# Patient Record
Sex: FEMALE | Race: WHITE | NOT HISPANIC OR LATINO | Employment: OTHER | URBAN - METROPOLITAN AREA
[De-identification: names, ages, dates, MRNs, and addresses within clinical notes are randomized per-mention and may not be internally consistent; named-entity substitution may affect disease eponyms.]

---

## 2017-01-23 RX ORDER — TRIAMTERENE AND HYDROCHLOROTHIAZIDE 37.5; 25 MG/1; MG/1
TABLET ORAL
Qty: 90 TAB | Refills: 1 | Status: SHIPPED | OUTPATIENT
Start: 2017-01-23 | End: 2017-07-28 | Stop reason: SDUPTHER

## 2017-01-24 NOTE — TELEPHONE ENCOUNTER
Refill X 6 months, sent to pharmacy.Pt. Seen in the last 6 months per protocol.   Lab Results   Component Value Date/Time    SODIUM 138 10/26/2016 07:42 AM    POTASSIUM 4.5 10/26/2016 07:42 AM    CHLORIDE 105 10/26/2016 07:42 AM    CO2 22 10/26/2016 07:42 AM    GLUCOSE 105* 10/26/2016 07:42 AM    BUN 20 10/26/2016 07:42 AM    CREATININE 1.11 10/26/2016 07:42 AM

## 2017-01-24 NOTE — TELEPHONE ENCOUNTER
Was the patient seen in the last year in this department? Yes     Does patient have an active prescription for medications requested? No     Received Request Via: Pharmacy      Pt met protocol?: Yes    LAST OV 12/08/16    BP Readings from Last 1 Encounters:   12/08/16 138/62

## 2017-03-07 RX ORDER — LISINOPRIL 40 MG/1
TABLET ORAL
Qty: 90 TAB | Refills: 1 | Status: SHIPPED | OUTPATIENT
Start: 2017-03-07 | End: 2017-09-20 | Stop reason: SDUPTHER

## 2017-03-07 RX ORDER — AMLODIPINE BESYLATE 5 MG/1
TABLET ORAL
Qty: 90 TAB | Refills: 1 | Status: SHIPPED | OUTPATIENT
Start: 2017-03-07 | End: 2017-09-20 | Stop reason: SDUPTHER

## 2017-03-07 NOTE — TELEPHONE ENCOUNTER
Was the patient seen in the last year in this department? Yes     Does patient have an active prescription for medications requested? No     Received Request Via: Pharmacy      Pt met protocol?: Yes    BP Readings from Last 1 Encounters:   12/08/16 138/62

## 2017-06-28 ENCOUNTER — HOSPITAL ENCOUNTER (OUTPATIENT)
Dept: LAB | Facility: MEDICAL CENTER | Age: 82
End: 2017-06-28
Attending: NURSE PRACTITIONER
Payer: MEDICARE

## 2017-06-28 DIAGNOSIS — E78.5 HYPERLIPIDEMIA, UNSPECIFIED HYPERLIPIDEMIA TYPE: ICD-10-CM

## 2017-06-28 LAB
ALBUMIN SERPL BCP-MCNC: 4.3 G/DL (ref 3.2–4.9)
ALBUMIN/GLOB SERPL: 1.4 G/DL
ALP SERPL-CCNC: 53 U/L (ref 30–99)
ALT SERPL-CCNC: 12 U/L (ref 2–50)
ANION GAP SERPL CALC-SCNC: 7 MMOL/L (ref 0–11.9)
AST SERPL-CCNC: 19 U/L (ref 12–45)
BILIRUB SERPL-MCNC: 0.3 MG/DL (ref 0.1–1.5)
BUN SERPL-MCNC: 32 MG/DL (ref 8–22)
CALCIUM SERPL-MCNC: 9.4 MG/DL (ref 8.5–10.5)
CHLORIDE SERPL-SCNC: 108 MMOL/L (ref 96–112)
CHOLEST SERPL-MCNC: 114 MG/DL (ref 100–199)
CO2 SERPL-SCNC: 21 MMOL/L (ref 20–33)
CREAT SERPL-MCNC: 1.18 MG/DL (ref 0.5–1.4)
GFR SERPL CREATININE-BSD FRML MDRD: 44 ML/MIN/1.73 M 2
GLOBULIN SER CALC-MCNC: 3 G/DL (ref 1.9–3.5)
GLUCOSE SERPL-MCNC: 122 MG/DL (ref 65–99)
HDLC SERPL-MCNC: 33 MG/DL
LDLC SERPL CALC-MCNC: 60 MG/DL
POTASSIUM SERPL-SCNC: 4.6 MMOL/L (ref 3.6–5.5)
PROT SERPL-MCNC: 7.3 G/DL (ref 6–8.2)
SODIUM SERPL-SCNC: 136 MMOL/L (ref 135–145)
TRIGL SERPL-MCNC: 103 MG/DL (ref 0–149)

## 2017-06-28 PROCEDURE — 80053 COMPREHEN METABOLIC PANEL: CPT

## 2017-06-28 PROCEDURE — 36415 COLL VENOUS BLD VENIPUNCTURE: CPT

## 2017-06-28 PROCEDURE — 80061 LIPID PANEL: CPT

## 2017-06-30 ENCOUNTER — TELEPHONE (OUTPATIENT)
Dept: MEDICAL GROUP | Facility: PHYSICIAN GROUP | Age: 82
End: 2017-06-30

## 2017-06-30 NOTE — TELEPHONE ENCOUNTER
----- Message from CHACORTA Lambert sent at 6/29/2017  7:41 PM PDT -----  Please call patient. I have reviewed their most recent labs and noticed some abnormalities such as elevated glucose and decreased kidney function that are essentially the same as previous labs. Please follow up in 6 months as discussed

## 2017-07-25 NOTE — TELEPHONE ENCOUNTER
Was the patient seen in the last year in this department? Yes 12/08/16    Does patient have an active prescription for medications requested? No     Received Request Via: Pharmacy

## 2017-07-26 RX ORDER — TRIAMTERENE AND HYDROCHLOROTHIAZIDE 37.5; 25 MG/1; MG/1
TABLET ORAL
Qty: 90 TAB | Refills: 1 | Status: SHIPPED | OUTPATIENT
Start: 2017-07-26 | End: 2018-05-14 | Stop reason: SDUPTHER

## 2017-07-26 NOTE — TELEPHONE ENCOUNTER
Refill X 6 months, sent to pharmacy.Pt. Seen in the last 6 months per protocol.   Lab Results   Component Value Date/Time    SODIUM 136 06/28/2017 07:33 AM    POTASSIUM 4.6 06/28/2017 07:33 AM    CHLORIDE 108 06/28/2017 07:33 AM    CO2 21 06/28/2017 07:33 AM    GLUCOSE 122* 06/28/2017 07:33 AM    BUN 32* 06/28/2017 07:33 AM    CREATININE 1.18 06/28/2017 07:33 AM

## 2017-07-28 RX ORDER — TRIAMTERENE AND HYDROCHLOROTHIAZIDE 37.5; 25 MG/1; MG/1
TABLET ORAL
Qty: 90 TAB | Refills: 0 | Status: SHIPPED | OUTPATIENT
Start: 2017-07-28 | End: 2017-12-13

## 2017-07-28 NOTE — TELEPHONE ENCOUNTER
Was the patient seen in the last year in this department? No     Does patient have an active prescription for medications requested? Yes     Received Request Via: Patient

## 2017-09-21 NOTE — TELEPHONE ENCOUNTER
Was the patient seen in the last year in this department? Yes     Does patient have an active prescription for medications requested? No     Received Request Via: Pharmacy      Pt met protocol?: Yes, last ov 12/8/16   BP Readings from Last 1 Encounters:   12/08/16 138/62   last labs 9/28/17

## 2017-09-22 RX ORDER — LISINOPRIL 40 MG/1
TABLET ORAL
Qty: 90 TAB | Refills: 0 | Status: SHIPPED | OUTPATIENT
Start: 2017-09-22 | End: 2018-01-02 | Stop reason: SDUPTHER

## 2017-09-22 RX ORDER — AMLODIPINE BESYLATE 5 MG/1
TABLET ORAL
Qty: 90 TAB | Refills: 0 | Status: SHIPPED | OUTPATIENT
Start: 2017-09-22 | End: 2018-01-03 | Stop reason: SDUPTHER

## 2017-10-03 ENCOUNTER — APPOINTMENT (RX ONLY)
Dept: URBAN - METROPOLITAN AREA CLINIC 4 | Facility: CLINIC | Age: 82
Setting detail: DERMATOLOGY
End: 2017-10-03

## 2017-10-03 DIAGNOSIS — L82.1 OTHER SEBORRHEIC KERATOSIS: ICD-10-CM

## 2017-10-03 DIAGNOSIS — B35.1 TINEA UNGUIUM: ICD-10-CM

## 2017-10-03 DIAGNOSIS — L21.8 OTHER SEBORRHEIC DERMATITIS: ICD-10-CM

## 2017-10-03 DIAGNOSIS — D22 MELANOCYTIC NEVI: ICD-10-CM

## 2017-10-03 DIAGNOSIS — D18.0 HEMANGIOMA: ICD-10-CM

## 2017-10-03 DIAGNOSIS — L81.4 OTHER MELANIN HYPERPIGMENTATION: ICD-10-CM

## 2017-10-03 PROBLEM — I10 ESSENTIAL (PRIMARY) HYPERTENSION: Status: ACTIVE | Noted: 2017-10-03

## 2017-10-03 PROBLEM — M12.9 ARTHROPATHY, UNSPECIFIED: Status: ACTIVE | Noted: 2017-10-03

## 2017-10-03 PROBLEM — Z85.828 PERSONAL HISTORY OF OTHER MALIGNANT NEOPLASM OF SKIN: Status: ACTIVE | Noted: 2017-10-03

## 2017-10-03 PROBLEM — D22.9 MELANOCYTIC NEVI, UNSPECIFIED: Status: ACTIVE | Noted: 2017-10-03

## 2017-10-03 PROBLEM — D18.01 HEMANGIOMA OF SKIN AND SUBCUTANEOUS TISSUE: Status: ACTIVE | Noted: 2017-10-03

## 2017-10-03 PROBLEM — L55.1 SUNBURN OF SECOND DEGREE: Status: ACTIVE | Noted: 2017-10-03

## 2017-10-03 PROBLEM — K21.9 GASTRO-ESOPHAGEAL REFLUX DISEASE WITHOUT ESOPHAGITIS: Status: ACTIVE | Noted: 2017-10-03

## 2017-10-03 PROCEDURE — 99214 OFFICE O/P EST MOD 30 MIN: CPT

## 2017-10-03 PROCEDURE — ? COUNSELING

## 2017-10-03 ASSESSMENT — LOCATION ZONE DERM
LOCATION ZONE: TOENAIL
LOCATION ZONE: TOE

## 2017-10-03 ASSESSMENT — LOCATION SIMPLE DESCRIPTION DERM
LOCATION SIMPLE: RIGHT GREAT TOE
LOCATION SIMPLE: LEFT GREAT TOE

## 2017-10-03 ASSESSMENT — LOCATION DETAILED DESCRIPTION DERM
LOCATION DETAILED: RIGHT GREAT TOENAIL
LOCATION DETAILED: LEFT DORSAL GREAT TOE

## 2017-12-01 DIAGNOSIS — Z76.0 MEDICATION REFILL: ICD-10-CM

## 2017-12-01 DIAGNOSIS — E78.5 HYPERLIPIDEMIA, UNSPECIFIED HYPERLIPIDEMIA TYPE: ICD-10-CM

## 2017-12-02 NOTE — TELEPHONE ENCOUNTER
Was the patient seen in the last year in this department? Yes     Does patient have an active prescription for medications requested? No     Received Request Via: Pharmacy      Pt met protocol?: Yes pt last has an appt coming up on 12/5/17   Lab Results   Component Value Date/Time    HBA1C 6.1 (H) 06/16/2016 07:49 AM

## 2017-12-04 RX ORDER — ROSUVASTATIN CALCIUM 5 MG/1
TABLET, COATED ORAL
Qty: 90 TAB | Refills: 0 | Status: SHIPPED | OUTPATIENT
Start: 2017-12-04 | End: 2018-02-27 | Stop reason: SDUPTHER

## 2017-12-13 ENCOUNTER — OFFICE VISIT (OUTPATIENT)
Dept: MEDICAL GROUP | Facility: PHYSICIAN GROUP | Age: 82
End: 2017-12-13
Payer: MEDICARE

## 2017-12-13 VITALS
SYSTOLIC BLOOD PRESSURE: 128 MMHG | RESPIRATION RATE: 14 BRPM | TEMPERATURE: 97.5 F | DIASTOLIC BLOOD PRESSURE: 62 MMHG | HEIGHT: 61 IN | WEIGHT: 159 LBS | OXYGEN SATURATION: 95 % | HEART RATE: 87 BPM | BODY MASS INDEX: 30.02 KG/M2

## 2017-12-13 DIAGNOSIS — E78.5 HYPERLIPIDEMIA, UNSPECIFIED HYPERLIPIDEMIA TYPE: ICD-10-CM

## 2017-12-13 DIAGNOSIS — R73.03 PRE-DIABETES: ICD-10-CM

## 2017-12-13 DIAGNOSIS — I10 ESSENTIAL HYPERTENSION: ICD-10-CM

## 2017-12-13 PROCEDURE — 99214 OFFICE O/P EST MOD 30 MIN: CPT | Performed by: NURSE PRACTITIONER

## 2017-12-13 ASSESSMENT — PATIENT HEALTH QUESTIONNAIRE - PHQ9: CLINICAL INTERPRETATION OF PHQ2 SCORE: 0

## 2017-12-26 NOTE — ASSESSMENT & PLAN NOTE
Chronic condition: Patient's treated for hypertension with amlodipine,lisinopril and triamterene HCTZ and blood pressure is controlled. Blood pressure today is 128/62. Patient denies any chest pain, diaphoresis, palpitations, shortness of breath, headaches or dizziness.

## 2017-12-26 NOTE — PROGRESS NOTES
Subjective:     Chief Complaint   Patient presents with   • Medication Management       HPI:  Radha Verduzco is a 82 y.o. female here today to discuss the following:    Hyperlipidemia  Chronic Condition: Patient has hyperlipidemia and is currently taking rosuvastatin. She denies any chest pain, diaphoresis, shortness of breath, headaches, dizziness, blurred vision or myalgias.   Lab Results   Component Value Date/Time    CHOLSTRLTOT 114 06/28/2017 07:33 AM    LDL 60 06/28/2017 07:33 AM    HDL 33 (A) 06/28/2017 07:33 AM    TRIGLYCERIDE 103 06/28/2017 07:33 AM       Lab Results   Component Value Date/Time    SODIUM 136 06/28/2017 07:33 AM    POTASSIUM 4.6 06/28/2017 07:33 AM    CHLORIDE 108 06/28/2017 07:33 AM    CO2 21 06/28/2017 07:33 AM    GLUCOSE 122 (H) 06/28/2017 07:33 AM    BUN 32 (H) 06/28/2017 07:33 AM    CREATININE 1.18 06/28/2017 07:33 AM     Lab Results   Component Value Date/Time    ALKPHOSPHAT 53 06/28/2017 07:33 AM    ASTSGOT 19 06/28/2017 07:33 AM    ALTSGPT 12 06/28/2017 07:33 AM    TBILIRUBIN 0.3 06/28/2017 07:33 AM        Pre-diabetes  Patient has a diagnosis of prediabetes. She is asymptomatic at this time. She is here for lab orders    Hypertension  Chronic condition: Patient's treated for hypertension with amlodipine,lisinopril and triamterene HCTZ and blood pressure is controlled. Blood pressure today is 128/62. Patient denies any chest pain, diaphoresis, palpitations, shortness of breath, headaches or dizziness.       Current medicines (including changes today)  Current Outpatient Prescriptions   Medication Sig Dispense Refill   • rosuvastatin (CRESTOR) 5 MG Tab TAKE ONE TABLET BY MOUTH DAILY 90 Tab 0   • amlodipine (NORVASC) 5 MG Tab TAKE ONE TABLET BY MOUTH DAILY 90 Tab 0   • lisinopril (PRINIVIL, ZESTRIL) 40 MG tablet TAKE ONE TABLET BY MOUTH DAILY 90 Tab 0   • triamterene-hctz (MAXZIDE-25/DYAZIDE) 37.5-25 MG Tab TAKE ONE TABLET BY MOUTH EVERY MORNING 90 Tab 1   • triamcinolone acetonide  "(KENALOG) 0.5 % Cream Apply to affected area twice daily. 60 g 1   • glucosamine 500 MG CAPS Take 500 mg by mouth 3 times a day.       • aspirin EC (ECOTRIN) 81 MG TBEC Take 81 mg by mouth every day.     • Naproxen Sodium (ALEVE PO) Take  by mouth.         No current facility-administered medications for this visit.        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    ROS   Review of Systems   Constitutional: Negative for fever, chills, weight loss and malaise/fatigue.   HENT: Negative for ear pain, nosebleeds, congestion, sore throat and neck pain.    Respiratory: Negative for cough, sputum production, shortness of breath and wheezing.    Cardiovascular: Negative for chest pain, palpitations,  and leg swelling.   Gastrointestinal: Negative for heartburn, nausea, vomiting, diarrhea and abdominal pain.   Neurological: Negative for dizziness, tingling, tremors, sensory change, focal weakness and headaches.   Psychiatric/Behavioral: Negative for depression, anxiety, suicidal ideas, insomnia and memory loss.    All other systems reviewed and are negative except as in HPI.   Objective:   Physical Exam:  Blood pressure 128/62, pulse 87, temperature 36.4 °C (97.5 °F), resp. rate 14, height 1.549 m (5' 0.98\"), weight 72.1 kg (159 lb), SpO2 95 %. Body mass index is 30.06 kg/m².   Physical Exam:  Alert, oriented in no acute distress.  Eye contact is good, speech goal directed, affect calm  HEENT: conjunctiva non-injected, sclera non-icteric.  Pinna normal.   Neck No adenopathy or masses in the neck or supraclavicular regions.  Lungs: clear to auscultation bilaterally with good excursion.  CV: regular rate and rhythm.   Abdomen: soft, nontender, No CVAT. Normal bowel sounds.  Ext: no edema, color normal, vascularity normal, temperature normal      Health Maintenance Due   Topic Date Due   • IMM " DTaP/Tdap/Td Vaccine (1 - Tdap) 03[birth year]29/1955   • PAP SMEAR  03/29/1957   • MAMMOGRAM  03/29/1976   • COLON CANCER SCREENING ANNUAL FIT  03/29/1986   • IMM ZOSTER VACCINE  03/29/1996   • BONE DENSITY  03/29/2001   • IMM PNEUMOCOCCAL 65+ (ADULT) LOW/MEDIUM RISK SERIES (1 of 2 - PCV13) 03/29/2001   • Annual Wellness Visit  12/19/2014   • IMM INFLUENZA (1) 09/01/2017     Assessment and Plan:   The following treatment plan was discussed   1. Hyperlipidemia, unspecified hyperlipidemia type  COMP METABOLIC PANEL    LIPID PROFILE   2. Essential hypertension     3. Pre-diabetes  HEMOGLOBIN A1C       Followup: Return in about 6 months (around 6/13/2018) for chronic disease management.   Please note that this dictation was created using voice recognition software. I have made every reasonable attempt to correct obvious errors, but I expect that there are errors of grammar and possibly content that I did not discover before finalizing the note.

## 2017-12-26 NOTE — ASSESSMENT & PLAN NOTE
Chronic Condition: Patient has hyperlipidemia and is currently taking rosuvastatin. She denies any chest pain, diaphoresis, shortness of breath, headaches, dizziness, blurred vision or myalgias.   Lab Results   Component Value Date/Time    CHOLSTRLTOT 114 06/28/2017 07:33 AM    LDL 60 06/28/2017 07:33 AM    HDL 33 (A) 06/28/2017 07:33 AM    TRIGLYCERIDE 103 06/28/2017 07:33 AM       Lab Results   Component Value Date/Time    SODIUM 136 06/28/2017 07:33 AM    POTASSIUM 4.6 06/28/2017 07:33 AM    CHLORIDE 108 06/28/2017 07:33 AM    CO2 21 06/28/2017 07:33 AM    GLUCOSE 122 (H) 06/28/2017 07:33 AM    BUN 32 (H) 06/28/2017 07:33 AM    CREATININE 1.18 06/28/2017 07:33 AM     Lab Results   Component Value Date/Time    ALKPHOSPHAT 53 06/28/2017 07:33 AM    ASTSGOT 19 06/28/2017 07:33 AM    ALTSGPT 12 06/28/2017 07:33 AM    TBILIRUBIN 0.3 06/28/2017 07:33 AM

## 2017-12-26 NOTE — ASSESSMENT & PLAN NOTE
Patient has a diagnosis of prediabetes. She is asymptomatic at this time. She is here for lab orders

## 2018-01-02 RX ORDER — INDOMETHACIN 50 MG/1
50 CAPSULE ORAL 2 TIMES DAILY WITH MEALS
Qty: 60 CAP | Refills: 0 | Status: ON HOLD | OUTPATIENT
Start: 2018-01-02 | End: 2018-12-28

## 2018-01-02 NOTE — TELEPHONE ENCOUNTER
Pt has not had this medication for several yrs but states she is having a gout flare up    Was the patient seen in the last year in this department? Yes     Does patient have an active prescription for medications requested? No     Received Request Via: Patient

## 2018-01-02 NOTE — TELEPHONE ENCOUNTER
Was the patient seen in the last year in this department?no 12/08/16    Does patient have an active prescription for medications requested? No     Received Request Via: Pharmacy

## 2018-01-03 RX ORDER — LISINOPRIL 40 MG/1
TABLET ORAL
Qty: 90 TAB | Refills: 1 | Status: SHIPPED | OUTPATIENT
Start: 2018-01-03 | End: 2018-07-10 | Stop reason: SDUPTHER

## 2018-01-04 RX ORDER — AMLODIPINE BESYLATE 5 MG/1
TABLET ORAL
Qty: 90 TAB | Refills: 1 | Status: SHIPPED | OUTPATIENT
Start: 2018-01-04 | End: 2018-07-10 | Stop reason: SDUPTHER

## 2018-01-04 NOTE — TELEPHONE ENCOUNTER
Was the patient seen in the last year in this department? Yes     Does patient have an active prescription for medications requested? No     Received Request Via: Pharmacy      Pt met protocol?: Yes, last ov 12/13/17  BP Readings from Last 1 Encounters:   12/13/17 128/62

## 2018-01-04 NOTE — TELEPHONE ENCOUNTER
Refill X 6 months, sent to pharmacy.Pt. Seen in the last 6 months per protocol.   Lab Results   Component Value Date/Time    SODIUM 136 06/28/2017 07:33 AM    POTASSIUM 4.6 06/28/2017 07:33 AM    CHLORIDE 108 06/28/2017 07:33 AM    CO2 21 06/28/2017 07:33 AM    GLUCOSE 122 (H) 06/28/2017 07:33 AM    BUN 32 (H) 06/28/2017 07:33 AM    CREATININE 1.18 06/28/2017 07:33 AM

## 2018-02-27 DIAGNOSIS — E78.5 HYPERLIPIDEMIA, UNSPECIFIED HYPERLIPIDEMIA TYPE: ICD-10-CM

## 2018-02-27 DIAGNOSIS — Z76.0 MEDICATION REFILL: ICD-10-CM

## 2018-02-28 RX ORDER — ROSUVASTATIN CALCIUM 5 MG/1
TABLET, COATED ORAL
Qty: 90 TAB | Refills: 1 | Status: SHIPPED | OUTPATIENT
Start: 2018-02-28 | End: 2018-08-27 | Stop reason: SDUPTHER

## 2018-02-28 NOTE — TELEPHONE ENCOUNTER
Pt has had OV within the 12 month protocol and lipid panel is current. 6 month supply sent to pharmacy.   Lab Results   Component Value Date/Time    CHOLSTRLTOT 114 06/28/2017 07:33 AM    LDL 60 06/28/2017 07:33 AM    HDL 33 (A) 06/28/2017 07:33 AM    TRIGLYCERIDE 103 06/28/2017 07:33 AM       Lab Results   Component Value Date/Time    SODIUM 136 06/28/2017 07:33 AM    POTASSIUM 4.6 06/28/2017 07:33 AM    CHLORIDE 108 06/28/2017 07:33 AM    CO2 21 06/28/2017 07:33 AM    GLUCOSE 122 (H) 06/28/2017 07:33 AM    BUN 32 (H) 06/28/2017 07:33 AM    CREATININE 1.18 06/28/2017 07:33 AM     Lab Results   Component Value Date/Time    ALKPHOSPHAT 53 06/28/2017 07:33 AM    ASTSGOT 19 06/28/2017 07:33 AM    ALTSGPT 12 06/28/2017 07:33 AM    TBILIRUBIN 0.3 06/28/2017 07:33 AM

## 2018-02-28 NOTE — TELEPHONE ENCOUNTER
Pt met protocol?: Yes pt last ov 12/17   Lab Results  Component Value Date/Time   CHOLSTRLTOT 114 06/28/2017 0733   TRIGLYCERIDE 103 06/28/2017 0733   HDL 33 (A) 06/28/2017 0733   LDL 60 06/28/2017 0733

## 2018-03-21 ENCOUNTER — HOSPITAL ENCOUNTER (OUTPATIENT)
Dept: LAB | Facility: MEDICAL CENTER | Age: 83
End: 2018-03-21
Attending: NURSE PRACTITIONER
Payer: MEDICARE

## 2018-03-21 DIAGNOSIS — R73.03 PRE-DIABETES: ICD-10-CM

## 2018-03-21 DIAGNOSIS — E78.5 HYPERLIPIDEMIA, UNSPECIFIED HYPERLIPIDEMIA TYPE: ICD-10-CM

## 2018-03-21 LAB
ALBUMIN SERPL BCP-MCNC: 4.2 G/DL (ref 3.2–4.9)
ALBUMIN/GLOB SERPL: 1.6 G/DL
ALP SERPL-CCNC: 48 U/L (ref 30–99)
ALT SERPL-CCNC: 15 U/L (ref 2–50)
ANION GAP SERPL CALC-SCNC: 11 MMOL/L (ref 0–11.9)
AST SERPL-CCNC: 18 U/L (ref 12–45)
BILIRUB SERPL-MCNC: 0.6 MG/DL (ref 0.1–1.5)
BUN SERPL-MCNC: 15 MG/DL (ref 8–22)
CALCIUM SERPL-MCNC: 9.5 MG/DL (ref 8.5–10.5)
CHLORIDE SERPL-SCNC: 104 MMOL/L (ref 96–112)
CHOLEST SERPL-MCNC: 103 MG/DL (ref 100–199)
CO2 SERPL-SCNC: 24 MMOL/L (ref 20–33)
CREAT SERPL-MCNC: 1.03 MG/DL (ref 0.5–1.4)
EST. AVERAGE GLUCOSE BLD GHB EST-MCNC: 143 MG/DL
GLOBULIN SER CALC-MCNC: 2.7 G/DL (ref 1.9–3.5)
GLUCOSE SERPL-MCNC: 159 MG/DL (ref 65–99)
HBA1C MFR BLD: 6.6 % (ref 0–5.6)
HDLC SERPL-MCNC: 35 MG/DL
LDLC SERPL CALC-MCNC: 44 MG/DL
POTASSIUM SERPL-SCNC: 3.2 MMOL/L (ref 3.6–5.5)
PROT SERPL-MCNC: 6.9 G/DL (ref 6–8.2)
SODIUM SERPL-SCNC: 139 MMOL/L (ref 135–145)
TRIGL SERPL-MCNC: 122 MG/DL (ref 0–149)

## 2018-03-21 PROCEDURE — 80061 LIPID PANEL: CPT

## 2018-03-21 PROCEDURE — 36415 COLL VENOUS BLD VENIPUNCTURE: CPT

## 2018-03-21 PROCEDURE — 83036 HEMOGLOBIN GLYCOSYLATED A1C: CPT | Mod: GA

## 2018-03-21 PROCEDURE — 80053 COMPREHEN METABOLIC PANEL: CPT

## 2018-04-19 ENCOUNTER — OFFICE VISIT (OUTPATIENT)
Dept: MEDICAL GROUP | Facility: PHYSICIAN GROUP | Age: 83
End: 2018-04-19
Payer: MEDICARE

## 2018-04-19 VITALS
RESPIRATION RATE: 14 BRPM | SYSTOLIC BLOOD PRESSURE: 122 MMHG | WEIGHT: 162.26 LBS | HEIGHT: 61 IN | DIASTOLIC BLOOD PRESSURE: 82 MMHG | TEMPERATURE: 98.1 F | OXYGEN SATURATION: 94 % | HEART RATE: 82 BPM | BODY MASS INDEX: 30.63 KG/M2

## 2018-04-19 DIAGNOSIS — E78.5 HYPERLIPIDEMIA, UNSPECIFIED HYPERLIPIDEMIA TYPE: ICD-10-CM

## 2018-04-19 DIAGNOSIS — E66.9 OBESITY (BMI 30-39.9): ICD-10-CM

## 2018-04-19 DIAGNOSIS — Z78.0 POST-MENOPAUSE: ICD-10-CM

## 2018-04-19 DIAGNOSIS — Z85.828 HISTORY OF SKIN CANCER: ICD-10-CM

## 2018-04-19 DIAGNOSIS — I10 ESSENTIAL HYPERTENSION: ICD-10-CM

## 2018-04-19 DIAGNOSIS — E11.9 TYPE 2 DIABETES MELLITUS WITHOUT COMPLICATION, WITHOUT LONG-TERM CURRENT USE OF INSULIN (HCC): ICD-10-CM

## 2018-04-19 PROCEDURE — 99214 OFFICE O/P EST MOD 30 MIN: CPT | Performed by: INTERNAL MEDICINE

## 2018-04-19 RX ORDER — PREDNISOLONE ACETATE 10 MG/ML
1 SUSPENSION/ DROPS OPHTHALMIC DAILY
COMMUNITY
Start: 2018-04-02 | End: 2020-01-23

## 2018-04-19 RX ORDER — POTASSIUM CHLORIDE 750 MG/1
10 TABLET, FILM COATED, EXTENDED RELEASE ORAL DAILY
Qty: 60 TAB | Refills: 1 | Status: ON HOLD | OUTPATIENT
Start: 2018-04-19 | End: 2018-12-28

## 2018-04-19 NOTE — ASSESSMENT & PLAN NOTE
Has a history of skin cancer on her nose but cannot remember which type. Is following with a dermatologist annually but can't remember the name or when her next follow up. She hasn't seen a dermatologist this year yet.

## 2018-04-19 NOTE — PATIENT INSTRUCTIONS
"· Please talk to your pharmacist about the Tdap vaccination   · Start daily fish oil     You have been identified as having a Body Mass Index over 30, which is characterized as \"obese\".  Focus on consuming a dietary pattern that emphasizes intake of vegetables, fruits, and whole grains; includes low-fat dairy products, poultry, fish, legumes, nontropical vegetable oils and nuts; and limits intake of sweets, sugar-sweetened beverages and red meats.   Look up Mediterranean diet for more information.  "

## 2018-04-19 NOTE — PROGRESS NOTES
[facility] NEW PATIENT H&P  Chief Complaint   Patient presents with   • Diabetes     History of Present Illness     Hypertension  She's on Maxzide, lisinopril, and amlodipine. Denies dizziness and lightheadedness. Her most recent potassium was 3.2. She's eating a banana every day since receiving this result.     Hyperlipidemia  On crestor 5 mg daily. Her most recent HDL was 35.     History of skin cancer  Has a history of skin cancer on her nose but cannot remember which type. Is following with a dermatologist annually but can't remember the name or when her next follow up. She hasn't seen a dermatologist this year yet.     Diabetes (CMS-HCC)  Most recent HgbA1c 6.6%. She does admit weight gain.     Current Outpatient Prescriptions   Medication Sig Dispense Refill   • potassium chloride ER (KLOR-CON) 10 MEQ tablet Take 1 Tab by mouth every day. 60 Tab 1   • rosuvastatin (CRESTOR) 5 MG Tab TAKE ONE TABLET BY MOUTH DAILY 90 Tab 1   • amlodipine (NORVASC) 5 MG Tab TAKE ONE TABLET BY MOUTH DAILY 90 Tab 1   • lisinopril (PRINIVIL, ZESTRIL) 40 MG tablet TAKE ONE TABLET BY MOUTH DAILY 90 Tab 1   • triamterene-hctz (MAXZIDE-25/DYAZIDE) 37.5-25 MG Tab TAKE ONE TABLET BY MOUTH EVERY MORNING 90 Tab 1   • glucosamine 500 MG CAPS Take 500 mg by mouth 3 times a day.       • aspirin EC (ECOTRIN) 81 MG TBEC Take 81 mg by mouth every day.     • prednisoLONE acetate (PRED FORTE) 1 % Suspension      • indomethacin (INDOCIN) 50 MG Cap Take 1 Cap by mouth 2 times a day, with meals. 60 Cap 0   • triamcinolone acetonide (KENALOG) 0.5 % Cream Apply to affected area twice daily. 60 g 1   • Naproxen Sodium (ALEVE PO) Take  by mouth.         No current facility-administered medications for this visit.        Past Medical History:   Diagnosis Date   • Carotid artery disease (CMS-HCC) 8/2/2010   • CPPD (pseudo-gout) 4/7/2011   • Diabetes (CMS-HCC) 4/19/2018   • GOUT 1/31/2011   • History of skin cancer 2/24/2016   • Hyperlipidemia  "2010   • Hypertension 2010   • PAD (peripheral artery disease) 2010   • Pre-diabetes 2010     Past Surgical History:   Procedure Laterality Date   • AORTOFEMORAL BYPASS     • CAROTID ENDARTERECTOMY      right   • CATARACT PHACO WITH IOL     • NEUROMA EXCISION      in  from foot   • TOENAIL REMOVAL      in ,      Social History   Substance Use Topics   • Smoking status: Former Smoker     Packs/day: 2.00     Years: 12.00     Types: Cigarettes     Quit date:    • Smokeless tobacco: Never Used   • Alcohol use 0.0 oz/week      Comment: rarely maybe one a year     Social History     Social History Narrative    Lives with her son      Family History   Problem Relation Age of Onset   • Lung Disease Mother      TB   • Stroke Father      Family Status   Relation Status   • Mother    • Father      Allergies: Anesthetic [benzocaine]; Iodine; and Other drug    ROS  Constitutional: Negative for fatigue/generalized weakness.   HEENT: Negative for  vision changes, hearing changes    Respiratory: Negative for shortness of breath  Cardiovascular: Negative for chest pain, palpitations  Gastrointestinal: Negative for blood in stool, constipation, diarrhea  Genitourinary: Negative for dysuria, polyuria  Musculoskeletal: Negative for myalgias, back pain, and joint pain.   Skin: Negative for rash  Neurological: Negative for numbness, tingling  Psychiatric/Behavioral: Negative for depression, anxiety     Objective   Blood pressure 122/82, pulse 82, temperature 36.7 °C (98.1 °F), resp. rate 14, height 1.549 m (5' 0.98\"), weight 73.6 kg (162 lb 4.1 oz), SpO2 94 %. Body mass index is 30.68 kg/m².    General: Alert, oriented. In no acute distress   HEET: EOMI, PERRL, conjunctiva non-injected, sclera non-icteric.  Nares patent with no significant congestion or drainage.  Shea pinnae, external auditory canals, TM pearly gray with normal light reflex bilaterally.Oral mucous membranes pink and moist " with no lesions. Deformity of tip of nose   Neck: supple with no cervical, subclavicular lymphadenopathy, JVD, palpable thyroid nodules   Lungs: clear to auscultation bilaterally with good excursion.  CV: regular rate and rhythm.  Abdomen soft, non-distended, non-tender with normal bowel sounds. No hepatosplenomegaly, no masses palpated  Skin: no lesions. Warm, dry   Psychiatric: appropriate mood and affect       Assessment and Plan   The following treatment plan was discussed     1. Type 2 diabetes mellitus without complication, without long-term current use of insulin (CMS-[facility])  Her most recently A1c is 6.6% but she is well within goal A1c of < 8% for her age so wouldn't start any oral agents. Counseled on lowering carbohydrate intake and weight loss.     2. Obesity (BMI 30-39.9)  Counseled as above.   - Patient identified as having weight management issue.  Appropriate orders and counseling given.    3. Essential hypertension  Currently well controlled however her potassium was only 3.2 on Maxzide. Will start on low dose daily potassium and recheck labs; patient says given her insurance she can't have these labs checked for another 2 months.   - potassium chloride ER (KLOR-CON) 10 MEQ tablet; Take 1 Tab by mouth every day.  Dispense: 60 Tab; Refill: 1  - BASIC METABOLIC PANEL; Future    4. Hyperlipidemia, unspecified hyperlipidemia type  Informed her of her low HDL and counseled to start fish oil. Continue crestor 5 mg daily.     Lab Results   Component Value Date/Time    CHOLSTRLTOT 103 03/21/2018 08:49 AM    LDL 44 03/21/2018 08:49 AM    HDL 35 (A) 03/21/2018 08:49 AM    TRIGLYCERIDE 122 03/21/2018 08:49 AM     5. History of skin cancer  Following with her dermatologist annually.     6. Post-menopause  Due for DEXA, ordered.   - DS-BONE DENSITY STUDY (DEXA)      Return in about 6 months (around 10/19/2018).    Health Maintenance      Health Maintenance Due   Topic Date Due   • IMM DTaP/Tdap/Td Vaccine (1 -  Tdap) 03/29/1954   • BONE DENSITY  03/29/2001   • Annual Wellness Visit  12/19/2014     Instructed her to ask her pharmacy about the Tdap, since it will run her cheaper there (she is on Medicare)     Bong Quick MD  Internal Medicine  [facility]

## 2018-04-19 NOTE — ASSESSMENT & PLAN NOTE
She's on Maxzide, lisinopril, and amlodipine. Denies dizziness and lightheadedness. Her most recent potassium was 3.2. She's eating a banana every day since receiving this result.

## 2018-05-15 NOTE — TELEPHONE ENCOUNTER
Was the patient seen in the last year in this department? Yes     Does patient have an active prescription for medications requested? No     Received Request Via: Pharmacy      Pt met protocol?: Yes    LAST OV 04/19/2018    BP Readings from Last 1 Encounters:   04/19/18 122/82     Lab Results   Component Value Date/Time    CHOLSTRLTOT 103 03/21/2018 08:49 AM    LDL 44 03/21/2018 08:49 AM    HDL 35 (A) 03/21/2018 08:49 AM    TRIGLYCERIDE 122 03/21/2018 08:49 AM       Lab Results   Component Value Date/Time    SODIUM 139 03/21/2018 08:49 AM    POTASSIUM 3.2 (L) 03/21/2018 08:49 AM    CHLORIDE 104 03/21/2018 08:49 AM    CO2 24 03/21/2018 08:49 AM    GLUCOSE 159 (H) 03/21/2018 08:49 AM    BUN 15 03/21/2018 08:49 AM    CREATININE 1.03 03/21/2018 08:49 AM     Lab Results   Component Value Date/Time    ALKPHOSPHAT 48 03/21/2018 08:49 AM    ASTSGOT 18 03/21/2018 08:49 AM    ALTSGPT 15 03/21/2018 08:49 AM    TBILIRUBIN 0.6 03/21/2018 08:49 AM

## 2018-05-16 RX ORDER — TRIAMTERENE AND HYDROCHLOROTHIAZIDE 37.5; 25 MG/1; MG/1
TABLET ORAL
Qty: 90 TAB | Refills: 1 | Status: SHIPPED | OUTPATIENT
Start: 2018-05-16 | End: 2018-11-23 | Stop reason: SDUPTHER

## 2018-05-16 NOTE — TELEPHONE ENCOUNTER
Refill X 6 months, sent to pharmacy.Pt. Seen in the last 6 months per protocol.   Lab Results   Component Value Date/Time    SODIUM 139 03/21/2018 08:49 AM    POTASSIUM 3.2 (L) 03/21/2018 08:49 AM    CHLORIDE 104 03/21/2018 08:49 AM    CO2 24 03/21/2018 08:49 AM    GLUCOSE 159 (H) 03/21/2018 08:49 AM    BUN 15 03/21/2018 08:49 AM    CREATININE 1.03 03/21/2018 08:49 AM       BP Readings from Last 1 Encounters:   04/19/18 122/82

## 2018-07-10 RX ORDER — LISINOPRIL 40 MG/1
TABLET ORAL
Qty: 90 TAB | Refills: 0 | Status: SHIPPED | OUTPATIENT
Start: 2018-07-10 | End: 2018-10-04 | Stop reason: SDUPTHER

## 2018-07-10 NOTE — TELEPHONE ENCOUNTER
Was the patient seen in the last year in this department? Yes     Does patient have an active prescription for medications requested? No     Received Request Via: Pharmacy      Pt met protocol?: Yes    OV 4/18    BP Readings from Last 1 Encounters:   04/19/18 122/82

## 2018-07-11 RX ORDER — AMLODIPINE BESYLATE 5 MG/1
TABLET ORAL
Qty: 90 TAB | Refills: 0 | Status: SHIPPED | OUTPATIENT
Start: 2018-07-11 | End: 2018-10-04 | Stop reason: SDUPTHER

## 2018-07-27 ENCOUNTER — HOSPITAL ENCOUNTER (OUTPATIENT)
Dept: LAB | Facility: MEDICAL CENTER | Age: 83
End: 2018-07-27
Attending: INTERNAL MEDICINE
Payer: MEDICARE

## 2018-07-27 DIAGNOSIS — I10 ESSENTIAL HYPERTENSION: ICD-10-CM

## 2018-07-27 LAB
ANION GAP SERPL CALC-SCNC: 12 MMOL/L (ref 0–11.9)
BUN SERPL-MCNC: 26 MG/DL (ref 8–22)
CALCIUM SERPL-MCNC: 9.6 MG/DL (ref 8.5–10.5)
CHLORIDE SERPL-SCNC: 105 MMOL/L (ref 96–112)
CO2 SERPL-SCNC: 21 MMOL/L (ref 20–33)
CREAT SERPL-MCNC: 1.27 MG/DL (ref 0.5–1.4)
GLUCOSE SERPL-MCNC: 164 MG/DL (ref 65–99)
POTASSIUM SERPL-SCNC: 4.2 MMOL/L (ref 3.6–5.5)
SODIUM SERPL-SCNC: 138 MMOL/L (ref 135–145)

## 2018-07-27 PROCEDURE — 36415 COLL VENOUS BLD VENIPUNCTURE: CPT

## 2018-07-27 PROCEDURE — 80048 BASIC METABOLIC PNL TOTAL CA: CPT

## 2018-08-27 DIAGNOSIS — Z76.0 MEDICATION REFILL: ICD-10-CM

## 2018-08-27 DIAGNOSIS — E78.5 HYPERLIPIDEMIA, UNSPECIFIED HYPERLIPIDEMIA TYPE: ICD-10-CM

## 2018-08-29 RX ORDER — ROSUVASTATIN CALCIUM 5 MG/1
TABLET, COATED ORAL
Qty: 90 TAB | Refills: 1 | Status: SHIPPED | OUTPATIENT
Start: 2018-08-29 | End: 2019-03-26 | Stop reason: SDUPTHER

## 2018-08-29 NOTE — TELEPHONE ENCOUNTER
Pt has had OV within the 12 month protocol and lipid panel is current. 6 month supply sent to pharmacy.   Lab Results   Component Value Date/Time    CHOLSTRLTOT 103 03/21/2018 08:49 AM    LDL 44 03/21/2018 08:49 AM    HDL 35 (A) 03/21/2018 08:49 AM    TRIGLYCERIDE 122 03/21/2018 08:49 AM       Lab Results   Component Value Date/Time    SODIUM 138 07/27/2018 07:45 AM    POTASSIUM 4.2 07/27/2018 07:45 AM    CHLORIDE 105 07/27/2018 07:45 AM    CO2 21 07/27/2018 07:45 AM    GLUCOSE 164 (H) 07/27/2018 07:45 AM    BUN 26 (H) 07/27/2018 07:45 AM    CREATININE 1.27 07/27/2018 07:45 AM     Lab Results   Component Value Date/Time    ALKPHOSPHAT 48 03/21/2018 08:49 AM    ASTSGOT 18 03/21/2018 08:49 AM    ALTSGPT 15 03/21/2018 08:49 AM    TBILIRUBIN 0.6 03/21/2018 08:49 AM

## 2018-10-05 RX ORDER — LISINOPRIL 40 MG/1
TABLET ORAL
Qty: 90 TAB | Refills: 1 | Status: SHIPPED | OUTPATIENT
Start: 2018-10-05 | End: 2019-05-01 | Stop reason: SDUPTHER

## 2018-10-05 RX ORDER — AMLODIPINE BESYLATE 5 MG/1
TABLET ORAL
Qty: 90 TAB | Refills: 1 | Status: SHIPPED | OUTPATIENT
Start: 2018-10-05 | End: 2019-05-01 | Stop reason: SDUPTHER

## 2018-10-05 NOTE — TELEPHONE ENCOUNTER
Was the patient seen in the last year in this department? Yes    Does patient have an active prescription for medications requested? No     Received Request Via: Pharmacy      Pt met protocol?: Yes    LAST OV 04/19/2018    BP Readings from Last 1 Encounters:   04/19/18 122/82     Lab Results   Component Value Date/Time    SODIUM 138 07/27/2018 07:45 AM    POTASSIUM 4.2 07/27/2018 07:45 AM    CHLORIDE 105 07/27/2018 07:45 AM    CO2 21 07/27/2018 07:45 AM    GLUCOSE 164 (H) 07/27/2018 07:45 AM    BUN 26 (H) 07/27/2018 07:45 AM    CREATININE 1.27 07/27/2018 07:45 AM

## 2018-10-05 NOTE — TELEPHONE ENCOUNTER
Refill X 6 months, sent to pharmacy.Pt. Seen in the last 6 months per protocol.   Lab Results   Component Value Date/Time    SODIUM 138 07/27/2018 07:45 AM    POTASSIUM 4.2 07/27/2018 07:45 AM    CHLORIDE 105 07/27/2018 07:45 AM    CO2 21 07/27/2018 07:45 AM    GLUCOSE 164 (H) 07/27/2018 07:45 AM    BUN 26 (H) 07/27/2018 07:45 AM    CREATININE 1.27 07/27/2018 07:45 AM

## 2018-11-27 RX ORDER — TRIAMTERENE AND HYDROCHLOROTHIAZIDE 37.5; 25 MG/1; MG/1
TABLET ORAL
Qty: 90 TAB | Refills: 0 | Status: ON HOLD | OUTPATIENT
Start: 2018-11-27 | End: 2018-12-28

## 2018-11-27 NOTE — TELEPHONE ENCOUNTER
Phone Number Called:[phone]    Message: pt. Informed didn't want to schedule appt. yet, will call back later.     Left Message for patient to call back: no

## 2018-11-27 NOTE — TELEPHONE ENCOUNTER
Was the patient seen in the last year in this department? Yes    Does patient have an active prescription for medications requested? No     Received Request Via: Pharmacy      Pt met protocol?: No  Pt last ov 4/2018  BP Readings from Last 1 Encounters:   04/19/18 122/82

## 2018-12-19 ENCOUNTER — HOSPITAL ENCOUNTER (OUTPATIENT)
Dept: RADIOLOGY | Facility: MEDICAL CENTER | Age: 83
End: 2018-12-19

## 2018-12-19 ENCOUNTER — HOSPITAL ENCOUNTER (INPATIENT)
Facility: MEDICAL CENTER | Age: 83
LOS: 9 days | DRG: 253 | End: 2018-12-28
Attending: EMERGENCY MEDICINE | Admitting: SURGERY
Payer: MEDICARE

## 2018-12-19 ENCOUNTER — APPOINTMENT (OUTPATIENT)
Dept: RADIOLOGY | Facility: MEDICAL CENTER | Age: 83
DRG: 253 | End: 2018-12-19
Attending: ANESTHESIOLOGY
Payer: MEDICARE

## 2018-12-19 DIAGNOSIS — I70.90 ARTERIAL OCCLUSION: ICD-10-CM

## 2018-12-19 DIAGNOSIS — G89.18 POSTOPERATIVE PAIN: ICD-10-CM

## 2018-12-19 DIAGNOSIS — I99.8 LOWER LIMB ISCHEMIA: Primary | ICD-10-CM

## 2018-12-19 PROBLEM — E11.29 TYPE 2 DIABETES MELLITUS WITH KIDNEY COMPLICATION, WITHOUT LONG-TERM CURRENT USE OF INSULIN (HCC): Status: ACTIVE | Noted: 2018-04-19

## 2018-12-19 PROBLEM — D72.829 LEUKOCYTOSIS: Status: ACTIVE | Noted: 2018-12-19

## 2018-12-19 PROBLEM — R79.89 ELEVATED LACTIC ACID LEVEL: Status: ACTIVE | Noted: 2018-12-19

## 2018-12-19 PROBLEM — N18.30 CKD (CHRONIC KIDNEY DISEASE) STAGE 3, GFR 30-59 ML/MIN: Status: ACTIVE | Noted: 2018-12-19

## 2018-12-19 LAB
ACT BLD: 224 SEC (ref 74–137)
ANION GAP SERPL CALC-SCNC: 11 MMOL/L (ref 0–11.9)
APTT PPP: 120.8 SEC (ref 24.7–36)
APTT PPP: 28 SEC (ref 24.7–36)
BASOPHILS # BLD AUTO: 0.1 % (ref 0–1.8)
BASOPHILS # BLD AUTO: 0.2 % (ref 0–1.8)
BASOPHILS # BLD: 0.01 K/UL (ref 0–0.12)
BASOPHILS # BLD: 0.04 K/UL (ref 0–0.12)
BUN SERPL-MCNC: 21 MG/DL (ref 8–22)
CALCIUM SERPL-MCNC: 8.9 MG/DL (ref 8.5–10.5)
CHLORIDE SERPL-SCNC: 106 MMOL/L (ref 96–112)
CO2 SERPL-SCNC: 20 MMOL/L (ref 20–33)
CREAT SERPL-MCNC: 1 MG/DL (ref 0.5–1.4)
EKG IMPRESSION: NORMAL
EOSINOPHIL # BLD AUTO: 0 K/UL (ref 0–0.51)
EOSINOPHIL # BLD AUTO: 0 K/UL (ref 0–0.51)
EOSINOPHIL NFR BLD: 0 % (ref 0–6.9)
EOSINOPHIL NFR BLD: 0 % (ref 0–6.9)
ERYTHROCYTE [DISTWIDTH] IN BLOOD BY AUTOMATED COUNT: 48 FL (ref 35.9–50)
ERYTHROCYTE [DISTWIDTH] IN BLOOD BY AUTOMATED COUNT: 51.7 FL (ref 35.9–50)
GLUCOSE BLD-MCNC: 160 MG/DL (ref 65–99)
GLUCOSE BLD-MCNC: 164 MG/DL (ref 65–99)
GLUCOSE BLD-MCNC: 187 MG/DL (ref 65–99)
GLUCOSE SERPL-MCNC: 194 MG/DL (ref 65–99)
HCT VFR BLD AUTO: 27.9 % (ref 37–47)
HCT VFR BLD AUTO: 42.7 % (ref 37–47)
HGB BLD-MCNC: 14.3 G/DL (ref 12–16)
HGB BLD-MCNC: 9 G/DL (ref 12–16)
IMM GRANULOCYTES # BLD AUTO: 0.08 K/UL (ref 0–0.11)
IMM GRANULOCYTES # BLD AUTO: 0.15 K/UL (ref 0–0.11)
IMM GRANULOCYTES NFR BLD AUTO: 0.5 % (ref 0–0.9)
IMM GRANULOCYTES NFR BLD AUTO: 0.8 % (ref 0–0.9)
INR PPP: 1.28 (ref 0.87–1.13)
INR PPP: 1.45 (ref 0.87–1.13)
LACTATE BLD-SCNC: 1.6 MMOL/L (ref 0.5–2)
LACTATE BLD-SCNC: 1.7 MMOL/L (ref 0.5–2)
LACTATE BLD-SCNC: 3 MMOL/L (ref 0.5–2)
LYMPHOCYTES # BLD AUTO: 0.79 K/UL (ref 1–4.8)
LYMPHOCYTES # BLD AUTO: 1.44 K/UL (ref 1–4.8)
LYMPHOCYTES NFR BLD: 4.1 % (ref 22–41)
LYMPHOCYTES NFR BLD: 8.8 % (ref 22–41)
MCH RBC QN AUTO: 29.2 PG (ref 27–33)
MCH RBC QN AUTO: 29.4 PG (ref 27–33)
MCHC RBC AUTO-ENTMCNC: 32.3 G/DL (ref 33.6–35)
MCHC RBC AUTO-ENTMCNC: 33.5 G/DL (ref 33.6–35)
MCV RBC AUTO: 87.3 FL (ref 81.4–97.8)
MCV RBC AUTO: 91.2 FL (ref 81.4–97.8)
MONOCYTES # BLD AUTO: 0.47 K/UL (ref 0–0.85)
MONOCYTES # BLD AUTO: 0.86 K/UL (ref 0–0.85)
MONOCYTES NFR BLD AUTO: 2.5 % (ref 0–13.4)
MONOCYTES NFR BLD AUTO: 5.3 % (ref 0–13.4)
NEUTROPHILS # BLD AUTO: 13.93 K/UL (ref 2–7.15)
NEUTROPHILS # BLD AUTO: 17.73 K/UL (ref 2–7.15)
NEUTROPHILS NFR BLD: 85.3 % (ref 44–72)
NEUTROPHILS NFR BLD: 92.4 % (ref 44–72)
NRBC # BLD AUTO: 0 K/UL
NRBC # BLD AUTO: 0 K/UL
NRBC BLD-RTO: 0 /100 WBC
NRBC BLD-RTO: 0 /100 WBC
PLATELET # BLD AUTO: 128 K/UL (ref 164–446)
PLATELET # BLD AUTO: 161 K/UL (ref 164–446)
PMV BLD AUTO: 9.1 FL (ref 9–12.9)
PMV BLD AUTO: 9.5 FL (ref 9–12.9)
POTASSIUM SERPL-SCNC: 4.1 MMOL/L (ref 3.6–5.5)
PROTHROMBIN TIME: 16.1 SEC (ref 12–14.6)
PROTHROMBIN TIME: 17.8 SEC (ref 12–14.6)
RBC # BLD AUTO: 3.06 M/UL (ref 4.2–5.4)
RBC # BLD AUTO: 4.89 M/UL (ref 4.2–5.4)
SODIUM SERPL-SCNC: 137 MMOL/L (ref 135–145)
TROPONIN I SERPL-MCNC: 0.33 NG/ML (ref 0–0.04)
TSH SERPL DL<=0.005 MIU/L-ACNC: 2.32 UIU/ML (ref 0.38–5.33)
WBC # BLD AUTO: 16.3 K/UL (ref 4.8–10.8)
WBC # BLD AUTO: 19.2 K/UL (ref 4.8–10.8)

## 2018-12-19 PROCEDURE — 85730 THROMBOPLASTIN TIME PARTIAL: CPT

## 2018-12-19 PROCEDURE — 160039 HCHG SURGERY MINUTES - EA ADDL 1 MIN LEVEL 3: Performed by: SURGERY

## 2018-12-19 PROCEDURE — 501445 HCHG STAPLER, SKIN DISP: Performed by: SURGERY

## 2018-12-19 PROCEDURE — 80048 BASIC METABOLIC PNL TOTAL CA: CPT

## 2018-12-19 PROCEDURE — 110454 HCHG SHELL REV 250: Performed by: SURGERY

## 2018-12-19 PROCEDURE — 160009 HCHG ANES TIME/MIN: Performed by: SURGERY

## 2018-12-19 PROCEDURE — C1757 CATH, THROMBECTOMY/EMBOLECT: HCPCS | Performed by: SURGERY

## 2018-12-19 PROCEDURE — 160035 HCHG PACU - 1ST 60 MINS PHASE I: Performed by: SURGERY

## 2018-12-19 PROCEDURE — 36415 COLL VENOUS BLD VENIPUNCTURE: CPT

## 2018-12-19 PROCEDURE — 71045 X-RAY EXAM CHEST 1 VIEW: CPT

## 2018-12-19 PROCEDURE — 96365 THER/PROPH/DIAG IV INF INIT: CPT

## 2018-12-19 PROCEDURE — 85025 COMPLETE CBC W/AUTO DIFF WBC: CPT

## 2018-12-19 PROCEDURE — 700111 HCHG RX REV CODE 636 W/ 250 OVERRIDE (IP): Performed by: EMERGENCY MEDICINE

## 2018-12-19 PROCEDURE — 700111 HCHG RX REV CODE 636 W/ 250 OVERRIDE (IP): Performed by: SURGERY

## 2018-12-19 PROCEDURE — 99291 CRITICAL CARE FIRST HOUR: CPT

## 2018-12-19 PROCEDURE — 502240 HCHG MISC OR SUPPLY RC 0272: Performed by: SURGERY

## 2018-12-19 PROCEDURE — 82962 GLUCOSE BLOOD TEST: CPT

## 2018-12-19 PROCEDURE — 04CL0ZZ EXTIRPATION OF MATTER FROM LEFT FEMORAL ARTERY, OPEN APPROACH: ICD-10-PCS | Performed by: SURGERY

## 2018-12-19 PROCEDURE — P9045 ALBUMIN (HUMAN), 5%, 250 ML: HCPCS | Mod: JG

## 2018-12-19 PROCEDURE — A9270 NON-COVERED ITEM OR SERVICE: HCPCS | Performed by: FAMILY MEDICINE

## 2018-12-19 PROCEDURE — 160028 HCHG SURGERY MINUTES - 1ST 30 MINS LEVEL 3: Performed by: SURGERY

## 2018-12-19 PROCEDURE — 700105 HCHG RX REV CODE 258: Performed by: SURGERY

## 2018-12-19 PROCEDURE — 160048 HCHG OR STATISTICAL LEVEL 1-5: Performed by: SURGERY

## 2018-12-19 PROCEDURE — 700101 HCHG RX REV CODE 250

## 2018-12-19 PROCEDURE — 160002 HCHG RECOVERY MINUTES (STAT): Performed by: SURGERY

## 2018-12-19 PROCEDURE — 501837 HCHG SUTURE CV: Performed by: SURGERY

## 2018-12-19 PROCEDURE — 83036 HEMOGLOBIN GLYCOSYLATED A1C: CPT

## 2018-12-19 PROCEDURE — 85347 COAGULATION TIME ACTIVATED: CPT

## 2018-12-19 PROCEDURE — 84443 ASSAY THYROID STIM HORMONE: CPT

## 2018-12-19 PROCEDURE — 700102 HCHG RX REV CODE 250 W/ 637 OVERRIDE(OP): Performed by: FAMILY MEDICINE

## 2018-12-19 PROCEDURE — 83605 ASSAY OF LACTIC ACID: CPT

## 2018-12-19 PROCEDURE — C1768 GRAFT, VASCULAR: HCPCS | Performed by: SURGERY

## 2018-12-19 PROCEDURE — 93005 ELECTROCARDIOGRAM TRACING: CPT | Performed by: SURGERY

## 2018-12-19 PROCEDURE — 700105 HCHG RX REV CODE 258: Performed by: EMERGENCY MEDICINE

## 2018-12-19 PROCEDURE — 770020 HCHG ROOM/CARE - TELE (206)

## 2018-12-19 PROCEDURE — 160036 HCHG PACU - EA ADDL 30 MINS PHASE I: Performed by: SURGERY

## 2018-12-19 PROCEDURE — 84484 ASSAY OF TROPONIN QUANT: CPT

## 2018-12-19 PROCEDURE — C1725 CATH, TRANSLUMIN NON-LASER: HCPCS | Performed by: SURGERY

## 2018-12-19 PROCEDURE — 96375 TX/PRO/DX INJ NEW DRUG ADDON: CPT

## 2018-12-19 PROCEDURE — 501838 HCHG SUTURE GENERAL: Performed by: SURGERY

## 2018-12-19 PROCEDURE — 700105 HCHG RX REV CODE 258: Performed by: FAMILY MEDICINE

## 2018-12-19 PROCEDURE — 85610 PROTHROMBIN TIME: CPT

## 2018-12-19 PROCEDURE — 04CK0ZZ EXTIRPATION OF MATTER FROM RIGHT FEMORAL ARTERY, OPEN APPROACH: ICD-10-PCS | Performed by: SURGERY

## 2018-12-19 PROCEDURE — 93010 ELECTROCARDIOGRAM REPORT: CPT | Performed by: INTERNAL MEDICINE

## 2018-12-19 PROCEDURE — 700111 HCHG RX REV CODE 636 W/ 250 OVERRIDE (IP)

## 2018-12-19 PROCEDURE — 99222 1ST HOSP IP/OBS MODERATE 55: CPT | Mod: AI | Performed by: FAMILY MEDICINE

## 2018-12-19 DEVICE — GRAFT GORE PROPATEN 6MMX80CM: Type: IMPLANTABLE DEVICE | Site: GROIN | Status: FUNCTIONAL

## 2018-12-19 DEVICE — PATCH 2X9CM XENOSURE BIOLOGIC VASCULAR---ORDER IN MULTIPLES OF 5---: Type: IMPLANTABLE DEVICE | Site: GROIN | Status: FUNCTIONAL

## 2018-12-19 RX ORDER — SODIUM CHLORIDE 9 MG/ML
1000 INJECTION, SOLUTION INTRAVENOUS ONCE
Status: COMPLETED | OUTPATIENT
Start: 2018-12-19 | End: 2018-12-19

## 2018-12-19 RX ORDER — SODIUM CHLORIDE 9 MG/ML
INJECTION, SOLUTION INTRAVENOUS CONTINUOUS
Status: DISCONTINUED | OUTPATIENT
Start: 2018-12-19 | End: 2018-12-22

## 2018-12-19 RX ORDER — POLYETHYLENE GLYCOL 3350 17 G/17G
1 POWDER, FOR SOLUTION ORAL
Status: DISCONTINUED | OUTPATIENT
Start: 2018-12-19 | End: 2018-12-28 | Stop reason: HOSPADM

## 2018-12-19 RX ORDER — HYDRALAZINE HYDROCHLORIDE 20 MG/ML
10 INJECTION INTRAMUSCULAR; INTRAVENOUS EVERY 4 HOURS PRN
Status: DISCONTINUED | OUTPATIENT
Start: 2018-12-19 | End: 2018-12-28 | Stop reason: HOSPADM

## 2018-12-19 RX ORDER — HYDROMORPHONE HYDROCHLORIDE 1 MG/ML
0.2 INJECTION, SOLUTION INTRAMUSCULAR; INTRAVENOUS; SUBCUTANEOUS
Status: DISCONTINUED | OUTPATIENT
Start: 2018-12-19 | End: 2018-12-19 | Stop reason: HOSPADM

## 2018-12-19 RX ORDER — ACETAMINOPHEN 325 MG/1
650 TABLET ORAL EVERY 6 HOURS PRN
Status: DISCONTINUED | OUTPATIENT
Start: 2018-12-19 | End: 2018-12-28 | Stop reason: HOSPADM

## 2018-12-19 RX ORDER — SODIUM CHLORIDE, SODIUM LACTATE, POTASSIUM CHLORIDE, AND CALCIUM CHLORIDE .6; .31; .03; .02 G/100ML; G/100ML; G/100ML; G/100ML
500 INJECTION, SOLUTION INTRAVENOUS ONCE
Status: COMPLETED | OUTPATIENT
Start: 2018-12-19 | End: 2018-12-19

## 2018-12-19 RX ORDER — ONDANSETRON 2 MG/ML
4 INJECTION INTRAMUSCULAR; INTRAVENOUS
Status: DISCONTINUED | OUTPATIENT
Start: 2018-12-19 | End: 2018-12-19 | Stop reason: HOSPADM

## 2018-12-19 RX ORDER — HEPARIN SODIUM 1000 [USP'U]/ML
5000 INJECTION, SOLUTION INTRAVENOUS; SUBCUTANEOUS ONCE
Status: COMPLETED | OUTPATIENT
Start: 2018-12-19 | End: 2018-12-19

## 2018-12-19 RX ORDER — MAGNESIUM HYDROXIDE 1200 MG/15ML
LIQUID ORAL
Status: COMPLETED | OUTPATIENT
Start: 2018-12-19 | End: 2018-12-19

## 2018-12-19 RX ORDER — ONDANSETRON 2 MG/ML
4 INJECTION INTRAMUSCULAR; INTRAVENOUS EVERY 4 HOURS PRN
Status: DISCONTINUED | OUTPATIENT
Start: 2018-12-19 | End: 2018-12-28 | Stop reason: HOSPADM

## 2018-12-19 RX ORDER — ROSUVASTATIN CALCIUM 5 MG/1
5 TABLET, COATED ORAL EVERY EVENING
Status: DISCONTINUED | OUTPATIENT
Start: 2018-12-19 | End: 2018-12-28 | Stop reason: HOSPADM

## 2018-12-19 RX ORDER — BISACODYL 10 MG
10 SUPPOSITORY, RECTAL RECTAL
Status: DISCONTINUED | OUTPATIENT
Start: 2018-12-19 | End: 2018-12-28 | Stop reason: HOSPADM

## 2018-12-19 RX ORDER — HYDROMORPHONE HYDROCHLORIDE 1 MG/ML
0.1 INJECTION, SOLUTION INTRAMUSCULAR; INTRAVENOUS; SUBCUTANEOUS
Status: DISCONTINUED | OUTPATIENT
Start: 2018-12-19 | End: 2018-12-19 | Stop reason: HOSPADM

## 2018-12-19 RX ORDER — HALOPERIDOL 5 MG/ML
1 INJECTION INTRAMUSCULAR
Status: DISCONTINUED | OUTPATIENT
Start: 2018-12-19 | End: 2018-12-19 | Stop reason: HOSPADM

## 2018-12-19 RX ORDER — DIPHENHYDRAMINE HYDROCHLORIDE 50 MG/ML
12.5 INJECTION INTRAMUSCULAR; INTRAVENOUS
Status: DISCONTINUED | OUTPATIENT
Start: 2018-12-19 | End: 2018-12-19 | Stop reason: HOSPADM

## 2018-12-19 RX ORDER — IPRATROPIUM BROMIDE AND ALBUTEROL SULFATE 2.5; .5 MG/3ML; MG/3ML
3 SOLUTION RESPIRATORY (INHALATION)
Status: DISCONTINUED | OUTPATIENT
Start: 2018-12-19 | End: 2018-12-19 | Stop reason: HOSPADM

## 2018-12-19 RX ORDER — HEPARIN SODIUM 5000 [USP'U]/ML
5000 INJECTION, SOLUTION INTRAVENOUS; SUBCUTANEOUS EVERY 8 HOURS
Status: DISCONTINUED | OUTPATIENT
Start: 2018-12-19 | End: 2018-12-20

## 2018-12-19 RX ORDER — HEPARIN SODIUM 1000 [USP'U]/ML
2600 INJECTION, SOLUTION INTRAVENOUS; SUBCUTANEOUS PRN
Status: DISCONTINUED | OUTPATIENT
Start: 2018-12-19 | End: 2018-12-19

## 2018-12-19 RX ORDER — AMOXICILLIN 250 MG
2 CAPSULE ORAL 2 TIMES DAILY
Status: DISCONTINUED | OUTPATIENT
Start: 2018-12-19 | End: 2018-12-28 | Stop reason: HOSPADM

## 2018-12-19 RX ORDER — DEXTROSE MONOHYDRATE 25 G/50ML
25 INJECTION, SOLUTION INTRAVENOUS
Status: DISCONTINUED | OUTPATIENT
Start: 2018-12-19 | End: 2018-12-22

## 2018-12-19 RX ORDER — OXYCODONE HYDROCHLORIDE 5 MG/1
5 TABLET ORAL
Status: DISCONTINUED | OUTPATIENT
Start: 2018-12-19 | End: 2018-12-28 | Stop reason: HOSPADM

## 2018-12-19 RX ORDER — MORPHINE SULFATE 10 MG/ML
2 INJECTION, SOLUTION INTRAMUSCULAR; INTRAVENOUS
Status: DISCONTINUED | OUTPATIENT
Start: 2018-12-19 | End: 2018-12-28 | Stop reason: HOSPADM

## 2018-12-19 RX ORDER — ALBUMIN, HUMAN INJ 5% 5 %
25 SOLUTION INTRAVENOUS ONCE
Status: COMPLETED | OUTPATIENT
Start: 2018-12-19 | End: 2018-12-19

## 2018-12-19 RX ORDER — MIDAZOLAM HYDROCHLORIDE 1 MG/ML
1 INJECTION INTRAMUSCULAR; INTRAVENOUS
Status: DISCONTINUED | OUTPATIENT
Start: 2018-12-19 | End: 2018-12-19 | Stop reason: HOSPADM

## 2018-12-19 RX ORDER — OXYCODONE HYDROCHLORIDE 5 MG/1
2.5 TABLET ORAL
Status: DISCONTINUED | OUTPATIENT
Start: 2018-12-19 | End: 2018-12-28 | Stop reason: HOSPADM

## 2018-12-19 RX ORDER — ENALAPRILAT 1.25 MG/ML
1.25 INJECTION INTRAVENOUS EVERY 6 HOURS PRN
Status: DISCONTINUED | OUTPATIENT
Start: 2018-12-19 | End: 2018-12-28 | Stop reason: HOSPADM

## 2018-12-19 RX ORDER — HYDROMORPHONE HYDROCHLORIDE 1 MG/ML
0.4 INJECTION, SOLUTION INTRAMUSCULAR; INTRAVENOUS; SUBCUTANEOUS
Status: DISCONTINUED | OUTPATIENT
Start: 2018-12-19 | End: 2018-12-19 | Stop reason: HOSPADM

## 2018-12-19 RX ORDER — ONDANSETRON 4 MG/1
4 TABLET, ORALLY DISINTEGRATING ORAL EVERY 4 HOURS PRN
Status: DISCONTINUED | OUTPATIENT
Start: 2018-12-19 | End: 2018-12-28 | Stop reason: HOSPADM

## 2018-12-19 RX ORDER — SODIUM CHLORIDE, SODIUM LACTATE, POTASSIUM CHLORIDE, CALCIUM CHLORIDE 600; 310; 30; 20 MG/100ML; MG/100ML; MG/100ML; MG/100ML
INJECTION, SOLUTION INTRAVENOUS CONTINUOUS
Status: DISCONTINUED | OUTPATIENT
Start: 2018-12-19 | End: 2018-12-19 | Stop reason: HOSPADM

## 2018-12-19 RX ADMIN — HEPARIN SODIUM 5000 UNITS: 5000 INJECTION, SOLUTION INTRAVENOUS; SUBCUTANEOUS at 20:56

## 2018-12-19 RX ADMIN — SODIUM CHLORIDE 1000 ML: 9 INJECTION, SOLUTION INTRAVENOUS at 05:00

## 2018-12-19 RX ADMIN — SODIUM CHLORIDE, POTASSIUM CHLORIDE, SODIUM LACTATE AND CALCIUM CHLORIDE 500 ML: 600; 310; 30; 20 INJECTION, SOLUTION INTRAVENOUS at 20:56

## 2018-12-19 RX ADMIN — ROSUVASTATIN CALCIUM 5 MG: 5 TABLET, FILM COATED ORAL at 20:56

## 2018-12-19 RX ADMIN — INSULIN HUMAN 1 UNITS: 100 INJECTION, SOLUTION PARENTERAL at 21:16

## 2018-12-19 RX ADMIN — HEPARIN SODIUM 5000 UNITS: 1000 INJECTION, SOLUTION INTRAVENOUS; SUBCUTANEOUS at 04:36

## 2018-12-19 RX ADMIN — HEPARIN SODIUM 25000 UNITS: 5000 INJECTION, SOLUTION INTRAVENOUS at 04:36

## 2018-12-19 RX ADMIN — ALBUMIN, HUMAN INJ 5% 25 G: 5 SOLUTION at 14:00

## 2018-12-19 RX ADMIN — SODIUM CHLORIDE: 9 INJECTION, SOLUTION INTRAVENOUS at 17:16

## 2018-12-19 ASSESSMENT — COGNITIVE AND FUNCTIONAL STATUS - GENERAL
WALKING IN HOSPITAL ROOM: A LOT
CLIMB 3 TO 5 STEPS WITH RAILING: A LOT
PERSONAL GROOMING: A LITTLE
SUGGESTED CMS G CODE MODIFIER MOBILITY: CK
DRESSING REGULAR LOWER BODY CLOTHING: A LITTLE
TURNING FROM BACK TO SIDE WHILE IN FLAT BAD: A LITTLE
MOVING TO AND FROM BED TO CHAIR: A LITTLE
MOVING FROM LYING ON BACK TO SITTING ON SIDE OF FLAT BED: A LITTLE
DAILY ACTIVITIY SCORE: 19
TOILETING: A LITTLE
STANDING UP FROM CHAIR USING ARMS: A LITTLE
SUGGESTED CMS G CODE MODIFIER DAILY ACTIVITY: CK
HELP NEEDED FOR BATHING: A LITTLE
DRESSING REGULAR UPPER BODY CLOTHING: A LITTLE
MOBILITY SCORE: 16

## 2018-12-19 ASSESSMENT — PAIN SCALES - GENERAL
PAINLEVEL_OUTOF10: 3
PAINLEVEL_OUTOF10: 0

## 2018-12-19 ASSESSMENT — COPD QUESTIONNAIRES
IN THE PAST 12 MONTHS DO YOU DO LESS THAN YOU USED TO BECAUSE OF YOUR BREATHING PROBLEMS: DISAGREE/UNSURE
COPD SCREENING SCORE: 4
DURING THE PAST 4 WEEKS HOW MUCH DID YOU FEEL SHORT OF BREATH: NONE/LITTLE OF THE TIME
DO YOU EVER COUGH UP ANY MUCUS OR PHLEGM?: NO/ONLY WITH OCCASIONAL COLDS OR INFECTIONS
HAVE YOU SMOKED AT LEAST 100 CIGARETTES IN YOUR ENTIRE LIFE: YES

## 2018-12-19 ASSESSMENT — PATIENT HEALTH QUESTIONNAIRE - PHQ9
2. FEELING DOWN, DEPRESSED, IRRITABLE, OR HOPELESS: NOT AT ALL
1. LITTLE INTEREST OR PLEASURE IN DOING THINGS: NOT AT ALL
SUM OF ALL RESPONSES TO PHQ9 QUESTIONS 1 AND 2: 0

## 2018-12-19 ASSESSMENT — LIFESTYLE VARIABLES
ALCOHOL_USE: NO
EVER_SMOKED: YES

## 2018-12-19 NOTE — ED PROVIDER NOTES
"ED Provider Note    Scribed for Tu Jean-Baptiste M.D. by Joseph Caballero. 12/19/2018, 3:30 AM.    Primary care provider: Bong Quick M.D.  Means of arrival: EMS  History obtained from: Patient  History limited by: None    CHIEF COMPLAINT  Chief Complaint   Patient presents with   • Leg Pain     left femoral artery occlusion, right partial occlusion, hx aortic bypass, CTA shows abdominal aortic occlusion as well       HPI  Radha Verduzco is an 83 y.o. 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  The transferring physician was unable to palpate or Doppler lower extremity pulses and she was subsequently transferred here for a more extensive workup. She reports her leg pain began at 2130 last night when she sat down on the toilet after taking a laxative for constipation.  She began to experience severe bilateral leg pain after she got up, left worse than right. She describes her symptoms as though her legs \"were made of lead\" and she has been unable to ambulate due to the pain.  Patient is not anticoagulated beyond a daily aspirin.  Currently denies any chest pain, shortness of breath, abdominal pain, fevers, chills.    Olya has had multiple vascular surgeries performed by Dr. Lomax in [place], with her last surgery performed in 2010.    REVIEW OF SYSTEMS  Pertinent positives include leg pain, difficulty with ambulation, vomiting. Pertinent negatives include no nausea, " abdominal pain, diarrhea, chest pain, shortness of breath. As above, all other systems reviewed and are negative.   See HPI for further details.     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    SURGICAL HISTORY   has a past surgical history that includes aortofemoral bypass; toenail removal; neuroma excision; cataract phaco with iol; and carotid endarterectomy.    SOCIAL HISTORY  Social History   Substance Use Topics   • Smoking status: Former Smoker     Packs/day: 2.00     Years: 12.00     Types: Cigarettes     Quit date: 1985   • Smokeless tobacco: Never Used   • Alcohol use 0.0 oz/week      Comment: rarely maybe one a year      History   Drug Use No       FAMILY HISTORY  Family History   Problem Relation Age of Onset   • Lung Disease Mother         TB   • Stroke Father        CURRENT MEDICATIONS  No current facility-administered medications on file prior to encounter.      Current Outpatient Prescriptions on File Prior to Encounter   Medication Sig Dispense Refill   • triamterene-hctz (MAXZIDE-25/DYAZIDE) 37.5-25 MG Tab TAKE ONE TABLET BY MOUTH EVERY MORNING 90 Tab 0   • lisinopril (PRINIVIL, ZESTRIL) 40 MG tablet TAKE ONE TABLET BY MOUTH DAILY 90 Tab 1   • amLODIPine (NORVASC) 5 MG Tab TAKE ONE TABLET BY MOUTH DAILY 90 Tab 1   • rosuvastatin (CRESTOR) 5 MG Tab TAKE ONE TABLET BY MOUTH DAILY 90 Tab 1   • prednisoLONE acetate (PRED FORTE) 1 % Suspension      • potassium chloride ER (KLOR-CON) 10 MEQ tablet Take 1 Tab by mouth every day. 60 Tab 1   • indomethacin (INDOCIN) 50 MG Cap Take 1 Cap by mouth 2 times a day, with meals. 60 Cap 0   • triamcinolone acetonide (KENALOG) 0.5 % Cream Apply to affected area twice daily. 60 g 1  "  • glucosamine 500 MG CAPS Take 500 mg by mouth 3 times a day.       • Naproxen Sodium (ALEVE PO) Take  by mouth.       • aspirin EC (ECOTRIN) 81 MG TBEC Take 81 mg by mouth every day.         ALLERGIES  Allergies   Allergen Reactions   • Anesthetic [Benzocaine] Nausea     Got nausea from anesthesia     • Iodine Rash   • Other Drug      Opiate make her vomit       PHYSICAL EXAM  VITAL SIGNS: /71   Pulse (!) 107   Temp 37.5 °C (99.5 °F) (Temporal)   Resp 18   Ht 1.6 m (5' 3\")   Wt 70.3 kg (155 lb)   SpO2 94%   BMI 27.46 kg/m²   Vitals reviewed.  Constitutional: Alert in no apparent distress.  HENT: No signs of trauma, Bilateral external ears normal, Nose normal. moist mucous membranes.  Eyes: Pupils are equal and reactive, Conjunctiva normal, Non-icteric.   Neck: Normal range of motion, No tenderness, Supple, No stridor.   Lymphatic: No lymphadenopathy noted.   Cardiovascular: Tachycardic, Regular rhythm, no murmurs.  Unable to appreciate DP or PT pulses in bilateral lower extremities.  Thorax & Lungs: Normal breath sounds, No respiratory distress, No wheezing, No chest tenderness.   Abdomen: Bowel sounds normal, Soft, No tenderness, well-healed midline surgical incision without surrounding erythema, no peritoneal signs, No masses, No pulsatile masses.   Skin: Bilateral lower extremities are pale, cold, pulseless.   Back: Normal alignment.   Extremities: Unable to appreciate DP or PT pulses in bilateral lower extremities.  Bilateral lower extremities are cold, tender to palpation.  Poor capillary refill, worse in the left lower extremity and approximately 5 seconds.  Capillary refill on the right foot is approximately 4 seconds.  Musculoskeletal: Decreased range of motion in the bilateral lower extremities secondary to pain.  No major deformities noted.   Neurologic: Alert, decreased sensation in bilateral lower extremities.  Psychiatric: Affect normal, Judgment normal, Mood normal.     DIAGNOSTIC " STUDIES / PROCEDURES    LABS  Labs Reviewed   CBC WITH DIFFERENTIAL   BASIC METABOLIC PANEL   PROTHROMBIN TIME   APTT   LACTIC ACID      All labs reviewed by me.    EKG Interpretation:  Interpreted by me    12 Lead EKG interpreted by me to show:  Sinus Tachycardia  Rate 109  Axis: Normal  Intervals: Normal  ST Depressions in lateral and inferior leads  My impression of this EKG: Does not indicate ischemia or arrhythmia at this time.    RADIOLOGY  No orders to display     The radiologist's interpretation of all radiological studies have been reviewed by me.    COURSE & MEDICAL DECISION MAKING  Nursing notes, VS, PMSFHx reviewed in chart.  Differential diagnoses include but not limited to: Arterial occlusion    3:30 AM Patient seen and examined at bedside. Patient arrives tachycardic but afebrile with otherwise normal vital signs. Patient appears well hydrated and non-toxic.  CTA reviewed from outside facility shows multiple arterial occlusions as noted in the HPI.  On physical exam, the patient's bilateral lower extremities are pale, cold, tender.  She has decreased sensation bilaterally with prolonged capillary refill in her bilateral feet.  She further has decreased range of motion of her bilateral lower extremity secondary to pain.  There is no edema or erythema to suggest infection.  Patient was not anticoagulated at the outside facility and does require heparin bolus and drip.  This was ordered promptly.  Ordered for CBC with differential, BMP, PT/INR, PTT, Lactic Acid to evaluate.  Pain is currently controlled.  Patient has not eaten or had anything to drink since 1630 yesterday.     3:47 AM - Patient's prior vascular surgeon does not have privileges at this facility.  Paged Vascular Surgery    Labs reveal leukocytosis to 19.2.  I suspect this is stress demargination versus potential beginning of infection secondary to ischemia.  There is a neutrophilic predominance but no bandemia.  Blood glucose is elevated  to 194.  Normal renal function.  Lactic acid elevated to 3.  INR slightly elevated at 1.28.  PTT within normal limits at 28.  IV fluids started.    4:12 AM - I spoke with Dr. Kohli, Vascular, who will see her shortly, and concurs with heparin bolus and drip.    Seen by Dr. Kohli. Patient will be taken to the OR immediately. Hospitalist to admit.    DISPOSITION:  Patient will be admitted to Dr. White, hospitalist, in guarded condition. Transferred to the OR from ED for surgical intervention.    FINAL IMPRESSION  1. Arterial occlusion          Joseph MOYER (Curt), am scribing for, and in the presence of, Tu Jean-Baptiste M.D..    Electronically signed by: Joseph Caballero (Curt), 12/19/2018    Tu MOYER M.D. personally performed the services described in this documentation, as scribed by Joseph Caballero in my presence, and it is both accurate and complete. C.    The note accurately reflects work and decisions made by me.  Tu Jean-Baptiste  12/19/2018  4:43 AM

## 2018-12-19 NOTE — OR NURSING
Call placed to Dr Kohli regarding the decrease in SBP with the slowing of fluids (86/53)  Bolus given

## 2018-12-19 NOTE — ED TRIAGE NOTES
Radha Verduzco  83 y.o.  Chief Complaint   Patient presents with   • Leg Pain     left femoral artery occlusion, right partial occlusion, hx aortic bypass, CTA shows abdominal aortic occlusion as well     Pt BIB EMS transfer from [facility]. Pt had leg pain that started today.  Lower extremities were found to be [place] and [place] without dopplar pulses.  Patient has history aortofemoral bypass.    PTA pt received fentanyl 150mcg, , benadryl 50 mg, PIV placed.    Pt leg pain 3/10 at this time.    Changed into gown.  Chart up for ERP.

## 2018-12-19 NOTE — OR NURSING
0915  Dr Kohli at bedside; checked pulses; capillary refill.  Aware of absent pulses  0931  Fingerstick glucose 164  0935  Xray for line placement taken  0937  Dr Hughes notified of patient's glucose and SBP; no orders  1015  Dr Nolasco at bedside  1045  Patient arouses to verbal with tactile stimuli and returns to sleep quickly.  Pulses by doppler remain at Left post tibial and popliteal pulses heard

## 2018-12-19 NOTE — PROGRESS NOTES
Beginning at 0700 the attending Hospitalist is Dr. Nolasco, please call this physician with any questions or updates. Thank you

## 2018-12-19 NOTE — ASSESSMENT & PLAN NOTE
Last Glycated hemoglobin 6.6, pending repeat    Accu-Checks, hypoglycemia protocol       Repeat A1c 6.3  Blood glucoses are stable.  No need for short-acting insulin.  Check blood sugar once a day

## 2018-12-19 NOTE — CONSULTS
Vascular Surgery Consult Note  -------------------------------------------------------------------------------------------------  Date: 12/19/2018    Consulting Physician: Garett Kohli M.D. [facility]  -------------------------------------------------------------------------------------------------    Reason for consultation:  Bilateral acute lower extremity ischemia     HPI:  This is a 83 y.o. female who is presenting with acute bilateral lower extremity pain. She has a history of an aortobifemoral bypass and a ilio-ilio bypass by Dr. Hawk in [place] about 8 years ago.  Leg pain started around 9 o'clock on 12/18/18.  She is alert and conversant.    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   • Diabetes ([facility]) 4/19/2018   • GOUT 1/31/2011   • History of skin cancer 2/24/2016   • Hyperlipidemia 8/2/2010   • Hypertension 8/2/2010   • PAD (peripheral artery disease) 8/2/2010   • Pre-diabetes 8/2/2010       Past Surgical History:   Procedure Laterality Date   • AORTOFEMORAL BYPASS     • CAROTID ENDARTERECTOMY      right   • CATARACT PHACO WITH IOL     • NEUROMA EXCISION      in 2000 from foot   • TOENAIL REMOVAL      in 2009,2008        Current Facility-Administered Medications   Medication Dose Route Frequency Provider Last Rate Last Dose   • heparin injection 2,600 Units  2,600 Units Intravenous PRN Tu Jean-Baptiste M.D.        And   • heparin infusion 25,000 units in 500 ml 0.45% nacl   Intravenous Continuous Tu Jean-Baptiste M.D.   25,000 Units at 12/19/18 0436     Current Outpatient Prescriptions   Medication Sig Dispense Refill   • triamterene-hctz (MAXZIDE-25/DYAZIDE) 37.5-25 MG Tab TAKE ONE TABLET BY MOUTH EVERY MORNING 90 Tab 0   • lisinopril (PRINIVIL, ZESTRIL) 40 MG tablet TAKE ONE TABLET BY MOUTH DAILY 90 Tab 1   • amLODIPine (NORVASC) 5 MG Tab TAKE ONE TABLET BY MOUTH DAILY 90 Tab 1   • rosuvastatin (CRESTOR) 5 MG Tab  "TAKE ONE TABLET BY MOUTH DAILY 90 Tab 1   • prednisoLONE acetate (PRED FORTE) 1 % Suspension      • potassium chloride ER (KLOR-CON) 10 MEQ tablet Take 1 Tab by mouth every day. 60 Tab 1   • indomethacin (INDOCIN) 50 MG Cap Take 1 Cap by mouth 2 times a day, with meals. 60 Cap 0   • triamcinolone acetonide (KENALOG) 0.5 % Cream Apply to affected area twice daily. 60 g 1   • glucosamine 500 MG CAPS Take 500 mg by mouth 3 times a day.       • Naproxen Sodium (ALEVE PO) Take  by mouth.       • aspirin EC (ECOTRIN) 81 MG TBEC Take 81 mg by mouth every day.         Social History     Social History   • Marital status:      Spouse name: N/A   • Number of children: N/A   • Years of education: N/A     Occupational History   • Not on file.     Social History Main Topics   • Smoking status: Former Smoker     Packs/day: 2.00     Years: 12.00     Types: Cigarettes     Quit date: 1985   • Smokeless tobacco: Never Used   • Alcohol use 0.0 oz/week      Comment: rarely maybe one a year   • Drug use: No   • Sexual activity: Not on file     Other Topics Concern   • Not on file     Social History Narrative    Lives with her son        Family History   Problem Relation Age of Onset   • Lung Disease Mother         TB   • Stroke Father        Allergies:  Anesthetic [benzocaine]; Iodine; and Other drug    Review of Systems:  Noncontributory except as per HPI      Physical Exam:  Blood pressure 131/71, pulse (!) 106, temperature 37.5 °C (99.5 °F), temperature source Temporal, resp. rate 18, height 1.6 m (5' 3\"), weight 70.3 kg (155 lb), SpO2 91 %, not currently breastfeeding.    Constitutional: Alert, oriented, no acute distress  HEENT:  Normocephalic and atraumatic, EOMI  Neck:   Supple, no JVD,   Cardiovascular: Regular rate and rhythm,   Pulmonary:  Good air entry bilaterally,   Abdominal:  Soft, non-tender, non-distended     Multiple healed incisions  Musculoskeletal: Bilateral foot are cold and pale     Decreased sensation, " motor movement is intact  Vascular:  Strong palpable radial pulses bilaterally  Neurological:  CN II-XII grossly intact, no focal deficits    Labs:  Recent Labs      12/19/18 0309   WBC  19.2*   RBC  4.89   HEMOGLOBIN  14.3   HEMATOCRIT  42.7   MCV  87.3   MCH  29.2   MCHC  33.5*   RDW  48.0   PLATELETCT  161*   MPV  9.1     Recent Labs      12/19/18 0309   SODIUM  137   POTASSIUM  4.1   CHLORIDE  106   CO2  20   GLUCOSE  194*   BUN  21   CREATININE  1.00   CALCIUM  8.9     Recent Labs      12/19/18 0309   APTT  28.0   INR  1.28*     Recent Labs      12/19/18 0309   INR  1.28*       Radiology:  CTA: occluded aortobifemoral bypass.  Patent profunda and SFAs bilaterally    Assessment/Plan:  1) Acute bilateral lower extremity ischemic from arterial graft thrombosis  2) Hypertenstion  3) Hyperlipidemia  4) Diabetes    This is a 83 y.o. female with acute bilateral lower extremity ischemic from aortobifemoral graft thrombosis.  Recommend bilateral groin exploration with attempted thrombectomy however if cannot reopen the existing graft then axillobifemoral bypass will be required.    On heparin gtt.    The patient and I, as well as participating family members, discussed the recommendation for surgery as well as alternatives. We discussed the pertinent preoperative, intraoperative, and postoperative aspects of the procedure including anticipated recovery and how that could be affected by potential complications.    We discussed potential risks from the surgery including but not limited to:  Bleeding and possible need for transfusion, if applicable. Infection of the incision or any potential implanted materials. Injury to nearby vessels or nerves. Injury to nearby organs. Risk of xray and contrast exposure. Risks of anesthesia.    We also discussed global risks including but not limited to: Blood clots, Stroke, Heart attack, Pulmonary complications, and not surviving the operation or the recovery.    All questions  were answered. They understand and agree to proceed. Informed consent was obtained.       Garett Kohli MD  [facility] (General and Vascular Surgery)  Cell: [phone] (text or call is fine, if you don't reach me please try my office)  Office: [phone]    12/19/2018    5:03 AM  ___________________________________________________________________  Patient:Radha Ruiz Dax   MRN:[id number]   CSN:[id number]

## 2018-12-20 PROBLEM — E83.51 HYPOCALCEMIA: Status: ACTIVE | Noted: 2018-12-20

## 2018-12-20 PROBLEM — D64.9 ANEMIA: Status: ACTIVE | Noted: 2018-12-20

## 2018-12-20 LAB
ABO GROUP BLD: NORMAL
ABO GROUP BLD: NORMAL
ANION GAP SERPL CALC-SCNC: 7 MMOL/L (ref 0–11.9)
APTT PPP: 27 SEC (ref 24.7–36)
BARCODED ABORH UBTYP: 6200
BARCODED ABORH UBTYP: 6200
BARCODED PRD CODE UBPRD: NORMAL
BARCODED PRD CODE UBPRD: NORMAL
BARCODED UNIT NUM UBUNT: NORMAL
BARCODED UNIT NUM UBUNT: NORMAL
BASOPHILS # BLD AUTO: 0.1 % (ref 0–1.8)
BASOPHILS # BLD: 0.01 K/UL (ref 0–0.12)
BLD GP AB SCN SERPL QL: NORMAL
BUN SERPL-MCNC: 21 MG/DL (ref 8–22)
CALCIUM SERPL-MCNC: 6.2 MG/DL (ref 8.5–10.5)
CALCIUM SERPL-MCNC: 7.3 MG/DL (ref 8.5–10.5)
CHLORIDE SERPL-SCNC: 115 MMOL/L (ref 96–112)
CHOLEST SERPL-MCNC: 52 MG/DL (ref 100–199)
CO2 SERPL-SCNC: 19 MMOL/L (ref 20–33)
COMPONENT R 8504R: NORMAL
COMPONENT R 8504R: NORMAL
CREAT SERPL-MCNC: 1.01 MG/DL (ref 0.5–1.4)
EOSINOPHIL # BLD AUTO: 0 K/UL (ref 0–0.51)
EOSINOPHIL NFR BLD: 0 % (ref 0–6.9)
ERYTHROCYTE [DISTWIDTH] IN BLOOD BY AUTOMATED COUNT: 52.2 FL (ref 35.9–50)
GLUCOSE BLD-MCNC: 120 MG/DL (ref 65–99)
GLUCOSE BLD-MCNC: 122 MG/DL (ref 65–99)
GLUCOSE BLD-MCNC: 123 MG/DL (ref 65–99)
GLUCOSE BLD-MCNC: 137 MG/DL (ref 65–99)
GLUCOSE SERPL-MCNC: 160 MG/DL (ref 65–99)
HCT VFR BLD AUTO: 19.1 % (ref 37–47)
HCT VFR BLD AUTO: 20.3 % (ref 37–47)
HCT VFR BLD AUTO: 26.8 % (ref 37–47)
HCT VFR BLD AUTO: 28.6 % (ref 37–47)
HDLC SERPL-MCNC: 13 MG/DL
HGB BLD-MCNC: 6 G/DL (ref 12–16)
HGB BLD-MCNC: 6.4 G/DL (ref 12–16)
HGB BLD-MCNC: 8.7 G/DL (ref 12–16)
HGB BLD-MCNC: 9.6 G/DL (ref 12–16)
IMM GRANULOCYTES # BLD AUTO: 0.04 K/UL (ref 0–0.11)
IMM GRANULOCYTES NFR BLD AUTO: 0.4 % (ref 0–0.9)
LACTATE BLD-SCNC: 2 MMOL/L (ref 0.5–2)
LDLC SERPL CALC-MCNC: 13 MG/DL
LYMPHOCYTES # BLD AUTO: 1.8 K/UL (ref 1–4.8)
LYMPHOCYTES NFR BLD: 18 % (ref 22–41)
MCH RBC QN AUTO: 29 PG (ref 27–33)
MCHC RBC AUTO-ENTMCNC: 31.6 G/DL (ref 33.6–35)
MCV RBC AUTO: 91.8 FL (ref 81.4–97.8)
MONOCYTES # BLD AUTO: 1.08 K/UL (ref 0–0.85)
MONOCYTES NFR BLD AUTO: 10.8 % (ref 0–13.4)
NEUTROPHILS # BLD AUTO: 7.07 K/UL (ref 2–7.15)
NEUTROPHILS NFR BLD: 70.7 % (ref 44–72)
NRBC # BLD AUTO: 0 K/UL
NRBC BLD-RTO: 0 /100 WBC
PLATELET # BLD AUTO: 100 K/UL (ref 164–446)
PMV BLD AUTO: 9.7 FL (ref 9–12.9)
POTASSIUM SERPL-SCNC: 3.6 MMOL/L (ref 3.6–5.5)
PRODUCT TYPE UPROD: NORMAL
PRODUCT TYPE UPROD: NORMAL
RBC # BLD AUTO: 2.07 M/UL (ref 4.2–5.4)
RH BLD: NORMAL
RH BLD: NORMAL
SODIUM SERPL-SCNC: 141 MMOL/L (ref 135–145)
TRIGL SERPL-MCNC: 130 MG/DL (ref 0–149)
UNIT STATUS USTAT: NORMAL
UNIT STATUS USTAT: NORMAL
WBC # BLD AUTO: 10 K/UL (ref 4.8–10.8)

## 2018-12-20 PROCEDURE — 86901 BLOOD TYPING SEROLOGIC RH(D): CPT

## 2018-12-20 PROCEDURE — 86850 RBC ANTIBODY SCREEN: CPT

## 2018-12-20 PROCEDURE — 86923 COMPATIBILITY TEST ELECTRIC: CPT

## 2018-12-20 PROCEDURE — 99233 SBSQ HOSP IP/OBS HIGH 50: CPT | Performed by: HOSPITALIST

## 2018-12-20 PROCEDURE — 700105 HCHG RX REV CODE 258

## 2018-12-20 PROCEDURE — 30233N1 TRANSFUSION OF NONAUTOLOGOUS RED BLOOD CELLS INTO PERIPHERAL VEIN, PERCUTANEOUS APPROACH: ICD-10-PCS | Performed by: SURGERY

## 2018-12-20 PROCEDURE — 80061 LIPID PANEL: CPT

## 2018-12-20 PROCEDURE — 83605 ASSAY OF LACTIC ACID: CPT

## 2018-12-20 PROCEDURE — 85730 THROMBOPLASTIN TIME PARTIAL: CPT

## 2018-12-20 PROCEDURE — 770020 HCHG ROOM/CARE - TELE (206)

## 2018-12-20 PROCEDURE — 36430 TRANSFUSION BLD/BLD COMPNT: CPT

## 2018-12-20 PROCEDURE — 93005 ELECTROCARDIOGRAM TRACING: CPT | Performed by: HOSPITALIST

## 2018-12-20 PROCEDURE — A9270 NON-COVERED ITEM OR SERVICE: HCPCS | Performed by: FAMILY MEDICINE

## 2018-12-20 PROCEDURE — 85025 COMPLETE CBC W/AUTO DIFF WBC: CPT

## 2018-12-20 PROCEDURE — 93010 ELECTROCARDIOGRAM REPORT: CPT | Performed by: INTERNAL MEDICINE

## 2018-12-20 PROCEDURE — 85018 HEMOGLOBIN: CPT

## 2018-12-20 PROCEDURE — P9016 RBC LEUKOCYTES REDUCED: HCPCS

## 2018-12-20 PROCEDURE — 86900 BLOOD TYPING SEROLOGIC ABO: CPT

## 2018-12-20 PROCEDURE — 700102 HCHG RX REV CODE 250 W/ 637 OVERRIDE(OP): Performed by: FAMILY MEDICINE

## 2018-12-20 PROCEDURE — 82310 ASSAY OF CALCIUM: CPT

## 2018-12-20 PROCEDURE — 700111 HCHG RX REV CODE 636 W/ 250 OVERRIDE (IP): Performed by: FAMILY MEDICINE

## 2018-12-20 PROCEDURE — 82962 GLUCOSE BLOOD TEST: CPT | Mod: 91

## 2018-12-20 PROCEDURE — 80048 BASIC METABOLIC PNL TOTAL CA: CPT

## 2018-12-20 PROCEDURE — 85014 HEMATOCRIT: CPT | Mod: 91

## 2018-12-20 PROCEDURE — 700105 HCHG RX REV CODE 258: Performed by: FAMILY MEDICINE

## 2018-12-20 RX ORDER — TRIAMCINOLONE ACETONIDE 5 MG/G
1 CREAM TOPICAL 2 TIMES DAILY PRN
Status: ON HOLD | COMMUNITY
End: 2019-01-24

## 2018-12-20 RX ORDER — HEPARIN SODIUM 1000 [USP'U]/ML
2600 INJECTION, SOLUTION INTRAVENOUS; SUBCUTANEOUS PRN
Status: DISCONTINUED | OUTPATIENT
Start: 2018-12-20 | End: 2018-12-22 | Stop reason: ALTCHOICE

## 2018-12-20 RX ORDER — SODIUM CHLORIDE 9 MG/ML
INJECTION, SOLUTION INTRAVENOUS
Status: COMPLETED
Start: 2018-12-20 | End: 2018-12-20

## 2018-12-20 RX ADMIN — OXYCODONE HYDROCHLORIDE 5 MG: 5 TABLET ORAL at 02:33

## 2018-12-20 RX ADMIN — OXYCODONE HYDROCHLORIDE 5 MG: 5 TABLET ORAL at 23:53

## 2018-12-20 RX ADMIN — SODIUM CHLORIDE: 9 INJECTION, SOLUTION INTRAVENOUS at 06:35

## 2018-12-20 RX ADMIN — SODIUM CHLORIDE: 9 INJECTION, SOLUTION INTRAVENOUS at 03:15

## 2018-12-20 RX ADMIN — SODIUM CHLORIDE: 9 INJECTION, SOLUTION INTRAVENOUS at 18:48

## 2018-12-20 RX ADMIN — OXYCODONE HYDROCHLORIDE 5 MG: 5 TABLET ORAL at 13:14

## 2018-12-20 RX ADMIN — ONDANSETRON 4 MG: 4 TABLET, ORALLY DISINTEGRATING ORAL at 13:14

## 2018-12-20 RX ADMIN — ROSUVASTATIN CALCIUM 5 MG: 5 TABLET, FILM COATED ORAL at 16:55

## 2018-12-20 ASSESSMENT — PAIN SCALES - GENERAL
PAINLEVEL_OUTOF10: 7
PAINLEVEL_OUTOF10: 0
PAINLEVEL_OUTOF10: 7
PAINLEVEL_OUTOF10: 7
PAINLEVEL_OUTOF10: 0

## 2018-12-20 ASSESSMENT — ENCOUNTER SYMPTOMS
NERVOUS/ANXIOUS: 0
SORE THROAT: 0
SEIZURES: 0
PALPITATIONS: 0
FOCAL WEAKNESS: 0
FLANK PAIN: 0
VOMITING: 0
SPEECH CHANGE: 0
MYALGIAS: 1
ABDOMINAL PAIN: 0
POLYDIPSIA: 0
PHOTOPHOBIA: 0
CHILLS: 0
SPUTUM PRODUCTION: 0
STRIDOR: 0
HEMOPTYSIS: 0
EYE DISCHARGE: 0
WEAKNESS: 1
FEVER: 0
FALLS: 0
LOSS OF CONSCIOUSNESS: 0
COUGH: 0
SHORTNESS OF BREATH: 0
BLOOD IN STOOL: 0
BRUISES/BLEEDS EASILY: 0
EYE PAIN: 0
DIARRHEA: 0
HALLUCINATIONS: 0

## 2018-12-20 NOTE — PROGRESS NOTES
2 RN skin check done with TYLER Riley    Bilateral groin sites soft, small new drainage, scattered bruising. Wound vac to right groin and prevena vac to left groin, both draining. Skin is intact, but fragile and pale. Lower extremities warm to touch with post tibial pulses heard via doppler. Heels are boggy, floated on pillow. Will apply heel boots. Sacrum is pink and blanching.

## 2018-12-20 NOTE — OR NURSING
1430  Dr Kohli notified of lab results.  Albumin infused.  1647  EKG completed  1650  Dr Kohli at bedside; examined patient  Pulses heard by doppler; post tibial bilateral.  Patient complaining of pain in heels; lifted off bed with pad.  Ecchymotic left jaw line; area soft and slightly puffy.  Ecchymotic right groin; area palpated and soft,  Bilateral wound vac present and functioning

## 2018-12-20 NOTE — PROGRESS NOTES
Damaris from lab called to inform RN that the Pt hemoglobin was 6.0. RN redrew hemoglobin and it came back at 6.4. Dr. Kohli was called and updated. New orders in place.

## 2018-12-20 NOTE — PROGRESS NOTES
Received report from night staff. Assumed pt care. Blood infusing currently. Groin assessment has increased swelling and bruising, hospitalist to be notified. Pain level 0/10. AOX4. POC discussed. Tele monitor in place. Call light within reach, bed in lowest position, and personal items accessible.

## 2018-12-20 NOTE — PROGRESS NOTES
Dr Kohli updated on HGB of 9.6. No new orders at this time. Also updated on lopez cath; orders to leave in until tomorrow 12/21 to monitor for adequate urine output post surgery.

## 2018-12-20 NOTE — PROGRESS NOTES
Pt received from PACU. Report received from TYLER Baez. Tele monitor in place, monitor room notified. VSS, BP soft. Wound vac on right groin, previna vac on left groin, both draining. Bilateral groin sites soft and intact with small drainage. Pt oriented to room. Educated on use of the call light. Pt demonstrated use of the call light. Discussed POC. All questions answered.

## 2018-12-20 NOTE — PROGRESS NOTES
Narinder from Lab called with critical result of Calcium at 6.2. Critical lab result read back to Narinder.   Dr. Kohli notified of critical lab result at 0440.  Critical lab result read back by Dr. Kohli.

## 2018-12-20 NOTE — PROGRESS NOTES
Notified Surgeon Dr Kohli of increased groin swelling and minimal output in wound vac and prevena vac. Groin is still soft. MD aware of pt requiring 2 units of blood overnight. MD expecting HGB to increase to about 8.4 once current unit is done infusing. New orders to recheck HGB once current unit of blood is done infusing and to contact Dr Kohli if HGB is still low.

## 2018-12-20 NOTE — PROGRESS NOTES
Med rec complete per pt at bedside  Allergies have been verified and updated  No oral ABX within the last 30 days

## 2018-12-20 NOTE — PROGRESS NOTES
Bedside report received by day RN Berkley. Patient lying in bed watching TV at this time. POC discussed, verbalized understanding. No immediate concerns for patient at this time. All safety measures in place.

## 2018-12-21 PROBLEM — E21.3 HYPERPARATHYROIDISM (HCC): Status: ACTIVE | Noted: 2018-12-21

## 2018-12-21 PROBLEM — E55.9 VITAMIN D DEFICIENCY: Status: ACTIVE | Noted: 2018-12-21

## 2018-12-21 LAB
25(OH)D3 SERPL-MCNC: 4 NG/ML (ref 30–100)
ALBUMIN SERPL BCP-MCNC: 3 G/DL (ref 3.2–4.9)
ALBUMIN/GLOB SERPL: 1.8 G/DL
ALP SERPL-CCNC: 29 U/L (ref 30–99)
ALT SERPL-CCNC: 13 U/L (ref 2–50)
ANION GAP SERPL CALC-SCNC: 8 MMOL/L (ref 0–11.9)
APTT PPP: 101.2 SEC (ref 24.7–36)
APTT PPP: 45.1 SEC (ref 24.7–36)
APTT PPP: 74.2 SEC (ref 24.7–36)
AST SERPL-CCNC: 37 U/L (ref 12–45)
BASOPHILS # BLD AUTO: 0.3 % (ref 0–1.8)
BASOPHILS # BLD: 0.03 K/UL (ref 0–0.12)
BILIRUB SERPL-MCNC: 0.5 MG/DL (ref 0.1–1.5)
BUN SERPL-MCNC: 14 MG/DL (ref 8–22)
CA-I SERPL-SCNC: 1 MMOL/L (ref 1.1–1.3)
CALCIUM SERPL-MCNC: 7.5 MG/DL (ref 8.5–10.5)
CHLORIDE SERPL-SCNC: 105 MMOL/L (ref 96–112)
CO2 SERPL-SCNC: 24 MMOL/L (ref 20–33)
CREAT SERPL-MCNC: 0.77 MG/DL (ref 0.5–1.4)
EKG IMPRESSION: NORMAL
EOSINOPHIL # BLD AUTO: 0.01 K/UL (ref 0–0.51)
EOSINOPHIL NFR BLD: 0.1 % (ref 0–6.9)
ERYTHROCYTE [DISTWIDTH] IN BLOOD BY AUTOMATED COUNT: 52.2 FL (ref 35.9–50)
GLOBULIN SER CALC-MCNC: 1.7 G/DL (ref 1.9–3.5)
GLUCOSE BLD-MCNC: 106 MG/DL (ref 65–99)
GLUCOSE BLD-MCNC: 121 MG/DL (ref 65–99)
GLUCOSE BLD-MCNC: 122 MG/DL (ref 65–99)
GLUCOSE BLD-MCNC: 148 MG/DL (ref 65–99)
GLUCOSE SERPL-MCNC: 142 MG/DL (ref 65–99)
HCT VFR BLD AUTO: 24.3 % (ref 37–47)
HCT VFR BLD AUTO: 25 % (ref 37–47)
HCT VFR BLD AUTO: 25.4 % (ref 37–47)
HCT VFR BLD AUTO: 25.6 % (ref 37–47)
HGB BLD-MCNC: 8.1 G/DL (ref 12–16)
HGB BLD-MCNC: 8.3 G/DL (ref 12–16)
HGB BLD-MCNC: 8.4 G/DL (ref 12–16)
HGB BLD-MCNC: 8.5 G/DL (ref 12–16)
IMM GRANULOCYTES # BLD AUTO: 0.05 K/UL (ref 0–0.11)
IMM GRANULOCYTES NFR BLD AUTO: 0.4 % (ref 0–0.9)
LYMPHOCYTES # BLD AUTO: 1.42 K/UL (ref 1–4.8)
LYMPHOCYTES NFR BLD: 12.6 % (ref 22–41)
MCH RBC QN AUTO: 28.9 PG (ref 27–33)
MCHC RBC AUTO-ENTMCNC: 33.1 G/DL (ref 33.6–35)
MCV RBC AUTO: 87.3 FL (ref 81.4–97.8)
MONOCYTES # BLD AUTO: 1.13 K/UL (ref 0–0.85)
MONOCYTES NFR BLD AUTO: 10 % (ref 0–13.4)
NEUTROPHILS # BLD AUTO: 8.62 K/UL (ref 2–7.15)
NEUTROPHILS NFR BLD: 76.6 % (ref 44–72)
NRBC # BLD AUTO: 0 K/UL
NRBC BLD-RTO: 0 /100 WBC
PLATELET # BLD AUTO: 101 K/UL (ref 164–446)
PMV BLD AUTO: 9.7 FL (ref 9–12.9)
POTASSIUM SERPL-SCNC: 3.5 MMOL/L (ref 3.6–5.5)
PROT SERPL-MCNC: 4.7 G/DL (ref 6–8.2)
PTH-INTACT SERPL-MCNC: 963.2 PG/ML (ref 14–72)
RBC # BLD AUTO: 2.91 M/UL (ref 4.2–5.4)
SODIUM SERPL-SCNC: 137 MMOL/L (ref 135–145)
TEST NAME 95000: ABNORMAL
WBC # BLD AUTO: 11.3 K/UL (ref 4.8–10.8)

## 2018-12-21 PROCEDURE — A9270 NON-COVERED ITEM OR SERVICE: HCPCS | Performed by: FAMILY MEDICINE

## 2018-12-21 PROCEDURE — 83970 ASSAY OF PARATHORMONE: CPT

## 2018-12-21 PROCEDURE — 85025 COMPLETE CBC W/AUTO DIFF WBC: CPT

## 2018-12-21 PROCEDURE — 80053 COMPREHEN METABOLIC PANEL: CPT

## 2018-12-21 PROCEDURE — 82962 GLUCOSE BLOOD TEST: CPT | Mod: 91

## 2018-12-21 PROCEDURE — 97162 PT EVAL MOD COMPLEX 30 MIN: CPT

## 2018-12-21 PROCEDURE — 99233 SBSQ HOSP IP/OBS HIGH 50: CPT | Performed by: HOSPITALIST

## 2018-12-21 PROCEDURE — 51798 US URINE CAPACITY MEASURE: CPT

## 2018-12-21 PROCEDURE — 700102 HCHG RX REV CODE 250 W/ 637 OVERRIDE(OP): Performed by: HOSPITALIST

## 2018-12-21 PROCEDURE — 82330 ASSAY OF CALCIUM: CPT

## 2018-12-21 PROCEDURE — 700102 HCHG RX REV CODE 250 W/ 637 OVERRIDE(OP): Performed by: FAMILY MEDICINE

## 2018-12-21 PROCEDURE — G8978 MOBILITY CURRENT STATUS: HCPCS | Mod: CJ

## 2018-12-21 PROCEDURE — 85018 HEMOGLOBIN: CPT

## 2018-12-21 PROCEDURE — 770020 HCHG ROOM/CARE - TELE (206)

## 2018-12-21 PROCEDURE — G8988 SELF CARE GOAL STATUS: HCPCS | Mod: CI

## 2018-12-21 PROCEDURE — A9270 NON-COVERED ITEM OR SERVICE: HCPCS | Performed by: HOSPITALIST

## 2018-12-21 PROCEDURE — G8987 SELF CARE CURRENT STATUS: HCPCS | Mod: CJ

## 2018-12-21 PROCEDURE — 85014 HEMATOCRIT: CPT | Mod: 91

## 2018-12-21 PROCEDURE — G8979 MOBILITY GOAL STATUS: HCPCS | Mod: CI

## 2018-12-21 PROCEDURE — 97165 OT EVAL LOW COMPLEX 30 MIN: CPT

## 2018-12-21 PROCEDURE — 82306 VITAMIN D 25 HYDROXY: CPT

## 2018-12-21 PROCEDURE — 85730 THROMBOPLASTIN TIME PARTIAL: CPT

## 2018-12-21 PROCEDURE — 700111 HCHG RX REV CODE 636 W/ 250 OVERRIDE (IP): Performed by: SURGERY

## 2018-12-21 PROCEDURE — 700105 HCHG RX REV CODE 258: Performed by: FAMILY MEDICINE

## 2018-12-21 RX ORDER — CALCIUM CARBONATE 500 MG/1
500 TABLET, CHEWABLE ORAL
Status: DISCONTINUED | OUTPATIENT
Start: 2018-12-21 | End: 2018-12-28 | Stop reason: HOSPADM

## 2018-12-21 RX ORDER — ONDANSETRON 4 MG/1
4 TABLET, FILM COATED ORAL EVERY 4 HOURS PRN
Qty: 20 TAB | Refills: 3 | Status: ON HOLD | OUTPATIENT
Start: 2018-12-21 | End: 2019-01-24

## 2018-12-21 RX ORDER — DOCUSATE SODIUM 100 MG/1
100 CAPSULE, LIQUID FILLED ORAL 2 TIMES DAILY
Qty: 15 CAP | Refills: 3 | Status: SHIPPED | OUTPATIENT
Start: 2018-12-21 | End: 2020-01-23

## 2018-12-21 RX ORDER — POTASSIUM CHLORIDE 20 MEQ/1
40 TABLET, EXTENDED RELEASE ORAL ONCE
Status: COMPLETED | OUTPATIENT
Start: 2018-12-21 | End: 2018-12-21

## 2018-12-21 RX ORDER — HYDROCODONE BITARTRATE AND ACETAMINOPHEN 5; 325 MG/1; MG/1
1-2 TABLET ORAL EVERY 6 HOURS PRN
Qty: 30 TAB | Refills: 0 | Status: SHIPPED | OUTPATIENT
Start: 2018-12-21 | End: 2018-12-25

## 2018-12-21 RX ORDER — ERGOCALCIFEROL 1.25 MG/1
50000 CAPSULE ORAL
Status: DISCONTINUED | OUTPATIENT
Start: 2018-12-21 | End: 2018-12-28 | Stop reason: HOSPADM

## 2018-12-21 RX ADMIN — HEPARIN SODIUM 1050 UNITS/HR: 5000 INJECTION, SOLUTION INTRAVENOUS at 01:18

## 2018-12-21 RX ADMIN — ANTACID TABLETS 500 MG: 500 TABLET, CHEWABLE ORAL at 17:39

## 2018-12-21 RX ADMIN — POTASSIUM CHLORIDE 40 MEQ: 1500 TABLET, EXTENDED RELEASE ORAL at 10:36

## 2018-12-21 RX ADMIN — SODIUM CHLORIDE: 9 INJECTION, SOLUTION INTRAVENOUS at 18:26

## 2018-12-21 RX ADMIN — ROSUVASTATIN CALCIUM 5 MG: 5 TABLET, FILM COATED ORAL at 17:39

## 2018-12-21 RX ADMIN — HEPARIN SODIUM 2600 UNITS: 1000 INJECTION, SOLUTION INTRAVENOUS; SUBCUTANEOUS at 22:04

## 2018-12-21 RX ADMIN — STANDARDIZED SENNA CONCENTRATE AND DOCUSATE SODIUM 2 TABLET: 8.6; 5 TABLET, FILM COATED ORAL at 17:39

## 2018-12-21 RX ADMIN — SODIUM CHLORIDE: 9 INJECTION, SOLUTION INTRAVENOUS at 06:48

## 2018-12-21 RX ADMIN — ERGOCALCIFEROL 50000 UNITS: 1.25 CAPSULE ORAL at 10:36

## 2018-12-21 RX ADMIN — HEPARIN SODIUM 2600 UNITS: 1000 INJECTION, SOLUTION INTRAVENOUS; SUBCUTANEOUS at 01:18

## 2018-12-21 RX ADMIN — ANTACID TABLETS 500 MG: 500 TABLET, CHEWABLE ORAL at 11:30

## 2018-12-21 ASSESSMENT — COGNITIVE AND FUNCTIONAL STATUS - GENERAL
DRESSING REGULAR UPPER BODY CLOTHING: A LITTLE
MOBILITY SCORE: 11
STANDING UP FROM CHAIR USING ARMS: A LITTLE
SUGGESTED CMS G CODE MODIFIER MOBILITY: CL
PERSONAL GROOMING: A LITTLE
CLIMB 3 TO 5 STEPS WITH RAILING: A LOT
TURNING FROM BACK TO SIDE WHILE IN FLAT BAD: UNABLE
SUGGESTED CMS G CODE MODIFIER DAILY ACTIVITY: CK
DAILY ACTIVITIY SCORE: 17
WALKING IN HOSPITAL ROOM: A LITTLE
MOVING FROM LYING ON BACK TO SITTING ON SIDE OF FLAT BED: UNABLE
DRESSING REGULAR LOWER BODY CLOTHING: A LOT
MOVING TO AND FROM BED TO CHAIR: UNABLE
HELP NEEDED FOR BATHING: A LOT
TOILETING: A LITTLE

## 2018-12-21 ASSESSMENT — ENCOUNTER SYMPTOMS
EYE DISCHARGE: 0
FEVER: 0
HALLUCINATIONS: 0
HEMOPTYSIS: 0
STRIDOR: 0
SEIZURES: 0
SPUTUM PRODUCTION: 0
MYALGIAS: 1
PALPITATIONS: 0
PHOTOPHOBIA: 0
ABDOMINAL PAIN: 0
FALLS: 0
NERVOUS/ANXIOUS: 0
WEAKNESS: 1
BRUISES/BLEEDS EASILY: 0
FLANK PAIN: 0
LOSS OF CONSCIOUSNESS: 0
SHORTNESS OF BREATH: 0
SORE THROAT: 0
VOMITING: 0
BLOOD IN STOOL: 0
DIARRHEA: 0
EYE PAIN: 0
COUGH: 0
POLYDIPSIA: 0
CHILLS: 0
FOCAL WEAKNESS: 0
SPEECH CHANGE: 0

## 2018-12-21 ASSESSMENT — PAIN SCALES - GENERAL
PAINLEVEL_OUTOF10: 0
PAINLEVEL_OUTOF10: 2
PAINLEVEL_OUTOF10: 0

## 2018-12-21 ASSESSMENT — GAIT ASSESSMENTS
DEVIATION: BRADYKINETIC;SHUFFLED GAIT;ANTALGIC
DISTANCE (FEET): 10
GAIT LEVEL OF ASSIST: MINIMAL ASSIST
ASSISTIVE DEVICE: FRONT WHEEL WALKER

## 2018-12-21 ASSESSMENT — ACTIVITIES OF DAILY LIVING (ADL): TOILETING: INDEPENDENT

## 2018-12-21 NOTE — ASSESSMENT & PLAN NOTE
Marked hyper !  Mostly due to hypocalcemia & vitamin D Deficiency   Need follow up PTH after correcting Ca and vitamin D Deficiency  If PTH does not correct after replacing vitamin D and calcium, patient may benefit from seeing an endocrinologist

## 2018-12-21 NOTE — THERAPY
"Occupational Therapy Evaluation completed.   Functional Status:  Max A LB dressing, Min A toilet txf, CGA standing grooming, Min A toileting  Plan of Care: Will benefit from Occupational Therapy 3 times per week  Discharge Recommendations:  Equipment: Will Continue to Assess for Equipment Needs. Post-acute therapy Recommend inpatient transitional care services for continued occupational therapy services.     See \"Rehab Therapy-Acute\" Patient Summary Report for complete documentation.    Pt is an 84 y/o female who presents to acute 2/2 leg pain. PMH includes Diabetes, PVD. Pt is now s/p vascular leg sx. Pt has bilateral wound vacs. Pt reports she lives w/ adult son who travels for work. Pt primarily limited by hip pain. Pt demo'd impaired balance, functional mobility and activity tolerance impacting functional independence. Will follow for Acute OT services while in house.  Recommend subacute placement for continued inpt therapy services.     "

## 2018-12-21 NOTE — ASSESSMENT & PLAN NOTE
Marked  vitamin D Deficiency 4 only!, started ergocalciferol.    Calcium improving with replacement

## 2018-12-21 NOTE — PROGRESS NOTES
"Vascular    Events: Hgb down to 6.4, HDS, gave 2 U PRBCs, repeat Hgb was 9.6 and then 8.7   Pain controlled  Feet feel better  /58   Pulse 92   Temp 36.9 °C (98.4 °F) (Temporal)   Resp 18   Ht 1.6 m (5' 3\")   Wt 70.3 kg (155 lb)   SpO2 94%   Breastfeeding? No   BMI 27.46 kg/m²   Brisk PT doppler signals bilaterally  Prevenas CDI, no drainage on left, slowed drainage on right    A/P)  12/19: ABFB graft thrombectomy    Restart therapeutic heparin no-bolus protocol.  If Hgb stabilizes on heparin then will transition to PO anticoagulation  PT/OT, dispo planning  Appreciate hospitalist service's help managing Ms. Verduzco.    Garett Kohli MD  [facility] (General and Vascular Surgery)  Cell: [phone] (text or call is fine, if you don't reach me please try my office)  Office: [phone]  __________________________________________________________________  Patient:Radha Verduzco   MRN:[id number]   CSN:[id number]    12/20/2018    8:20 PM    "

## 2018-12-21 NOTE — PROGRESS NOTES
Discharge Instructions    Procedure: Lower Extremity Bypass    1. ACTIVITIES: No strenuous activities or heavy lifting (greater than 15 pounds) for 4 weeks.    2. DRIVING: You may drive whenever you are off pain medications and are able to perform the activities needed to drive, i.e. turning, bending, twisting, etc.     3. WOUND: It is not unusual for patients to experience swelling and even bruising, as well as a small amount of drainage from the incisions. This is normal and will resolve over the next 1-2 weeks. You can shower starting the day of discharge. You only need to cover the incisions if there is drainage from the incision.    If you have purple bandages covering your incisions I will remove those in office on Wednesday 12/26/18.  Please come to the clinic in the morning any time after 9 AM to have the bandages removed.    4. BATHING: You can shower starting the day of discharge. You only need to cover the incisions if there is drainage from the incision.    6. PAIN MEDICATION: You will be given a prescription for pain medication at discharge. Please take these as directed. It is important to remember not to take medications on an empty stomach as this may cause nausea. Also, you may get a prescription for a stool softener which you should take as long as you are taking pain medication.    7. BOWEL FUNCTION: After surgery, it is not uncommon for patients to experience constipation. This is due to decreasing activity levels as well as pain medications. You may need to use a laxative (Milk of Magnesia, Ex-lax; Senokot, etc.) if you go more than 1-2 days without a bowel movement.    8 .CALL IF YOU HAVE: (1) Fevers to more than 101.5 F, (2) Unusual or excessive pain, (3) Drainage or fluid from incision that may be foul smelling, increased tenderness or soreness at the wound or the wound edges are no longer together, redness or swelling at the incision site. Please do not hesitate to call with any other  questions.   If you have any additional questions, please do not hesitate to call the office and speak to either myself or the physician on call.     Office addresses:   [address] [place] [address] [address]  [facility] [address]  [phone]    Garett Kohli MD  [facility]  [phone]

## 2018-12-21 NOTE — PROGRESS NOTES
Hospital Medicine Daily Progress Note    Date of Service  12/21/2018    Chief Complaint  Leg pain    Hospital Course      83 y.o. female past medical history of diabetes, gout, hypertension, dyslipidemia, peripheral vascular disease status post aorto biiliac graft, aorto left external iliac artery bypass, admitted 12/19/2018 with leg pain vascular surgery Dr. Kohli was consulted imaging showed occluded aortobifemoral bypass.  Patient was started on a heparin drip and was taken to the OR for bilateral groin exploration with attempted thrombectomy.             Interval Problem Update  HR , BP  / 38-58, saturating on RA.    Getting heparin drip, watch for bleeding, and trend Hb.   Hb 8.4 > 8.1 > 8.5, still having B/L groin swelling.    WBC 11.3, platelets 101.  Hypokalemia 3.5 replacing.   Hypocalcemia starting calcium.   Marked Vitamin D Deficiency 4 only!, started ergocalciferol.  Hyperparathyrodism, mostly due to hypocalcemia and Vit D def, will need outpatient follow up.     Consultants/Specialty  Surgery Dr. Kohli     Code Status  FULL    Disposition  Inpatient      Review of Systems  Review of Systems   Constitutional: Positive for malaise/fatigue. Negative for chills and fever.   HENT: Negative for congestion, ear discharge, ear pain and sore throat.    Eyes: Negative for photophobia, pain and discharge.   Respiratory: Negative for cough, hemoptysis, sputum production, shortness of breath and stridor.    Cardiovascular: Positive for leg swelling. Negative for chest pain and palpitations.   Gastrointestinal: Negative for abdominal pain, blood in stool, diarrhea and vomiting.   Genitourinary: Negative for flank pain, hematuria and urgency.   Musculoskeletal: Positive for myalgias. Negative for falls.        Soreness at the groins    Skin: Negative.    Neurological: Positive for weakness. Negative for speech change, focal weakness, seizures and loss of consciousness.   Endo/Heme/Allergies: Negative  for polydipsia. Does not bruise/bleed easily.   Psychiatric/Behavioral: Negative for hallucinations and suicidal ideas. The patient is not nervous/anxious.         Physical Exam  Temp:  [36.1 °C (97 °F)-36.9 °C (98.4 °F)] 36.4 °C (97.5 °F)  Pulse:  [88-97] 90  Resp:  [16-18] 16  BP: ()/(38-58) 108/42    Physical Exam   Constitutional: She is oriented to person, place, and time. She appears well-developed and well-nourished.   HENT:   Head: Normocephalic and atraumatic.   Right Ear: External ear normal.   Left Ear: External ear normal.   Eyes: Pupils are equal, round, and reactive to light. Conjunctivae are normal. Right eye exhibits no discharge. Left eye exhibits no discharge.   Neck: Normal range of motion. Neck supple. No JVD present. No tracheal deviation present. No thyromegaly present.   Cardiovascular: Normal rate and regular rhythm.  Exam reveals no gallop and no friction rub.    No murmur heard.  Pulmonary/Chest: Effort normal and breath sounds normal. No stridor. No respiratory distress. She has no wheezes. She has no rales. She exhibits no tenderness.   Abdominal: Soft. Bowel sounds are normal. She exhibits no distension. There is no tenderness. There is no rebound and no guarding.   Musculoskeletal: She exhibits edema and tenderness.   Wound vac at both groins with drains    Neurological: She is alert and oriented to person, place, and time. No cranial nerve deficit. Coordination normal.   Skin: Skin is warm and dry. No erythema.   Psychiatric: She has a normal mood and affect. Judgment normal.   Nursing note and vitals reviewed.      Fluids    Intake/Output Summary (Last 24 hours) at 12/21/18 1350  Last data filed at 12/20/18 1800   Gross per 24 hour   Intake                0 ml   Output              750 ml   Net             -750 ml       Laboratory  Recent Labs      12/19/18   1401  12/20/18   0049   12/21/18   0030  12/21/18   0540  12/21/18   1037   WBC  16.3*  10.0   --   11.3*   --    --     RBC  3.06*  2.07*   --   2.91*   --    --    HEMOGLOBIN  9.0*  6.0*   < >  8.4*  8.1*  8.5*   HEMATOCRIT  27.9*  19.1*   < >  25.4*  24.3*  25.6*   MCV  91.2  91.8   --   87.3   --    --    MCH  29.4  29.0   --   28.9   --    --    MCHC  32.3*  31.6*   --   33.1*   --    --    RDW  51.7*  52.2*   --   52.2*   --    --    PLATELETCT  128*  100*   --   101*   --    --    MPV  9.5  9.7   --   9.7   --    --     < > = values in this interval not displayed.     Recent Labs      12/19/18   0309  12/20/18   0049  12/20/18   0457  12/21/18   0030   SODIUM  137  141   --   137   POTASSIUM  4.1  3.6   --   3.5*   CHLORIDE  106  115*   --   105   CO2  20  19*   --   24   GLUCOSE  194*  160*   --   142*   BUN  21 21   --   14   CREATININE  1.00  1.01   --   0.77   CALCIUM  8.9  6.2*  7.3*  7.5*     Recent Labs      12/19/18   0309  12/19/18   1356  12/20/18   2230  12/21/18   0736   APTT  28.0  120.8*  27.0  101.2*   INR  1.28*  1.45*   --    --          Recent Labs      12/20/18   0049   TRIGLYCERIDE  130   HDL  13*   LDL  13       Imaging  DX-CHEST-PORTABLE (1 VIEW)   Final Result      Left IJ central catheter tip is in the proximal/mid SVC. No pneumothorax.      OUTSIDE IMAGES-CT CHEST/ABDOMEN/PELVIS   Final Result           Assessment/Plan  * Lower limb ischemia- (present on admission)   Assessment & Plan    Surgery consulted  Exploratory surgery on 12/19/2018, Dr Kohli following         Anemia   Assessment & Plan    Postoperatively patient hemoglobin was 6   S/P 2 units of packed RBCs, Repeat Hb 9.6  Continue to monitor and transfuse for hemoglobin below 7     PAD (peripheral artery disease) (HCC)- (present on admission)   Assessment & Plan    Statin       Vitamin D deficiency- (present on admission)   Assessment & Plan    Marked  vitamin D Deficiency 4 only!, started ergocalciferol.      Hypocalcemia- (present on admission)   Assessment & Plan    Recent transfusion could be part of the reasonCalcium 6.2, repeat 7.3,  replacing.    Replacing calcium   Marked  vitamin D Deficiency 4 only!, started ergocalciferol.      Elevated lactic acid level- (present on admission)   Assessment & Plan    IVF hydration  Serial lactic improved       Leukocytosis- (present on admission)   Assessment & Plan    Improved   Watch for signs of infection       CKD (chronic kidney disease) stage 3, GFR 30-59 ml/min ([facility])- (present on admission)   Assessment & Plan    Avoid nephrotoxins as much as possible   Monitor inputs and outputs  CTM BMP         Type 2 diabetes mellitus with kidney complication, without long-term current use of insulin ([facility])- (present on admission)   Assessment & Plan    With hyperglycemia  Last Glycated hemoglobin 6.6, pending repeat    Short acting insulin  Accu-Checks, hypoglycemia protocol       Hypertension- (present on admission)   Assessment & Plan    IV Vasotec and Hydralazine prn       Hyperparathyroidism ([facility])- (present on admission)   Assessment & Plan    Marked hyper !  Mostly due to hypocalcemia & vitamin D Deficiency   Need follow up PTH after correcting Ca and PTH      Hyperlipidemia- (present on admission)   Assessment & Plan    Crestor             VTE prophylaxis: SCDs, heparin per surgery

## 2018-12-21 NOTE — CARE PLAN
Problem: Safety  Goal: Will remain free from falls  Safety precautions reviewed with pt, pt verbalized understanding and denies questions.  Pt demonstrated ability to use call light for assistance.      Problem: Mobility  Goal: Risk for activity intolerance will decrease  PT/OT actively working with patient. Pt encouraged to mobilize frequently with assistance. Pt up to chair for meals and when requested.

## 2018-12-21 NOTE — PROGRESS NOTES
Received report from night staff. Assumed pt care. Pain level 0/10. AOX4. Lower extremity pulses assessed with doppler. POC discussed. Tele monitor in place. Call light within reach, bed in lowest position, and personal items accessible.

## 2018-12-21 NOTE — ASSESSMENT & PLAN NOTE
Postoperatively patient hemoglobin was 6   S/P 2 units of packed RBCs, stabilized after that   Continue to monitor and transfuse for hemoglobin below 7  Hemoglobin remained stable

## 2018-12-22 LAB
ANION GAP SERPL CALC-SCNC: 7 MMOL/L (ref 0–11.9)
APPEARANCE UR: CLEAR
APTT PPP: 72.7 SEC (ref 24.7–36)
APTT PPP: 83.3 SEC (ref 24.7–36)
BACTERIA #/AREA URNS HPF: NEGATIVE /HPF
BILIRUB UR QL STRIP.AUTO: NEGATIVE
BUN SERPL-MCNC: 11 MG/DL (ref 8–22)
CALCIUM SERPL-MCNC: 8.1 MG/DL (ref 8.5–10.5)
CHLORIDE SERPL-SCNC: 105 MMOL/L (ref 96–112)
CO2 SERPL-SCNC: 25 MMOL/L (ref 20–33)
COLOR UR: YELLOW
CREAT SERPL-MCNC: 0.65 MG/DL (ref 0.5–1.4)
EPI CELLS #/AREA URNS HPF: ABNORMAL /HPF
GLUCOSE BLD-MCNC: 105 MG/DL (ref 65–99)
GLUCOSE BLD-MCNC: 137 MG/DL (ref 65–99)
GLUCOSE SERPL-MCNC: 147 MG/DL (ref 65–99)
GLUCOSE UR STRIP.AUTO-MCNC: NEGATIVE MG/DL
HCT VFR BLD AUTO: 23.9 % (ref 37–47)
HGB BLD-MCNC: 8.1 G/DL (ref 12–16)
HYALINE CASTS #/AREA URNS LPF: ABNORMAL /LPF
KETONES UR STRIP.AUTO-MCNC: NEGATIVE MG/DL
LEUKOCYTE ESTERASE UR QL STRIP.AUTO: ABNORMAL
MICRO URNS: ABNORMAL
NITRITE UR QL STRIP.AUTO: NEGATIVE
PH UR STRIP.AUTO: 5.5 [PH]
POTASSIUM SERPL-SCNC: 3.9 MMOL/L (ref 3.6–5.5)
PROT UR QL STRIP: NEGATIVE MG/DL
RBC # URNS HPF: ABNORMAL /HPF
RBC UR QL AUTO: ABNORMAL
SODIUM SERPL-SCNC: 137 MMOL/L (ref 135–145)
SP GR UR STRIP.AUTO: 1.01
UROBILINOGEN UR STRIP.AUTO-MCNC: 0.2 MG/DL
WBC #/AREA URNS HPF: ABNORMAL /HPF

## 2018-12-22 PROCEDURE — 99232 SBSQ HOSP IP/OBS MODERATE 35: CPT | Performed by: HOSPITALIST

## 2018-12-22 PROCEDURE — 85018 HEMOGLOBIN: CPT

## 2018-12-22 PROCEDURE — 700111 HCHG RX REV CODE 636 W/ 250 OVERRIDE (IP): Performed by: HOSPITALIST

## 2018-12-22 PROCEDURE — A9270 NON-COVERED ITEM OR SERVICE: HCPCS | Performed by: HOSPITALIST

## 2018-12-22 PROCEDURE — 87086 URINE CULTURE/COLONY COUNT: CPT

## 2018-12-22 PROCEDURE — 85730 THROMBOPLASTIN TIME PARTIAL: CPT

## 2018-12-22 PROCEDURE — 770020 HCHG ROOM/CARE - TELE (206)

## 2018-12-22 PROCEDURE — A9270 NON-COVERED ITEM OR SERVICE: HCPCS | Performed by: FAMILY MEDICINE

## 2018-12-22 PROCEDURE — 81001 URINALYSIS AUTO W/SCOPE: CPT

## 2018-12-22 PROCEDURE — 700102 HCHG RX REV CODE 250 W/ 637 OVERRIDE(OP): Performed by: FAMILY MEDICINE

## 2018-12-22 PROCEDURE — 80048 BASIC METABOLIC PNL TOTAL CA: CPT

## 2018-12-22 PROCEDURE — 700102 HCHG RX REV CODE 250 W/ 637 OVERRIDE(OP): Performed by: HOSPITALIST

## 2018-12-22 PROCEDURE — 85014 HEMATOCRIT: CPT

## 2018-12-22 PROCEDURE — 82962 GLUCOSE BLOOD TEST: CPT

## 2018-12-22 PROCEDURE — 700111 HCHG RX REV CODE 636 W/ 250 OVERRIDE (IP): Performed by: SURGERY

## 2018-12-22 RX ADMIN — ANTACID TABLETS 500 MG: 500 TABLET, CHEWABLE ORAL at 11:30

## 2018-12-22 RX ADMIN — ENOXAPARIN SODIUM 80 MG: 100 INJECTION SUBCUTANEOUS at 12:30

## 2018-12-22 RX ADMIN — STANDARDIZED SENNA CONCENTRATE AND DOCUSATE SODIUM 2 TABLET: 8.6; 5 TABLET, FILM COATED ORAL at 06:00

## 2018-12-22 RX ADMIN — ANTACID TABLETS 500 MG: 500 TABLET, CHEWABLE ORAL at 17:42

## 2018-12-22 RX ADMIN — HEPARIN SODIUM 1250 UNITS/HR: 5000 INJECTION, SOLUTION INTRAVENOUS at 02:32

## 2018-12-22 RX ADMIN — ROSUVASTATIN CALCIUM 5 MG: 5 TABLET, FILM COATED ORAL at 17:42

## 2018-12-22 RX ADMIN — STANDARDIZED SENNA CONCENTRATE AND DOCUSATE SODIUM 2 TABLET: 8.6; 5 TABLET, FILM COATED ORAL at 17:42

## 2018-12-22 ASSESSMENT — PAIN SCALES - GENERAL
PAINLEVEL_OUTOF10: 0

## 2018-12-22 ASSESSMENT — ENCOUNTER SYMPTOMS
NERVOUS/ANXIOUS: 0
VOMITING: 0
FEVER: 0
MYALGIAS: 1
BLOOD IN STOOL: 0
LOSS OF CONSCIOUSNESS: 0
BRUISES/BLEEDS EASILY: 0
FLANK PAIN: 0
STRIDOR: 0
DIARRHEA: 0
COUGH: 0
EYE DISCHARGE: 0
CHILLS: 0
SHORTNESS OF BREATH: 0
HEMOPTYSIS: 0
SORE THROAT: 0
FOCAL WEAKNESS: 0
PHOTOPHOBIA: 0
EYE PAIN: 0
SPUTUM PRODUCTION: 0
ABDOMINAL PAIN: 0
SEIZURES: 0
FALLS: 0
WEAKNESS: 1
SPEECH CHANGE: 0
POLYDIPSIA: 0
PALPITATIONS: 0
HALLUCINATIONS: 0

## 2018-12-22 NOTE — PROGRESS NOTES
Report received by day shift RN. Pt laying in bed awake alert and oriented x 4 with no c/o pain. Bilateral groin sites CDI with prevena intact. Updated to POC and verbalized understanding. Bed locked in low position with fall precautions and call light in reach.

## 2018-12-22 NOTE — THERAPY
"Physical Therapy Evaluation completed.   Bed Mobility:  Supine to Sit: Maximal Assist  Transfers: Sit to Stand: Minimal Assist  Gait: Level Of Assist: Minimal Assist with Front-Wheel Walker       Plan of Care: Will benefit from Physical Therapy 3 times per week  Discharge Recommendations: Equipment: Will Continue to Assess for Equipment Needs. Post-acute therapy Recommend inpatient transitional care services for continued physical therapy services.        See \"Rehab Therapy-Acute\" Patient Summary Report for complete documentation.     "

## 2018-12-22 NOTE — PROGRESS NOTES
"  S: Awake alert, no complaints     O: Incisions look good   Feet warm and well perfused   Blood pressure 108/64, pulse 94, temperature 36.2 °C (97.2 °F), temperature source Temporal, resp. rate 16, height 1.6 m (5' 3\"), weight 78.2 kg (172 lb 6.4 oz), SpO2 92 %, not currently breastfeeding.    Intake/Output Summary (Last 24 hours) at 12/22/18 0932  Last data filed at 12/21/18 2300   Gross per 24 hour   Intake              240 ml   Output                0 ml   Net              240 ml     Recent Labs      12/19/18   1401  12/20/18   0049   12/21/18   0030   12/21/18   1037  12/21/18 2028 12/22/18   0352   WBC  16.3*  10.0   --   11.3*   --    --    --    --    RBC  3.06*  2.07*   --   2.91*   --    --    --    --    HEMOGLOBIN  9.0*  6.0*   < >  8.4*   < >  8.5*  8.3*  8.1*   HEMATOCRIT  27.9*  19.1*   < >  25.4*   < >  25.6*  25.0*  23.9*   MCV  91.2  91.8   --   87.3   --    --    --    --    MCH  29.4  29.0   --   28.9   --    --    --    --    MCHC  32.3*  31.6*   --   33.1*   --    --    --    --    RDW  51.7*  52.2*   --   52.2*   --    --    --    --    PLATELETCT  128*  100*   --   101*   --    --    --    --    MPV  9.5  9.7   --   9.7   --    --    --    --     < > = values in this interval not displayed.     Recent Labs      12/20/18   0049  12/20/18   0457  12/21/18   0030 12/22/18   0352   SODIUM  141   --   137  137   POTASSIUM  3.6   --   3.5*  3.9   CHLORIDE  115*   --   105  105   CO2  19*   --   24  25   GLUCOSE  160*   --   142*  147*   BUN  21   --   14  11   CREATININE  1.01   --   0.77  0.65   CALCIUM  6.2*  7.3*  7.5*  8.1*     Recent Labs      12/20/18   0049  12/21/18   0030  12/22/18   0352   SODIUM  141  137  137   POTASSIUM  3.6  3.5*  3.9   CHLORIDE  115*  105  105   CO2  19*  24  25   GLUCOSE  160*  142*  147*   BUN  21  14  11     Recent Labs      12/19/18   1356   12/21/18   1340  12/21/18   2028  12/22/18   0352   APTT  120.8*   < >  74.2*  45.1*  83.3*   INR  1.45*   --    --    " --    --     < > = values in this interval not displayed.     Recent Labs      12/19/18   1401   TROPONINI  0.33*       Current Facility-Administered Medications   Medication Dose   • vitamin D (Ergocalciferol) (DRISDOL) capsule 50,000 Units  50,000 Units   • calcium carbonate (TUMS) chewable tab 500 mg  500 mg   • heparin injection 2,600 Units  2,600 Units    And   • heparin infusion 25,000 units in 500 ml 0.45% nacl     • rosuvastatin (CRESTOR) tablet 5 mg  5 mg   • senna-docusate (PERICOLACE or SENOKOT S) 8.6-50 MG per tablet 2 Tab  2 Tab    And   • polyethylene glycol/lytes (MIRALAX) PACKET 1 Packet  1 Packet    And   • magnesium hydroxide (MILK OF MAGNESIA) suspension 30 mL  30 mL    And   • bisacodyl (DULCOLAX) suppository 10 mg  10 mg   • Respiratory Care per Protocol     • NS infusion     • ondansetron (ZOFRAN) syringe/vial injection 4 mg  4 mg   • ondansetron (ZOFRAN ODT) dispertab 4 mg  4 mg   • enalaprilat (VASOTEC) injection 1.25 mg  1.25 mg   • hydrALAZINE (APRESOLINE) injection 10 mg  10 mg   • acetaminophen (TYLENOL) tablet 650 mg  650 mg   • Pharmacy Consult Request ...Pain Management Review      And   • oxyCODONE immediate-release (ROXICODONE) tablet 2.5 mg  2.5 mg    And   • oxyCODONE immediate-release (ROXICODONE) tablet 5 mg  5 mg    And   • morphine (pf) 10 mg/mL injection 2 mg  2 mg   • insulin regular (HUMULIN R) injection 1-6 Units  1-6 Units    And   • glucose 4 g chewable tablet 16 g  16 g    And   • dextrose 50% (D50W) injection 25 mL  25 mL       A:  Active Hospital Problems    Diagnosis   • Anemia [D64.9]     Priority: High   • Lower limb ischemia [I99.8]     Priority: High   • PAD (peripheral artery disease) ([facility]) [I73.9]     Priority: High   • Vitamin D deficiency [E55.9]     Priority: Medium   • Hypocalcemia [E83.51]     Priority: Medium   • CKD (chronic kidney disease) stage 3, GFR 30-59 ml/min ([facility]) [N18.3]     Priority: Medium   • Leukocytosis [D72.829]     Priority: Medium   •  Elevated lactic acid level [R79.89]     Priority: Medium   • Type 2 diabetes mellitus with kidney complication, without long-term current use of insulin (HCC) [E11.29]     Priority: Medium   • Hypertension [I10]     Priority: Medium   • Hyperlipidemia [E78.5]     Priority: Low   • Hyperparathyroidism (HCC) [E21.3]       P: OK to start oral anticoagulants (coumadin or Novel anticoagulant)   Do not need to continue to follow h&hs   SNF

## 2018-12-22 NOTE — PROGRESS NOTES
Hospital Medicine Daily Progress Note    Date of Service  12/22/2018    Chief Complaint  Leg pain    Hospital Course      83 y.o. female past medical history of diabetes, gout, hypertension, dyslipidemia, peripheral vascular disease status post aorto biiliac graft, aorto left external iliac artery bypass, admitted 12/19/2018 with leg pain vascular surgery Dr. Kohli was consulted imaging showed occluded aortobifemoral bypass.  Patient was started on a heparin drip and was taken to the OR for bilateral groin exploration with attempted thrombectomy.             Interval Problem Update  Hemodynamically stable overnight, saturating well on 1-2 L, continue to try to wean off, encourage incentive spirometry.  Having frequency, ordered UA  Swelling in the groins is improving   Plan to switch to an oral anticoagulants [NOAC Vs Coumadin], discussing with pharmacy and case management. ? Patient's co-pay given her insurance.   Hb 8.4 > 8.5 > 8.3 > 8.1, switching to daily checks  Hypocalcemia improved     Consultants/Specialty  Surgery Dr. Kohli     Code Status  FULL    Disposition  Inpatient      Review of Systems  Review of Systems   Constitutional: Positive for malaise/fatigue. Negative for chills and fever.   HENT: Negative for congestion, ear discharge, ear pain and sore throat.    Eyes: Negative for photophobia, pain and discharge.   Respiratory: Negative for cough, hemoptysis, sputum production, shortness of breath and stridor.    Cardiovascular: Positive for leg swelling. Negative for chest pain and palpitations.   Gastrointestinal: Negative for abdominal pain, blood in stool, diarrhea and vomiting.   Genitourinary: Positive for frequency and urgency. Negative for flank pain and hematuria.   Musculoskeletal: Positive for myalgias. Negative for falls.        Soreness at the groins    Skin: Negative.    Neurological: Positive for weakness. Negative for speech change, focal weakness, seizures and loss of consciousness.    Endo/Heme/Allergies: Negative for polydipsia. Does not bruise/bleed easily.   Psychiatric/Behavioral: Negative for hallucinations and suicidal ideas. The patient is not nervous/anxious.         Physical Exam  Temp:  [36.1 °C (97 °F)-36.6 °C (97.8 °F)] 36.2 °C (97.2 °F)  Pulse:  [85-95] 94  Resp:  [16-18] 16  BP: (101-125)/(41-64) 108/64    Physical Exam   Constitutional: She is oriented to person, place, and time. She appears well-developed and well-nourished.   HENT:   Head: Normocephalic and atraumatic.   Right Ear: External ear normal.   Left Ear: External ear normal.   Eyes: Pupils are equal, round, and reactive to light. Conjunctivae are normal. Right eye exhibits no discharge. Left eye exhibits no discharge.   Neck: Normal range of motion. Neck supple. No JVD present. No tracheal deviation present. No thyromegaly present.   Cardiovascular: Normal rate and regular rhythm.  Exam reveals no gallop and no friction rub.    No murmur heard.  Pulmonary/Chest: Effort normal and breath sounds normal. No stridor. No respiratory distress. She has no wheezes. She has no rales. She exhibits no tenderness.   Abdominal: Soft. Bowel sounds are normal. She exhibits no distension. There is no tenderness. There is no rebound and no guarding.   Musculoskeletal: She exhibits edema and tenderness.   Wound vac at both groins with drains    Neurological: She is alert and oriented to person, place, and time. No cranial nerve deficit. Coordination normal.   Skin: Skin is warm and dry. No erythema.   Psychiatric: She has a normal mood and affect. Judgment normal.   Nursing note and vitals reviewed.      Fluids    Intake/Output Summary (Last 24 hours) at 12/22/18 1242  Last data filed at 12/21/18 2300   Gross per 24 hour   Intake              240 ml   Output                0 ml   Net              240 ml       Laboratory  Recent Labs      12/19/18   1401  12/20/18   0049   12/21/18   0030   12/21/18   1037  12/21/18 2028  12/22/18    0352   WBC  16.3*  10.0   --   11.3*   --    --    --    --    RBC  3.06*  2.07*   --   2.91*   --    --    --    --    HEMOGLOBIN  9.0*  6.0*   < >  8.4*   < >  8.5*  8.3*  8.1*   HEMATOCRIT  27.9*  19.1*   < >  25.4*   < >  25.6*  25.0*  23.9*   MCV  91.2  91.8   --   87.3   --    --    --    --    MCH  29.4  29.0   --   28.9   --    --    --    --    MCHC  32.3*  31.6*   --   33.1*   --    --    --    --    RDW  51.7*  52.2*   --   52.2*   --    --    --    --    PLATELETCT  128*  100*   --   101*   --    --    --    --    MPV  9.5  9.7   --   9.7   --    --    --    --     < > = values in this interval not displayed.     Recent Labs      12/20/18   0049  12/20/18   0457  12/21/18   0030  12/22/18   0352   SODIUM  141   --   137  137   POTASSIUM  3.6   --   3.5*  3.9   CHLORIDE  115*   --   105  105   CO2  19*   --   24  25   GLUCOSE  160*   --   142*  147*   BUN  21   --   14  11   CREATININE  1.01   --   0.77  0.65   CALCIUM  6.2*  7.3*  7.5*  8.1*     Recent Labs      12/19/18   1356   12/21/18   2028  12/22/18   0352  12/22/18   1045   APTT  120.8*   < >  45.1*  83.3*  72.7*   INR  1.45*   --    --    --    --     < > = values in this interval not displayed.         Recent Labs      12/20/18   0049   TRIGLYCERIDE  130   HDL  13*   LDL  13       Imaging  DX-CHEST-PORTABLE (1 VIEW)   Final Result      Left IJ central catheter tip is in the proximal/mid SVC. No pneumothorax.      OUTSIDE IMAGES-CT CHEST/ABDOMEN/PELVIS   Final Result           Assessment/Plan  * Lower limb ischemia- (present on admission)   Assessment & Plan    Surgery consulted  Exploratory surgery on 12/19/2018, seen by Dr Kohli  Was on heparin drip than Lovenox therapeutic, ongoing discussions with long-term anticoagulation choice       Anemia   Assessment & Plan    Postoperatively patient hemoglobin was 6   S/P 2 units of packed RBCs, stabilized after that   Continue to monitor and transfuse for hemoglobin below 7     PAD (peripheral artery  disease) ([facility])- (present on admission)   Assessment & Plan    Statin        Hyperparathyroidism (HCC)- (present on admission)   Assessment & Plan    Marked hyper !  Mostly due to hypocalcemia & vitamin D Deficiency   Need follow up PTH after correcting Ca and vitamin D Deficiency  If PTH does not correct after replacing vitamin D and calcium, patient may benefit from seeing an endocrinologist        Vitamin D deficiency- (present on admission)   Assessment & Plan    Marked  vitamin D Deficiency 4 only!, started ergocalciferol.       Hypocalcemia- (present on admission)   Assessment & Plan    Marked  vitamin D Deficiency 4 only!, started ergocalciferol.    Calcium improving with replacement       Elevated lactic acid level- (present on admission)   Assessment & Plan    S/P IVF hydration  Serial lactic improved         Leukocytosis- (present on admission)   Assessment & Plan    Improved   Watch for signs of infection        CKD (chronic kidney disease) stage 3, GFR 30-59 ml/min ([facility])- (present on admission)   Assessment & Plan    Avoid nephrotoxins as much as possible   Monitor inputs & outputs, and BMP            Type 2 diabetes mellitus with kidney complication, without long-term current use of insulin ([facility])- (present on admission)   Assessment & Plan    With hyperglycemia  Last Glycated hemoglobin 6.6, pending repeat    Short acting insulin  Accu-Checks, hypoglycemia protocol          Hypertension- (present on admission)   Assessment & Plan    IV Vasotec and Hydralazine prn        Hyperlipidemia- (present on admission)   Assessment & Plan    Rosuvastatin            VTE prophylaxis: SCDs, heparin per surgery

## 2018-12-23 LAB
ANION GAP SERPL CALC-SCNC: 6 MMOL/L (ref 0–11.9)
BUN SERPL-MCNC: 11 MG/DL (ref 8–22)
CALCIUM SERPL-MCNC: 8.3 MG/DL (ref 8.5–10.5)
CHLORIDE SERPL-SCNC: 101 MMOL/L (ref 96–112)
CO2 SERPL-SCNC: 25 MMOL/L (ref 20–33)
CREAT SERPL-MCNC: 0.71 MG/DL (ref 0.5–1.4)
ERYTHROCYTE [DISTWIDTH] IN BLOOD BY AUTOMATED COUNT: 49.3 FL (ref 35.9–50)
EST. AVERAGE GLUCOSE BLD GHB EST-MCNC: 134 MG/DL
GLUCOSE SERPL-MCNC: 131 MG/DL (ref 65–99)
HBA1C MFR BLD: 6.3 % (ref 0–5.6)
HCT VFR BLD AUTO: 22.8 % (ref 37–47)
HCT VFR BLD AUTO: 22.8 % (ref 37–47)
HGB BLD-MCNC: 7.3 G/DL (ref 12–16)
HGB BLD-MCNC: 7.5 G/DL (ref 12–16)
INR PPP: 1.03 (ref 0.87–1.13)
MCH RBC QN AUTO: 28.4 PG (ref 27–33)
MCHC RBC AUTO-ENTMCNC: 32 G/DL (ref 33.6–35)
MCV RBC AUTO: 88.7 FL (ref 81.4–97.8)
PLATELET # BLD AUTO: 158 K/UL (ref 164–446)
PMV BLD AUTO: 9.6 FL (ref 9–12.9)
POTASSIUM SERPL-SCNC: 3.7 MMOL/L (ref 3.6–5.5)
PROTHROMBIN TIME: 13.6 SEC (ref 12–14.6)
RBC # BLD AUTO: 2.57 M/UL (ref 4.2–5.4)
SODIUM SERPL-SCNC: 132 MMOL/L (ref 135–145)
WBC # BLD AUTO: 9.9 K/UL (ref 4.8–10.8)

## 2018-12-23 PROCEDURE — 80048 BASIC METABOLIC PNL TOTAL CA: CPT

## 2018-12-23 PROCEDURE — A9270 NON-COVERED ITEM OR SERVICE: HCPCS | Performed by: FAMILY MEDICINE

## 2018-12-23 PROCEDURE — A9270 NON-COVERED ITEM OR SERVICE: HCPCS | Performed by: HOSPITALIST

## 2018-12-23 PROCEDURE — 85014 HEMATOCRIT: CPT

## 2018-12-23 PROCEDURE — 700102 HCHG RX REV CODE 250 W/ 637 OVERRIDE(OP): Performed by: HOSPITALIST

## 2018-12-23 PROCEDURE — 85018 HEMOGLOBIN: CPT

## 2018-12-23 PROCEDURE — 700111 HCHG RX REV CODE 636 W/ 250 OVERRIDE (IP): Performed by: HOSPITALIST

## 2018-12-23 PROCEDURE — 83036 HEMOGLOBIN GLYCOSYLATED A1C: CPT

## 2018-12-23 PROCEDURE — A9270 NON-COVERED ITEM OR SERVICE: HCPCS | Performed by: INTERNAL MEDICINE

## 2018-12-23 PROCEDURE — 85610 PROTHROMBIN TIME: CPT

## 2018-12-23 PROCEDURE — 700102 HCHG RX REV CODE 250 W/ 637 OVERRIDE(OP): Performed by: INTERNAL MEDICINE

## 2018-12-23 PROCEDURE — 99232 SBSQ HOSP IP/OBS MODERATE 35: CPT | Performed by: INTERNAL MEDICINE

## 2018-12-23 PROCEDURE — 85027 COMPLETE CBC AUTOMATED: CPT

## 2018-12-23 PROCEDURE — 770020 HCHG ROOM/CARE - TELE (206)

## 2018-12-23 PROCEDURE — 700102 HCHG RX REV CODE 250 W/ 637 OVERRIDE(OP): Performed by: FAMILY MEDICINE

## 2018-12-23 RX ORDER — WARFARIN SODIUM 2.5 MG/1
2.5 TABLET ORAL
Status: DISCONTINUED | OUTPATIENT
Start: 2018-12-25 | End: 2018-12-28 | Stop reason: HOSPADM

## 2018-12-23 RX ORDER — WARFARIN SODIUM 5 MG/1
5 TABLET ORAL
Status: COMPLETED | OUTPATIENT
Start: 2018-12-23 | End: 2018-12-24

## 2018-12-23 RX ORDER — CIPROFLOXACIN 500 MG/1
500 TABLET, FILM COATED ORAL EVERY 12 HOURS
Status: DISCONTINUED | OUTPATIENT
Start: 2018-12-23 | End: 2018-12-23

## 2018-12-23 RX ORDER — CIPROFLOXACIN 500 MG/1
500 TABLET, FILM COATED ORAL EVERY 12 HOURS
Status: COMPLETED | OUTPATIENT
Start: 2018-12-23 | End: 2018-12-25

## 2018-12-23 RX ADMIN — ENOXAPARIN SODIUM 80 MG: 100 INJECTION SUBCUTANEOUS at 05:05

## 2018-12-23 RX ADMIN — STANDARDIZED SENNA CONCENTRATE AND DOCUSATE SODIUM 2 TABLET: 8.6; 5 TABLET, FILM COATED ORAL at 05:05

## 2018-12-23 RX ADMIN — OXYCODONE HYDROCHLORIDE 5 MG: 5 TABLET ORAL at 17:56

## 2018-12-23 RX ADMIN — ANTACID TABLETS 500 MG: 500 TABLET, CHEWABLE ORAL at 09:10

## 2018-12-23 RX ADMIN — ANTACID TABLETS 500 MG: 500 TABLET, CHEWABLE ORAL at 17:07

## 2018-12-23 RX ADMIN — ENOXAPARIN SODIUM 80 MG: 100 INJECTION SUBCUTANEOUS at 17:06

## 2018-12-23 RX ADMIN — ROSUVASTATIN CALCIUM 5 MG: 5 TABLET, FILM COATED ORAL at 17:07

## 2018-12-23 RX ADMIN — CIPROFLOXACIN HYDROCHLORIDE 500 MG: 500 TABLET, FILM COATED ORAL at 17:07

## 2018-12-23 RX ADMIN — WARFARIN SODIUM 5 MG: 5 TABLET ORAL at 17:07

## 2018-12-23 RX ADMIN — CIPROFLOXACIN HYDROCHLORIDE 500 MG: 500 TABLET, FILM COATED ORAL at 09:10

## 2018-12-23 RX ADMIN — OXYCODONE HYDROCHLORIDE 2.5 MG: 5 TABLET ORAL at 19:21

## 2018-12-23 RX ADMIN — STANDARDIZED SENNA CONCENTRATE AND DOCUSATE SODIUM 2 TABLET: 8.6; 5 TABLET, FILM COATED ORAL at 17:07

## 2018-12-23 ASSESSMENT — ENCOUNTER SYMPTOMS
MYALGIAS: 1
FEVER: 0
STRIDOR: 0
BRUISES/BLEEDS EASILY: 0
NERVOUS/ANXIOUS: 0
SORE THROAT: 0
FOCAL WEAKNESS: 0
FALLS: 0
EYE PAIN: 0
SEIZURES: 0
LOSS OF CONSCIOUSNESS: 0
PHOTOPHOBIA: 0
ABDOMINAL PAIN: 0
WEAKNESS: 1
BLOOD IN STOOL: 0
VOMITING: 0
HALLUCINATIONS: 0
SPEECH CHANGE: 0
HEMOPTYSIS: 0
POLYDIPSIA: 0
DIARRHEA: 0
EYE DISCHARGE: 0
FLANK PAIN: 0
COUGH: 0
CHILLS: 0

## 2018-12-23 ASSESSMENT — PAIN SCALES - GENERAL
PAINLEVEL_OUTOF10: 5
PAINLEVEL_OUTOF10: 2
PAINLEVEL_OUTOF10: 5
PAINLEVEL_OUTOF10: 8
PAINLEVEL_OUTOF10: 3

## 2018-12-23 NOTE — CARE PLAN
Problem: Safety  Goal: Will remain free from injury    Intervention: Provide assistance with mobility  Pt. Ambulated with one assist and FWW. Verbal instruction given with mobility and x 1 prevena drains.       Problem: Discharge Barriers/Planning  Goal: Patient's continuum of care needs will be met    Intervention: Involve patient and significant other/support system in setting and prioritizing goals for hospital stay and discharge  Discussed POC with pt r/t next phase of care possible SNF placement. Pt verbalized understanding for the need of SNF.

## 2018-12-23 NOTE — PROGRESS NOTES
Hospital Medicine Daily Progress Note    Date of Service  12/23/2018    Chief Complaint  Leg pain    Hospital Course      83 y.o. female past medical history of diabetes, gout, hypertension, dyslipidemia, peripheral vascular disease status post aorto biiliac graft, aorto left external iliac artery bypass, admitted 12/19/2018 with leg pain vascular surgery Dr. Kohli was consulted imaging showed occluded aortobifemoral bypass.  Patient was started on a heparin drip and was taken to the OR for bilateral groin exploration with attempted thrombectomy.             Interval Problem Update  Pt seen and examined, no acute events overnight, hemoglobin 7.3 this morning, will monitor. And transfuse as needed if below 7, discussed with vascular susrgery regarding anticoagulation and will start on warfarin.       Consultants/Specialty  Surgery Dr. Kohli     Code Status  FULL    Disposition  Inpatient      Review of Systems  Review of Systems   Constitutional: Positive for malaise/fatigue. Negative for chills and fever.   HENT: Negative for congestion, ear discharge, ear pain and sore throat.    Eyes: Negative for photophobia, pain and discharge.   Respiratory: Negative for cough, hemoptysis and stridor.    Cardiovascular: Positive for leg swelling. Negative for chest pain.   Gastrointestinal: Negative for abdominal pain, blood in stool, diarrhea and vomiting.   Genitourinary: Positive for frequency and urgency. Negative for flank pain and hematuria.   Musculoskeletal: Positive for myalgias. Negative for falls.        Soreness at the groins    Skin: Negative.    Neurological: Positive for weakness. Negative for speech change, focal weakness, seizures and loss of consciousness.   Endo/Heme/Allergies: Negative for polydipsia. Does not bruise/bleed easily.   Psychiatric/Behavioral: Negative for hallucinations. The patient is not nervous/anxious.         Physical Exam  Temp:  [36.4 °C (97.6 °F)-37.7 °C (99.8 °F)] 37.1 °C (98.8  °F)  Pulse:  [86-95] 86  Resp:  [16-19] 19  BP: (112-148)/(48-68) 148/56    Physical Exam   Constitutional: She is oriented to person, place, and time. She appears well-developed and well-nourished.   HENT:   Head: Normocephalic and atraumatic.   Right Ear: External ear normal.   Left Ear: External ear normal.   Eyes: Pupils are equal, round, and reactive to light. Conjunctivae are normal. Right eye exhibits no discharge. Left eye exhibits no discharge.   Neck: Normal range of motion. Neck supple. No JVD present.   Cardiovascular: Normal rate and regular rhythm.  Exam reveals no gallop and no friction rub.    No murmur heard.  Pulmonary/Chest: Effort normal and breath sounds normal. No stridor. No respiratory distress. She has no wheezes. She has no rales. She exhibits no tenderness.   Abdominal: Soft. Bowel sounds are normal. There is no tenderness. There is no rebound.   Musculoskeletal: She exhibits edema and tenderness.   Wound vac at both groins with drains    Neurological: She is alert and oriented to person, place, and time. No cranial nerve deficit. Coordination normal.   Skin: Skin is warm and dry. No erythema.   Psychiatric: She has a normal mood and affect. Judgment normal.   Nursing note and vitals reviewed.      Fluids    Intake/Output Summary (Last 24 hours) at 12/23/18 1309  Last data filed at 12/23/18 0839   Gross per 24 hour   Intake              360 ml   Output                0 ml   Net              360 ml       Laboratory  Recent Labs      12/21/18   0030   12/21/18   2028  12/22/18   0352  12/23/18   0153   WBC  11.3*   --    --    --   9.9   RBC  2.91*   --    --    --   2.57*   HEMOGLOBIN  8.4*   < >  8.3*  8.1*  7.3*   HEMATOCRIT  25.4*   < >  25.0*  23.9*  22.8*   MCV  87.3   --    --    --   88.7   MCH  28.9   --    --    --   28.4   MCHC  33.1*   --    --    --   32.0*   RDW  52.2*   --    --    --   49.3   PLATELETCT  101*   --    --    --   158*   MPV  9.7   --    --    --   9.6    < > =  values in this interval not displayed.     Recent Labs      12/21/18   0030  12/22/18   0352  12/23/18   0153   SODIUM  137  137  132*   POTASSIUM  3.5*  3.9  3.7   CHLORIDE  105  105  101   CO2  24  25  25   GLUCOSE  142*  147*  131*   BUN  14  11  11   CREATININE  0.77  0.65  0.71   CALCIUM  7.5*  8.1*  8.3*     Recent Labs      12/21/18 2028  12/22/18   0352  12/22/18   1045   APTT  45.1*  83.3*  72.7*               Imaging  DX-CHEST-PORTABLE (1 VIEW)   Final Result      Left IJ central catheter tip is in the proximal/mid SVC. No pneumothorax.      OUTSIDE IMAGES-CT CHEST/ABDOMEN/PELVIS   Final Result           Assessment/Plan  * Lower limb ischemia- (present on admission)   Assessment & Plan    Surgery consulted  Exploratory surgery on 12/19/2018, seen by Dr Kohli  Was on heparin drip than Lovenox therapeutic, started on warfarin today      Anemia   Assessment & Plan    Postoperatively patient hemoglobin was 6   S/P 2 units of packed RBCs, stabilized after that   Continue to monitor and transfuse for hemoglobin below 7     PAD (peripheral artery disease) (HCC)- (present on admission)   Assessment & Plan    Statin        Hyperparathyroidism (HCC)- (present on admission)   Assessment & Plan    Marked hyper !  Mostly due to hypocalcemia & vitamin D Deficiency   Need follow up PTH after correcting Ca and vitamin D Deficiency  If PTH does not correct after replacing vitamin D and calcium, patient may benefit from seeing an endocrinologist        Vitamin D deficiency- (present on admission)   Assessment & Plan    Marked  vitamin D Deficiency 4 only!, started ergocalciferol.       Hypocalcemia- (present on admission)   Assessment & Plan    Marked  vitamin D Deficiency 4 only!, started ergocalciferol.    Calcium improving with replacement       Elevated lactic acid level- (present on admission)   Assessment & Plan    S/P IVF hydration  Serial lactic improved         Leukocytosis- (present on admission)    Assessment & Plan    Improved   Watch for signs of infection        CKD (chronic kidney disease) stage 3, GFR 30-59 ml/min ([facility])- (present on admission)   Assessment & Plan    Avoid nephrotoxins as much as possible   Monitor inputs & outputs, and BMP            Type 2 diabetes mellitus with kidney complication, without long-term current use of insulin ([facility])- (present on admission)   Assessment & Plan    With hyperglycemia  Last Glycated hemoglobin 6.6, pending repeat    Short acting insulin  Accu-Checks, hypoglycemia protocol          Hypertension- (present on admission)   Assessment & Plan    IV Vasotec and Hydralazine prn        Hyperlipidemia- (present on admission)   Assessment & Plan    Rosuvastatin            VTE prophylaxis: SCDs, heparin per surgery

## 2018-12-23 NOTE — PROGRESS NOTES
Patient A+Ox4, sitting up in chair for breakfast. Ambulating to BR with hand held assist, calls approp. C/O 4/10 pain but refusing pain medication. On room air, VSS. On monitor. Prevena wound vacs in place. Pulses to bilat feet heard with doppler, BLE warm +CSM. Using call bell approp, whiteboard updated, reviewed plan of care with patient, hourly rounding and fall precautions in place

## 2018-12-23 NOTE — PROGRESS NOTES
Report received by day shift RN. Pt ambulated to bathroom with one assist. Pt updated to POC and verbalized understanding. Bed locked in low position with fall precautions in place and call light in reach.

## 2018-12-23 NOTE — PROGRESS NOTES
Vascular Surgery     Patient feels well   Incisions dry   Feet warm and adequately perfused     Hgb 7.3 - no signs of bleeding likely equilibration- follow     OK to start coumadin   Repeat H&H this afternoon     Discussed plan with hospitalist     Trent Wilburn MD

## 2018-12-23 NOTE — PROGRESS NOTES
Inpatient Anticoagulation Service Note  Date: 12/23/2018  Reason for Anticoagulation: Other (Comments) (arterial occlusion)   Hemoglobin Value: (!) 7.3  Hematocrit Value: (!) 22.8  Lab Platelet Value: (!) 158  Target INR: 2.0 to 3.0  INR from last 7 days     Date/Time INR Value    12/19/18 1356 (!)  1.45    12/19/18 0309 (!)  1.28        Dose from last 7 days     Date/Time Dose (mg)    12/23/18 1100  5        Average Dose (mg): TBD (new start VKA)  Significant Interactions: Antibiotics, Statin  Bridge Therapy: Yes (enoxaparin ~1 mg/kg Q12H)  Date of Last VTE Event: 12/19/18  Bridge Therapy Start Date: 12/23/18  Days of Overlap Therapy: 0  INR Value Greater than 2 Prior to Discontinuation of Parenteral Anticoagulation: Not Applicable   Reversal Agent Administered: Not Applicable  Comments: Warfarin ordered by provider after consulation with vascular surgery service for new arterial occlusion. Baseline repeat INR pending. H/H down/low. PLT low. No overt bleeding noted. Warfarin interactions noted. Will iniate at 5 mg today given age/DDI noted at present. Enoxaparin overlap noted. INR with AM labs. Likely to need dose adjustments.    Plan:  Warfarin 5 mg 12/23/18  Education Material Provided?: No (needs education prior to discharge)  Pharmacist suggested discharge dosing: warfarin 2.5 mg daily    Derrick Canada, PharmD

## 2018-12-23 NOTE — CARE PLAN
Problem: Discharge Barriers/Planning  Goal: Patient's continuum of care needs will be met  Hep gtt transitioned to SQ Lovenox. SW assisting in authorizing NOAC for home anticoagulation.     Problem: Urinary Elimination:  Goal: Ability to reestablish a normal urinary elimination pattern will improve  Outcome: PROGRESSING AS EXPECTED  Frequency & Urgency.     Problem: Cardiac:  Intervention: TWENTY FOUR HOUR TELEMETRY MONITORING  Telemetry Summary: Atrial Fibrilation 68 bpm - 102 bpm. QRS 0.08. Ectopy: none noted.

## 2018-12-23 NOTE — CARE PLAN
Problem: Respiratory:  Goal: Respiratory status will improve  Outcome: MET Date Met: 12/23/18  Patient satting 96% on room air

## 2018-12-24 LAB
ANION GAP SERPL CALC-SCNC: 6 MMOL/L (ref 0–11.9)
BUN SERPL-MCNC: 15 MG/DL (ref 8–22)
CALCIUM SERPL-MCNC: 7.9 MG/DL (ref 8.5–10.5)
CHLORIDE SERPL-SCNC: 101 MMOL/L (ref 96–112)
CO2 SERPL-SCNC: 26 MMOL/L (ref 20–33)
CREAT SERPL-MCNC: 0.79 MG/DL (ref 0.5–1.4)
ERYTHROCYTE [DISTWIDTH] IN BLOOD BY AUTOMATED COUNT: 49.5 FL (ref 35.9–50)
GLUCOSE SERPL-MCNC: 100 MG/DL (ref 65–99)
HCT VFR BLD AUTO: 21.8 % (ref 37–47)
HGB BLD-MCNC: 7.1 G/DL (ref 12–16)
INR PPP: 1.12 (ref 0.87–1.13)
MCH RBC QN AUTO: 29.2 PG (ref 27–33)
MCHC RBC AUTO-ENTMCNC: 32.6 G/DL (ref 33.6–35)
MCV RBC AUTO: 89.7 FL (ref 81.4–97.8)
PLATELET # BLD AUTO: 196 K/UL (ref 164–446)
PMV BLD AUTO: 9.6 FL (ref 9–12.9)
POTASSIUM SERPL-SCNC: 3.7 MMOL/L (ref 3.6–5.5)
PROTHROMBIN TIME: 14.5 SEC (ref 12–14.6)
RBC # BLD AUTO: 2.43 M/UL (ref 4.2–5.4)
SODIUM SERPL-SCNC: 133 MMOL/L (ref 135–145)
WBC # BLD AUTO: 9 K/UL (ref 4.8–10.8)

## 2018-12-24 PROCEDURE — 770020 HCHG ROOM/CARE - TELE (206)

## 2018-12-24 PROCEDURE — A9270 NON-COVERED ITEM OR SERVICE: HCPCS | Performed by: HOSPITALIST

## 2018-12-24 PROCEDURE — 80048 BASIC METABOLIC PNL TOTAL CA: CPT

## 2018-12-24 PROCEDURE — 700102 HCHG RX REV CODE 250 W/ 637 OVERRIDE(OP): Performed by: INTERNAL MEDICINE

## 2018-12-24 PROCEDURE — 700102 HCHG RX REV CODE 250 W/ 637 OVERRIDE(OP): Performed by: HOSPITALIST

## 2018-12-24 PROCEDURE — A9270 NON-COVERED ITEM OR SERVICE: HCPCS | Performed by: FAMILY MEDICINE

## 2018-12-24 PROCEDURE — 97116 GAIT TRAINING THERAPY: CPT

## 2018-12-24 PROCEDURE — A9270 NON-COVERED ITEM OR SERVICE: HCPCS | Performed by: INTERNAL MEDICINE

## 2018-12-24 PROCEDURE — 700111 HCHG RX REV CODE 636 W/ 250 OVERRIDE (IP): Performed by: HOSPITALIST

## 2018-12-24 PROCEDURE — 85027 COMPLETE CBC AUTOMATED: CPT

## 2018-12-24 PROCEDURE — 99232 SBSQ HOSP IP/OBS MODERATE 35: CPT | Performed by: INTERNAL MEDICINE

## 2018-12-24 PROCEDURE — 85610 PROTHROMBIN TIME: CPT

## 2018-12-24 PROCEDURE — 97530 THERAPEUTIC ACTIVITIES: CPT

## 2018-12-24 PROCEDURE — 700102 HCHG RX REV CODE 250 W/ 637 OVERRIDE(OP): Performed by: FAMILY MEDICINE

## 2018-12-24 RX ADMIN — ENOXAPARIN SODIUM 80 MG: 100 INJECTION SUBCUTANEOUS at 17:35

## 2018-12-24 RX ADMIN — WARFARIN SODIUM 5 MG: 5 TABLET ORAL at 17:35

## 2018-12-24 RX ADMIN — ENOXAPARIN SODIUM 80 MG: 100 INJECTION SUBCUTANEOUS at 06:28

## 2018-12-24 RX ADMIN — OXYCODONE HYDROCHLORIDE 5 MG: 5 TABLET ORAL at 21:47

## 2018-12-24 RX ADMIN — ANTACID TABLETS 500 MG: 500 TABLET, CHEWABLE ORAL at 12:41

## 2018-12-24 RX ADMIN — STANDARDIZED SENNA CONCENTRATE AND DOCUSATE SODIUM 2 TABLET: 8.6; 5 TABLET, FILM COATED ORAL at 06:27

## 2018-12-24 RX ADMIN — OXYCODONE HYDROCHLORIDE 5 MG: 5 TABLET ORAL at 03:51

## 2018-12-24 RX ADMIN — ROSUVASTATIN CALCIUM 5 MG: 5 TABLET, FILM COATED ORAL at 17:34

## 2018-12-24 RX ADMIN — ANTACID TABLETS 500 MG: 500 TABLET, CHEWABLE ORAL at 07:56

## 2018-12-24 RX ADMIN — CIPROFLOXACIN HYDROCHLORIDE 500 MG: 500 TABLET, FILM COATED ORAL at 06:27

## 2018-12-24 RX ADMIN — STANDARDIZED SENNA CONCENTRATE AND DOCUSATE SODIUM 2 TABLET: 8.6; 5 TABLET, FILM COATED ORAL at 17:34

## 2018-12-24 RX ADMIN — CIPROFLOXACIN HYDROCHLORIDE 500 MG: 500 TABLET, FILM COATED ORAL at 17:34

## 2018-12-24 RX ADMIN — ANTACID TABLETS 500 MG: 500 TABLET, CHEWABLE ORAL at 17:35

## 2018-12-24 ASSESSMENT — COGNITIVE AND FUNCTIONAL STATUS - GENERAL
TURNING FROM BACK TO SIDE WHILE IN FLAT BAD: UNABLE
CLIMB 3 TO 5 STEPS WITH RAILING: A LOT
MOVING TO AND FROM BED TO CHAIR: UNABLE
STANDING UP FROM CHAIR USING ARMS: A LITTLE
WALKING IN HOSPITAL ROOM: A LITTLE
MOVING FROM LYING ON BACK TO SITTING ON SIDE OF FLAT BED: UNABLE
MOBILITY SCORE: 11
SUGGESTED CMS G CODE MODIFIER MOBILITY: CL

## 2018-12-24 ASSESSMENT — ENCOUNTER SYMPTOMS
LOSS OF CONSCIOUSNESS: 0
HEMOPTYSIS: 0
ABDOMINAL PAIN: 0
MYALGIAS: 1
FEVER: 0
SEIZURES: 0
EYE PAIN: 0
NERVOUS/ANXIOUS: 0
COUGH: 0
BRUISES/BLEEDS EASILY: 0
FOCAL WEAKNESS: 0
EYE DISCHARGE: 0
VOMITING: 0
SPEECH CHANGE: 0
FALLS: 0
PHOTOPHOBIA: 0
POLYDIPSIA: 0
FLANK PAIN: 0
WEAKNESS: 1
DIARRHEA: 0
CHILLS: 0
BLOOD IN STOOL: 0
SORE THROAT: 0
STRIDOR: 0
HALLUCINATIONS: 0

## 2018-12-24 ASSESSMENT — PAIN SCALES - GENERAL
PAINLEVEL_OUTOF10: 5
PAINLEVEL_OUTOF10: 5
PAINLEVEL_OUTOF10: 0
PAINLEVEL_OUTOF10: 3

## 2018-12-24 ASSESSMENT — GAIT ASSESSMENTS
DEVIATION: ANTALGIC;STEP TO;BRADYKINETIC;SHUFFLED GAIT
DISTANCE (FEET): 45
ASSISTIVE DEVICE: FRONT WHEEL WALKER
GAIT LEVEL OF ASSIST: CONTACT GUARD ASSIST

## 2018-12-24 NOTE — PROGRESS NOTES
Assumed care at 1900, bedside report received from day shift RN. Pt on the monitor. Initial assessment completed, orders reviewed, call light within reach, and hourly rounding in place. POC addressed with patient, no additional questions at this time.

## 2018-12-24 NOTE — CARE PLAN
Problem: Communication  Goal: The ability to communicate needs accurately and effectively will improve  Outcome: PROGRESSING AS EXPECTED    Intervention: [place] patient and significant other/support system to call light to alert staff of needs  Patient oriented to call light system and has been able to communicate needs accurately and effectively.      Problem: Safety  Goal: Will remain free from falls  Outcome: PROGRESSING AS EXPECTED    Intervention: Assess risk factors for falls  Patient has been assessed for fall risks and fall precautions have been put in place.

## 2018-12-24 NOTE — THERAPY
"Physical Therapy Treatment completed.   Bed Mobility:  Supine to Sit: Maximal Assist  Transfers: Sit to Stand: Minimal Assist  Gait: Level Of Assist: Contact Guard Assist with Front-Wheel Walker       Plan of Care: Will benefit from Physical Therapy 3 times per week  Discharge Recommendations: Equipment: Will Continue to Assess for Equipment Needs. Post-acute therapy Recommend inpatient transitional care services for continued physical therapy services.      See \"Rehab Therapy-Acute\" Patient Summary Report for complete documentation.     Pt continues to present w/ decreased functional mobility secondary to pain and weakness. Pt has most difficulty w/ bed mobility and other positional changes. Pt requires Leon for STS and is unable to lean forward to get to assist w/ ease of standing d/t R knee and B hip pain. Pt also requires a lot of time to perform all mobility. Pt able to ambulate 45 feet but required frequent standing rest breaks. Pt is very motivated and as per initial eval, will benefit from post acute therapy.  "

## 2018-12-24 NOTE — PROGRESS NOTES
Hospital Medicine Daily Progress Note    Date of Service  12/24/2018    Chief Complaint  Leg pain    Hospital Course      83 y.o. female past medical history of diabetes, gout, hypertension, dyslipidemia, peripheral vascular disease status post aorto biiliac graft, aorto left external iliac artery bypass, admitted 12/19/2018 with leg pain vascular surgery Dr. Kohli was consulted imaging showed occluded aortobifemoral bypass.  Patient was started on a heparin drip and was taken to the OR for bilateral groin exploration with attempted thrombectomy.             Interval Problem Update  No acute events overnight, hemoglobin 7.1 this morning.  Vascular surgery following appreciate rec.      Consultants/Specialty  Surgery Dr. Kohli     Code Status  FULL    Disposition  Inpatient      Review of Systems  Review of Systems   Constitutional: Positive for malaise/fatigue. Negative for chills and fever.   HENT: Negative for congestion, ear discharge, ear pain and sore throat.    Eyes: Negative for photophobia, pain and discharge.   Respiratory: Negative for cough, hemoptysis and stridor.    Cardiovascular: Positive for leg swelling. Negative for chest pain.   Gastrointestinal: Negative for abdominal pain, blood in stool, diarrhea and vomiting.   Genitourinary: Positive for frequency and urgency. Negative for flank pain and hematuria.   Musculoskeletal: Positive for myalgias. Negative for falls.        Soreness at the groins    Skin: Negative.    Neurological: Positive for weakness. Negative for speech change, focal weakness, seizures and loss of consciousness.   Endo/Heme/Allergies: Negative for polydipsia. Does not bruise/bleed easily.   Psychiatric/Behavioral: Negative for hallucinations. The patient is not nervous/anxious.         Physical Exam  Temp:  [36.4 °C (97.6 °F)-37.1 °C (98.7 °F)] 36.4 °C (97.6 °F)  Pulse:  [80-90] 80  Resp:  [16-20] 17  BP: (102-142)/(42-61) 118/42    Physical Exam   Constitutional: She is  oriented to person, place, and time. She appears well-developed and well-nourished.   HENT:   Head: Normocephalic and atraumatic.   Right Ear: External ear normal.   Left Ear: External ear normal.   Eyes: Pupils are equal, round, and reactive to light. Conjunctivae are normal. Right eye exhibits no discharge. Left eye exhibits no discharge.   Neck: Normal range of motion. Neck supple. No JVD present.   Cardiovascular: Normal rate and regular rhythm.  Exam reveals no gallop and no friction rub.    No murmur heard.  Pulmonary/Chest: Effort normal and breath sounds normal. No stridor. No respiratory distress. She has no wheezes. She has no rales. She exhibits no tenderness.   Abdominal: Soft. Bowel sounds are normal. There is no tenderness. There is no rebound.   Musculoskeletal: She exhibits edema and tenderness.   Wound vac at both groins with drains    Neurological: She is alert and oriented to person, place, and time. No cranial nerve deficit. Coordination normal.   Skin: Skin is warm and dry. No erythema.   Psychiatric: She has a normal mood and affect. Judgment normal.   Nursing note and vitals reviewed.      Fluids    Intake/Output Summary (Last 24 hours) at 12/24/18 1140  Last data filed at 12/23/18 1800   Gross per 24 hour   Intake              360 ml   Output                0 ml   Net              360 ml       Laboratory  Recent Labs      12/23/18   0153  12/23/18   1545  12/24/18   0300   WBC  9.9   --   9.0   RBC  2.57*   --   2.43*   HEMOGLOBIN  7.3*  7.5*  7.1*   HEMATOCRIT  22.8*  22.8*  21.8*   MCV  88.7   --   89.7   MCH  28.4   --   29.2   MCHC  32.0*   --   32.6*   RDW  49.3   --   49.5   PLATELETCT  158*   --   196   MPV  9.6   --   9.6     Recent Labs      12/22/18   0352  12/23/18   0153  12/24/18   0300   SODIUM  137  132*  133*   POTASSIUM  3.9  3.7  3.7   CHLORIDE  105  101  101   CO2  25  25  26   GLUCOSE  147*  131*  100*   BUN  11  11  15   CREATININE  0.65  0.71  0.79   CALCIUM  8.1*  8.3*   7.9*     Recent Labs      12/21/18 2028  12/22/18   0352  12/22/18   1045  12/23/18   1400  12/24/18   0300   APTT  45.1*  83.3*  72.7*   --    --    INR   --    --    --   1.03  1.12               Imaging  DX-CHEST-PORTABLE (1 VIEW)   Final Result      Left IJ central catheter tip is in the proximal/mid SVC. No pneumothorax.      OUTSIDE IMAGES-CT CHEST/ABDOMEN/PELVIS   Final Result           Assessment/Plan  * Lower limb ischemia- (present on admission)   Assessment & Plan    Surgery consulted  Exploratory surgery on 12/19/2018, seen by Dr Kohli  Was on heparin drip than Lovenox therapeutic, started on warfarin today      Anemia   Assessment & Plan    Postoperatively patient hemoglobin was 6   S/P 2 units of packed RBCs, stabilized after that   Continue to monitor and transfuse for hemoglobin below 7     PAD (peripheral artery disease) ([facility])- (present on admission)   Assessment & Plan    Statin        Hyperparathyroidism ([facility])- (present on admission)   Assessment & Plan    Marked hyper !  Mostly due to hypocalcemia & vitamin D Deficiency   Need follow up PTH after correcting Ca and vitamin D Deficiency  If PTH does not correct after replacing vitamin D and calcium, patient may benefit from seeing an endocrinologist        Vitamin D deficiency- (present on admission)   Assessment & Plan    Marked  vitamin D Deficiency 4 only!, started ergocalciferol.       Hypocalcemia- (present on admission)   Assessment & Plan    Marked  vitamin D Deficiency 4 only!, started ergocalciferol.    Calcium improving with replacement       Elevated lactic acid level- (present on admission)   Assessment & Plan    S/P IVF hydration  Serial lactic improved         Leukocytosis- (present on admission)   Assessment & Plan    Improved   Watch for signs of infection        CKD (chronic kidney disease) stage 3, GFR 30-59 ml/min ([facility])- (present on admission)   Assessment & Plan    Avoid nephrotoxins as much as possible   Monitor  inputs & outputs, and BMP            Type 2 diabetes mellitus with kidney complication, without long-term current use of insulin ([facility])- (present on admission)   Assessment & Plan    With hyperglycemia  Last Glycated hemoglobin 6.6, pending repeat    Short acting insulin  Accu-Checks, hypoglycemia protocol          Hypertension- (present on admission)   Assessment & Plan    IV Vasotec and Hydralazine prn        Hyperlipidemia- (present on admission)   Assessment & Plan    Rosuvastatin            VTE prophylaxis: lovenox/ warfarin

## 2018-12-24 NOTE — DISCHARGE PLANNING
Received Choice form at 1400  Agency/Facility Name: [facility]  Referral sent per Choice form at 1500

## 2018-12-24 NOTE — DISCHARGE PLANNING
Anticipated Discharge Disposition: SNF    Action: LSW that Pt needed SNF . CHOICE form brought to Pt.  [facility]. [facility] 3. [facility].Faxed to [facility]. Received Completion      Barriers to Discharge: NONE    Plan: Waiting for Pt acceptance/denial to SNF

## 2018-12-24 NOTE — PROGRESS NOTES
Inpatient Anticoagulation Service Note  Date: 12/24/2018  Reason for Anticoagulation: Other (Comments) (arterial occlusion)   Hemoglobin Value: (!) 7.1  Hematocrit Value: (!) 21.8  Lab Platelet Value: 196  Target INR: 2.0 to 3.0  INR from last 7 days     Date/Time INR Value    12/24/18 0300  1.12    12/23/18 1400  1.03    12/19/18 1356 (!)  1.45    12/19/18 0309 (!)  1.28        Dose from last 7 days     Date/Time Dose (mg)    12/24/18 0800  5    12/23/18 1100  5        Average Dose (mg): 5 (new start warfarin patient)  Significant Interactions: Antibiotics, Statin  Bridge Therapy: Yes (enoxaparin ~1 mg/kg Q12H)  Date of Last VTE Event: 12/19/18  Bridge Therapy Start Date: 12/23/18  Days of Overlap Therapy: 1  INR Value Greater than 2 Prior to Discontinuation of Parenteral Anticoagulation: Not Applicable  Reversal Agent Administered: Not Applicable  Comments: INR slightly increased and below goal. H/H low/down. PLT improved slightly. No overt bleeding noted. Warfarin interactions noted. No overt bleeding noted. Will give 5 mg today and trend INR with AM labs. May need dose adjustments.    Plan:  Warfarin 5 mg 12/24/18  Education Material Provided?: No (needs education prior to discharge)  Pharmacist suggested discharge dosing: warfarin 2.5 mg daily    Derrick Canada, PharmD

## 2018-12-25 LAB
ANION GAP SERPL CALC-SCNC: 7 MMOL/L (ref 0–11.9)
BACTERIA UR CULT: NORMAL
BUN SERPL-MCNC: 14 MG/DL (ref 8–22)
CALCIUM SERPL-MCNC: 8.4 MG/DL (ref 8.5–10.5)
CHLORIDE SERPL-SCNC: 102 MMOL/L (ref 96–112)
CO2 SERPL-SCNC: 24 MMOL/L (ref 20–33)
CREAT SERPL-MCNC: 0.71 MG/DL (ref 0.5–1.4)
ERYTHROCYTE [DISTWIDTH] IN BLOOD BY AUTOMATED COUNT: 50.8 FL (ref 35.9–50)
GLUCOSE SERPL-MCNC: 106 MG/DL (ref 65–99)
HCT VFR BLD AUTO: 23.5 % (ref 37–47)
HGB BLD-MCNC: 7.4 G/DL (ref 12–16)
INR PPP: 1.24 (ref 0.87–1.13)
MCH RBC QN AUTO: 28.8 PG (ref 27–33)
MCHC RBC AUTO-ENTMCNC: 31.5 G/DL (ref 33.6–35)
MCV RBC AUTO: 91.4 FL (ref 81.4–97.8)
PLATELET # BLD AUTO: 252 K/UL (ref 164–446)
PMV BLD AUTO: 9.3 FL (ref 9–12.9)
POTASSIUM SERPL-SCNC: 3.6 MMOL/L (ref 3.6–5.5)
PROTHROMBIN TIME: 15.7 SEC (ref 12–14.6)
RBC # BLD AUTO: 2.57 M/UL (ref 4.2–5.4)
SIGNIFICANT IND 70042: NORMAL
SITE SITE: NORMAL
SODIUM SERPL-SCNC: 133 MMOL/L (ref 135–145)
SOURCE SOURCE: NORMAL
WBC # BLD AUTO: 9.4 K/UL (ref 4.8–10.8)

## 2018-12-25 PROCEDURE — 85027 COMPLETE CBC AUTOMATED: CPT

## 2018-12-25 PROCEDURE — 700102 HCHG RX REV CODE 250 W/ 637 OVERRIDE(OP): Performed by: INTERNAL MEDICINE

## 2018-12-25 PROCEDURE — 85610 PROTHROMBIN TIME: CPT

## 2018-12-25 PROCEDURE — 700102 HCHG RX REV CODE 250 W/ 637 OVERRIDE(OP): Performed by: HOSPITALIST

## 2018-12-25 PROCEDURE — 700111 HCHG RX REV CODE 636 W/ 250 OVERRIDE (IP): Performed by: HOSPITALIST

## 2018-12-25 PROCEDURE — 700102 HCHG RX REV CODE 250 W/ 637 OVERRIDE(OP): Performed by: FAMILY MEDICINE

## 2018-12-25 PROCEDURE — 80048 BASIC METABOLIC PNL TOTAL CA: CPT

## 2018-12-25 PROCEDURE — 770006 HCHG ROOM/CARE - MED/SURG/GYN SEMI*

## 2018-12-25 PROCEDURE — A9270 NON-COVERED ITEM OR SERVICE: HCPCS | Performed by: HOSPITALIST

## 2018-12-25 PROCEDURE — 99232 SBSQ HOSP IP/OBS MODERATE 35: CPT | Performed by: INTERNAL MEDICINE

## 2018-12-25 PROCEDURE — 700111 HCHG RX REV CODE 636 W/ 250 OVERRIDE (IP): Mod: JG | Performed by: INTERNAL MEDICINE

## 2018-12-25 PROCEDURE — A9270 NON-COVERED ITEM OR SERVICE: HCPCS | Performed by: FAMILY MEDICINE

## 2018-12-25 PROCEDURE — A9270 NON-COVERED ITEM OR SERVICE: HCPCS | Performed by: INTERNAL MEDICINE

## 2018-12-25 RX ORDER — SODIUM CHLORIDE 9 MG/ML
INJECTION, SOLUTION INTRAVENOUS
Status: ACTIVE
Start: 2018-12-25 | End: 2018-12-26

## 2018-12-25 RX ORDER — SODIUM CHLORIDE 9 MG/ML
500 INJECTION, SOLUTION INTRAVENOUS ONCE
Status: ACTIVE | OUTPATIENT
Start: 2018-12-25 | End: 2018-12-26

## 2018-12-25 RX ADMIN — WARFARIN SODIUM 2.5 MG: 2.5 TABLET ORAL at 17:25

## 2018-12-25 RX ADMIN — CIPROFLOXACIN HYDROCHLORIDE 500 MG: 500 TABLET, FILM COATED ORAL at 05:37

## 2018-12-25 RX ADMIN — ENOXAPARIN SODIUM 80 MG: 100 INJECTION SUBCUTANEOUS at 05:37

## 2018-12-25 RX ADMIN — ANTACID TABLETS 500 MG: 500 TABLET, CHEWABLE ORAL at 05:37

## 2018-12-25 RX ADMIN — CIPROFLOXACIN HYDROCHLORIDE 500 MG: 500 TABLET, FILM COATED ORAL at 17:24

## 2018-12-25 RX ADMIN — ANTACID TABLETS 500 MG: 500 TABLET, CHEWABLE ORAL at 17:25

## 2018-12-25 RX ADMIN — ALTEPLASE 2 MG: 2.2 INJECTION, POWDER, LYOPHILIZED, FOR SOLUTION INTRAVENOUS at 18:17

## 2018-12-25 RX ADMIN — MAGNESIUM HYDROXIDE 30 ML: 400 SUSPENSION ORAL at 17:25

## 2018-12-25 RX ADMIN — ROSUVASTATIN CALCIUM 5 MG: 5 TABLET, FILM COATED ORAL at 17:24

## 2018-12-25 RX ADMIN — STANDARDIZED SENNA CONCENTRATE AND DOCUSATE SODIUM 2 TABLET: 8.6; 5 TABLET, FILM COATED ORAL at 17:25

## 2018-12-25 RX ADMIN — ANTACID TABLETS 500 MG: 500 TABLET, CHEWABLE ORAL at 12:58

## 2018-12-25 RX ADMIN — ENOXAPARIN SODIUM 80 MG: 100 INJECTION SUBCUTANEOUS at 18:00

## 2018-12-25 RX ADMIN — POLYETHYLENE GLYCOL 3350 1 PACKET: 17 POWDER, FOR SOLUTION ORAL at 05:51

## 2018-12-25 RX ADMIN — OXYCODONE HYDROCHLORIDE 5 MG: 5 TABLET ORAL at 20:24

## 2018-12-25 ASSESSMENT — ENCOUNTER SYMPTOMS
SPUTUM PRODUCTION: 0
HALLUCINATIONS: 0
NERVOUS/ANXIOUS: 0
HEMOPTYSIS: 0
BRUISES/BLEEDS EASILY: 0
COUGH: 0
BLOOD IN STOOL: 0
FALLS: 0
VOMITING: 0
STRIDOR: 0
SPEECH CHANGE: 0
POLYDIPSIA: 0
FLANK PAIN: 0
DIARRHEA: 0
FOCAL WEAKNESS: 0
WEAKNESS: 1
FEVER: 0
PHOTOPHOBIA: 0
ABDOMINAL PAIN: 0
MYALGIAS: 1
LOSS OF CONSCIOUSNESS: 0
SEIZURES: 0
PALPITATIONS: 0
SORE THROAT: 0
EYE DISCHARGE: 0
CHILLS: 0
EYE PAIN: 0

## 2018-12-25 ASSESSMENT — PAIN SCALES - GENERAL
PAINLEVEL_OUTOF10: 4
PAINLEVEL_OUTOF10: 7
PAINLEVEL_OUTOF10: 3
PAINLEVEL_OUTOF10: 4
PAINLEVEL_OUTOF10: 0

## 2018-12-25 ASSESSMENT — LIFESTYLE VARIABLES: SUBSTANCE_ABUSE: 0

## 2018-12-25 NOTE — PROGRESS NOTES
Inpatient Anticoagulation Service Note  Date: 12/25/2018  Reason for Anticoagulation: Other (Comments) (arterial occlusion)   Hemoglobin Value: (!) 7.4  Hematocrit Value: (!) 23.5  Lab Platelet Value: 252  Target INR: 2.0 to 3.0  INR from last 7 days     Date/Time INR Value    12/25/18 0540 (!)  1.24    12/24/18 0300  1.12    12/23/18 1400  1.03    12/19/18 1356 (!)  1.45    12/19/18 0309 (!)  1.28        Dose from last 7 days     Date/Time Dose (mg)    12/25/18 1400  2.5    12/24/18 0800  5    12/23/18 1100  5        Average Dose (mg): TBD (new start warfarin)  Significant Interactions: Antibiotics, Statin  Bridge Therapy: Yes (enoxaparin ~1 mg/kg)  Date of Last VTE Event: 12/19/18  Bridge Therapy Start Date: 12/23/18  Days of Overlap Therapy: 2   INR Value Greater than 2 Prior to Discontinuation of Parenteral Anticoagulation: Not Applicable   Reversal Agent Administered: Not Applicable  Comments: INR with slightly increase and below goal. H/H low. PLT improved. No overt bleeding noted. Warfarin interactions noted. Will give 2.5 mg today and trend INR with AM labs. May need dose adjustments.    Plan:  Warfarin 2.5 mg 12/25/18  Education Material Provided?: No (needs education prior to discharge)  Pharmacist suggested discharge dosing: warfarin 2.5 mg daily    Derrick Canada, PharmD

## 2018-12-25 NOTE — PROGRESS NOTES
Hospital Medicine Daily Progress Note    Date of Service  12/25/2018    Chief Complaint  Leg pain    Hospital Course      83 y.o. female past medical history of diabetes, gout, hypertension, dyslipidemia, peripheral vascular disease status post aorto biiliac graft, aorto left external iliac artery bypass, admitted 12/19/2018 with leg pain vascular surgery Dr. Kohli was consulted imaging showed occluded aortobifemoral bypass.  Patient was started on a heparin drip and was taken to the OR for bilateral groin exploration with attempted thrombectomy.             Interval Problem Update  Hemoglobin is stable.  No signs of bleeding  Patient had no overnight events.  Vital stable.  Wound vacuum to both sides of the groin in place, no discharge noted  Patient appears to be stable for discharge.  Acceptance to SNF pending.      Consultants/Specialty  Surgery Dr. Kohli     Code Status  FULL    Disposition  SNF    Review of Systems  Review of Systems   Constitutional: Positive for malaise/fatigue. Negative for chills and fever.   HENT: Negative for congestion, ear discharge, ear pain and sore throat.    Eyes: Negative for photophobia, pain and discharge.   Respiratory: Negative for cough, hemoptysis, sputum production and stridor.    Cardiovascular: Positive for leg swelling. Negative for chest pain and palpitations.   Gastrointestinal: Negative for abdominal pain, blood in stool, diarrhea and vomiting.   Genitourinary: Positive for frequency and urgency. Negative for flank pain and hematuria.   Musculoskeletal: Positive for myalgias. Negative for falls and joint pain.        Soreness at the groins    Skin: Negative.    Neurological: Positive for weakness. Negative for speech change, focal weakness, seizures and loss of consciousness.   Endo/Heme/Allergies: Negative for polydipsia. Does not bruise/bleed easily.   Psychiatric/Behavioral: Negative for hallucinations and substance abuse. The patient is not nervous/anxious.          Physical Exam  Temp:  [36 °C (96.8 °F)-37.7 °C (99.8 °F)] 36.1 °C (97 °F)  Pulse:  [76-85] 78  Resp:  [16-18] 17  BP: (104-143)/(46-55) 143/55    Physical Exam   Constitutional: She is oriented to person, place, and time. She appears well-developed and well-nourished.   HENT:   Head: Normocephalic and atraumatic.   Right Ear: External ear normal.   Left Ear: External ear normal.   Eyes: Pupils are equal, round, and reactive to light. Conjunctivae are normal. Right eye exhibits no discharge. Left eye exhibits no discharge.   Neck: Normal range of motion. Neck supple. No JVD present.   Cardiovascular: Normal rate and regular rhythm.  Exam reveals no gallop and no friction rub.    No murmur heard.  Pulmonary/Chest: Effort normal and breath sounds normal. No stridor. No respiratory distress. She has no wheezes. She has no rales. She exhibits no tenderness.   Abdominal: Soft. Bowel sounds are normal. There is no tenderness. There is no rebound.   Musculoskeletal: She exhibits edema and tenderness.   Wound vac at both groins with drains    Neurological: She is alert and oriented to person, place, and time. No cranial nerve deficit. Coordination normal.   Skin: Skin is warm and dry. No erythema.   Psychiatric: She has a normal mood and affect. Judgment normal.   Nursing note and vitals reviewed.      Fluids    Intake/Output Summary (Last 24 hours) at 12/25/18 1422  Last data filed at 12/25/18 0734   Gross per 24 hour   Intake              300 ml   Output                0 ml   Net              300 ml       Laboratory  Recent Labs      12/23/18   0153  12/23/18   1545  12/24/18   0300  12/25/18   0540   WBC  9.9   --   9.0  9.4   RBC  2.57*   --   2.43*  2.57*   HEMOGLOBIN  7.3*  7.5*  7.1*  7.4*   HEMATOCRIT  22.8*  22.8*  21.8*  23.5*   MCV  88.7   --   89.7  91.4   MCH  28.4   --   29.2  28.8   MCHC  32.0*   --   32.6*  31.5*   RDW  49.3   --   49.5  50.8*   PLATELETCT  158*   --   196  252   MPV  9.6   --   9.6   9.3     Recent Labs      12/23/18   0153  12/24/18   0300  12/25/18   0540   SODIUM  132*  133*  133*   POTASSIUM  3.7  3.7  3.6   CHLORIDE  101  101  102   CO2  25  26  24   GLUCOSE  131*  100*  106*   BUN  11  15  14   CREATININE  0.71  0.79  0.71   CALCIUM  8.3*  7.9*  8.4*     Recent Labs      12/23/18   1400  12/24/18   0300  12/25/18   0540   INR  1.03  1.12  1.24*               Imaging  DX-CHEST-PORTABLE (1 VIEW)   Final Result      Left IJ central catheter tip is in the proximal/mid SVC. No pneumothorax.      OUTSIDE IMAGES-CT CHEST/ABDOMEN/PELVIS   Final Result           Assessment/Plan  * Lower limb ischemia- (present on admission)   Assessment & Plan    Surgery consulted  Exploratory surgery on 12/19/2018, seen by Dr Kohli  Was on heparin drip than Lovenox for bridging, warfarin   Could go to skilled nursing facility to continue Lovenox and warfarin until INR is therapeutic     Anemia   Assessment & Plan    Postoperatively patient hemoglobin was 6   S/P 2 units of packed RBCs, stabilized after that   Continue to monitor and transfuse for hemoglobin below 7  Hemoglobin remained stable     PAD (peripheral artery disease) (HCC)- (present on admission)   Assessment & Plan    Statin        Hyperparathyroidism (HCC)- (present on admission)   Assessment & Plan    Marked hyper !  Mostly due to hypocalcemia & vitamin D Deficiency   Need follow up PTH after correcting Ca and vitamin D Deficiency  If PTH does not correct after replacing vitamin D and calcium, patient may benefit from seeing an endocrinologist        Vitamin D deficiency- (present on admission)   Assessment & Plan    Marked  vitamin D Deficiency 4 only!, started ergocalciferol.       Hypocalcemia- (present on admission)   Assessment & Plan    Marked  vitamin D Deficiency 4 only!, started ergocalciferol.    Calcium improving with replacement       Elevated lactic acid level- (present on admission)   Assessment & Plan    S/P IVF  hydration  Came down to normal     Leukocytosis- (present on admission)   Assessment & Plan    Improved   Watch for signs of infection        CKD (chronic kidney disease) stage 3, GFR 30-59 ml/min ([facility])- (present on admission)   Assessment & Plan    Avoid nephrotoxins as much as possible   Monitor inputs & outputs, and BMP            Type 2 diabetes mellitus with kidney complication, without long-term current use of insulin ([facility])- (present on admission)   Assessment & Plan    With hyperglycemia  Last Glycated hemoglobin 6.6, pending repeat    Accu-Checks, hypoglycemia protocol         Blood sugar stable.  Repeat A1c 6.3  No need for short-acting insulin.  Check blood sugar once a day     Hypertension- (present on admission)   Assessment & Plan    IV Vasotec and Hydralazine prn     Blood pressure is stable     Hyperlipidemia- (present on admission)   Assessment & Plan    Rosuvastatin            VTE prophylaxis: lovenox/ warfarin

## 2018-12-26 LAB
INR PPP: 1.44 (ref 0.87–1.13)
PROTHROMBIN TIME: 17.7 SEC (ref 12–14.6)

## 2018-12-26 PROCEDURE — 99232 SBSQ HOSP IP/OBS MODERATE 35: CPT | Performed by: FAMILY MEDICINE

## 2018-12-26 PROCEDURE — 700111 HCHG RX REV CODE 636 W/ 250 OVERRIDE (IP): Performed by: HOSPITALIST

## 2018-12-26 PROCEDURE — 700102 HCHG RX REV CODE 250 W/ 637 OVERRIDE(OP): Performed by: HOSPITALIST

## 2018-12-26 PROCEDURE — 700102 HCHG RX REV CODE 250 W/ 637 OVERRIDE(OP): Performed by: INTERNAL MEDICINE

## 2018-12-26 PROCEDURE — A9270 NON-COVERED ITEM OR SERVICE: HCPCS | Performed by: HOSPITALIST

## 2018-12-26 PROCEDURE — A9270 NON-COVERED ITEM OR SERVICE: HCPCS | Performed by: FAMILY MEDICINE

## 2018-12-26 PROCEDURE — 770006 HCHG ROOM/CARE - MED/SURG/GYN SEMI*

## 2018-12-26 PROCEDURE — 85610 PROTHROMBIN TIME: CPT

## 2018-12-26 PROCEDURE — 700102 HCHG RX REV CODE 250 W/ 637 OVERRIDE(OP): Performed by: FAMILY MEDICINE

## 2018-12-26 PROCEDURE — A9270 NON-COVERED ITEM OR SERVICE: HCPCS | Performed by: INTERNAL MEDICINE

## 2018-12-26 RX ADMIN — ENOXAPARIN SODIUM 80 MG: 100 INJECTION SUBCUTANEOUS at 17:40

## 2018-12-26 RX ADMIN — OXYCODONE HYDROCHLORIDE 5 MG: 5 TABLET ORAL at 04:10

## 2018-12-26 RX ADMIN — STANDARDIZED SENNA CONCENTRATE AND DOCUSATE SODIUM 2 TABLET: 8.6; 5 TABLET, FILM COATED ORAL at 05:30

## 2018-12-26 RX ADMIN — ANTACID TABLETS 500 MG: 500 TABLET, CHEWABLE ORAL at 17:40

## 2018-12-26 RX ADMIN — ENOXAPARIN SODIUM 80 MG: 100 INJECTION SUBCUTANEOUS at 05:30

## 2018-12-26 RX ADMIN — POLYETHYLENE GLYCOL 3350 1 PACKET: 17 POWDER, FOR SOLUTION ORAL at 17:40

## 2018-12-26 RX ADMIN — STANDARDIZED SENNA CONCENTRATE AND DOCUSATE SODIUM 2 TABLET: 8.6; 5 TABLET, FILM COATED ORAL at 17:40

## 2018-12-26 RX ADMIN — OXYCODONE HYDROCHLORIDE 5 MG: 5 TABLET ORAL at 20:28

## 2018-12-26 RX ADMIN — ANTACID TABLETS 500 MG: 500 TABLET, CHEWABLE ORAL at 08:36

## 2018-12-26 RX ADMIN — WARFARIN SODIUM 2.5 MG: 2.5 TABLET ORAL at 17:40

## 2018-12-26 RX ADMIN — ROSUVASTATIN CALCIUM 5 MG: 5 TABLET, FILM COATED ORAL at 17:40

## 2018-12-26 RX ADMIN — ANTACID TABLETS 500 MG: 500 TABLET, CHEWABLE ORAL at 12:40

## 2018-12-26 ASSESSMENT — PAIN SCALES - GENERAL
PAINLEVEL_OUTOF10: 7
PAINLEVEL_OUTOF10: 4
PAINLEVEL_OUTOF10: 7
PAINLEVEL_OUTOF10: 3

## 2018-12-26 ASSESSMENT — ENCOUNTER SYMPTOMS
WEAKNESS: 0
DEPRESSION: 0
ORTHOPNEA: 0
SHORTNESS OF BREATH: 0
LOSS OF CONSCIOUSNESS: 0
FOCAL WEAKNESS: 0
HEMOPTYSIS: 0
FEVER: 0
PHOTOPHOBIA: 0
WHEEZING: 0
SEIZURES: 0
STRIDOR: 0
NAUSEA: 0
NERVOUS/ANXIOUS: 0
POLYDIPSIA: 0
BACK PAIN: 0
DOUBLE VISION: 0
CHILLS: 0
FALLS: 0
DIZZINESS: 0
ABDOMINAL PAIN: 0
WEAKNESS: 1
SPEECH CHANGE: 0
PALPITATIONS: 0
FLANK PAIN: 0
SPUTUM PRODUCTION: 0
EYE DISCHARGE: 0
BLURRED VISION: 0
HALLUCINATIONS: 0
HEARTBURN: 0
HEADACHES: 0
NECK PAIN: 0
DIARRHEA: 0
SORE THROAT: 0
COUGH: 0
VOMITING: 0
MYALGIAS: 1
BLOOD IN STOOL: 0
EYE PAIN: 0

## 2018-12-26 ASSESSMENT — LIFESTYLE VARIABLES: SUBSTANCE_ABUSE: 0

## 2018-12-26 NOTE — CONSULTS
Hospital Medicine Consultation    Date of Service  12/19/2018    Referring Physician  Garett Kohli MD    Consulting Physician  Jorge Luis Nolasco M.D.    Reason for Consultation  Medical management    History of Presenting Illness  83 y.o. female who presented 12/19/2018 with Bilateral leg pain which started last night.  She has known history of peripheral artery disease, with history of aortobifemoral bypass and iliac-iliac bypass in the past.  Along with the pain she noted that her legs felt very heavy.  She was initially seen at outside hospital will pulses were not appreciated by Doppler.  Workup showed multiple arterial occlusions.  She was then transferred over here.  She underwent aortobifemoral bypass graft thrombectomy.  I saw the patient postoperatively.  She remains on the heparin drip.    Review of Systems  Review of Systems   Constitutional: Negative for chills, fever and malaise/fatigue.   HENT: Negative for hearing loss and sore throat.    Eyes: Negative for blurred vision.   Respiratory: Negative for cough, shortness of breath and wheezing.    Cardiovascular: Positive for leg swelling. Negative for chest pain and palpitations.   Gastrointestinal: Negative for abdominal pain, diarrhea, heartburn, nausea and vomiting.   Genitourinary: Negative for dysuria.   Musculoskeletal: Negative for back pain and neck pain.   Skin: Negative for rash.   Neurological: Negative for dizziness, speech change, focal weakness, weakness and headaches.   Psychiatric/Behavioral: The patient is not nervous/anxious.        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    Surgical History   has a past surgical history that includes  aortofemoral bypass; toenail removal; neuroma excision; cataract phaco with iol; carotid endarterectomy; and thrombectomy (12/19/2018).    Family History  family history includes Lung Disease in her mother; Stroke in her father.    Social History   reports that she quit smoking about 34 years ago. Her smoking use included Cigarettes. She has a 24.00 pack-year smoking history. She has never used smokeless tobacco. She reports that she drinks alcohol. She reports that she does not use drugs.    Medications  Prior to Admission Medications   Prescriptions Last Dose Informant Patient Reported? Taking?   amLODIPine (NORVASC) 5 MG Tab 12/19/2018 at AM Patient No No   Sig: TAKE ONE TABLET BY MOUTH DAILY   aspirin EC (ECOTRIN) 81 MG TBEC 12/18/2018 at PM Patient Yes No   Sig: Take 81 mg by mouth every evening.   glucosamine 500 MG CAPS 12/19/2018 at AM Patient Yes No   Sig: Take 500 mg by mouth 3 times a day.     indomethacin (INDOCIN) 50 MG Cap 12/19/2018 at AM Patient No No   Sig: Take 1 Cap by mouth 2 times a day, with meals.   lisinopril (PRINIVIL, ZESTRIL) 40 MG tablet 12/19/2018 at AM Patient No No   Sig: TAKE ONE TABLET BY MOUTH DAILY   naproxen (ALEVE) 220 MG tablet PRN at UNKNOWN Patient Yes No   Sig: Take 220 mg by mouth 1 time daily as needed (PAIN).   potassium chloride ER (KLOR-CON) 10 MEQ tablet 12/19/2018 at AM Patient No No   Sig: Take 1 Tab by mouth every day.   prednisoLONE acetate (PRED FORTE) 1 % Suspension UNKNOWN at UNKNOWN Patient Yes No   Sig: Place 1 Drop in both eyes every day.   rosuvastatin (CRESTOR) 5 MG Tab 12/19/2018 at AM Patient No No   Sig: TAKE ONE TABLET BY MOUTH DAILY   triamcinolone acetonide (KENALOG) 0.5 % Cream PRN at UNKNOWN Patient Yes Yes   Sig: Apply 1 Each to affected area(s) 2 times a day as needed (RASH).   triamterene-hctz (MAXZIDE-25/DYAZIDE) 37.5-25 MG Tab 12/19/2018 at AM Patient No No   Sig: TAKE ONE TABLET BY MOUTH EVERY MORNING      Facility-Administered Medications:  None       Allergies  Allergies   Allergen Reactions   • Anesthetic [Benzocaine] Vomiting and Nausea     Got nausea from anesthesia     • Iodine Rash     RASH     • Other Drug Vomiting     Opiate make her vomit       Physical Exam  Temp:  [36.2 °C (97.1 °F)-36.9 °C (98.4 °F)] 36.2 °C (97.1 °F)  Pulse:  [74-81] 74  Resp:  [15-18] 15  BP: (103-132)/(46-62) 120/49    Physical Exam   Constitutional: She appears well-developed.   HENT:   Head: Normocephalic and atraumatic.   Eyes: Pupils are equal, round, and reactive to light. Conjunctivae are normal.   Neck: No tracheal deviation present. No thyromegaly present.   Cardiovascular: Normal rate and regular rhythm.    Pulmonary/Chest: Effort normal and breath sounds normal.   Abdominal: Soft. Bowel sounds are normal. She exhibits no distension. There is no tenderness.   Musculoskeletal: She exhibits edema.   Lymphadenopathy:     She has no cervical adenopathy.   Neurological:   Drowsy but awakens easily to voice  Answers most questions   Skin: Skin is warm and dry.   Nursing note and vitals reviewed.      Fluids       Laboratory  Recent Labs      12/24/18   0300  12/25/18   0540   WBC  9.0  9.4   RBC  2.43*  2.57*   HEMOGLOBIN  7.1*  7.4*   HEMATOCRIT  21.8*  23.5*   MCV  89.7  91.4   MCH  29.2  28.8   MCHC  32.6*  31.5*   RDW  49.5  50.8*   PLATELETCT  196  252   MPV  9.6  9.3     Recent Labs      12/24/18   0300  12/25/18   0540   SODIUM  133*  133*   POTASSIUM  3.7  3.6   CHLORIDE  101  102   CO2  26  24   GLUCOSE  100*  106*   BUN  15  14   CREATININE  0.79  0.71   CALCIUM  7.9*  8.4*     Recent Labs      12/24/18   0300  12/25/18   0540  12/26/18   0330   INR  1.12  1.24*  1.44*                 Imaging  DX-CHEST-PORTABLE (1 VIEW)   Final Result      Left IJ central catheter tip is in the proximal/mid SVC. No pneumothorax.      OUTSIDE IMAGES-CT CHEST/ABDOMEN/PELVIS   Final Result          Assessment/Plan  1.  Lower limb ischemia  s/p aortobifemoral bypass graft  thrombectomy 12/19/2018  Heparin drip    2.  Diabetes mellitus type 2  SSI for now  Check hemoglobin A1c    3.  Hypertension  IV Vasotec and hydralazine as needed for now    4.  Leukocytosis  Follow CBC    5.  Anemia  Serial hemoglobin    6. Elevated lactic acid  IVF hydration  Serial lactic acid    7.  Chronic kidney disease stage III   IVF hydration  Follow BMP    8.  Hyperlipidemia  Crestor    Thank you for this consult we will continue to follow the patient.  Check lipid profile

## 2018-12-26 NOTE — PROGRESS NOTES
Pt aox 4. No visible signs of distress. VSS.  Tolerating medication regimen without any signs or symptoms of adverse reactions.  Pt reports 7 /10 groin pain, medicated per MAR.   Tolerating diet without any n/v. Hypoactive BS, - BM, +flatus.  Ambulatory standby, stead gait.  Prevena wound vac dressings to bilateral groins are CDI.  On room air. No complaints of SOB.  Bed locked and in low position.  Call light within reach and able to make all needs be known.  Will continue to monitor.

## 2018-12-26 NOTE — DISCHARGE PLANNING
Agency/Facility Name: [facility]  Spoke To: Jyotsna Dean  Outcome: Patient accepted at all facilities.

## 2018-12-26 NOTE — PROGRESS NOTES
Hospital Medicine Daily Progress Note    Date of Service  12/26/2018    Chief Complaint  Leg pain    Hospital Course      83 y.o. female past medical history of diabetes, gout, hypertension, dyslipidemia, peripheral vascular disease status post aorto biiliac graft, aorto left external iliac artery bypass, admitted 12/19/2018 with leg pain vascular surgery Dr. Kohli was consulted imaging showed occluded aortobifemoral bypass.  Patient was started on a heparin drip and was taken to the OR for bilateral groin exploration with attempted thrombectomy.             Interval Problem Update  Patient is laying in bed comfortably.  Pain is fairly controlled.  On Lovenox therapeutic and warfarin.  INR 1.4 this morning.  Pending SNF acceptance.    Consultants/Specialty  Surgery Dr. Kohli     Code Status  FULL    Disposition  SNF pending acceptance    Review of Systems  Review of Systems   Constitutional: Positive for malaise/fatigue. Negative for chills and fever.   HENT: Negative for congestion, ear discharge, ear pain, hearing loss, sore throat and tinnitus.    Eyes: Negative for blurred vision, double vision, photophobia, pain and discharge.   Respiratory: Negative for cough, hemoptysis, sputum production, shortness of breath and stridor.    Cardiovascular: Positive for leg swelling. Negative for chest pain, palpitations and orthopnea.   Gastrointestinal: Negative for abdominal pain, blood in stool, diarrhea, heartburn and vomiting.   Genitourinary: Positive for frequency and urgency. Negative for dysuria, flank pain and hematuria.   Musculoskeletal: Positive for myalgias. Negative for falls, joint pain and neck pain.        Soreness at the groins    Skin: Negative for rash.   Neurological: Positive for weakness. Negative for dizziness, speech change, focal weakness, seizures, loss of consciousness and headaches.   Endo/Heme/Allergies: Negative for environmental allergies and polydipsia.   Psychiatric/Behavioral: Negative  for depression, hallucinations, substance abuse and suicidal ideas.        Physical Exam  Temp:  [36.2 °C (97.1 °F)-36.9 °C (98.4 °F)] 36.2 °C (97.1 °F)  Pulse:  [74-81] 74  Resp:  [15-18] 15  BP: (103-132)/(46-62) 120/49    Physical Exam   Constitutional: She is oriented to person, place, and time. She appears well-developed and well-nourished. No distress.   HENT:   Head: Normocephalic and atraumatic.   Mouth/Throat: No oropharyngeal exudate.   Eyes: Pupils are equal, round, and reactive to light. Conjunctivae are normal. No scleral icterus.   Neck: Normal range of motion. Neck supple. No thyromegaly present.   Cardiovascular: Normal rate and regular rhythm.  Exam reveals no gallop and no friction rub.    Pulmonary/Chest: Effort normal. No stridor. No respiratory distress. She has no wheezes. She has no rales.   Abdominal: Soft. Bowel sounds are normal. She exhibits no distension. There is no tenderness. There is no rebound.   Musculoskeletal: She exhibits edema and tenderness.   Wound vac at both groins with drains    Neurological: She is alert and oriented to person, place, and time. No cranial nerve deficit.   Skin: Skin is warm. No erythema.   Psychiatric: She has a normal mood and affect. Her behavior is normal.   Nursing note and vitals reviewed.      Fluids  No intake or output data in the 24 hours ending 12/26/18 1533    Laboratory  Recent Labs      12/23/18   1545  12/24/18   0300  12/25/18   0540   WBC   --   9.0  9.4   RBC   --   2.43*  2.57*   HEMOGLOBIN  7.5*  7.1*  7.4*   HEMATOCRIT  22.8*  21.8*  23.5*   MCV   --   89.7  91.4   MCH   --   29.2  28.8   MCHC   --   32.6*  31.5*   RDW   --   49.5  50.8*   PLATELETCT   --   196  252   MPV   --   9.6  9.3     Recent Labs      12/24/18   0300  12/25/18   0540   SODIUM  133*  133*   POTASSIUM  3.7  3.6   CHLORIDE  101  102   CO2  26  24   GLUCOSE  100*  106*   BUN  15  14   CREATININE  0.79  0.71   CALCIUM  7.9*  8.4*     Recent Labs      12/24/18   0300   12/25/18   0540  12/26/18   0330   INR  1.12  1.24*  1.44*               Imaging  DX-CHEST-PORTABLE (1 VIEW)   Final Result      Left IJ central catheter tip is in the proximal/mid SVC. No pneumothorax.      OUTSIDE IMAGES-CT CHEST/ABDOMEN/PELVIS   Final Result           Assessment/Plan  * Lower limb ischemia- (present on admission)   Assessment & Plan    Surgery consulted  Exploratory surgery on 12/19/2018, seen by Dr Kohli  Was on heparin drip than Lovenox for bridging, warfarin   Could go to skilled nursing facility to continue Lovenox and warfarin until INR is therapeutic     Anemia   Assessment & Plan    Postoperatively patient hemoglobin was 6   S/P 2 units of packed RBCs, stabilized after that   Continue to monitor and transfuse for hemoglobin below 7  Hemoglobin remained stable     PAD (peripheral artery disease) ([facility])- (present on admission)   Assessment & Plan    Continue Statin        Hyperparathyroidism ([facility])- (present on admission)   Assessment & Plan    Marked hyper !  Mostly due to hypocalcemia & vitamin D Deficiency   Need follow up PTH after correcting Ca and vitamin D Deficiency  If PTH does not correct after replacing vitamin D and calcium, patient may benefit from seeing an endocrinologist        Vitamin D deficiency- (present on admission)   Assessment & Plan    Marked  vitamin D Deficiency 4 only!, started ergocalciferol.       Hypocalcemia- (present on admission)   Assessment & Plan    Marked  vitamin D Deficiency 4 only!, started ergocalciferol.    Calcium improving with replacement       Elevated lactic acid level- (present on admission)   Assessment & Plan    S/P IVF hydration  Came down to normal     Leukocytosis- (present on admission)   Assessment & Plan    Resolved.     CKD (chronic kidney disease) stage 3, GFR 30-59 ml/min ([facility])- (present on admission)   Assessment & Plan    At baseline.  Avoid nephrotoxins.  Renal dose or medications.          Type 2 diabetes mellitus with  kidney complication, without long-term current use of insulin (HCC)- (present on admission)   Assessment & Plan    Last Glycated hemoglobin 6.6, pending repeat    Accu-Checks, hypoglycemia protocol       Repeat A1c 6.3  Blood glucoses are stable.  No need for short-acting insulin.  Check blood sugar once a day     Hypertension- (present on admission)   Assessment & Plan    IV Vasotec and Hydralazine prn     Blood pressure is stable     Hyperlipidemia- (present on admission)   Assessment & Plan    Rosuvastatin       Plan of care discussed with multidisciplinary team during rounds.    VTE prophylaxis: lovenox/ warfarin

## 2018-12-26 NOTE — PROGRESS NOTES
Inpatient Anticoagulation Service Note    Date: 2018  Reason for Anticoagulation: Other (Comments) (arterial occlusion)        Hemoglobin Value: (!) 7.4  Hematocrit Value: (!) 23.5  Lab Platelet Value: 252  Target INR: 2.0 to 3.0    INR from last 7 days     Date/Time INR Value    18 0330 (!)  1.44    18 0540 (!)  1.24    18 0300  1.12    18 1400  1.03    18 1356 (!)  1.45        Dose from last 7 days     Date/Time Dose (mg)    18 0330  2.5    18 1400  2.5    18 0800  5    18 1100  5        Average Dose (mg):  (new start warfarin)  Significant Interactions: Statin  Bridge Therapy: Yes (enoxaparin ~1 mg/kg)  Date of Last VTE Event: 18  Bridge Therapy Start Date: 18  Days of Overlap Therapy: 3  INR Value Greater than 2 Prior to Discontinuation of Parenteral Anticoagulation: Not Applicable     Reversal Agent Administered: Not Applicable  Comments: H/H is low, but stable. All warfarin doses have been charted as administered. INR is appropriately increasing toward goal. Continue warfarin 2.5 mg daily at this time.    Plan:  INR with morning labs  Education Material Provided?: No (needs education prior to discharge)  Pharmacist suggested discharge dosin.5 mg daily with outpatient followup in 1 to 2 days     Olga Chandler, PharmD., BCCCP

## 2018-12-26 NOTE — PROGRESS NOTES
Patient arrived on [facility].   Patient ambulated with standby assist. Patient voided in toilet.     2 RN Skin Check Complete  Redness, rash on back  Bruising flanks, and groin  Prevena in place bilateral groin

## 2018-12-26 NOTE — PROGRESS NOTES
Bedside report received.    Assessment complete.  A&O x 4. Patient calls appropriately.  Patient ambulates with standby assist.   Patient reports mild pain. Declines pian medication at this time.   Denies N&V. Tolerating regular diet.  Surgical incisions bilateral groin with prevenas in place.  + void, + flatus.  Patient denies SOB.    Review plan with of care with patient. Call light and personal belongings with in reach. Hourly rounding in place. All needs met at this time.

## 2018-12-26 NOTE — PROGRESS NOTES
Vascular    No acute changes  Pain controlled  Ambulating without walker  AFVSS  Feet well perfused  Preveenas CDI    A/P)  12/19: ABFB thrombectomy    Doing well  On coumadin, INR 1.44  May be able to discharge home instead of SNF based on her progress    Appreciate hospitalist service's help managing Ms. Verduzco    Garett Kohli MD  [facility] (General and Vascular Surgery)  Cell: [phone] (text or call is fine, if you don't reach me please try my office)  Office: [phone]  __________________________________________________________________  Patient:Radha Verduzco   MRN:[id number]   CSN:[id number]    12/26/2018    12:49 PM

## 2018-12-27 LAB
INR PPP: 1.63 (ref 0.87–1.13)
PROTHROMBIN TIME: 19.4 SEC (ref 12–14.6)

## 2018-12-27 PROCEDURE — A9270 NON-COVERED ITEM OR SERVICE: HCPCS | Performed by: HOSPITALIST

## 2018-12-27 PROCEDURE — 700111 HCHG RX REV CODE 636 W/ 250 OVERRIDE (IP): Performed by: HOSPITALIST

## 2018-12-27 PROCEDURE — A9270 NON-COVERED ITEM OR SERVICE: HCPCS | Performed by: FAMILY MEDICINE

## 2018-12-27 PROCEDURE — 99232 SBSQ HOSP IP/OBS MODERATE 35: CPT | Performed by: FAMILY MEDICINE

## 2018-12-27 PROCEDURE — 85610 PROTHROMBIN TIME: CPT

## 2018-12-27 PROCEDURE — 700102 HCHG RX REV CODE 250 W/ 637 OVERRIDE(OP): Performed by: FAMILY MEDICINE

## 2018-12-27 PROCEDURE — 770006 HCHG ROOM/CARE - MED/SURG/GYN SEMI*

## 2018-12-27 PROCEDURE — A9270 NON-COVERED ITEM OR SERVICE: HCPCS | Performed by: INTERNAL MEDICINE

## 2018-12-27 PROCEDURE — 700102 HCHG RX REV CODE 250 W/ 637 OVERRIDE(OP): Performed by: INTERNAL MEDICINE

## 2018-12-27 PROCEDURE — 700102 HCHG RX REV CODE 250 W/ 637 OVERRIDE(OP): Performed by: HOSPITALIST

## 2018-12-27 RX ADMIN — STANDARDIZED SENNA CONCENTRATE AND DOCUSATE SODIUM 2 TABLET: 8.6; 5 TABLET, FILM COATED ORAL at 05:05

## 2018-12-27 RX ADMIN — ANTACID TABLETS 500 MG: 500 TABLET, CHEWABLE ORAL at 07:30

## 2018-12-27 RX ADMIN — WARFARIN SODIUM 2.5 MG: 2.5 TABLET ORAL at 18:20

## 2018-12-27 RX ADMIN — ENOXAPARIN SODIUM 80 MG: 100 INJECTION SUBCUTANEOUS at 18:21

## 2018-12-27 RX ADMIN — ENOXAPARIN SODIUM 80 MG: 100 INJECTION SUBCUTANEOUS at 05:05

## 2018-12-27 RX ADMIN — BISACODYL 10 MG: 10 SUPPOSITORY RECTAL at 18:21

## 2018-12-27 RX ADMIN — STANDARDIZED SENNA CONCENTRATE AND DOCUSATE SODIUM 2 TABLET: 8.6; 5 TABLET, FILM COATED ORAL at 18:20

## 2018-12-27 RX ADMIN — ANTACID TABLETS 500 MG: 500 TABLET, CHEWABLE ORAL at 12:41

## 2018-12-27 RX ADMIN — ROSUVASTATIN CALCIUM 5 MG: 5 TABLET, FILM COATED ORAL at 18:20

## 2018-12-27 RX ADMIN — ANTACID TABLETS 500 MG: 500 TABLET, CHEWABLE ORAL at 18:21

## 2018-12-27 ASSESSMENT — ENCOUNTER SYMPTOMS
TREMORS: 0
NAUSEA: 0
NERVOUS/ANXIOUS: 0
CHILLS: 0
HEARTBURN: 0
FLANK PAIN: 0
SPUTUM PRODUCTION: 0
BLURRED VISION: 0
DOUBLE VISION: 0
POLYDIPSIA: 0
ABDOMINAL PAIN: 0
WEIGHT LOSS: 0
HALLUCINATIONS: 0
COUGH: 0
DEPRESSION: 0
PALPITATIONS: 0
TINGLING: 0
ORTHOPNEA: 0
VOMITING: 0
HEMOPTYSIS: 0
PHOTOPHOBIA: 0
HEADACHES: 0
MYALGIAS: 1
CONSTIPATION: 1
FEVER: 0
EYE PAIN: 0
NECK PAIN: 0
DIZZINESS: 0
WEAKNESS: 1
BACK PAIN: 0

## 2018-12-27 ASSESSMENT — PAIN SCALES - GENERAL: PAINLEVEL_OUTOF10: 0

## 2018-12-27 ASSESSMENT — LIFESTYLE VARIABLES: SUBSTANCE_ABUSE: 0

## 2018-12-27 NOTE — PROGRESS NOTES
Bedside report received.    Assessment complete.  A&O x 4. Patient calls appropriately.  Patient ambualtes with standby assist.   Patient denies pain at this time.   Denies N&V. Tolerating regular, DM diet.  Surgical incisions bilateral groin with prevenas in place.  + void, + flatus.  Patient denies SOB.  Patient has PIV, Central line removed per order. Patient tolerated well, tip intact.    Review plan with of care with patient. Call light and personal belongings with in reach. Hourly rounding in place. All needs met at this time.

## 2018-12-27 NOTE — CARE PLAN
Problem: Safety  Goal: Will remain free from falls  Outcome: PROGRESSING AS EXPECTED  Will educate pt. On need to utilize call light for assistance and before getting up.   Bed locked and in lowest position, Non-slid slippers on, Call light and belongings are within reach    Problem: Pain Management  Goal: Pain level will decrease to patient's comfort goal  Outcome: PROGRESSING AS EXPECTED  Will encourage pt to ambulate, Will medicate per MAR, and will provide non-pharmacologic pain relief measures.

## 2018-12-27 NOTE — PROGRESS NOTES
Inpatient Anticoagulation Service Note    Date: 2018  Reason for Anticoagulation: Other (Comments) (arterial occlusion)  Target INR: 2.0 to 3.0    Hemoglobin Value: (!) 7.4  Hematocrit Value: (!) 23.5  Lab Platelet Value: 252    INR from last 7 days     Date/Time INR Value    18 0443 (!)  1.63    18 0330 (!)  1.44    18 0540 (!)  1.24    18 0300  1.12    18 1400  1.03        Dose from last 7 days     Date/Time Dose (mg)    18 1000  2.5    18 0330  2.5    18 1400  2.5    18 0800  5    18 1100  5        Average Dose (mg):  (new start warfarin)    Significant Interactions: Statin    Date of Last VTE Event: 18    Bridge Therapy: Yes (enoxaparin ~1 mg/kg)  Bridge Therapy Start Date: 18  Days of Overlap Therapy: 4  INR Value Greater than 2 Prior to Discontinuation of Parenteral Anticoagulation: Not Applicable   Reversal Agent Administered: Not Applicable    Comments: No bleeding or embolic complications noted overnight.  No changes in drug interactions.  INR trending up nicely.      Plan: Will continue current dosing of 2.5 mg daily.  Plan for discharge for SNF soon.  Counseling given to pt.  Pharmacy will continue to monitor until discharge.    Education Material Provided?: Yes    Pharmacist suggested discharge dosin.5 mg daily with f/u INR in 2-3 days.       Liz Elkins, PharmD

## 2018-12-27 NOTE — PROGRESS NOTES
Bedside report received.  Assessment complete.  A&O x 4. Patient calls appropriately.  Patient up with standby assist.    Patient has 7/10 pain. Medicated per MAR  Denies N&V. Tolerating Diabetic diet.  Surgical incisions in bilateral groins with Prevenas in place.  + void, + flatus, - BM.  Patient denies SOB.  Patient pleasant with staff and resting in bed.  Review plan with of care with patient. Call light and personal belongings with in reach. Hourly rounding in place. All needs met at this time.

## 2018-12-27 NOTE — DISCHARGE PLANNING
[facility]   Spoke To: Reji   Outcome: Patient accepted. Bed is available for patient today.     ELIAN Dotson notified.

## 2018-12-28 VITALS
BODY MASS INDEX: 30.23 KG/M2 | SYSTOLIC BLOOD PRESSURE: 146 MMHG | WEIGHT: 170.64 LBS | HEART RATE: 76 BPM | RESPIRATION RATE: 18 BRPM | DIASTOLIC BLOOD PRESSURE: 53 MMHG | HEIGHT: 63 IN | OXYGEN SATURATION: 96 % | TEMPERATURE: 97.3 F

## 2018-12-28 PROBLEM — D72.829 LEUKOCYTOSIS: Status: RESOLVED | Noted: 2018-12-19 | Resolved: 2018-12-28

## 2018-12-28 PROBLEM — R79.89 ELEVATED LACTIC ACID LEVEL: Status: RESOLVED | Noted: 2018-12-19 | Resolved: 2018-12-28

## 2018-12-28 LAB
ALBUMIN SERPL BCP-MCNC: 3.1 G/DL (ref 3.2–4.9)
ALBUMIN/GLOB SERPL: 1.6 G/DL
ALP SERPL-CCNC: 45 U/L (ref 30–99)
ALT SERPL-CCNC: 16 U/L (ref 2–50)
ANION GAP SERPL CALC-SCNC: 9 MMOL/L (ref 0–11.9)
AST SERPL-CCNC: 19 U/L (ref 12–45)
BASOPHILS # BLD AUTO: 0.5 % (ref 0–1.8)
BASOPHILS # BLD: 0.05 K/UL (ref 0–0.12)
BILIRUB SERPL-MCNC: 1.4 MG/DL (ref 0.1–1.5)
BUN SERPL-MCNC: 13 MG/DL (ref 8–22)
CALCIUM SERPL-MCNC: 8.3 MG/DL (ref 8.5–10.5)
CHLORIDE SERPL-SCNC: 105 MMOL/L (ref 96–112)
CO2 SERPL-SCNC: 22 MMOL/L (ref 20–33)
CREAT SERPL-MCNC: 0.82 MG/DL (ref 0.5–1.4)
EOSINOPHIL # BLD AUTO: 0.25 K/UL (ref 0–0.51)
EOSINOPHIL NFR BLD: 2.4 % (ref 0–6.9)
ERYTHROCYTE [DISTWIDTH] IN BLOOD BY AUTOMATED COUNT: 56.7 FL (ref 35.9–50)
GLOBULIN SER CALC-MCNC: 2 G/DL (ref 1.9–3.5)
GLUCOSE SERPL-MCNC: 110 MG/DL (ref 65–99)
HCT VFR BLD AUTO: 26.4 % (ref 37–47)
HGB BLD-MCNC: 8 G/DL (ref 12–16)
IMM GRANULOCYTES # BLD AUTO: 0.24 K/UL (ref 0–0.11)
IMM GRANULOCYTES NFR BLD AUTO: 2.3 % (ref 0–0.9)
INR PPP: 1.66 (ref 0.87–1.13)
LYMPHOCYTES # BLD AUTO: 1.65 K/UL (ref 1–4.8)
LYMPHOCYTES NFR BLD: 15.7 % (ref 22–41)
MCH RBC QN AUTO: 29 PG (ref 27–33)
MCHC RBC AUTO-ENTMCNC: 30.3 G/DL (ref 33.6–35)
MCV RBC AUTO: 95.7 FL (ref 81.4–97.8)
MONOCYTES # BLD AUTO: 0.83 K/UL (ref 0–0.85)
MONOCYTES NFR BLD AUTO: 7.9 % (ref 0–13.4)
NEUTROPHILS # BLD AUTO: 7.5 K/UL (ref 2–7.15)
NEUTROPHILS NFR BLD: 71.2 % (ref 44–72)
NRBC # BLD AUTO: 0.03 K/UL
NRBC BLD-RTO: 0.3 /100 WBC
PLATELET # BLD AUTO: 321 K/UL (ref 164–446)
PMV BLD AUTO: 8.9 FL (ref 9–12.9)
POTASSIUM SERPL-SCNC: 3.7 MMOL/L (ref 3.6–5.5)
PROT SERPL-MCNC: 5.1 G/DL (ref 6–8.2)
PROTHROMBIN TIME: 19.7 SEC (ref 12–14.6)
RBC # BLD AUTO: 2.76 M/UL (ref 4.2–5.4)
SODIUM SERPL-SCNC: 136 MMOL/L (ref 135–145)
WBC # BLD AUTO: 10.5 K/UL (ref 4.8–10.8)

## 2018-12-28 PROCEDURE — 85610 PROTHROMBIN TIME: CPT

## 2018-12-28 PROCEDURE — 700102 HCHG RX REV CODE 250 W/ 637 OVERRIDE(OP): Performed by: FAMILY MEDICINE

## 2018-12-28 PROCEDURE — 99239 HOSP IP/OBS DSCHRG MGMT >30: CPT | Performed by: FAMILY MEDICINE

## 2018-12-28 PROCEDURE — A9270 NON-COVERED ITEM OR SERVICE: HCPCS | Performed by: FAMILY MEDICINE

## 2018-12-28 PROCEDURE — 700102 HCHG RX REV CODE 250 W/ 637 OVERRIDE(OP): Performed by: HOSPITALIST

## 2018-12-28 PROCEDURE — 36415 COLL VENOUS BLD VENIPUNCTURE: CPT

## 2018-12-28 PROCEDURE — 80053 COMPREHEN METABOLIC PANEL: CPT

## 2018-12-28 PROCEDURE — A9270 NON-COVERED ITEM OR SERVICE: HCPCS | Performed by: HOSPITALIST

## 2018-12-28 PROCEDURE — 85025 COMPLETE CBC W/AUTO DIFF WBC: CPT

## 2018-12-28 PROCEDURE — 700111 HCHG RX REV CODE 636 W/ 250 OVERRIDE (IP): Performed by: HOSPITALIST

## 2018-12-28 PROCEDURE — 97535 SELF CARE MNGMENT TRAINING: CPT

## 2018-12-28 RX ORDER — WARFARIN SODIUM 2.5 MG/1
2.5 TABLET ORAL
Qty: 30 TAB | Refills: 3 | Status: ON HOLD | OUTPATIENT
Start: 2018-12-28 | End: 2019-01-24

## 2018-12-28 RX ORDER — ERGOCALCIFEROL 1.25 MG/1
50000 CAPSULE ORAL
Qty: 4 CAP | Refills: 0 | Status: ON HOLD | OUTPATIENT
Start: 2019-01-04 | End: 2019-01-24

## 2018-12-28 RX ADMIN — ANTACID TABLETS 500 MG: 500 TABLET, CHEWABLE ORAL at 08:23

## 2018-12-28 RX ADMIN — ANTACID TABLETS 500 MG: 500 TABLET, CHEWABLE ORAL at 12:36

## 2018-12-28 RX ADMIN — ERGOCALCIFEROL 50000 UNITS: 1.25 CAPSULE ORAL at 08:23

## 2018-12-28 RX ADMIN — STANDARDIZED SENNA CONCENTRATE AND DOCUSATE SODIUM 2 TABLET: 8.6; 5 TABLET, FILM COATED ORAL at 05:18

## 2018-12-28 RX ADMIN — ENOXAPARIN SODIUM 80 MG: 100 INJECTION SUBCUTANEOUS at 05:18

## 2018-12-28 ASSESSMENT — COGNITIVE AND FUNCTIONAL STATUS - GENERAL
DRESSING REGULAR LOWER BODY CLOTHING: A LOT
PERSONAL GROOMING: A LITTLE
HELP NEEDED FOR BATHING: A LOT
SUGGESTED CMS G CODE MODIFIER DAILY ACTIVITY: CK
DAILY ACTIVITIY SCORE: 19

## 2018-12-28 ASSESSMENT — PAIN SCALES - GENERAL
PAINLEVEL_OUTOF10: 0

## 2018-12-28 NOTE — THERAPY
"Occupational Therapy Treatment completed with focus on ADLs and ADL transfers.  Functional Status:    Pt completed sup>sit with min/mod A, sit><stand with CGA, donned robe with supv, ambulated to bathroom with FWW, completed toilet xfer and toileting Mod I, took short walk, and returned to room, Mod A b2b for BLEs. Continues to have deficits with bed mobility, balance, strength, and activity tolerance.     Plan of Care: Will benefit from Occupational Therapy 3 times per week  Discharge Recommendations:  Equipment Will Continue to Assess for Equipment Needs. Post-acute therapy Recommend inpatient transitional care services for continued occupational therapy services.       See \"Rehab Therapy-Acute\" Patient Summary Report for complete documentation.   "

## 2018-12-28 NOTE — CARE PLAN
Problem: Safety  Goal: Will remain free from injury  Outcome: PROGRESSING AS EXPECTED  Patient educated not to get out of bed without staff present    Problem: Pain Management  Goal: Pain level will decrease to patient's comfort goal  Outcome: PROGRESSING AS EXPECTED  Denies pain at this time

## 2018-12-28 NOTE — DISCHARGE SUMMARY
Discharge Summary    CHIEF COMPLAINT ON ADMISSION  Chief Complaint   Patient presents with   • Leg Pain     left femoral artery occlusion, right partial occlusion, hx aortic bypass, CTA shows abdominal aortic occlusion as well       Reason for Admission  EMS     CODE STATUS  Full Code    HPI & HOSPITAL COURSE  83 y.o. female past medical history of diabetes, gout, hypertension, dyslipidemia, peripheral vascular disease status post aorto biiliac graft, aorto left external iliac artery bypass, admitted 12/19/2018 with leg pain vascular surgery Dr. Kohli was consulted imaging showed occluded aortobifemoral bypass.  Patient was started on a heparin drip and was taken to the OR for bilateral groin exploration with attempted thrombectomy.  Patient tolerated the surgery fairly.  Heparin drip was switched with therapeutic Lovenox along with Coumadin.  Denise wound vacs were placed at the incisions.  Those were removed on the day of discharge and the wounds were dressed.   Vascular surgery recommended to remove the bandages in 1-2 days and then the incisions can stay open to air.  Also was found to have vitamin D deficiency and hypocalcemia and was started on supplements.  Patient was hemodynamically and clinically stable.  Her INR did not get to therapeutic range and it was recommended that patient continues with therapeutic Lovenox along with Coumadin until her INR is between 2 and 3.  At that point, the Lovenox can be discontinued and patient can be continue with Coumadin.  INR will be monitored and rehab and Coumadin dose will be adjusted accordingly.  Patient is scheduled to follow-up with vascular surgery on Wednesday January 4 or Friday January 6, 2019.  Patient was cleared for discharge from medical standpoint.       Therefore, she is discharged in fair and stable condition to skilled nursing facility.    The patient met 2-midnight criteria for an inpatient stay at the time of discharge.      FOLLOW UP ITEMS POST  DISCHARGE  Follow-up with PCP at SNF per recommendations.  Follow-up with neurosurgery as per recommendations.    DISCHARGE DIAGNOSES  Principal Problem:    Lower limb ischemia POA: Yes  Active Problems:    PAD (peripheral artery disease) (HCC) POA: Yes    Anemia POA: No    Hypertension POA: Yes    Type 2 diabetes mellitus with kidney complication, without long-term current use of insulin ([facility]) POA: Yes    CKD (chronic kidney disease) stage 3, GFR 30-59 ml/min ([facility]) POA: Yes    Hypocalcemia POA: Yes    Vitamin D deficiency POA: Yes    Hyperparathyroidism ([facility]) POA: Yes    Hyperlipidemia POA: Yes  Resolved Problems:    Leukocytosis POA: Yes    Elevated lactic acid level POA: Yes      FOLLOW UP  No future appointments.  Garett Kohli M.D.  [address]  [facility] [id number] [phone]    Schedule an appointment as soon as possible for a visit on 1/4/2019  For Progress Check and removal of stitches    [facility] ([facility])  [address] [place] [address]  [phone]          MEDICATIONS ON DISCHARGE     Medication List      START taking these medications      Instructions   docusate sodium 100 MG Caps  Commonly known as:  COLACE   Doctor's comments:  Take this for 5 days after surgery.  Stop taking this if you have loose stool  Take 1 Cap by mouth 2 times a day.  Dose:  100 mg     enoxaparin 80 MG/0.8ML Soln inj  Commonly known as:  LOVENOX   Inject 80 mg as instructed every 12 hours for 15 days.  Dose:  1 mg/kg     ondansetron 4 MG Tabs tablet  Commonly known as:  ZOFRAN   Take 1 Tab by mouth every four hours as needed for Nausea/Vomiting.  Dose:  4 mg     vitamin D (Ergocalciferol) 50947 units Caps capsule  Start taking on:  1/4/2019  Commonly known as:  DRISDOL   Take 1 Cap by mouth every 7 days.  Dose:  99906 Units     warfarin 2.5 MG Tabs  Commonly known as:  COUMADIN   Take 1 Tab by mouth COUMADIN-DAILY.  Dose:  2.5 mg        CONTINUE taking these  medications      Instructions   amLODIPine 5 MG Tabs  Commonly known as:  NORVASC   TAKE ONE TABLET BY MOUTH DAILY     glucosamine 500 MG Caps   Take 500 mg by mouth 3 times a day.  Dose:  500 mg     lisinopril 40 MG tablet  Commonly known as:  PRINIVIL, ZESTRIL   TAKE ONE TABLET BY MOUTH DAILY     prednisoLONE acetate 1 % Susp  Commonly known as:  PRED FORTE   Place 1 Drop in both eyes every day.  Dose:  1 Drop     rosuvastatin 5 MG Tabs  Commonly known as:  CRESTOR   TAKE ONE TABLET BY MOUTH DAILY     triamcinolone acetonide 0.5 % Crea  Commonly known as:  KENALOG   Apply 1 Each to affected area(s) 2 times a day as needed (RASH).  Dose:  1 Each        STOP taking these medications    ALEVE 220 MG tablet  Generic drug:  naproxen     aspirin EC 81 MG Tbec  Commonly known as:  ECOTRIN     indomethacin 50 MG Caps  Commonly known as:  INDOCIN     potassium chloride ER 10 MEQ tablet  Commonly known as:  KLOR-CON     triamterene-hctz 37.5-25 MG Tabs  Commonly known as:  MAXZIDE-25/DYAZIDE        ASK your doctor about these medications      Instructions   HYDROcodone-acetaminophen 5-325 MG Tabs per tablet  Commonly known as:  NORCO  Ask about: Should I take this medication?   Doctor's comments:  No driving while on pain medications. Patient was on this in the hospital after surgery.  Take 1-2 Tabs by mouth every 6 hours as needed (as needed for pain) for up to 4 days.  Dose:  1-2 Tab            Allergies  Allergies   Allergen Reactions   • Anesthetic [Benzocaine] Vomiting and Nausea     Got nausea from anesthesia     • Iodine Rash     RASH     • Other Drug Vomiting     Opiate make her vomit       DIET  Orders Placed This Encounter   Procedures   • Diet Order Diabetic     Standing Status:   Standing     Number of Occurrences:   1     Order Specific Question:   Diet:     Answer:   Diabetic [3]       ACTIVITY  As tolerated.  Weight bearing as tolerated    LINES, DRAINS, AND WOUNDS  This is an automated list. Peripheral IVs  will be removed prior to discharge.  Peripheral IV 12/27/18 20 G Right Hand (Active)   Site Assessment Clean;Dry;Intact 12/28/2018 11:00 AM   Dressing Type Transparent 12/28/2018 11:00 AM   Line Status Scrubbed the hub prior to access;Flushed;Saline locked 12/28/2018 11:00 AM   Dressing Status Clean;Dry;Intact 12/28/2018 11:00 AM   Dressing Intervention Other (Comment) 12/28/2018 11:00 AM          Peripheral IV 12/27/18 20 G Right Hand (Active)   Site Assessment Clean;Dry;Intact 12/28/2018 11:00 AM   Dressing Type Transparent 12/28/2018 11:00 AM   Line Status Scrubbed the hub prior to access;Flushed;Saline locked 12/28/2018 11:00 AM   Dressing Status Clean;Dry;Intact 12/28/2018 11:00 AM   Dressing Intervention Other (Comment) 12/28/2018 11:00 AM               MENTAL STATUS ON TRANSFER  Level of Consciousness: Alert  Orientation : Oriented x 4  Speech: Speech Clear    CONSULTATIONS  Vascular surgery    PROCEDURES  Bilateral groin exploration with thrombectomy as mentioned above.    LABORATORY  Lab Results   Component Value Date    SODIUM 136 12/28/2018    POTASSIUM 3.7 12/28/2018    CHLORIDE 105 12/28/2018    CO2 22 12/28/2018    GLUCOSE 110 (H) 12/28/2018    BUN 13 12/28/2018    CREATININE 0.82 12/28/2018        Lab Results   Component Value Date    WBC 10.5 12/28/2018    HEMOGLOBIN 8.0 (L) 12/28/2018    HEMATOCRIT 26.4 (L) 12/28/2018    PLATELETCT 321 12/28/2018        Total time of the discharge process exceeds 38 minutes.

## 2018-12-28 NOTE — PROGRESS NOTES
Hospital Medicine Daily Progress Note    Date of Service  12/27/2018    Chief Complaint  Leg pain    Hospital Course      83 y.o. female past medical history of diabetes, gout, hypertension, dyslipidemia, peripheral vascular disease status post aorto biiliac graft, aorto left external iliac artery bypass, admitted 12/19/2018 with leg pain vascular surgery Dr. Kohli was consulted imaging showed occluded aortobifemoral bypass.  Patient was started on a heparin drip and was taken to the OR for bilateral groin exploration with attempted thrombectomy.             Interval Problem Update  Stated that she has not had a bowel movement for a few days.  Denies any abdominal pain.  Denies any nausea or vomiting.  Tolerating p.o. intake fairly.  INR 1.6 today.  Awaiting acceptance to SNF.    Consultants/Specialty  Surgery Dr. Kohli     Code Status  FULL    Disposition  SNF pending acceptance    Review of Systems  Review of Systems   Constitutional: Positive for malaise/fatigue. Negative for chills, fever and weight loss.   HENT: Negative for ear discharge, ear pain, hearing loss and tinnitus.    Eyes: Negative for blurred vision, double vision, photophobia and pain.   Respiratory: Negative for cough, hemoptysis and sputum production.    Cardiovascular: Positive for leg swelling. Negative for chest pain, palpitations and orthopnea.   Gastrointestinal: Positive for constipation. Negative for abdominal pain, heartburn, nausea and vomiting.   Genitourinary: Positive for frequency and urgency. Negative for dysuria, flank pain and hematuria.   Musculoskeletal: Positive for myalgias. Negative for back pain, joint pain and neck pain.        Soreness at the groins    Skin: Negative for rash.   Neurological: Positive for weakness. Negative for dizziness, tingling, tremors and headaches.   Endo/Heme/Allergies: Negative for environmental allergies and polydipsia.   Psychiatric/Behavioral: Negative for depression, hallucinations,  substance abuse and suicidal ideas. The patient is not nervous/anxious.         Physical Exam  Temp:  [36.2 °C (97.1 °F)-36.7 °C (98 °F)] 36.2 °C (97.1 °F)  Pulse:  [57-81] 76  Resp:  [17-18] 18  BP: ()/(42-58) 98/50    Physical Exam   Constitutional: She is oriented to person, place, and time. No distress.   HENT:   Head: Normocephalic and atraumatic.   Mouth/Throat: No oropharyngeal exudate.   Eyes: Pupils are equal, round, and reactive to light. Conjunctivae are normal. Right eye exhibits no discharge. Left eye exhibits no discharge.   Neck: Normal range of motion. Neck supple. No thyromegaly present.   Cardiovascular: Normal rate and regular rhythm.  Exam reveals no gallop and no friction rub.    Pulmonary/Chest: Effort normal and breath sounds normal. No stridor. No respiratory distress. She has no wheezes.   Abdominal: Soft. Bowel sounds are normal. She exhibits no distension. There is no tenderness.   Musculoskeletal: She exhibits edema and tenderness.   Wound vac at both groins with drains (Denise).   Neurological: She is alert and oriented to person, place, and time. No cranial nerve deficit.   Skin: Skin is warm. She is not diaphoretic. No erythema.   Psychiatric: She has a normal mood and affect.   Nursing note and vitals reviewed.      Fluids    Intake/Output Summary (Last 24 hours) at 12/27/18 1801  Last data filed at 12/27/18 0850   Gross per 24 hour   Intake              480 ml   Output                0 ml   Net              480 ml       Laboratory  Recent Labs      12/25/18   0540   WBC  9.4   RBC  2.57*   HEMOGLOBIN  7.4*   HEMATOCRIT  23.5*   MCV  91.4   MCH  28.8   MCHC  31.5*   RDW  50.8*   PLATELETCT  252   MPV  9.3     Recent Labs      12/25/18   0540   SODIUM  133*   POTASSIUM  3.6   CHLORIDE  102   CO2  24   GLUCOSE  106*   BUN  14   CREATININE  0.71   CALCIUM  8.4*     Recent Labs      12/25/18   0540  12/26/18   0330  12/27/18   0443   INR  1.24*  1.44*  1.63*                Imaging  DX-CHEST-PORTABLE (1 VIEW)   Final Result      Left IJ central catheter tip is in the proximal/mid SVC. No pneumothorax.      OUTSIDE IMAGES-CT CHEST/ABDOMEN/PELVIS   Final Result           Assessment/Plan  * Lower limb ischemia- (present on admission)   Assessment & Plan    Surgery consulted  Exploratory surgery on 12/19/2018, seen by Dr Kohli  Was on heparin drip than Lovenox for bridging, warfarin   Monitor INR.  Goal between 2 and 3.  Awaiting SNF placement.  Denise wound VAC to be removed tomorrow 12/28.     Anemia   Assessment & Plan    Postoperatively patient hemoglobin was 6   S/P 2 units of packed RBCs, stabilized after that   Continue to monitor and transfuse for hemoglobin below 7  Hemoglobin remained stable     PAD (peripheral artery disease) ([facility])- (present on admission)   Assessment & Plan    Continue Statin        Hyperparathyroidism ([facility])- (present on admission)   Assessment & Plan    Marked hyper !  Mostly due to hypocalcemia & vitamin D Deficiency   Need follow up PTH after correcting Ca and vitamin D Deficiency  If PTH does not correct after replacing vitamin D and calcium, patient may benefit from seeing an endocrinologist        Vitamin D deficiency- (present on admission)   Assessment & Plan    Marked  vitamin D Deficiency 4 only!, started ergocalciferol.       Hypocalcemia- (present on admission)   Assessment & Plan    Marked  vitamin D Deficiency 4 only!, started ergocalciferol.    Calcium improving with replacement       Elevated lactic acid level- (present on admission)   Assessment & Plan    S/P IVF hydration  Came down to normal     Leukocytosis- (present on admission)   Assessment & Plan    Resolved.     CKD (chronic kidney disease) stage 3, GFR 30-59 ml/min ([facility])- (present on admission)   Assessment & Plan    At baseline.  Avoid nephrotoxins.  Renal dose or medications.          Type 2 diabetes mellitus with kidney complication, without long-term current use of  insulin ([facility]CC)- (present on admission)   Assessment & Plan    Last Glycated hemoglobin 6.6, pending repeat    Accu-Checks, hypoglycemia protocol       Repeat A1c 6.3  Blood glucoses are stable.  No need for short-acting insulin.  Check blood sugar once a day     Hypertension- (present on admission)   Assessment & Plan    IV Vasotec and Hydralazine prn     Blood pressure is stable     Hyperlipidemia- (present on admission)   Assessment & Plan    Rosuvastatin       Plan of care discussed with multidisciplinary team during rounds.    VTE prophylaxis: lovenox/ warfarin

## 2018-12-28 NOTE — DISCHARGE INSTRUCTIONS
Discharge Instructions     Procedure: Lower Extremity Bypass     1. ACTIVITIES: No strenuous activities or heavy lifting (greater than 15 pounds) for 4 weeks.     2. DRIVING: You may drive whenever you are off pain medications and are able to perform the activities needed to drive, i.e. turning, bending, twisting, etc.      3. WOUND: It is not unusual for patients to experience swelling and even bruising, as well as a small amount of drainage from the incisions. This is normal and will resolve over the next 1-2 weeks. You can shower starting the day of discharge. You only need to cover the incisions if there is drainage from the incision.     If you have purple bandages covering your incisions I will remove those in office on Wednesday 12/26/18.  Please come to the clinic in the morning any time after 9 AM to have the bandages removed.     4. BATHING: You can shower starting the day of discharge. You only need to cover the incisions if there is drainage from the incision.     6. PAIN MEDICATION: You will be given a prescription for pain medication at discharge. Please take these as directed. It is important to remember not to take medications on an empty stomach as this may cause nausea. Also, you may get a prescription for a stool softener which you should take as long as you are taking pain medication.     7. BOWEL FUNCTION: After surgery, it is not uncommon for patients to experience constipation. This is due to decreasing activity levels as well as pain medications. You may need to use a laxative (Milk of Magnesia, Ex-lax; Senokot, etc.) if you go more than 1-2 days without a bowel movement.     8 .CALL IF YOU HAVE: (1) Fevers to more than 101.5 F, (2) Unusual or excessive pain, (3) Drainage or fluid from incision that may be foul smelling, increased tenderness or soreness at the wound or the wound edges are no longer together, redness or swelling at the incision site. Please do not hesitate to call with any  other questions.   If you have any additional questions, please do not hesitate to call the office and speak to either myself or the physician on call.      Office addresses:   [address] [place] [address] [address]  [place] [address]  [phone]     Garett Kohli MD  [facility]  [phone]    Discharge Instructions    Discharged to other by medical transportation with escort. Discharged via wheelchair, hospital escort: Refused.  Special equipment needed: Not Applicable    Be sure to schedule a follow-up appointment with your primary care doctor or any specialists as instructed.     Discharge Plan:   Influenza Vaccine Indication: Not indicated: Previously immunized this influenza season and > 8 years of age (09/21/2018)    I understand that a diet low in cholesterol, fat, and sodium is recommended for good health. Unless I have been given specific instructions below for another diet, I accept this instruction as my diet prescription.   Other diet: diabetic    Special Instructions: None    · Is patient discharged on Warfarin / Coumadin?   No     Depression / Suicide Risk    As you are discharged from this [facility], it is important to learn how to keep safe from harming yourself.    Recognize the warning signs:  · Abrupt changes in personality, positive or negative- including increase in energy   · Giving away possessions  · Change in eating patterns- significant weight changes-  positive or negative  · Change in sleeping patterns- unable to sleep or sleeping all the time   · Unwillingness or inability to communicate  · Depression  · Unusual sadness, discouragement and loneliness  · Talk of wanting to die  · Neglect of personal appearance   · Rebelliousness- reckless behavior  · Withdrawal from people/activities they love  · Confusion- inability to concentrate     If you or a loved one observes any of these behaviors or has concerns about self-harm, here's what you can do:  · Talk about it- your  feelings and reasons for harming yourself  · Remove any means that you might use to hurt yourself (examples: pills, rope, extension cords, firearm)  · Get professional help from the community (Mental Health, Substance Abuse, psychological counseling)  · Do not be alone:Call your Safe Contact- someone whom you trust who will be there for you.  · Call your local CRISIS HOTLINE 127-2579 or 713-413-4183  · Call your local Children's Mobile Crisis Response Team [place] (668) 952-1719 or wwwTargeter App  · Call the toll free National Suicide Prevention Hotlines   · National Suicide Prevention Lifeline 034-287-XASW (2906)  · National Hope Line Network 800-SUICIDE (709-5360)    Embolectomy and Thrombectomy, Care After  Refer to this sheet in the next few weeks. These discharge instructions provide you with general information on caring for yourself after you leave the hospital. Your health care provider may also give you specific instructions. Your treatment has been planned according to the most current medical practices available, but unavoidable complications sometimes occur. If you have any problems or questions after discharge, call your health care provider.  HOME CARE INSTRUCTIONS  · You will probably be put on blood thinners. Take them as directed.  · Tell your health care provider about any unusual bruising or bleeding, including nosebleeds, blood in your urine, blood in your stools, or if you are vomiting blood. Blood in the stools may be black and tarry or red.  · Keep your follow-up appointments to check how thin your blood is.  · Change your bandages (dressings) as instructed.  · Do not swim, bathe, or shower except as allowed by your health care provider.  · Do not smoke.  · Do not drink alcohol.  · Do not use any tobacco products including cigarettes, chewing tobacco, or electronic cigarettes.  SEEK IMMEDIATE MEDICAL CARE IF:  · You have bleeding from the surgical site.  · You have a sudden pain in  the foot, leg, hand, or arm.  · You have sudden abdominal pain.  · You have a sudden facial droop, weakness on one side of your body, or trouble speaking.  · You have bloody stools.  · You vomit blood.  · You suddenly feel short of breath, weak, or dizzy.  · You have chest pain.  · You have drainage, redness, swelling, or pain at the surgery site.  · You have a fever.  MAKE SURE YOU:  · Understand these instructions.  · Will watch your condition.  · Will get help right away if you are not doing well or get worse.     This information is not intended to replace advice given to you by your health care provider. Make sure you discuss any questions you have with your health care provider.     Document Released: 04/13/2012 Document Revised: 01/08/2016 Document Reviewed: 04/13/2012  [employer] Interactive Patient Education ©2016 [employer]

## 2018-12-28 NOTE — DISCHARGE PLANNING
Received Transport Form @ 7707  Spoke to Phoebe @ [facility]    Transport is scheduled for 12/28 @1700 going to [facility].    ELIAN Lopez @ [facility] notified.

## 2018-12-28 NOTE — PROGRESS NOTES
Vascular    Doing well  To [facility] today on coumadin  Prevenas removed, bandages placed, the bandages can be removed in 1-2 days and the incisions can stay open to air.  Follow up in my clinic on Wednesday 1/4 or Friday 1/6    Garett Kohli MD  [facility] (General and Vascular Surgery)  Cell: [phone] (text or call is fine, if you don't reach me please try my office)  Office: [phone]  __________________________________________________________________  Patient:Radha Verduzco   MRN:[id number]   CSN:[id number]    12/28/2018    2:00 PM

## 2018-12-28 NOTE — PROGRESS NOTES
"Assumed care of patient from night shift RN.  Patient is alert and oriented times 4, denies pain at this time.  VSS /53   Pulse 76   Temp 36.3 °C (97.3 °F) (Temporal)   Resp 18   Ht 1.6 m (5' 3\")   Wt 77.4 kg (170 lb 10.2 oz)   SpO2 96%   Breastfeeding? No   BMI 30.23 kg/m²   PIV in the R hand, patent and saline locked.  On RA with saturations in the mid 90s.   in use.  Last BM 12/27, urinating without difficulty.  Diabetic diet, tolerating well.  Bilateral prevena wound vacs to the groin.  Significant bruising noted to the groin and perineum and bilateral legs.  Patient is a standby assist, demonstrates steady gait, minimal assistance needed.  POC discussed for the day, including possible discharge to rehab facility.  Bed is locked and in the lowest position, call light is within reach.  All needs are met at this time, hourly rounding is in place.  "

## 2018-12-28 NOTE — CARE PLAN
Problem: Safety  Goal: Will remain free from injury  Outcome: PROGRESSING AS EXPECTED  Will educate pt. On need to utilize call light for assistance and before getting up.   Bed locked and in lowest position, Non-slid slippers on.  Call light and personal belongings are within reach    Problem: Mobility  Goal: Risk for activity intolerance will decrease  Outcome: PROGRESSING AS EXPECTED  Will encourage pt. To ambulate as much as tolerated

## 2018-12-28 NOTE — PROGRESS NOTES
Inpatient Anticoagulation Service Note    Date: 2018  Reason for Anticoagulation: Other (Comments) (arterial occlusion)  Target INR: 2.0 to 3.0    Hemoglobin Value: (!) 8  Hematocrit Value: (!) 26.4  Lab Platelet Value: 321    INR from last 7 days     Date/Time INR Value    18 0420 (!)  1.66    18 0443 (!)  1.63    18 0330 (!)  1.44    18 0540 (!)  1.24    18 0300  1.12    18 1400  1.03        Dose from last 7 days     Date/Time Dose (mg)    18 1100  2.5    18 1000  2.5    18 0330  2.5    18 1400  2.5    18 0800  5    18 1100  5        Average Dose (mg):  (new start warfarin)    Significant Interactions: Statin    Date of Last VTE Event: 18    Bridge Therapy: Yes (enoxaparin ~1 mg/kg)  Bridge Therapy Start Date: 18  Days of Overlap Therapy: 5  INR Value Greater than 2 Prior to Discontinuation of Parenteral Anticoagulation: Not Applicable    Reversal Agent Administered: Not Applicable    Comments: No bleeding or embolic complications noted overnight.  No changes in drug interactions.  INR continues to trend up but has slowed.      Plan:  Will continue current dosing of 2.5 mg daily.  Plan for discharge for SNF soon.  Counseling given to pt yesterday.  Pharmacy will continue to monitor until discharge.    Education Material Provided?: Yes (18)    Pharmacist suggested discharge dosin.5 mg daily with f/u INR in 2-3 days after discharge.       Liz Elkins, PharmD

## 2018-12-28 NOTE — PROGRESS NOTES
"Bedside report received.  Assessment complete.  A&O x 4. Patient calls appropriately.  Patient up with standby assist.    Patient has 0/10 pain.   Denies N&V. Tolerating Diabetic diet.  Surgical incisions in bilateral groins with Prevenas in place.  + void, + flatus, - BM.  Patient denies SOB.  Patient pleasant with staff and sitting up in chair with the urge to use the restroom, pt. Walking back and forth from bathroom to chair to \"relieve the urge.\"  Review plan with of care with patient. Call light and personal belongings with in reach. Hourly rounding in place. All needs met at this time.  "

## 2018-12-28 NOTE — DISCHARGE PLANNING
Agency/Facility Name: [facility]   Spoke To: Jessica   Outcome: Bed is available for patient today.     ELIAN Dotson notified.

## 2018-12-29 NOTE — PROGRESS NOTES
Discharging Patient to [facility] per physician order.  Discharged with Luciano rocha with [facility].  Demonstrated understanding of discharge instructions, follow up appointments, home medications, prescriptions, home care for surgical wound, and nursing care instructions for thrombectomy.  Ambulating without assistance, voiding without difficulty, pain well controlled, tolerating oral medications, oxygen saturation greater than 90% , tolerating diet.   Educational handouts given and discussed.  Verbalized understanding of discharge instructions and educational handouts.  All questions answered.  Belongings with patient at time of discharge.    Report called to Radha at [facility].

## 2018-12-29 NOTE — DISCHARGE PLANNING
Anticipated Discharge Disposition: SNF    Action: LSW met with patient at bedside and confirmed her choice as [facility].  LSW confirmed the medical clearance and requested the Discharge Summary.    Per RN, patient is able to transfer via Wheelchair Van, Transportation Form provided to [facility]. Per [facility], the transfer is scheduled for today at 1700. RN aware.    LSW copied patient chart and completed the [facility] Form, patient notified.    Barriers to Discharge: None.    Plan: SNF.

## 2019-01-10 NOTE — OP REPORT
DATE OF SERVICE:  12/19/2018    PREOPERATIVE DIAGNOSIS:  Acute bilateral lower extremity ischemia due to acute   occlusion of the aortobifemoral bypass graft.    POSTOPERATIVE DIAGNOSIS:  Acute bilateral lower extremity ischemia due to acute occlusion of the   aortobifemoral bypass graft.    PROCEDURES PERFORMED:  1.  Bilateral redo common femoral artery exposures.  2.  Darlene catheter thrombectomy of the left aortofemoral bypass and the left   superficial femoral and profunda femoris artery.  3.  Darlene catheter thrombectomy of the right aortofemoral bypass graft and   the right superficial femoral artery and the right profunda femoris artery.    SURGEON:  Garett Kohli MD    ASSISTANT:  EVELYN Awad    ANESTHESIA:  General.    BLOOD LOSS:  500 mL    SPECIMENS:  None.    DISPOSITION:  Patient tolerated the procedure well and was extubated and sent   to recovery in stable condition.    INDICATIONS:  Patient is an 83-year-old female with a history of multiple   vascular procedures including an aortobifemoral bypass (years ago).  She   presented to an outside hospital with acute onset of bilateral leg pain and   the legs were found to be ischemic.  She was transferred to our facility and   we determined that she had acute occlusion of her aortobifemoral bypass.    Based on preoperative imaging, it appeared that the entire bypass was occluded   on both sides.  I therefore had a detailed preoperative discussion with the   patient regarding the nature of the ischemia and what would be required to   restore flow.  I also discussed with her that if we did not proceed with   surgery, she would most certainly have critically low blood flow to her   extremities and ultimately require amputation.  Therefore, I felt that   emergent operative intervention was thus in order to try and restore the flow   down both legs.  We discussed the risks of the operation including but not   limited to bleeding,  infection, injury to vessels or nerves, risks of   anesthesia and global risk such as stroke, heart attack, pulmonary   complications, and even potentially not surviving the operation or the   recovery.  The patient understood this information and agreed to proceed.    Informed consent was obtained.    DESCRIPTION OF PROCEDURE:  The patient was placed supine on the operating   table and general anesthesia was administered.  The abdomen and bilateral   lower extremities were prepped and draped sterile.  Surgical time-out was   called, everyone was in agreement.  Patient received preoperative prophylactic   antibiotics.    The procedure began with infiltration of local anesthetic over both groins and   then vertical incisions were made.  The aortobifemoral limb was dissected out   circumferentially on both sides as well as the native vessels to allow for   proximal and distal control.  The patient was systemically heparinized at the   start of the procedure and this was maintained throughout the entirety of the   procedure.  We then clamped the left common femoral artery and the   aortofemoral bypass and we opened the bypass longitudinally.  We then passed a   Darlene catheter retrograde up the aortobifemoral graft and pulled out a   significant amount of thrombus.  There was a weak inflow at this point.  We   then passed a graft thrombectomy catheter up into the aortobifemoral bypass   and we pulled out a significant amount of debris from the bypass conduit.  At   this point, we had very strong brisk pulsatile inflow.  We then passed the   Darlene catheter down the SFA and profunda femoris arteries and we retrieved a   small amount of thrombus from these arteries as well.  We then flushed and   irrigated the site and we closed the graftotomy with Prolene sutures.  We then   turned our attention to the right groin where we clamped the proximal and   distal vessels and we made a transverse graftotomy.  We passed the  Darlene   catheter retrograde and cleaned out the aortofemoral conduit and we restored   strong pulsatile antegrade inflow through the aortofemoral limb.  We then   passed Darlene catheters distally and we retrieved small amount of thrombus   from the runoff vessels.  The graftotomy was closed with Prolene sutures and   the leg was reperfused.  At this point, we had good flow to both feet.  The   incisions were closed with multiple layers of absorbable suture and staples   for the skin.  Prevena wound VACs were applied.  Patient tolerated the   procedure well.  She was extubated and sent to recovery in stable condition.       ____________________________________     MD JATIN Jacobs / TAMICA    DD:  01/10/2019 01:38:40  DT:  01/10/2019 02:11:15    D#:  [id number]  Job#:  [id number]

## 2019-01-17 ENCOUNTER — OFFICE VISIT (OUTPATIENT)
Dept: MEDICAL GROUP | Facility: PHYSICIAN GROUP | Age: 84
End: 2019-01-17
Payer: MEDICARE

## 2019-01-17 VITALS
OXYGEN SATURATION: 96 % | BODY MASS INDEX: 28.13 KG/M2 | RESPIRATION RATE: 14 BRPM | TEMPERATURE: 97.6 F | SYSTOLIC BLOOD PRESSURE: 124 MMHG | HEIGHT: 61 IN | HEART RATE: 88 BPM | WEIGHT: 149 LBS | DIASTOLIC BLOOD PRESSURE: 84 MMHG

## 2019-01-17 DIAGNOSIS — I73.9 PAD (PERIPHERAL ARTERY DISEASE) (HCC): ICD-10-CM

## 2019-01-17 PROCEDURE — 99213 OFFICE O/P EST LOW 20 MIN: CPT | Performed by: INTERNAL MEDICINE

## 2019-01-17 ASSESSMENT — PATIENT HEALTH QUESTIONNAIRE - PHQ9: CLINICAL INTERPRETATION OF PHQ2 SCORE: 0

## 2019-01-17 NOTE — ASSESSMENT & PLAN NOTE
Radha was recently admitted 12/2018 for acute bilateral limb ischemia due to acute occlusion of her  aortobifemoral bypass graft. She is s/p thrombectomy of bilateral aortofemoral bypass grafts with Dr. Jara on 12/19/2018. She was discharged from acute rehab to home a few days ago. She's been on warfarin but isn't following with coumadin clinic. Was told she would need to remain on coumadin indefinitely by vascular surgery. She follows up with Dr. Jara on 1/23/2019. Denies lower extremity pain and is able to walk and has resumed her baseline activities. The stitch of her left inguinal region popped open and there is a wound there. They saw Dr. Wilburn about a week ago who guided them to keep saline moist gauze there. This wound is also followed by home health three times a week.

## 2019-01-17 NOTE — PROGRESS NOTES
PRIMARY CARE CLINIC FOLLOW UP VISIT  Chief Complaint   Patient presents with   • Other     peripheral arterial disease      History of Present Illness     Radha is here with her daughter for the following:     PAD (peripheral artery disease) (HCC)  Radha was recently admitted 12/2018 for acute bilateral limb ischemia due to acute occlusion of her  aortobifemoral bypass graft. She is s/p thrombectomy of bilateral aortofemoral bypass grafts with Dr. Jara on 12/19/2018. She was discharged from acute rehab to home a few days ago. She's been on warfarin but isn't following with coumadin clinic. Was told she would need to remain on coumadin indefinitely by vascular surgery. She follows up with Dr. Jara on 1/23/2019. Denies lower extremity pain and is able to walk and has resumed her baseline activities. The stitch of her left inguinal region popped open and there is a wound there. They saw Dr. Wilburn about a week ago who guided them to keep saline moist gauze there. This wound is also followed by home health three times a week.     Current Outpatient Prescriptions   Medication Sig Dispense Refill   • warfarin (COUMADIN) 2.5 MG Tab Take 1 Tab by mouth COUMADIN-DAILY. 30 Tab 3   • vitamin D, Ergocalciferol, (DRISDOL) 99004 units Cap capsule Take 1 Cap by mouth every 7 days. 4 Cap 0   • ondansetron (ZOFRAN) 4 MG Tab tablet Take 1 Tab by mouth every four hours as needed for Nausea/Vomiting. 20 Tab 3   • docusate sodium (COLACE) 100 MG Cap Take 1 Cap by mouth 2 times a day. 15 Cap 3   • triamcinolone acetonide (KENALOG) 0.5 % Cream Apply 1 Each to affected area(s) 2 times a day as needed (RASH).     • lisinopril (PRINIVIL, ZESTRIL) 40 MG tablet TAKE ONE TABLET BY MOUTH DAILY 90 Tab 1   • amLODIPine (NORVASC) 5 MG Tab TAKE ONE TABLET BY MOUTH DAILY 90 Tab 1   • rosuvastatin (CRESTOR) 5 MG Tab TAKE ONE TABLET BY MOUTH DAILY 90 Tab 1   • prednisoLONE acetate (PRED FORTE) 1 % Suspension Place 1 Drop in both eyes every  "day.     • glucosamine 500 MG CAPS Take 500 mg by mouth 3 times a day.         No current facility-administered medications for this visit.      Past Medical History:   Diagnosis Date   • Carotid artery disease (HCC) 2010   • CPPD (pseudo-gout) 2011   • Diabetes (HCC) 2018   • GOUT 2011   • History of skin cancer 2016   • Hyperlipidemia 2010   • Hypertension 2010   • PAD (peripheral artery disease) 2010   • Pre-diabetes 2010     Past Surgical History:   Procedure Laterality Date   • THROMBECTOMY  2018    Procedure: THROMBECTOMY;  Surgeon: Garett Kohli M.D.;  Location: SURGERY [facility];  Service: Vascular   • AORTOFEMORAL BYPASS     • CAROTID ENDARTERECTOMY      right   • CATARACT PHACO WITH IOL     • NEUROMA EXCISION      in  from foot   • TOENAIL REMOVAL      in ,      Social History   Substance Use Topics   • Smoking status: Former Smoker     Packs/day: 2.00     Years: 12.00     Types: Cigarettes     Quit date:    • Smokeless tobacco: Never Used   • Alcohol use 0.0 oz/week      Comment: rarely maybe one a year     Social History     Social History Narrative    Lives with her son      Family History   Problem Relation Age of Onset   • Lung Disease Mother         TB   • Stroke Father      Family Status   Relation Status   • Mo    • Fa      Allergies: Anesthetic [benzocaine]; Iodine; and Other drug    ROS  As per HPI above. All other systems reviewed and negative.        Objective   Blood pressure 124/84, pulse 88, temperature 36.4 °C (97.6 °F), resp. rate 14, height 1.549 m (5' 0.98\"), weight 67.6 kg (149 lb), SpO2 96 %, not currently breastfeeding. Body mass index is 28.17 kg/m².    General: alert and oriented, pleasant, cooperative  HEENT: Normocephalic, atraumatic.   Skin: wound opening without erythema or induration of left inguinal region  Extremities: well perfused bilateral lower extremities   Psychiatric: appropriate " mood and affect. Good insight and appropriate judgment     Assessment and Plan   The following treatment plan was discussed     1. PAD (peripheral artery disease) (HCC)  Radha has been doing well since the bilateral thrombectomies of her right and left aortofemoral bypass grafts. She follows up with Dr. aJra next week. She's been home a little less than a week from acute rehab and needs to be set up with coumadin clinic for INR monitoring; discussed this with her and her daughter and placed referral. Home health is following her left inguinal wound and her daughter has been following Dr. Wilburn's recommendations. Counseled on signs and symptoms of wound infection to watch out for.   - REFERRAL TO ANTICOAGULATION MONITORING      Healthcare maintenance     Health Maintenance Due   Topic Date Due   • DIABETES MONOFILAMENT / LE EXAM  1935   • RETINAL SCREENING  [birth year]29/1953   • URINE ACR / MICROALBUMIN  03/29/1953   • IMM HEP B VACCINE (1 of 3 - Risk 3-dose series) [birth year]/29/1955   • IMM DTaP/Tdap/Td Vaccine (1 - Tdap) 03/29/1955   • BONE DENSITY  03/29/2001   • Annual Wellness Visit  12/19/2014       Return if symptoms worsen or fail to improve.    Bong Quick MD  Internal Medicine  [facility]

## 2019-01-18 ENCOUNTER — APPOINTMENT (OUTPATIENT)
Dept: VASCULAR LAB | Facility: MEDICAL CENTER | Age: 84
End: 2019-01-18
Attending: INTERNAL MEDICINE
Payer: MEDICARE

## 2019-01-22 ENCOUNTER — ANTICOAGULATION VISIT (OUTPATIENT)
Dept: MEDICAL GROUP | Facility: PHYSICIAN GROUP | Age: 84
End: 2019-01-22
Payer: MEDICARE

## 2019-01-22 DIAGNOSIS — I73.9 PAD (PERIPHERAL ARTERY DISEASE) (HCC): ICD-10-CM

## 2019-01-22 LAB — INR PPP: 1.2 (ref 2–3.5)

## 2019-01-22 PROCEDURE — 85610 PROTHROMBIN TIME: CPT | Performed by: FAMILY MEDICINE

## 2019-01-22 PROCEDURE — 99211 OFF/OP EST MAY X REQ PHY/QHP: CPT | Performed by: FAMILY MEDICINE

## 2019-01-22 NOTE — PROGRESS NOTES
Anticoagulation Summary  As of 1/22/2019    INR goal:   2.0-3.0   TTR:   --   Today's INR:   1.2!   Warfarin maintenance plan:   2.5 mg (2.5 mg x 1) every day   Weekly warfarin total:   17.5 mg   Plan last modified:   Fred Nayak, PharmD (1/22/2019)   Next INR check:   1/25/2019   Target end date:   6/12/2019    Indications    PAD (peripheral artery disease) (HCC) [I73.9]             Anticoagulation Episode Summary     INR check location:       Preferred lab:       Send INR reminders to:       Comments:         Anticoagulation Care Providers     Provider Role Specialty Phone number    Bong Quick M.D. Referring Internal Medicine [phone]    [facility] Responsible  [phone]        Anticoagulation Patient Findings    Pt is new to warfarin and new to [facility].  Discussed indication for warfarin therapy and INR goal range. Explained our services, hours of operation, warfarin therapy, potential SE, potential DI. Discussed diet at length, with an emphasis on foods rich in vitamin K.  Discussed monitoring parameters, such as blood in urine, blood in stool, discussed what to do if a dose is missed, or suspected as missed.  Emphasized importance of compliance including follow up. Discussed lifestyle choices of ETOH & smoking and its impact on therapy.    Pt is on ASA 81mg.  Patient unclear why she is taking, will contact Dr. Jara office to determine if indication exists.  Can pt be transitioned to DOAC? Did not discuss at today's visit, may be considered off label in her case.  Pt signed new patient contract.  Copy will be scanned into media.      Pt denies any unusual s/s of bleeding, bruising, clotting or any changes to diet or medications.    CHADSVASC = n/a    HASBLED= 3    HTN  Abnormal  Liver (cirrhosis, bilirubin 2x ULN, AST/ALT)    Kidney (SCr >2.6)  Stroke  Bleeding predisposition  Labile INR  Elderly (>65)  Drugs (Nsaid, antiplatelet, alcohol)    Added [facility]  to care team    Patient with hx of PVD, aortic bypass admitted on 12-19-18 with bilateral leg pain.  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   Dr. Kohli performed following procedures:  1.  Bilateral redo common femoral artery exposures.  2.  Darlene catheter thrombectomy of the left aortofemoral bypass and the left   superficial femoral and profunda femoris artery.  3.  Darlene catheter thrombectomy of the right aortofemoral bypass graft and   the right superficial femoral artery and the right profunda femoris artery.    She was initiated on heparin then transitioned to warfarin with lovenox bridge.  Following discharge she was transferred to [facility] where she was cared for until discharge last week.    Hospital follow up with PCP last week resulted in referral to anticoagulation services.    Initial INR is critically subtherapeutic at 1.2.  Will have patient restart lovenox bridge given recent clotting event.  Kidney function supports use of Lovenox 60mg two times daily.  She will also bolus with warfarin 5mg X 3, then back to clinic to recheck INR.    Follow up in 3 days, to reduce risk of adverse events related to this high risk medication,  Warfarin.    Fred Nayak, PharmD

## 2019-01-23 ENCOUNTER — TELEPHONE (OUTPATIENT)
Dept: VASCULAR LAB | Facility: MEDICAL CENTER | Age: 84
End: 2019-01-23

## 2019-01-23 NOTE — TELEPHONE ENCOUNTER
Initial anticoagulation clinic note and most recent discharge summary reviewed.  Patient started on anticoagulation at the request of his vascular surgeon for a thrombosed aortoiliac arterial graft    Pending further recognitions, we will continue with at least 6 months of oral anticoagulation after which time we can check back with the vascular surgeon to see whether or not it should be discontinued.    Patient is currently on concomitant aspirin.  We will check with vascular surgeon as to whether or not that should be continued    Will defer all other vascular care, aside for management of anticoagulation, to vascular surgery    Michael Bloch, MD  Vascular Care    Cc:   FERCHO Kohli

## 2019-01-23 NOTE — TELEPHONE ENCOUNTER
Case discussed with Dr. Kohli who recommends concomitant use of low-dose aspirin and warfarin.  We will defer to that recommendation    Michael J. Bloch, MD  Anticoagulation Center

## 2019-01-24 ENCOUNTER — HOSPITAL ENCOUNTER (INPATIENT)
Facility: MEDICAL CENTER | Age: 84
LOS: 4 days | DRG: 903 | End: 2019-01-28
Attending: SURGERY | Admitting: SURGERY
Payer: MEDICARE

## 2019-01-24 DIAGNOSIS — T81.30XA WOUND DEHISCENCE: Primary | ICD-10-CM

## 2019-01-24 LAB
ANION GAP SERPL CALC-SCNC: 11 MMOL/L (ref 0–11.9)
APTT PPP: 30.5 SEC (ref 24.7–36)
BUN SERPL-MCNC: 37 MG/DL (ref 8–22)
CALCIUM SERPL-MCNC: 9.4 MG/DL (ref 8.5–10.5)
CHLORIDE SERPL-SCNC: 107 MMOL/L (ref 96–112)
CO2 SERPL-SCNC: 18 MMOL/L (ref 20–33)
CREAT SERPL-MCNC: 1.19 MG/DL (ref 0.5–1.4)
ERYTHROCYTE [DISTWIDTH] IN BLOOD BY AUTOMATED COUNT: 64 FL (ref 35.9–50)
GLUCOSE BLD-MCNC: 94 MG/DL (ref 65–99)
GLUCOSE SERPL-MCNC: 97 MG/DL (ref 65–99)
HCT VFR BLD AUTO: 41.3 % (ref 37–47)
HGB BLD-MCNC: 12.5 G/DL (ref 12–16)
INR PPP: 1.2 (ref 0.87–1.13)
MCH RBC QN AUTO: 28.6 PG (ref 27–33)
MCHC RBC AUTO-ENTMCNC: 30.3 G/DL (ref 33.6–35)
MCV RBC AUTO: 94.5 FL (ref 81.4–97.8)
PLATELET # BLD AUTO: 361 K/UL (ref 164–446)
PMV BLD AUTO: 8.6 FL (ref 9–12.9)
POTASSIUM SERPL-SCNC: 4.8 MMOL/L (ref 3.6–5.5)
PROTHROMBIN TIME: 15.3 SEC (ref 12–14.6)
RBC # BLD AUTO: 4.37 M/UL (ref 4.2–5.4)
SODIUM SERPL-SCNC: 136 MMOL/L (ref 135–145)
WBC # BLD AUTO: 10.3 K/UL (ref 4.8–10.8)

## 2019-01-24 PROCEDURE — 700102 HCHG RX REV CODE 250 W/ 637 OVERRIDE(OP): Performed by: SURGERY

## 2019-01-24 PROCEDURE — 80048 BASIC METABOLIC PNL TOTAL CA: CPT

## 2019-01-24 PROCEDURE — 700111 HCHG RX REV CODE 636 W/ 250 OVERRIDE (IP): Performed by: SURGERY

## 2019-01-24 PROCEDURE — 501838 HCHG SUTURE GENERAL: Performed by: SURGERY

## 2019-01-24 PROCEDURE — 160035 HCHG PACU - 1ST 60 MINS PHASE I: Performed by: SURGERY

## 2019-01-24 PROCEDURE — A9270 NON-COVERED ITEM OR SERVICE: HCPCS | Performed by: SURGERY

## 2019-01-24 PROCEDURE — 160009 HCHG ANES TIME/MIN: Performed by: SURGERY

## 2019-01-24 PROCEDURE — 85610 PROTHROMBIN TIME: CPT

## 2019-01-24 PROCEDURE — 160048 HCHG OR STATISTICAL LEVEL 1-5: Performed by: SURGERY

## 2019-01-24 PROCEDURE — A9270 NON-COVERED ITEM OR SERVICE: HCPCS | Performed by: ANESTHESIOLOGY

## 2019-01-24 PROCEDURE — 85730 THROMBOPLASTIN TIME PARTIAL: CPT

## 2019-01-24 PROCEDURE — 0JBC0ZZ EXCISION OF PELVIC REGION SUBCUTANEOUS TISSUE AND FASCIA, OPEN APPROACH: ICD-10-PCS | Performed by: SURGERY

## 2019-01-24 PROCEDURE — 160039 HCHG SURGERY MINUTES - EA ADDL 1 MIN LEVEL 3: Performed by: SURGERY

## 2019-01-24 PROCEDURE — 700101 HCHG RX REV CODE 250

## 2019-01-24 PROCEDURE — 700105 HCHG RX REV CODE 258: Performed by: SURGERY

## 2019-01-24 PROCEDURE — 85027 COMPLETE CBC AUTOMATED: CPT

## 2019-01-24 PROCEDURE — 160002 HCHG RECOVERY MINUTES (STAT): Performed by: SURGERY

## 2019-01-24 PROCEDURE — 82962 GLUCOSE BLOOD TEST: CPT

## 2019-01-24 PROCEDURE — 700101 HCHG RX REV CODE 250: Performed by: SURGERY

## 2019-01-24 PROCEDURE — 770006 HCHG ROOM/CARE - MED/SURG/GYN SEMI*

## 2019-01-24 PROCEDURE — 700111 HCHG RX REV CODE 636 W/ 250 OVERRIDE (IP)

## 2019-01-24 PROCEDURE — 501488 HCHG SUCTION CANN, WOUNDVAC TRAC: Performed by: SURGERY

## 2019-01-24 PROCEDURE — 501445 HCHG STAPLER, SKIN DISP: Performed by: SURGERY

## 2019-01-24 PROCEDURE — 160028 HCHG SURGERY MINUTES - 1ST 30 MINS LEVEL 3: Performed by: SURGERY

## 2019-01-24 PROCEDURE — A6550 NEG PRES WOUND THER DRSG SET: HCPCS | Performed by: SURGERY

## 2019-01-24 PROCEDURE — 160036 HCHG PACU - EA ADDL 30 MINS PHASE I: Performed by: SURGERY

## 2019-01-24 PROCEDURE — 700102 HCHG RX REV CODE 250 W/ 637 OVERRIDE(OP): Performed by: ANESTHESIOLOGY

## 2019-01-24 RX ORDER — SODIUM PHOSPHATE,MONO-DIBASIC 19G-7G/118
500 ENEMA (ML) RECTAL 3 TIMES DAILY
Status: DISCONTINUED | OUTPATIENT
Start: 2019-01-25 | End: 2019-01-28 | Stop reason: HOSPADM

## 2019-01-24 RX ORDER — ACETAMINOPHEN 500 MG
1000 TABLET ORAL ONCE
Status: COMPLETED | OUTPATIENT
Start: 2019-01-24 | End: 2019-01-24

## 2019-01-24 RX ORDER — OXYCODONE HYDROCHLORIDE 5 MG/1
5 TABLET ORAL
Status: DISCONTINUED | OUTPATIENT
Start: 2019-01-24 | End: 2019-01-28 | Stop reason: HOSPADM

## 2019-01-24 RX ORDER — DIPHENHYDRAMINE HYDROCHLORIDE 50 MG/ML
12.5 INJECTION INTRAMUSCULAR; INTRAVENOUS
Status: DISCONTINUED | OUTPATIENT
Start: 2019-01-24 | End: 2019-01-25 | Stop reason: HOSPADM

## 2019-01-24 RX ORDER — ACETAMINOPHEN 325 MG/1
650 TABLET ORAL EVERY 6 HOURS
Status: DISCONTINUED | OUTPATIENT
Start: 2019-01-24 | End: 2019-01-28 | Stop reason: HOSPADM

## 2019-01-24 RX ORDER — HYDROMORPHONE HYDROCHLORIDE 1 MG/ML
0.1 INJECTION, SOLUTION INTRAMUSCULAR; INTRAVENOUS; SUBCUTANEOUS
Status: DISCONTINUED | OUTPATIENT
Start: 2019-01-24 | End: 2019-01-25 | Stop reason: HOSPADM

## 2019-01-24 RX ORDER — AMLODIPINE BESYLATE 5 MG/1
5 TABLET ORAL DAILY
Status: DISCONTINUED | OUTPATIENT
Start: 2019-01-25 | End: 2019-01-28 | Stop reason: HOSPADM

## 2019-01-24 RX ORDER — SCOLOPAMINE TRANSDERMAL SYSTEM 1 MG/1
1 PATCH, EXTENDED RELEASE TRANSDERMAL
Status: DISCONTINUED | OUTPATIENT
Start: 2019-01-24 | End: 2019-01-28 | Stop reason: HOSPADM

## 2019-01-24 RX ORDER — DIPHENHYDRAMINE HYDROCHLORIDE 50 MG/ML
25 INJECTION INTRAMUSCULAR; INTRAVENOUS EVERY 6 HOURS PRN
Status: DISCONTINUED | OUTPATIENT
Start: 2019-01-24 | End: 2019-01-28 | Stop reason: HOSPADM

## 2019-01-24 RX ORDER — HALOPERIDOL 5 MG/ML
1 INJECTION INTRAMUSCULAR
Status: DISCONTINUED | OUTPATIENT
Start: 2019-01-24 | End: 2019-01-25 | Stop reason: HOSPADM

## 2019-01-24 RX ORDER — ONDANSETRON 2 MG/ML
4 INJECTION INTRAMUSCULAR; INTRAVENOUS
Status: DISCONTINUED | OUTPATIENT
Start: 2019-01-24 | End: 2019-01-25 | Stop reason: HOSPADM

## 2019-01-24 RX ORDER — OXYCODONE HCL 5 MG/5 ML
10 SOLUTION, ORAL ORAL
Status: DISCONTINUED | OUTPATIENT
Start: 2019-01-24 | End: 2019-01-25 | Stop reason: HOSPADM

## 2019-01-24 RX ORDER — ONDANSETRON 2 MG/ML
4 INJECTION INTRAMUSCULAR; INTRAVENOUS EVERY 4 HOURS PRN
Status: DISCONTINUED | OUTPATIENT
Start: 2019-01-24 | End: 2019-01-28 | Stop reason: HOSPADM

## 2019-01-24 RX ORDER — SODIUM HYPOCHLORITE 1.25 MG/ML
SOLUTION TOPICAL DAILY
Status: DISCONTINUED | OUTPATIENT
Start: 2019-01-24 | End: 2019-01-25 | Stop reason: HOSPADM

## 2019-01-24 RX ORDER — OXYCODONE HCL 5 MG/5 ML
5 SOLUTION, ORAL ORAL
Status: DISCONTINUED | OUTPATIENT
Start: 2019-01-24 | End: 2019-01-25 | Stop reason: HOSPADM

## 2019-01-24 RX ORDER — MEPERIDINE HYDROCHLORIDE 25 MG/ML
12.5 INJECTION INTRAMUSCULAR; INTRAVENOUS; SUBCUTANEOUS
Status: DISCONTINUED | OUTPATIENT
Start: 2019-01-24 | End: 2019-01-25 | Stop reason: HOSPADM

## 2019-01-24 RX ORDER — KETOROLAC TROMETHAMINE 30 MG/ML
15 INJECTION, SOLUTION INTRAMUSCULAR; INTRAVENOUS EVERY 6 HOURS
Status: DISPENSED | OUTPATIENT
Start: 2019-01-24 | End: 2019-01-27

## 2019-01-24 RX ORDER — TRAZODONE HYDROCHLORIDE 50 MG/1
50 TABLET ORAL NIGHTLY PRN
Status: DISCONTINUED | OUTPATIENT
Start: 2019-01-24 | End: 2019-01-28 | Stop reason: HOSPADM

## 2019-01-24 RX ORDER — LISINOPRIL 20 MG/1
40 TABLET ORAL DAILY
Status: DISCONTINUED | OUTPATIENT
Start: 2019-01-25 | End: 2019-01-28 | Stop reason: HOSPADM

## 2019-01-24 RX ORDER — DOCUSATE SODIUM 100 MG/1
100 CAPSULE, LIQUID FILLED ORAL 2 TIMES DAILY
Status: DISCONTINUED | OUTPATIENT
Start: 2019-01-25 | End: 2019-01-28 | Stop reason: HOSPADM

## 2019-01-24 RX ORDER — SODIUM CHLORIDE, SODIUM LACTATE, POTASSIUM CHLORIDE, CALCIUM CHLORIDE 600; 310; 30; 20 MG/100ML; MG/100ML; MG/100ML; MG/100ML
INJECTION, SOLUTION INTRAVENOUS CONTINUOUS
Status: DISCONTINUED | OUTPATIENT
Start: 2019-01-24 | End: 2019-01-26

## 2019-01-24 RX ORDER — WARFARIN SODIUM 2.5 MG/1
2.5 TABLET ORAL DAILY
COMMUNITY
End: 2019-04-09

## 2019-01-24 RX ORDER — HALOPERIDOL 5 MG/ML
1 INJECTION INTRAMUSCULAR EVERY 6 HOURS PRN
Status: DISCONTINUED | OUTPATIENT
Start: 2019-01-24 | End: 2019-01-28 | Stop reason: HOSPADM

## 2019-01-24 RX ORDER — HYDROMORPHONE HYDROCHLORIDE 1 MG/ML
0.4 INJECTION, SOLUTION INTRAMUSCULAR; INTRAVENOUS; SUBCUTANEOUS
Status: DISCONTINUED | OUTPATIENT
Start: 2019-01-24 | End: 2019-01-25 | Stop reason: HOSPADM

## 2019-01-24 RX ORDER — METOPROLOL TARTRATE 1 MG/ML
1 INJECTION, SOLUTION INTRAVENOUS
Status: DISCONTINUED | OUTPATIENT
Start: 2019-01-24 | End: 2019-01-25 | Stop reason: HOSPADM

## 2019-01-24 RX ORDER — WARFARIN SODIUM 5 MG/1
5 TABLET ORAL
Status: COMPLETED | OUTPATIENT
Start: 2019-01-24 | End: 2019-01-25

## 2019-01-24 RX ORDER — DIPHENHYDRAMINE HCL 25 MG
25 TABLET ORAL EVERY 6 HOURS PRN
Status: DISCONTINUED | OUTPATIENT
Start: 2019-01-24 | End: 2019-01-28 | Stop reason: HOSPADM

## 2019-01-24 RX ORDER — PREDNISOLONE ACETATE 10 MG/ML
1 SUSPENSION/ DROPS OPHTHALMIC DAILY
Status: DISCONTINUED | OUTPATIENT
Start: 2019-01-25 | End: 2019-01-28 | Stop reason: HOSPADM

## 2019-01-24 RX ORDER — KETOROLAC TROMETHAMINE 30 MG/ML
INJECTION, SOLUTION INTRAMUSCULAR; INTRAVENOUS
Status: COMPLETED
Start: 2019-01-24 | End: 2019-01-24

## 2019-01-24 RX ORDER — DEXTROSE MONOHYDRATE, SODIUM CHLORIDE, AND POTASSIUM CHLORIDE 50; 1.49; 4.5 G/1000ML; G/1000ML; G/1000ML
INJECTION, SOLUTION INTRAVENOUS CONTINUOUS
Status: DISCONTINUED | OUTPATIENT
Start: 2019-01-24 | End: 2019-01-28 | Stop reason: HOSPADM

## 2019-01-24 RX ORDER — HYDROMORPHONE HYDROCHLORIDE 1 MG/ML
0.2 INJECTION, SOLUTION INTRAMUSCULAR; INTRAVENOUS; SUBCUTANEOUS
Status: DISCONTINUED | OUTPATIENT
Start: 2019-01-24 | End: 2019-01-25 | Stop reason: HOSPADM

## 2019-01-24 RX ORDER — DEXAMETHASONE SODIUM PHOSPHATE 4 MG/ML
4 INJECTION, SOLUTION INTRA-ARTICULAR; INTRALESIONAL; INTRAMUSCULAR; INTRAVENOUS; SOFT TISSUE
Status: DISCONTINUED | OUTPATIENT
Start: 2019-01-24 | End: 2019-01-28 | Stop reason: HOSPADM

## 2019-01-24 RX ORDER — CALCIUM CARBONATE 500 MG/1
500 TABLET, CHEWABLE ORAL
Status: DISCONTINUED | OUTPATIENT
Start: 2019-01-24 | End: 2019-01-28 | Stop reason: HOSPADM

## 2019-01-24 RX ORDER — HYDRALAZINE HYDROCHLORIDE 20 MG/ML
5 INJECTION INTRAMUSCULAR; INTRAVENOUS
Status: DISCONTINUED | OUTPATIENT
Start: 2019-01-24 | End: 2019-01-25 | Stop reason: HOSPADM

## 2019-01-24 RX ORDER — SODIUM CHLORIDE, SODIUM LACTATE, POTASSIUM CHLORIDE, CALCIUM CHLORIDE 600; 310; 30; 20 MG/100ML; MG/100ML; MG/100ML; MG/100ML
INJECTION, SOLUTION INTRAVENOUS ONCE
Status: COMPLETED | OUTPATIENT
Start: 2019-01-24 | End: 2019-01-24

## 2019-01-24 RX ORDER — ROSUVASTATIN CALCIUM 5 MG/1
5 TABLET, COATED ORAL EVERY EVENING
Status: DISCONTINUED | OUTPATIENT
Start: 2019-01-25 | End: 2019-01-28 | Stop reason: HOSPADM

## 2019-01-24 RX ORDER — LIDOCAINE HYDROCHLORIDE 10 MG/ML
INJECTION, SOLUTION INFILTRATION; PERINEURAL
Status: COMPLETED
Start: 2019-01-24 | End: 2019-01-24

## 2019-01-24 RX ORDER — OXYCODONE HYDROCHLORIDE 5 MG/1
2.5 TABLET ORAL
Status: DISCONTINUED | OUTPATIENT
Start: 2019-01-24 | End: 2019-01-28 | Stop reason: HOSPADM

## 2019-01-24 RX ORDER — RIFAMPIN 600 MG/10ML
600 INJECTION, POWDER, LYOPHILIZED, FOR SOLUTION INTRAVENOUS
Status: DISCONTINUED | OUTPATIENT
Start: 2019-01-24 | End: 2019-01-24

## 2019-01-24 RX ADMIN — SODIUM CHLORIDE, SODIUM LACTATE, POTASSIUM CHLORIDE, CALCIUM CHLORIDE: 600; 310; 30; 20 INJECTION, SOLUTION INTRAVENOUS at 12:29

## 2019-01-24 RX ADMIN — ACETAMINOPHEN 1000 MG: 500 TABLET, FILM COATED ORAL at 13:02

## 2019-01-24 RX ADMIN — Medication 0.5 ML: at 12:29

## 2019-01-24 RX ADMIN — LIDOCAINE HYDROCHLORIDE 0.5 ML: 10 INJECTION, SOLUTION INFILTRATION; PERINEURAL at 12:29

## 2019-01-24 RX ADMIN — PIPERACILLIN SODIUM AND TAZOBACTAM SODIUM 3.38 G: 3; .375 INJECTION, POWDER, FOR SOLUTION INTRAVENOUS at 18:31

## 2019-01-24 RX ADMIN — KETOROLAC TROMETHAMINE 15 MG: 30 INJECTION, SOLUTION INTRAMUSCULAR at 23:29

## 2019-01-24 RX ADMIN — VANCOMYCIN HYDROCHLORIDE 1700 MG: 100 INJECTION, POWDER, LYOPHILIZED, FOR SOLUTION INTRAVENOUS at 20:05

## 2019-01-24 RX ADMIN — KETOROLAC TROMETHAMINE 15 MG: 30 INJECTION, SOLUTION INTRAMUSCULAR; INTRAVENOUS at 23:29

## 2019-01-24 NOTE — DISCHARGE PLANNING
Spoke to Caity at [facility] re: wound vac. She is reaching out to the office for authorization and wound vac dressing change referral.

## 2019-01-24 NOTE — PROGRESS NOTES
Med rec complete per pt at bedside  Interviewed pt with family at bedside with permission from pt  Allergies reviewed and updated.  Per  Anticoag calendar Pt was on 5 mg 3 days prior to surgery [facility] and pt  She only taking 2.5 mg

## 2019-01-24 NOTE — DISCHARGE PLANNING
Spoke to Dede Sandra RN at [facility]. She has not arranged wound vac dressing changes because Dr. Kohli will be admitting her.

## 2019-01-25 ENCOUNTER — APPOINTMENT (OUTPATIENT)
Dept: RADIOLOGY | Facility: MEDICAL CENTER | Age: 84
DRG: 903 | End: 2019-01-25
Attending: SURGERY
Payer: MEDICARE

## 2019-01-25 ENCOUNTER — HOME HEALTH ADMISSION (OUTPATIENT)
Dept: HOME HEALTH SERVICES | Facility: HOME HEALTHCARE | Age: 84
End: 2019-01-25
Payer: MEDICARE

## 2019-01-25 LAB
INR PPP: 1.17 (ref 0.87–1.13)
PROTHROMBIN TIME: 15 SEC (ref 12–14.6)

## 2019-01-25 PROCEDURE — 700101 HCHG RX REV CODE 250: Performed by: SURGERY

## 2019-01-25 PROCEDURE — A9270 NON-COVERED ITEM OR SERVICE: HCPCS | Performed by: SURGERY

## 2019-01-25 PROCEDURE — 36415 COLL VENOUS BLD VENIPUNCTURE: CPT

## 2019-01-25 PROCEDURE — 700105 HCHG RX REV CODE 258: Performed by: SURGERY

## 2019-01-25 PROCEDURE — 700105 HCHG RX REV CODE 258

## 2019-01-25 PROCEDURE — 85610 PROTHROMBIN TIME: CPT

## 2019-01-25 PROCEDURE — 770006 HCHG ROOM/CARE - MED/SURG/GYN SEMI*

## 2019-01-25 PROCEDURE — 700102 HCHG RX REV CODE 250 W/ 637 OVERRIDE(OP): Performed by: SURGERY

## 2019-01-25 PROCEDURE — 700111 HCHG RX REV CODE 636 W/ 250 OVERRIDE (IP): Performed by: SURGERY

## 2019-01-25 PROCEDURE — 700112 HCHG RX REV CODE 229: Performed by: SURGERY

## 2019-01-25 PROCEDURE — C1751 CATH, INF, PER/CENT/MIDLINE: HCPCS

## 2019-01-25 RX ORDER — WARFARIN SODIUM 5 MG/1
5 TABLET ORAL
Status: DISCONTINUED | OUTPATIENT
Start: 2019-01-25 | End: 2019-01-28 | Stop reason: HOSPADM

## 2019-01-25 RX ORDER — SODIUM CHLORIDE 9 MG/ML
INJECTION, SOLUTION INTRAVENOUS
Status: COMPLETED
Start: 2019-01-25 | End: 2019-01-25

## 2019-01-25 RX ADMIN — ACETAMINOPHEN 650 MG: 325 TABLET, FILM COATED ORAL at 12:14

## 2019-01-25 RX ADMIN — Medication 500 MG: at 12:14

## 2019-01-25 RX ADMIN — ROSUVASTATIN CALCIUM 5 MG: 5 TABLET, FILM COATED ORAL at 17:01

## 2019-01-25 RX ADMIN — ROSUVASTATIN CALCIUM 5 MG: 5 TABLET, FILM COATED ORAL at 01:17

## 2019-01-25 RX ADMIN — WARFARIN SODIUM 5 MG: 5 TABLET ORAL at 02:47

## 2019-01-25 RX ADMIN — PIPERACILLIN AND TAZOBACTAM 3.38 G: 3; .375 INJECTION, POWDER, LYOPHILIZED, FOR SOLUTION INTRAVENOUS; PARENTERAL at 09:04

## 2019-01-25 RX ADMIN — DOCUSATE SODIUM 100 MG: 100 CAPSULE, LIQUID FILLED ORAL at 05:03

## 2019-01-25 RX ADMIN — VANCOMYCIN HYDROCHLORIDE 1000 MG: 100 INJECTION, POWDER, LYOPHILIZED, FOR SOLUTION INTRAVENOUS at 18:48

## 2019-01-25 RX ADMIN — SODIUM CHLORIDE 500 ML: 9 INJECTION, SOLUTION INTRAVENOUS at 02:49

## 2019-01-25 RX ADMIN — WARFARIN SODIUM 5 MG: 5 TABLET ORAL at 17:01

## 2019-01-25 RX ADMIN — PREDNISOLONE ACETATE 1 DROP: 10 SUSPENSION/ DROPS OPHTHALMIC at 01:17

## 2019-01-25 RX ADMIN — ACETAMINOPHEN 650 MG: 325 TABLET, FILM COATED ORAL at 00:29

## 2019-01-25 RX ADMIN — AMLODIPINE BESYLATE 5 MG: 5 TABLET ORAL at 01:17

## 2019-01-25 RX ADMIN — KETOROLAC TROMETHAMINE 15 MG: 30 INJECTION, SOLUTION INTRAMUSCULAR; INTRAVENOUS at 12:14

## 2019-01-25 RX ADMIN — DOCUSATE SODIUM 100 MG: 100 CAPSULE, LIQUID FILLED ORAL at 17:01

## 2019-01-25 RX ADMIN — LISINOPRIL 40 MG: 20 TABLET ORAL at 05:03

## 2019-01-25 RX ADMIN — OXYCODONE HYDROCHLORIDE 5 MG: 5 TABLET ORAL at 03:25

## 2019-01-25 RX ADMIN — Medication 500 MG: at 05:03

## 2019-01-25 RX ADMIN — ACETAMINOPHEN 650 MG: 325 TABLET, FILM COATED ORAL at 05:03

## 2019-01-25 RX ADMIN — PIPERACILLIN AND TAZOBACTAM 3.38 G: 3; .375 INJECTION, POWDER, LYOPHILIZED, FOR SOLUTION INTRAVENOUS; PARENTERAL at 21:29

## 2019-01-25 RX ADMIN — PIPERACILLIN SODIUM AND TAZOBACTAM SODIUM 3.38 G: 3; .375 INJECTION, POWDER, FOR SOLUTION INTRAVENOUS at 02:47

## 2019-01-25 RX ADMIN — PREDNISOLONE ACETATE 1 DROP: 10 SUSPENSION/ DROPS OPHTHALMIC at 17:02

## 2019-01-25 RX ADMIN — POTASSIUM CHLORIDE, DEXTROSE MONOHYDRATE AND SODIUM CHLORIDE: 150; 5; 450 INJECTION, SOLUTION INTRAVENOUS at 00:29

## 2019-01-25 RX ADMIN — AMLODIPINE BESYLATE 5 MG: 5 TABLET ORAL at 17:04

## 2019-01-25 RX ADMIN — Medication 500 MG: at 17:01

## 2019-01-25 ASSESSMENT — COGNITIVE AND FUNCTIONAL STATUS - GENERAL
MOBILITY SCORE: 18
WALKING IN HOSPITAL ROOM: A LITTLE
DAILY ACTIVITIY SCORE: 23
TURNING FROM BACK TO SIDE WHILE IN FLAT BAD: A LITTLE
CLIMB 3 TO 5 STEPS WITH RAILING: A LITTLE
MOVING FROM LYING ON BACK TO SITTING ON SIDE OF FLAT BED: A LITTLE
SUGGESTED CMS G CODE MODIFIER DAILY ACTIVITY: CI
TOILETING: A LITTLE
STANDING UP FROM CHAIR USING ARMS: A LITTLE
MOVING TO AND FROM BED TO CHAIR: A LITTLE
SUGGESTED CMS G CODE MODIFIER MOBILITY: CK

## 2019-01-25 ASSESSMENT — PATIENT HEALTH QUESTIONNAIRE - PHQ9
SUM OF ALL RESPONSES TO PHQ9 QUESTIONS 1 AND 2: 0
2. FEELING DOWN, DEPRESSED, IRRITABLE, OR HOPELESS: NOT AT ALL
1. LITTLE INTEREST OR PLEASURE IN DOING THINGS: NOT AT ALL

## 2019-01-25 ASSESSMENT — LIFESTYLE VARIABLES: EVER_SMOKED: YES

## 2019-01-25 ASSESSMENT — COPD QUESTIONNAIRES
HAVE YOU SMOKED AT LEAST 100 CIGARETTES IN YOUR ENTIRE LIFE: YES
IN THE PAST 12 MONTHS DO YOU DO LESS THAN YOU USED TO BECAUSE OF YOUR BREATHING PROBLEMS: DISAGREE/UNSURE
DO YOU EVER COUGH UP ANY MUCUS OR PHLEGM?: YES, EVERY DAY
COPD SCREENING SCORE: 6
DURING THE PAST 4 WEEKS HOW MUCH DID YOU FEEL SHORT OF BREATH: NONE/LITTLE OF THE TIME

## 2019-01-25 NOTE — DISCHARGE PLANNING
Received Choice form at 1200  Agency/Facility Name: [facility]  Referral sent per Choice form at 1205

## 2019-01-25 NOTE — PROGRESS NOTES
"Pharmacy Kinetics 83 y.o. female on vancomycin day # 2 2019    Currently on Vancomycin 1000 mg iv q24hr    Indication for Treatment: SSTI, left groin wound dehiscence      Pertinent history per medical record: Admitted on 2019 for surgical debridement of left groin wound.  Patient had previously undergone thrombectomy of bilateral aortofemoral artery bypass and the superficial femoral and profundus femoris artery.  Left incision is slightly open and draining serosanguineous output per provider's note.  Patient is now s/p I&D with wound vac placement.  Vancomycin and zosyn ordered post-op.     Other antibiotics: zosyn 3.375 g IV q8 hrs     Allergies: Anesthetic [benzocaine]; Iodine; and Other drug      List concerns for renal function: age, SCr 1.19, BUN/SCr ratio > 20:1, CKD stage 3, DM, Vanco+zosyn+ketorolac.     Pertinent cultures to date:   None     MRSA nares swab if pneumonia is a concern: n/a    Recent Labs      19   1219   WBC  10.3     Recent Labs      19   1219   BUN  37*   CREATININE  1.19     Blood pressure 103/42, pulse 73, temperature 36.4 °C (97.6 °F), temperature source Temporal, resp. rate 17, height 1.6 m (5' 3\"), weight 66.7 kg (147 lb 0.8 oz), SpO2 93 %, not currently breastfeeding. Temp (24hrs), Av.4 °C (97.6 °F), Min:36.2 °C (97.2 °F), Max:36.7 °C (98 °F)      A/P   1. Vancomycin dose change: not indicated at this time  2. Next vancomycin level:  @ 1830 (prior to 4th total dose - not ordered)  3. Goal trough: 12-16 mcg/mL  4. Comments: dosing with vancomycin continues. No current cultures to help guide therapy. SCr noted to be slightly elevated on admission, but no repeat labs ordered. Will order BMP for tomorrow. Unclear duration at this point. No changes to current dose, will follow.    Nidia Bello, PharmD    "

## 2019-01-25 NOTE — PROGRESS NOTES
Dr. Kohli paged for orders for midline due to lack of IV access for pt and continuous ABX therapy. Orders received for midline

## 2019-01-25 NOTE — OR NURSING
PT resting quietly at present time, VSS.  Oral airway d/c at 1733, remains on Oxymask 4L O2 w/ sats 99%.  PT w/ L hip wound vac intact, to negative pressure.  PT denies nausea or pain at present time.  Attempted to call DTR, no answer and not in waiting room, will attempt to contact again.

## 2019-01-25 NOTE — PROCEDURES
Vascular Access Team    Date of Insertion: 1/25/19  Arm Circumference: n/a  Line Length: 8  Line Size: 20  Vein Occupancy %: 45  Reason for Midline: access  Labs: on 1/24/19 WBC 10.3, , BUN 37, Cr 1.19, GFR 43, INR 1.17 on 1/25/19    Orders confirmed, vessel patency confirmed with ultrasound. Risks and benefits of procedure explained to patient and education regarding line associated bloodstream infections provided. Questions answered.     PowerGlide Midline placed in R forearm per MD order with ultrasound guidance. 20g, 8 cm line placed in forearm vein after 2 attempt(s).  Catheter inserted with good blood return. Secured with 0cm external from insertion site.  Flushed without resistance with 10 mL 0.9% normal saline. Midline secured with Biopatch and Tegaderm.     Midline placement is confirmed by nurse using ultrasound and ability to flush and draw blood. Midline is appropriate for use at this time.  No X-ray is needed for placement confirmation. Pt tolerated procedure well.  Patient condition relayed to unit RN or ordering physician via this post procedure note in the EMR.    Ultrasound images uploaded to PACS and viewable in the EMR - yes  Ultrasound imaged printed and placed in paper chart - no      BARD PowerGlide Midline ref # [id number], Lot # [id number]

## 2019-01-25 NOTE — PROGRESS NOTES
Inpatient Anticoagulation Service Note    Date: 1/24/2019  Reason for Anticoagulation: Deep Vein Thrombosis, Other (Comments) (Hx occlusion of the aortobifemoral bypass graft. )        Hemoglobin Value: 12.5  Hematocrit Value: 41.3  Lab Platelet Value: 361  Target INR: 2.0 to 3.0    INR from last 7 days     Date/Time INR Value    01/24/19 1219 (!)  1.2        Dose from last 7 days     Date/Time Dose (mg)    01/24/19 1800  5        Average Dose (mg): 2.5  Significant Interactions: Antibiotics, NSAID, Statin  Bridge Therapy: No  Reversal Agent Administered: Not Applicable    Comments: Patient to resume warfarin s/p I&D of groin wound.  Patient is established with [facility] anticoagulation clinic for warfarin management for thrombosed aortoiliac arterial graft, s/p thrombectomy.  Last warfarin dose of 2.5 mg taken yesterday per med rec.  Per anticoag note dose was to be increased to 5 mg with INR follow up.  Patient was also placed on lovenox 60 mg subQ q12 hrs prior to admit, last dose 1/23/19 at 0830 per med rec.  Lovenox has not been re-ordered post-op.  INR on admit = 1.2.  Patient is s/p I&D of left groin wound today.  H/H and plt WNL.  No notes of overt bleeding per chart. Will schedule warfarin 5 mg x1 today.  INR daily x5 per protocol.  Daily dosing will be scheduled based on INR trending.  Pharmacy will continue to follow.    Plan:  Warfarin 5 mg x1 today.  Subsequent dosing to be determined based on INR trending.  INR daily x5 scheduled.   Education Material Provided?: No (Chronic anticoagulation therapy)  Pharmacist suggested discharge dosing: Warfarin dose to be determined.  INR follow up within 3 days of discharge.     Jabier AbbottD

## 2019-01-25 NOTE — PROGRESS NOTES
Report received from RN, assumed care at 0700  Pt is A0X4, and responds appropriately   Pt declines any SOB, chest pain, new onset of numbness/ tingiling  Pt rates pain at 0/10, on a scale of 1-10, pt declines pain or pain medication needs at this time   Pt is voiding adequatly and without hesitancy  Pt has + flatus, + bowel sounds,  BM on 1/23/2019  Pt ambulates with a x1 assist   Pt is tolerating a regular diet, pt denies any nausea/vomiting  Pt has wound vac to left groin, wound vac is intact no leaks noted at this time, setting on wound vac verified       Plan of care discussed, all questions answered. Explained importance of calling before getting OOB and pt verbalizes understanding. Explained importance of oral care. Call light is within reach, treaded slipper socks on, bed in lowest/ locked position, hourly rounding in place, all needs met at this time

## 2019-01-25 NOTE — OR NURSING
PT resting quietly at present time, VSS.  Wound vac intact to L Hip incision area, to negative pressure.  Attempted to contact PT DTR for update, no answer when called and no in waiting area, will attempt to call again.  Continue constant monitoring of PT and offer support as needed.

## 2019-01-25 NOTE — CARE PLAN
Problem: Safety  Goal: Will remain free from falls  Outcome: PROGRESSING AS EXPECTED  Bed locked and in lowest position, side rails up x2, call light within reach, non-slip footwear in place, patient educated not to exit bed without help     Problem: Pain Management  Goal: Pain level will decrease to patient's comfort goal  Outcome: PROGRESSING AS EXPECTED  Assess pain Q2-4 hours, administer pain medication when indicated, encourage patient to voice pain

## 2019-01-25 NOTE — PROGRESS NOTES
2 RN skin check completed     All bony prominences intact.     Left groin wound vac in place. Wound vac functioning correctly at 125.    Sacrum red with blanching    Feet dry and flaky BL.

## 2019-01-25 NOTE — FACE TO FACE
Face to Face Note  -  Durable Medical Equipment    Garett Kohli M.D. - NPI: [id number]  I certify that this patient is under my care and that they have had a durable medical equipment(DME)face to face encounter by myself that meets the physician DME face-to-face encounter requirements with this patient on:    Date of encounter:   Patient:                    MRN:                       YOB: 2018  Radha Verduzco  [id number]  1935     The encounter with the patient was in whole, or in part, for the following medical condition, which is the primary reason for durable medical equipment:  Wound Care    I certify that, based on my findings, the following durable medical equipment is medically necessary:  Wound Vac.    HOME O2 Saturation Measurements:(Values must be present for Home Oxygen orders)         ,     ,         My Clinical findings support the need for the above equipment due to:  Wound infection    Supporting Symptoms: left groin wound dehiscence with nearby prosthetic bypass graft     ------------------------------------------------------------------------------------------------------------------    Face to Face Supporting Documentation - Home Health    The encounter with this patient was in whole or in part the primary reason for home health admission.    Date of encounter:   Patient:                    MRN:                       YOB: 2018  Radha Verduzco  [id number]  1935     Home health to see patient for:  Wound Care    Skilled need for:  Surgical Aftercare left gron wound dehiscence    Skilled nursing interventions to include:  Wound Care    Homebound evidenced status by:  Needs the assistance of another person in order to leave the home. Leaving home must require a considerable and taxing effort. There must exist a normal inability to leave the home.    Community Physician to provide follow up care: Bong Quick M.D.     Optional  Interventions    Wound information & treatment:    Home Infusion Therapy orders:    Line/Drain/Airway:    I certify the face to face encounter for this home care referral meets the CMS requirements and the encounter/clinical assessment with the patient was, in whole, or in part, for the medical condition(s) listed above, which is the primary reason for home health care. Based on my clinical findings: the service(s) are medically necessary, support the need for home health care, and the homebound criteria are met.  I certify that this patient has had a face to face encounter by myself.  Garett Kohli M.D. - NPI: [id number]    *Debility, frailty and advanced age in the absence of an acute deterioration or exacerbation of a condition do not qualify a patient for home health.

## 2019-01-25 NOTE — DISCHARGE PLANNING
Agency/Facility Name: [facility]   Spoke To: Jenn  Outcome: Notified Jenn updated F2F and HH order were in, per RN YONATAN Decker patient will switch to oral ABX and d/c Monday.

## 2019-01-25 NOTE — OR NURSING
Assumed care of pt from TYLER Ritchie. Bedside rpeort received. Pt sleeping at time of handoff. VSS on 2.5L Oxymask. Wound vac in place, suction at 125 continuous. Drsg intact.   ABX infusing.   Will continue to monitor.

## 2019-01-25 NOTE — OR NURSING
Rec'd call back from PT DTR Johnna, updated on PT status, PT continues to deny pain and nausea at present time, tolerating ice chips.

## 2019-01-25 NOTE — PROGRESS NOTES
Report received from PACU RN  Patient arrived to floor via patient transport.  Assessment complete.  A&O x 4. Patient calls appropriately.  Patient used slide board to transfer to bed.    Patient has 0/10 pain. Declines intervention at this time  Denies N&V. Tolerating regular diet.  Left groin wound vac, CDI. Wound vac functioning properly at 125.    void, - flatus, - BM.  Patient denies SOB.  SCD's on.    Review plan with of care with patient. Call light and personal belongings with in reach. Hourly rounding in place. All needs met at this time.

## 2019-01-25 NOTE — PROGRESS NOTES
"Pharmacy Kinetics 83 y.o. female on vancomycin day # 1 2019    Currently on Vancomycin 1700 mg IV (load) followed by vancomycin 1000 mg IV q24 hrs    Indication for Treatment: SSTI, left groin wound dehiscence     Pertinent history per medical record: Admitted on 2019 for surgical debridement of left groin wound.  Patient had previously undergone thrombectomy of bilateral aortofemoral artery bypass and the superficial femoral and profundus femoris artery.  Left incision is slightly open and draining serosanguineous output per provider's note.  Patient is now s/p I&D with wound vac placement.  Vancomycin and zosyn ordered post-op.    Other antibiotics: zosyn 3.375 g IV q8 hrs    Allergies: Anesthetic [benzocaine]; Iodine; and Other drug     List concerns for renal function: age, SCr 1.19, BUN/SCr ratio > 20:1, CKD stage 3, DM, Vanco+zosyn+ketorolac.    Pertinent cultures to date:   pending    MRSA nares swab if pneumonia is a concern (ordered/positive/negative/n-a): n/a    Recent Labs      19   1219   WBC  10.3     Recent Labs      19   1219   BUN  37*   CREATININE  1.19     No results for input(s): VANCOTROUGH, VANCOPEAK, VANCORANDOM in the last 72 hours.No intake or output data in the 24 hours ending 19 1830   Blood pressure 104/46, pulse 75, temperature 36.4 °C (97.6 °F), temperature source Temporal, resp. rate 20, height 1.6 m (5' 3\"), weight 66.7 kg (147 lb 0.8 oz), SpO2 100 %, not currently breastfeeding. Temp (24hrs), Av.4 °C (97.5 °F), Min:36.3 °C (97.4 °F), Max:36.4 °C (97.6 °F)      A/P   1. Vancomycin dose change: new start, no changes  2. Next vancomycin level: to be scheduled by pharmacist on rounds  3. Goal trough: 12-16 mcg/mL  4. Comments: Vancomycin started per protocol.  Verified with PACU RN that vanco had not been given in OR or prior.  Loading dose and maintenance dosing scheduled.  Trough will be scheduled by clinical pharmacist on rounds.  Several risk factors " for diminished vancomycin clearance have been identified above. Pharmacy will continue to follow.    Jaiber AbbottD

## 2019-01-25 NOTE — CARE PLAN
Problem: Communication  Goal: The ability to communicate needs accurately and effectively will improve  Outcome: PROGRESSING AS EXPECTED  Pt updated on POC, all questions answered at this time       Problem: Safety  Goal: Will remain free from falls  Outcome: PROGRESSING AS EXPECTED  Call light is within reach, treaded socks on, bed in lowest position, personal belongings are within reach, all needs met at this time

## 2019-01-25 NOTE — PROGRESS NOTES
Inpatient Anticoagulation Service Note    Date: 1/25/2019  Reason for Anticoagulation: Deep Vein Thrombosis, Other (Comments) (Hx occlusion of the aortobifemoral bypass graft. )        Hemoglobin Value: 12.5  Hematocrit Value: 41.3  Lab Platelet Value: 361  Target INR: 2.0 to 3.0    INR from last 7 days     Date/Time INR Value    01/25/19 0305 (!)  1.17    01/24/19 1219 (!)  1.2        Dose from last 7 days     Date/Time Dose (mg)    01/25/19 1100  5    01/24/19 1800  5        Average Dose (mg): 2.5  Significant Interactions: Antibiotics, NSAID, Statin  Bridge Therapy: No    Reversal Agent Administered: Not Applicable  Comments: Patient received dose of warfarin last night without S/S bleeding noted. No new CBC today. Would recommend re-initiating weight based lovenox in the next 24-48 hours. For now, will continue with 5 mg daily dose. It appears patient will not be therapeutic with 2.5 mg daily. Will continue to follow.    Plan:  5 mg daily  Education Material Provided?: No (Chronic anticoagulation therapy)  Pharmacist suggested discharge dosing: TBD, likely 5 mg daily     Nidia Bello, PHARMD

## 2019-01-25 NOTE — OR NURSING
Pt A&O. VSS on 2 LNC. Pt denies having pain and nausea. wound vac intact.  Report called to TYLER Tee and pt transported to [facility] via [facility]. Dentures in place and belongings on [facility].

## 2019-01-25 NOTE — OR NURSING
PT continues to remain comfortable, turned on to R side for comfort.  Updated PT DTR Johnna on status of bed.  PT also updated on status.

## 2019-01-25 NOTE — DISCHARGE PLANNING
[facility] has received your referral and it is pending review. Per notes patient does have wound vac. If patient is going home with wound vac and there is also talk of IVABX. If they are going home with wound vac and IVABX please update the referral and F2F to reflect both. Also advised CCS Alida that we will not be able to see this patient over the weekend so we will need to know when patient will DC.

## 2019-01-26 LAB
ANION GAP SERPL CALC-SCNC: 8 MMOL/L (ref 0–11.9)
BUN SERPL-MCNC: 32 MG/DL (ref 8–22)
CALCIUM SERPL-MCNC: 8.4 MG/DL (ref 8.5–10.5)
CHLORIDE SERPL-SCNC: 109 MMOL/L (ref 96–112)
CO2 SERPL-SCNC: 21 MMOL/L (ref 20–33)
CREAT SERPL-MCNC: 1.08 MG/DL (ref 0.5–1.4)
GLUCOSE SERPL-MCNC: 100 MG/DL (ref 65–99)
INR PPP: 1.46 (ref 0.87–1.13)
POTASSIUM SERPL-SCNC: 4.4 MMOL/L (ref 3.6–5.5)
PROTHROMBIN TIME: 17.8 SEC (ref 12–14.6)
SODIUM SERPL-SCNC: 138 MMOL/L (ref 135–145)

## 2019-01-26 PROCEDURE — A9270 NON-COVERED ITEM OR SERVICE: HCPCS | Performed by: SURGERY

## 2019-01-26 PROCEDURE — 700102 HCHG RX REV CODE 250 W/ 637 OVERRIDE(OP): Performed by: SURGERY

## 2019-01-26 PROCEDURE — 700111 HCHG RX REV CODE 636 W/ 250 OVERRIDE (IP): Performed by: SURGERY

## 2019-01-26 PROCEDURE — 700112 HCHG RX REV CODE 229: Performed by: SURGERY

## 2019-01-26 PROCEDURE — 85610 PROTHROMBIN TIME: CPT

## 2019-01-26 PROCEDURE — 770006 HCHG ROOM/CARE - MED/SURG/GYN SEMI*

## 2019-01-26 PROCEDURE — 80048 BASIC METABOLIC PNL TOTAL CA: CPT

## 2019-01-26 PROCEDURE — 700105 HCHG RX REV CODE 258: Performed by: SURGERY

## 2019-01-26 RX ADMIN — AMLODIPINE BESYLATE 5 MG: 5 TABLET ORAL at 17:30

## 2019-01-26 RX ADMIN — DOCUSATE SODIUM 100 MG: 100 CAPSULE, LIQUID FILLED ORAL at 05:42

## 2019-01-26 RX ADMIN — Medication 500 MG: at 13:03

## 2019-01-26 RX ADMIN — LISINOPRIL 40 MG: 20 TABLET ORAL at 06:08

## 2019-01-26 RX ADMIN — Medication 500 MG: at 05:42

## 2019-01-26 RX ADMIN — PIPERACILLIN AND TAZOBACTAM 3.38 G: 3; .375 INJECTION, POWDER, LYOPHILIZED, FOR SOLUTION INTRAVENOUS; PARENTERAL at 13:02

## 2019-01-26 RX ADMIN — ROSUVASTATIN CALCIUM 5 MG: 5 TABLET, FILM COATED ORAL at 17:30

## 2019-01-26 RX ADMIN — Medication 500 MG: at 17:31

## 2019-01-26 RX ADMIN — ENOXAPARIN SODIUM 60 MG: 100 INJECTION SUBCUTANEOUS at 17:30

## 2019-01-26 RX ADMIN — PIPERACILLIN AND TAZOBACTAM 3.38 G: 3; .375 INJECTION, POWDER, LYOPHILIZED, FOR SOLUTION INTRAVENOUS; PARENTERAL at 05:42

## 2019-01-26 RX ADMIN — OXYCODONE HYDROCHLORIDE 5 MG: 5 TABLET ORAL at 11:04

## 2019-01-26 RX ADMIN — PIPERACILLIN AND TAZOBACTAM 3.38 G: 3; .375 INJECTION, POWDER, LYOPHILIZED, FOR SOLUTION INTRAVENOUS; PARENTERAL at 21:59

## 2019-01-26 RX ADMIN — DOCUSATE SODIUM 100 MG: 100 CAPSULE, LIQUID FILLED ORAL at 17:30

## 2019-01-26 RX ADMIN — WARFARIN SODIUM 5 MG: 5 TABLET ORAL at 17:30

## 2019-01-26 RX ADMIN — VANCOMYCIN HYDROCHLORIDE 1000 MG: 100 INJECTION, POWDER, LYOPHILIZED, FOR SOLUTION INTRAVENOUS at 19:23

## 2019-01-26 RX ADMIN — PREDNISOLONE ACETATE 1 DROP: 10 SUSPENSION/ DROPS OPHTHALMIC at 17:30

## 2019-01-26 NOTE — PROGRESS NOTES
Vascular    No acute events  Minimal pain  Tolerating PO  AFVSS  Vac CDI with minimal output    A/P)  1/24: left groin wound debridement and vac placement    Vac change tomorrow by wound care  Continue Vanco/Zosyn  Activity as tolerated    Anticipate home Monday with home wound vac and PO antibiotics    Garett Kohli MD  [facility] (General and Vascular Surgery)  Cell: [phone] (text or call is fine, if you don't reach me please try my office)  Office: [phone]  __________________________________________________________________  Patient:Radha Verduzco   MRN:[id number]   CSN:[id number]

## 2019-01-26 NOTE — WOUND TEAM
[facility]  Inpatient Services  Initial Wound and Skin Care Evaluation    Admission Date:  1/24/2019   HPI, PMH, SH: Reviewed  Unit where seen by Wound Team: [id number]    WOUND CONSULT RELATED TO:  VAC change     SUBJECTIVE:  Has not had any pain     Self Report / Pain Level:  Premedicated by RN        OBJECTIVE:      WOUND TYPE, LOCATION, CHARACTERISTICS (Pressure ulcers: location, stage, POA or date identified)        Negative Pressure Wound Therapy Groin Left (Active)   NPWT Pump Mode / Pressure Setting 125 mmHg;Continuous    Dressing Type Small;Black foam    Number of Foam Pieces Used 2    Canister Changed No      Wound 01/24/19 Full Thickness Wound Groin L groin open surgical site (Active)   Wound Image   1/26/2019   Site Assessment Red    Angelica-wound Assessment Intact    Margins Attached edges    Wound Length (cm) 3.5 cm Measured 1/26/2019   Wound Width (cm) 2.5 cm    Wound Depth (cm) 2 cm    Wound Surface Area (cm^2) 8.75 cm^2    Closure None    Drainage Amount Scant    Drainage Description Serosanguineous    Non-staged Wound Description Full thickness    Treatments Cleansed    Cleansing Approved Wound Cleanser    Periwound Protectant Benzoin;Drape    Dressing Options Wound Vac    Dressing Changed Changed    Dressing Change Frequency Monday, Wednesday, Friday    NEXT Dressing Change  01/28/19    NEXT Weekly Photo (Inpatient Only) 01/30/19    WOUND NURSE ONLY - Odor None    WOUND NURSE ONLY - Exposed Structures None    WOUND NURSE ONLY - Tissue Type and Percentage red 100        Lab Values:    WBC:       WBC   Date/Time Value Ref Range Status   01/24/2019 12:19 PM 10.3 4.8 - 10.8 K/uL Final     AIC:      Lab Results   Component Value Date/Time    HBA1C 6.3 (H) 12/23/2018 01:53 AM         Culture:   Completed N/A    INTERVENTIONS BY WOUND TEAM:  Soaked black foam with NS prior to removal. Removed dressing, cleaned with wound cleanser. Benzoin and drape to periwound skin. One black foam into wound  bed, a second button black foam offset for TRAC pad. VAC resumed at 125 mmHg continuous    Dressing selection:  VAC         Interdisciplinary consultation:  RN, patient      EVALUATION:  Removed gauze that surgeon had placed into wound bed during surgery which had been soaked in Dakins. Wound bed clean and red. Patient will go home Monday. Had home VAC in room      Factors affecting wound healing:  Hx fem bypass, with dehiscence.  Goals:  Steady decrease in wound area and depth weekly     NURSING PLAN OF CARE ORDERS (X):    Dressing changes: See Dressing Care orders:   X  Skin care: See Skin Care orders:   Rectal tube care: See Rectal Tube Care orders:   Other orders:    RSKIN: CURRENT (X) ORDERED (O)  Q shift Shemar:  X  Q shift pressure point assessments:  X  Pressure redistribution mattress   X     Waffle overlay  ENRIQUE      Bariatric ENRIQUE      Bariatric foam        Heel float boots       Heels floated on pillows      Barrier wipes      Barrier Cream      Barrier paste      Sacral silicone dressing      Silicone O2 tubing      Anchorfast      Trach with Optifoam split foam       Waffle cushion      Rectal tube or BMS      Antifungal tx    Turn q 2 hours   independent  Up to chair     Ambulate X  PT/OT     Dietician      PO  X   TF   TPN   NPO   # days   Other       WOUND TEAM PLAN OF CARE (X):   NPWT change 3 x week:   X     Dressing changes by wound team:       Follow up as needed:       Other (explain):    Anticipated discharge plans (X):  SNF:           Home Care: X          Outpatient Wound Center:            Self Care:            Other:

## 2019-01-26 NOTE — OP REPORT
Operative Report     Patient:   Radha Verduzco  :    1935  MRN:    [id number]      Date:    2019    Surgeon:   Garett Kohli M.D.     Assistant:    none    Pre-operative Diagnosis:  Dehiscence of left groin incision    Post-operative Diagnosis: same     Procedure:     1) Sharp excisional debridement of skin and subcutaneous tissue, 4 cubic cm of tissue removed  2) Wound vac placement less than 50 sq cm    Anesthesia:   General plus local 0.5% Marcaine with epinephrine    Blood Loss:   Minimal    Findings:     No purulent fluid, nothing to culture  Graft is not exposed, there is a thin layer of tissue covering the graft.    Disposition:   PACU in stable condition      Procedure in detail:  The patient was identified in the pre-operative holding area and brought to the operating room. Pre-operative antibiotics were administered prior to the procedure. General anesthesia was smoothly induced. The patient was prepped and draped in the usual sterile fashion. A surgical time out was called to identify the correct patient, procedure, and equipment.  Everyone was in agreement.    After infiltration of local anesthetic, the wound was sharply debrided using scalpel and scissors.  The tissue removed included skin and subcutaneous tissue.  The wound was carefully examined and it tracked down to the depth of the sartorius fascia however the graft was not exposed.  The wound was pulse irrigated and scrubbed with hydrogen peroxide. Liquid rifampin was poured into the wound to coat the surfaces.  A Dakin's soaked piece of gauze was laid on the wound and then a black foam wound vac was applied.  Sponge and needle counts were correct at the end of the case. The patient was awakened from general anesthetic, and was taken to the recovery room in stable condition.     Garett Kohli MD  General and Vascular Surgery  [facility]  Cell [phone]  Office [phone]

## 2019-01-26 NOTE — DISCHARGE PLANNING
Anticipated Discharge Disposition: Home with wound vac and home health    Action: RN CM met with pt at bedside, provided pt with Home Health Choice and faxed to [facility].  Worked with Roseanna from [facility] to set up wound vac, auth form signed by surgeon and emailed to Roseanna as her fax was not working.  Per surgeon pt will transition to PO abx.    Barriers to Discharge: Acceptance by Home Health and authorization and delivery of wound vac.    Plan: RN CM will handoff to weekend case management for follow up.

## 2019-01-26 NOTE — DISCHARGE PLANNING
Care Transition Team Assessment    RN YONATAN met with pt at bedside, she is fully independent and self drives.  She has the follwing DMe at home, cane, walker and over toilet commode.  She does not use the former 2.      Pt has good family support from her children and lives with her son.  Her daughter from [place] is currently visiting.  Pt's home is one level with 2 steps to access.  Pt states she has an advace directive, encouraged her to have family bring it it.  Pt utilizes the [facility] on [address].    Plan is home with wound vac and home health.    Information Source  Orientation : Oriented x 4  Information Given By: Patient  Who is responsible for making decisions for patient? : Patient         Elopement Risk  Legal Hold: No  Ambulatory or Self Mobile in Wheelchair: Yes  Disoriented: No  Psychiatric Symptoms: None  History of Wandering: No  Elopement this Admit: No  Vocalizing Wanting to Leave: No  Displays Behaviors, Body Language Wanting to Leave: No-Not at Risk for Elopement  Elopement Risk: Not at Risk for Elopement    Interdisciplinary Discharge Planning  Patient or legal guardian wants to designate a caregiver (see row info): No    Discharge Preparedness  What is your plan after discharge?: Home health care  What are your discharge supports?: Child  Prior Functional Level: Ambulatory, Drives Self, Independent with Activities of Daily Living  Difficulity with ADLs: None  Difficulity with IADLs: None    Functional Assesment  Prior Functional Level: Ambulatory, Drives Self, Independent with Activities of Daily Living    Finances  Financial Barriers to Discharge: No  Prescription Coverage: Yes    Vision / Hearing Impairment  Vision Impairment : No  Hearing Impairment : No    Values / Beliefs / Concerns  Values / Beliefs Concerns : No  Spiritual Requests During Hospitalization: No         Domestic Abuse  Have you ever been the victim of abuse or violence?: No  Physical Abuse or Sexual Abuse: No  Verbal  Abuse or Emotional Abuse: No  Possible Abuse Reported to:: Not Applicable         Discharge Risks or Barriers  Discharge risks or barriers?: No  Patient risk factors: Vulnerable adult    Anticipated Discharge Information  Anticipated discharge disposition: [facility]

## 2019-01-26 NOTE — PROGRESS NOTES
Bedside report received.  Assessment complete.  A&O x 4. Patient calls appropriately.  Patient u with standby assist for equipment.   Patient has 0/10 pain.   Denies N&V. Tolerating regular diet.  Wound vac in left groin, dressing clean, dry, and intact. 125 mmHg continuous with no leaks apparent at this time.   + void, + flatus, - BM.  Patient denies SOB.  Patient up to bathroom, then to ambulate the unit.  Review plan with of care with patient. Call light and personal belongings with in reach. Hourly rounding in place. All needs met at this time.

## 2019-01-26 NOTE — CARE PLAN
Problem: Safety  Goal: Will remain free from falls  Outcome: PROGRESSING AS EXPECTED  Will educate pt. On need to utilize call light for assistance and before getting up.   Bed locked and in lowest position, Non-slid slippers on, Call light and belongings are within reach.     Problem: Pain Management  Goal: Pain level will decrease to patient's comfort goal  Outcome: PROGRESSING AS EXPECTED  Will encourage pt to ambulate, Will medicate per MAR, and will provide non-pharmacologic pain relief measures.

## 2019-01-26 NOTE — PROGRESS NOTES
Report received from RN, assumed care at 0700  Pt is A0X4, and responds appropriately   Pt declines any SOB, chest pain, new onset of numbness/ tingiling  Pt rates pain at 0/10, on a scale of 1-10, pt declines pain or pain medication needs at this time   Pt is voiding adequatly and without hesitancy  Pt has + flatus, + bowel sounds,  BM on 1/23/2019  Pt ambulates with a stand by assist   Pt is tolerating a regular diet, pt denies any nausea/vomiting  Pt has wound vac to left groin, wound vac is intact no leaks noted at this time, setting on wound vac verified       Plan of care discussed, all questions answered. Explained importance of calling before getting OOB and pt verbalizes understanding. Explained importance of oral care. Call light is within reach, treaded slipper socks on, bed in lowest/ locked position, hourly rounding in place, all needs met at this time

## 2019-01-26 NOTE — PROGRESS NOTES
"Pharmacy Kinetics 83 y.o. female on vancomycin day # 4 2019    Currently on Vancomycin 1000 mg iv q24hr     Indication for Treatment: SSTI, left groin wound dehiscence      Pertinent history per medical record: Admitted on 2019 for surgical debridement of left groin wound.  Patient had previously undergone thrombectomy of bilateral aortofemoral artery bypass and the superficial femoral and profundus femoris artery.  Left incision is slightly open and draining serosanguineous output per provider's note.  Patient is now s/p I&D with wound vac placement.  Vancomycin and zosyn ordered post-op.  >Repeat wound dehisense performed  with no purulence or signs of infection per surgeon assessment. No cultures obtained     Other antibiotics: zosyn 3.375 g IV q8 hrs     Allergies: Anesthetic [benzocaine]; Iodine; and Other drug      List concerns for renal function: age, SCr 1.19, BUN/SCr ratio > 20:1, CKD stage 3, DM, Vanco+zosyn+ketorolac.     Pertinent cultures to date:   None     MRSA nares swab if pneumonia is a concern: n/a    Recent Labs      19   1219   WBC  10.3     Recent Labs      19   1219  19   0310   BUN  37*  32*   CREATININE  1.19  1.08     No results for input(s): VANCOTROUGH, VANCOPEAK, VANCORANDOM in the last 72 hours.  Intake/Output Summary (Last 24 hours) at 19 1027  Last data filed at 19 0810   Gross per 24 hour   Intake              760 ml   Output             1100 ml   Net             -340 ml      Blood pressure 126/52, pulse 72, temperature 36.2 °C (97.1 °F), temperature source Temporal, resp. rate 15, height 1.6 m (5' 3\"), weight 66.7 kg (147 lb 0.8 oz), SpO2 92 %, not currently breastfeeding. Temp (24hrs), Av.6 °C (97.9 °F), Min:36.2 °C (97.1 °F), Max:37.1 °C (98.7 °F)      A/P   1. Vancomycin dose change: none  2. Next vancomycin level:  at 1830 (ordered)  3. Goal trough: 12- 16 mcg/mL  4. Comments: Post-op vancomycin continues, along with Zosyn.  " Repeat wound dehiscence performed with no s/sx of infection noted and no further cultures obtained.  Plan for transition to PO antibiotic per surgery on Monday.  SCr slightly improved, UOP adequate.  Will order trough, following pharmacist to assess clinical need for trough assessment if plan remains to transition to PO antibiotics on 1/28.     Gonzalo Tamayo, MilenaD, BCPS

## 2019-01-27 LAB
INR PPP: 1.58 (ref 0.87–1.13)
PROTHROMBIN TIME: 18.9 SEC (ref 12–14.6)
VANCOMYCIN TROUGH SERPL-MCNC: 16.7 UG/ML (ref 10–20)

## 2019-01-27 PROCEDURE — 80202 ASSAY OF VANCOMYCIN: CPT

## 2019-01-27 PROCEDURE — 700102 HCHG RX REV CODE 250 W/ 637 OVERRIDE(OP): Performed by: SURGERY

## 2019-01-27 PROCEDURE — 85610 PROTHROMBIN TIME: CPT

## 2019-01-27 PROCEDURE — 770006 HCHG ROOM/CARE - MED/SURG/GYN SEMI*

## 2019-01-27 PROCEDURE — 700111 HCHG RX REV CODE 636 W/ 250 OVERRIDE (IP): Performed by: SURGERY

## 2019-01-27 PROCEDURE — 700105 HCHG RX REV CODE 258: Performed by: SURGERY

## 2019-01-27 PROCEDURE — 700112 HCHG RX REV CODE 229: Performed by: SURGERY

## 2019-01-27 PROCEDURE — 700101 HCHG RX REV CODE 250: Performed by: SURGERY

## 2019-01-27 PROCEDURE — A9270 NON-COVERED ITEM OR SERVICE: HCPCS | Performed by: SURGERY

## 2019-01-27 PROCEDURE — 36415 COLL VENOUS BLD VENIPUNCTURE: CPT

## 2019-01-27 RX ADMIN — ACETAMINOPHEN 650 MG: 325 TABLET, FILM COATED ORAL at 23:51

## 2019-01-27 RX ADMIN — ACETAMINOPHEN 650 MG: 325 TABLET, FILM COATED ORAL at 00:21

## 2019-01-27 RX ADMIN — Medication 500 MG: at 05:40

## 2019-01-27 RX ADMIN — DOCUSATE SODIUM 100 MG: 100 CAPSULE, LIQUID FILLED ORAL at 05:39

## 2019-01-27 RX ADMIN — KETOROLAC TROMETHAMINE 15 MG: 30 INJECTION, SOLUTION INTRAMUSCULAR; INTRAVENOUS at 00:22

## 2019-01-27 RX ADMIN — Medication 500 MG: at 18:16

## 2019-01-27 RX ADMIN — PIPERACILLIN AND TAZOBACTAM 3.38 G: 3; .375 INJECTION, POWDER, LYOPHILIZED, FOR SOLUTION INTRAVENOUS; PARENTERAL at 21:11

## 2019-01-27 RX ADMIN — PIPERACILLIN AND TAZOBACTAM 3.38 G: 3; .375 INJECTION, POWDER, LYOPHILIZED, FOR SOLUTION INTRAVENOUS; PARENTERAL at 12:30

## 2019-01-27 RX ADMIN — DOCUSATE SODIUM 100 MG: 100 CAPSULE, LIQUID FILLED ORAL at 18:16

## 2019-01-27 RX ADMIN — POTASSIUM CHLORIDE, DEXTROSE MONOHYDRATE AND SODIUM CHLORIDE: 150; 5; 450 INJECTION, SOLUTION INTRAVENOUS at 18:18

## 2019-01-27 RX ADMIN — VANCOMYCIN HYDROCHLORIDE 1000 MG: 100 INJECTION, POWDER, LYOPHILIZED, FOR SOLUTION INTRAVENOUS at 19:00

## 2019-01-27 RX ADMIN — Medication 500 MG: at 12:29

## 2019-01-27 RX ADMIN — AMLODIPINE BESYLATE 5 MG: 5 TABLET ORAL at 18:16

## 2019-01-27 RX ADMIN — ACETAMINOPHEN 650 MG: 325 TABLET, FILM COATED ORAL at 05:39

## 2019-01-27 RX ADMIN — ACETAMINOPHEN 650 MG: 325 TABLET, FILM COATED ORAL at 12:29

## 2019-01-27 RX ADMIN — WARFARIN SODIUM 5 MG: 5 TABLET ORAL at 18:16

## 2019-01-27 RX ADMIN — ENOXAPARIN SODIUM 60 MG: 100 INJECTION SUBCUTANEOUS at 05:40

## 2019-01-27 RX ADMIN — LISINOPRIL 40 MG: 20 TABLET ORAL at 05:39

## 2019-01-27 RX ADMIN — PREDNISOLONE ACETATE 1 DROP: 10 SUSPENSION/ DROPS OPHTHALMIC at 18:16

## 2019-01-27 RX ADMIN — ENOXAPARIN SODIUM 60 MG: 100 INJECTION SUBCUTANEOUS at 18:17

## 2019-01-27 RX ADMIN — ROSUVASTATIN CALCIUM 5 MG: 5 TABLET, FILM COATED ORAL at 18:16

## 2019-01-27 RX ADMIN — PIPERACILLIN AND TAZOBACTAM 3.38 G: 3; .375 INJECTION, POWDER, LYOPHILIZED, FOR SOLUTION INTRAVENOUS; PARENTERAL at 05:39

## 2019-01-27 RX ADMIN — ACETAMINOPHEN 650 MG: 325 TABLET, FILM COATED ORAL at 18:15

## 2019-01-27 NOTE — PROGRESS NOTES
Bedside report completed.  A&O x4.  In no acute distress.   VSS.  SpO2 93% on RA.  IVF 75 ml/hr.  Ambulating with SB assistance, steady gait.  Tolerating regular diet, denies N/V.  Denies pain.   Left groin incision with wound vac, CDI.   Last BM pta, + flatus.  + void.  Call light and personal belongings within reach.  SCDs in place.   POC discussed and all questions answered.  Hourly rounding and fall precautions in place.  No additional needs at this time.

## 2019-01-27 NOTE — CARE PLAN
Problem: Venous Thromboembolism (VTW)/Deep Vein Thrombosis (DVT) Prevention:  Goal: Patient will participate in Venous Thrombosis (VTE)/Deep Vein Thrombosis (DVT)Prevention Measures  Patient stated she's ambulated twice during AM shift. Stated that was good. Ambulating to/from bathroom frequently. SCDs worn, but later refused. Patient on Warfarin, monitoring labs. Encouraged increased ambulation. Verbalized understanding.     Problem: Fluid Volume:  Goal: Will maintain balanced intake and output  Outcome: PROGRESSING AS EXPECTED  Monitoring labs. Patient tolerating MIVF. Encouraged continued voiding into the hat to measure accurately and ingesting fluids. Verbalized understanding.

## 2019-01-27 NOTE — PROGRESS NOTES
Report received from RN, assumed care. A&O x 4. VSS. Patient initially denied pain; medicated with scheduled for low back constant ache. Pt denies SOB, nausea, vomiting, new numbness/tingling. Patient ambulated around unit twice SBA steady gait, holding onto IV pole. + void. + flatus. Normoactive BS x 3 quadrants, hypoactive LLQ. Encouraged fluids/increased ambulation. LBM 01/23/19. Patient tolerating diet and MIVF/IV antibiotics. MD aware of midline only access, alternating MIVF and IV antibiotics. CDI dressing to left groin with wound vac present, continuous 125mmHG suction. SCDs on, but refused after wearing for 2 hours. POC discussed. Bed locked/lowest position. Call light within reach. All needs met at present time.

## 2019-01-27 NOTE — PROGRESS NOTES
Inpatient Anticoagulation Service Note    Date: 1/26/2019  Reason for Anticoagulation: Deep Vein Thrombosis, Other (Comments) (Hx occlusion of the aortobifemoral bypass graft. )        Hemoglobin Value: 12.5  Hematocrit Value: 41.3  Lab Platelet Value: 361  Target INR: 2.0 to 3.0    INR from last 7 days     Date/Time INR Value    01/26/19 0310 (!)  1.46    01/25/19 0305 (!)  1.17    01/24/19 1219 (!)  1.2        Dose from last 7 days     Date/Time Dose (mg)    01/26/19 1500  5    01/25/19 1100  5    01/24/19 1800  5        Average Dose (mg): 2.5  Significant Interactions: Antibiotics, NSAID, Statin  Bridge Therapy: Yes  Date of Last VTE Event:  (bilateral aortofemoral bypass grafts placed 12/2018)  Bridge Therapy Start Date: 01/26/19  Days of Overlap Therapy: 0   INR Value Greater than 2 Prior to Discontinuation of Parenteral Anticoagulation: Not Applicable     Reversal Agent Administered: Not Applicable  Comments: Patient tolerating resumption of warfarin s/p left groin wound debridement.  Okay to begin Lovenox 1mg/kg BID per d/w MD Kohli as patient may be at higher thrombogenic risk with recent bypass graft history.  Will continue 5mg tonight, but if INR continues dramatic increase, recommend resumption of home regimen.     Plan:  Warfarin 5mg.  Lovenox 60mg BID.  INR in AM.  Education Material Provided?: No (chronic warfarin patient)  Pharmacist suggested discharge dosing: Resumption of home regimen at 2.5mg daily.     Gonzalo Tamayo

## 2019-01-27 NOTE — PROGRESS NOTES
Vascular Surgery     No complaints   Doing well     Continue IV antibiotics today     Possible discharge tomorrow on oral Abx.     Trent Wilburn MD

## 2019-01-27 NOTE — PROGRESS NOTES
Inpatient Anticoagulation Service Note    Date: 2019  Reason for Anticoagulation: Deep Vein Thrombosis, Other (Comments) (Hx occlusion of the aortobifemoral bypass graft. )        Hemoglobin Value: 12.5  Hematocrit Value: 41.3  Lab Platelet Value: 361  Target INR: 2.0 to 3.0    INR from last 7 days     Date/Time INR Value    19 0212 (!)  1.58    19 0310 (!)  1.46    19 0305 (!)  1.17    19 1219 (!)  1.2        Dose from last 7 days     Date/Time Dose (mg)    19 0900  5    19 1500  5    19 1100  5    19 1800  5        Average Dose (mg):  (home dose: 2.5 mg daily)  Significant Interactions: Antibiotics, NSAID, Statin  Bridge Therapy: Yes  Date of Last VTE Event:  (bilateral aortofemoral bypass grafts placed 2018)  Bridge Therapy Start Date: 19  Days of Overlap Therapy: 1   INR Value Greater than 2 Prior to Discontinuation of Parenteral Anticoagulation: Not Applicable     Reversal Agent Administered: Not Applicable  Comments: INR is starting to trend up appropriately. No overt S/S bleeding noted. Bridge therapy started yesterday - will need to continue until INR > 2. Will continue with 5 mg daily and trend INR.    Plan:  5 mg daily  Education Material Provided?: No (chronic warfarin patient)  Pharmacist suggested discharge dosin mg daily, INR within 48 hours of discharge     Nidia Bello, PHARMD

## 2019-01-27 NOTE — PROGRESS NOTES
"Pharmacy Kinetics 83 y.o. female on vancomycin day # 5 2019    Currently on Vancomycin 1000 mg iv q24hr    Indication for Treatment: SSTI, left groin wound dehiscence      Pertinent history per medical record: Admitted on 2019 for surgical debridement of left groin wound.  Patient had previously undergone thrombectomy of bilateral aortofemoral artery bypass and the superficial femoral and profundus femoris artery.  Left incision is slightly open and draining serosanguineous output per provider's note.  Patient is now s/p I&D with wound vac placement.  Vancomycin and zosyn ordered post-op. Repeat I&D performed  with no purulence or signs of infection per surgeon assessment. No cultures obtained     Other antibiotics: zosyn 3.375 g IV q8 hrs     Allergies: Anesthetic [benzocaine]; Iodine; and Other drug      List concerns for renal function: age, BUN/SCr ratio > 20:1, CKD stage 3, DM, combination vanco + Zosyn      Pertinent cultures to date:   None     MRSA nares swab if pneumonia is a concern: n/a    Recent Labs      19   1219   WBC  10.3     Recent Labs      19   1219  19   0310   BUN  37*  32*   CREATININE  1.19  1.08     Blood pressure 135/53, pulse 67, temperature 36.9 °C (98.4 °F), temperature source Temporal, resp. rate 16, height 1.6 m (5' 3\"), weight 66.7 kg (147 lb 0.8 oz), SpO2 92 %, not currently breastfeeding. Temp (24hrs), Av.7 °C (98.1 °F), Min:36.2 °C (97.1 °F), Max:37.3 °C (99.2 °F)      A/P   1. Vancomycin dose change: not indicated at this time  2. Next vancomycin level: tonight at 1830  3. Goal trough: 12-16 mcg/mL  4. Comments: dosing with vancomycin continues. Per surgeon's note, it appears the patient may discharge tomorrow with PO antibiotics. Will continue with current plan for now, in case patient does not leave tomorrow. Trough before dose this evening. Will continue to follow.    Nidia Bello, PharmD    "

## 2019-01-28 VITALS
HEART RATE: 73 BPM | TEMPERATURE: 97.4 F | DIASTOLIC BLOOD PRESSURE: 67 MMHG | HEIGHT: 63 IN | SYSTOLIC BLOOD PRESSURE: 157 MMHG | WEIGHT: 147.05 LBS | BODY MASS INDEX: 26.05 KG/M2 | OXYGEN SATURATION: 94 % | RESPIRATION RATE: 15 BRPM

## 2019-01-28 LAB
INR PPP: 1.88 (ref 0.87–1.13)
PROTHROMBIN TIME: 21.7 SEC (ref 12–14.6)

## 2019-01-28 PROCEDURE — 700112 HCHG RX REV CODE 229: Performed by: SURGERY

## 2019-01-28 PROCEDURE — A9270 NON-COVERED ITEM OR SERVICE: HCPCS | Performed by: SURGERY

## 2019-01-28 PROCEDURE — 97605 NEG PRS WND THER DME<=50SQCM: CPT

## 2019-01-28 PROCEDURE — 85610 PROTHROMBIN TIME: CPT

## 2019-01-28 PROCEDURE — 700111 HCHG RX REV CODE 636 W/ 250 OVERRIDE (IP): Performed by: SURGERY

## 2019-01-28 PROCEDURE — 700102 HCHG RX REV CODE 250 W/ 637 OVERRIDE(OP): Performed by: SURGERY

## 2019-01-28 PROCEDURE — 700105 HCHG RX REV CODE 258: Performed by: SURGERY

## 2019-01-28 PROCEDURE — 36415 COLL VENOUS BLD VENIPUNCTURE: CPT

## 2019-01-28 RX ORDER — POLYETHYLENE GLYCOL 3350 17 G/17G
1 POWDER, FOR SOLUTION ORAL 3 TIMES DAILY
Status: DISCONTINUED | OUTPATIENT
Start: 2019-01-28 | End: 2019-01-28 | Stop reason: HOSPADM

## 2019-01-28 RX ORDER — SULFAMETHOXAZOLE AND TRIMETHOPRIM 800; 160 MG/1; MG/1
1 TABLET ORAL 2 TIMES DAILY
Qty: 20 TAB | Refills: 0 | Status: SHIPPED | OUTPATIENT
Start: 2019-01-28 | End: 2019-01-28

## 2019-01-28 RX ORDER — DOXYCYCLINE HYCLATE 100 MG/1
100 CAPSULE ORAL 2 TIMES DAILY
Qty: 28 CAP | Refills: 0 | OUTPATIENT
Start: 2019-01-28 | End: 2019-04-09

## 2019-01-28 RX ADMIN — LISINOPRIL 40 MG: 20 TABLET ORAL at 05:35

## 2019-01-28 RX ADMIN — PIPERACILLIN AND TAZOBACTAM 3.38 G: 3; .375 INJECTION, POWDER, LYOPHILIZED, FOR SOLUTION INTRAVENOUS; PARENTERAL at 05:35

## 2019-01-28 RX ADMIN — DOCUSATE SODIUM 100 MG: 100 CAPSULE, LIQUID FILLED ORAL at 05:35

## 2019-01-28 RX ADMIN — ACETAMINOPHEN 650 MG: 325 TABLET, FILM COATED ORAL at 05:35

## 2019-01-28 RX ADMIN — Medication 500 MG: at 05:35

## 2019-01-28 RX ADMIN — POLYETHYLENE GLYCOL 3350 1 PACKET: 17 POWDER, FOR SOLUTION ORAL at 10:30

## 2019-01-28 RX ADMIN — OXYCODONE HYDROCHLORIDE 5 MG: 5 TABLET ORAL at 08:26

## 2019-01-28 RX ADMIN — ENOXAPARIN SODIUM 60 MG: 100 INJECTION SUBCUTANEOUS at 05:36

## 2019-01-28 NOTE — PROGRESS NOTES
Vascular    No acute events  No pain  Vac being changed at present, and wound is clean with good granulation tissue  AFVSS    A/P)  Home today or tomorrow with oral antibiotics (Bactrim DS) and wound vac with home health for wound vac changes.    Garett Kohli MD  [facility] (General and Vascular Surgery)  Cell: [phone] (text or call is fine, if you don't reach me please try my office)  Office: [phone]  __________________________________________________________________  Patient:Radha Sara Verduzco   MRN:[id number]   CSN:[id number]    1/28/2019    9:48 AM

## 2019-01-28 NOTE — DISCHARGE PLANNING
Received Choice form at 3539  Agency/Facility Name: [facility]  Referral sent per Choice form @ [phone]

## 2019-01-28 NOTE — PROGRESS NOTES
Report received from RN, assumed care. A&O x 4. 96% on RA. Patient denied pain. Pt denies SOB, nausea, vomiting, new numbness/tingling. Patient vocalized concern regarding no BM; MD aware. Provided prune juice during day shift. Awaiting results. Hyperactive BS x 3, hypoactive LLQ. + void. + flatus.  Reinforced importance of ambulation/fluids. Patient tolerating diet and MIVF/IV antibiotics. Midline right AC has small blood return, infusing at present. L groin WV in place, CDI dressing. 125mmHG continuous suction. SCDs refused. Verbalized understanding of risks. On Lovenox/Warfarin. POC discussed. Bed locked/lowest position. Call light within reach. All needs met at present time.

## 2019-01-28 NOTE — DISCHARGE PLANNING
ATTN: Case Management  RE: Referral for Home Health    Reason for referral denial: Patient is not home bound please refer to outpatient services              Unfortunately, we are not able to accept this referral for the reason listed above. If further clarity is needed, our Transitional Care Specialists are available to discuss any barriers to service at [phone].      We look forward to collaborating with you in the future,  [facility] Team

## 2019-01-28 NOTE — FACE TO FACE
Face to Face Supporting Documentation - Home Health    The encounter with this patient was in whole or in part the primary reason for home health admission.    Date of encounter:   Patient:                    MRN:                       YOB: 2018  Radha Verduzco  [id number]  1935     Home health to see patient for:  Wound Care    Skilled need for:  Surgical Aftercare left groin wound with vac    Skilled nursing interventions to include:  Wound Care    Homebound status evidenced by:  Need the aid of supportive devices such as crutches, canes, wheelchairs or walkers or Needs the assistance of another person in order to leave the home. Leaving home requires a considerable and taxing effort. There is a normal inability to leave the home.    Community Physician to provide follow up care: Bong Quick M.D.     Optional Interventions? No      I certify the face to face encounter for this home health care referral meets the CMS requirements and the encounter/clinical assessment with the patient was, in whole, or in part, for the medical condition(s) listed above, which is the primary reason for home health care. Based on my clinical findings: the service(s) are medically necessary, support the need for home health care, and the homebound criteria are met.  I certify that this patient has had a face to face encounter by myself.  Garett Kohli M.D. - NPI: [id number]

## 2019-01-28 NOTE — PROGRESS NOTES
Discharging Patient home per physician order.    Discharged with daughter.    Demonstrated understanding of discharge instructions, follow up appointments, home medications, prescriptions, home care for surgical wound and nursing care instructions for wound vac and Home health care.  Ambulating with no assistance, voiding without difficulty, pain well controlled, tolerating oral medications, oxygen saturation greater than 90%, tolerating diet.    Educational handouts given and discussed.    Verbalized understanding of discharge instructions and educational handouts.    All questions answered.    Belongings with patient at time of discharge.

## 2019-01-28 NOTE — PROGRESS NOTES
Post-Surgical Instructions:    1) Wound Care: Home Health services will change your wound vac. If you have any questions or problems with the wound vac please contact the home health nurse or the surgeon's office.    2) Take all of your antibiotics as prescribed. Be sure to complete the entire course of antibiotics.    3) Please call [phone] to schedule an appointment with Dr. Kohli on Wednesday 2/6/18. Call or return sooner if concerns arise.    Office address:  [facility] [address]  [facility] [address]    Graett Kohli MD  General and Vascular Surgery  [phone]

## 2019-01-28 NOTE — DISCHARGE INSTRUCTIONS
Discharge Instructions    Discharged to home by car with relative. Discharged via wheelchair, hospital escort: Yes.  Special equipment needed: Not Applicable    Be sure to schedule a follow-up appointment with your primary care doctor or any specialists as instructed.     Discharge Plan:   Diet Plan: Discussed  Activity Level: Discussed  Confirmed Follow up Appointment: Patient to Call and Schedule Appointment  Confirmed Symptoms Management: Discussed  Medication Reconciliation Updated: Yes  Influenza Vaccine Indication: Not indicated: Previously immunized this influenza season and > 8 years of age    I understand that a diet low in cholesterol, fat, and sodium is recommended for good health. Unless I have been given specific instructions below for another diet, I accept this instruction as my diet prescription.   Other diet: Regular diet as tolerated     Special Instructions:   Post-Surgical Instructions:     1) Wound Care: Home Health services will change your wound vac. If you have any questions or problems with the wound vac please contact the home health nurse or the surgeon's office.     2) Take all of your antibiotics as prescribed. Be sure to complete the entire course of antibiotics.     3) Please call [phone] to schedule an appointment with Dr. Kohli on Wednesday 2/6/18. Call or return sooner if concerns arise.     Office address:  [facility]  [address] [place] [address] [address]  [facility] [address]     Garett Kohli MD  General and Vascular Surgery  [phone]       · Is patient discharged on Warfarin / Coumadin?   No       Vacuum-Assisted Closure Therapy Home Guide  Vacuum-assisted closure therapy (VAC therapy) is a device that helps wounds heal. It is used on wounds that cannot be closed with stitches. They often heal slowly. VAC therapy helps the wound stay clean and healthy while its edges slowly grow back together.  VAC therapy uses a bandage (dressing) that is made of foam. It is put inside the  wound. Then, a drape is placed over the wound. This drape sticks to your skin (adhesive) to keep air out. A tube is hooked up to a small pump and is attached to the drape. The pump sucks fluid and germs from the wound. It can also decrease any bad smell that comes from the wound.  RISKS AND COMPLICATIONS  VAC therapy is usually safe to use at home. Your skin may get sore from the adhesive drape. That is the most common problem. However, more serious problems can develop, such as:   · Bleeding. This can happen if the dressing in the wound comes into contact with blood vessels. A little bleeding may occur when the dressing is being changed. This is normal now and then. Major bleeding can happen if a large blood vessel breaks. This is more likely if you are taking blood-thinning medicine. Emergency surgery may be needed.  · Infection. This can happen if the dressing has an air leak that is not repaired within a couple of hours.  · Dehydration. This can happen if the pump sucks out too much body fluid.  DRESSING CHANGES  Your dressing will have to be changed. Sometimes this is needed once a day. Other times, a dressing change must be done 3 times a week. How often you change your dressing will depend on what your wound is like. A trained caregiver will most likely change the dressing. However, a family member or friend may be trained to change the dressing. Below are steps to change a dressing in order to prevent an infection. The steps apply to you or the person that changes your dressing.  · Wash your hands with soap and water before and after each dressing change.  · Wear gloves and protective clothing. This may include eye protection.  · Do not allow anyone to change your dressing if they have an infection or a skin condition. Even a small cut can be a problem.  To change the dressing:   · Turn off the pump.  · Take off the adhesive drape.  · Disconnect the tube from the dressing.  · Take out the dressing that is  inside the wound. If the dressing sticks, use a germ-free (sterile), saltwater solution to wet the dressing. This helps it come out more easily. If it hurts when the dressing is changed, take pain medicine 30 minutes before the dressing change.  · Cleanse the wound with normal saline or sterile water.  · Apply a skin barrier film to the skin that will be covered with the drape. This will protect the skin.  · Put a new dressing into the wound.  · Apply a new drape and tube.  · Replace the container in the pump that collects fluid if it is full. Do this at least once per week.  · Turn the pump back on.  · Your doctor will decide what setting of suction is best. Do not change the settings on the machine without talking to your nurse or doctor.  HOME CARE INSTRUCTIONS   · The VAC pump has an alarm. It goes off if there are any problems such as a leak.  ¨ Ask your caregiver what to do if the alarm goes off.  ¨ Call your caregiver right away if the alarm goes off and you cannot fix the problem.  · Do not turn off the pump for more than 2 hours.  · Check your wound carefully at each dressing change for signs of infection. Watch for redness, swelling, or any fluid leaking from the wound. If you develop an infection:  ¨ You may have to stop VAC therapy.  ¨ The wound will need to be cleaned and washed out.  ¨ You will have to take antibiotic medicine.  · Ask your caregiver what activities you should or should not do while you are getting VAC therapy. This will depend on your particular wound.  · Ask if it is okay to turn off the pump so you can take a shower. If it is okay, make sure the wound is covered with plastic. The wound area must stay dry.  · Drink enough fluids to keep your urine clear or pale yellow.  · Eat foods that contain a lot of protein. Examples are meat, poultry, seafood, eggs, nuts, beans, and peas. Protein can help your wound heal.  SEEK MEDICAL CARE IF:  · Your wound itches or hurts.  · Dressing changes  are often painful or bleeding often occurs.  · You have a headache.  · You have diarrhea.  · You have a sore throat.  · You have a rash.  · You feel nauseous.  · You feel dizzy or weak.  SEEK IMMEDIATE MEDICAL CARE IF:   · You have very bad pain.  · You have bleeding that will not stop.  · Your wound smells bad.  · You have redness, swelling, or fluid leaking from your wound.  · Your alarm goes off and you do not know what to do.  · You have a fever.  This information is not intended to replace advice given to you by your health care provider. Make sure you discuss any questions you have with your health care provider.  Document Released: 03/11/2013 Document Reviewed: 09/22/2016  [employer] Interactive Patient Education © 2017 [employer]          Vacuum-Assisted Closure Therapy  Vacuum-assisted closure (VAC) therapy uses a device that removes fluid and germs from wounds to help them heal. It is used on wounds that cannot be closed with stitches. They often heal slowly. Vacuum-assisted therapy helps the wound stay clean and healthy while the open wound slowly grows back together.  Vacuum-assisted closure therapy uses a bandage (dressing) that is made of foam. It is put inside the wound. Then, a drape is placed over the wound. This drape sticks to your skin to keep air out, and to protect the wound. A tube is hooked up to a small pump and is attached to the drape. The pump sucks out the fluid and germs. Vacuum-assisted closure therapy can also help reduce the bad smell that comes from the wound.  HOW DOES IT WORK?   The vacuum pump pulls fluid through the foam dressing. The dressing may wrinkle during this process. The fluid goes into the tube and away from the wound. The fluid then goes into a container. The fluid in the container must be replaced if it is full or at least once a week, even if the container is not full. The pulling from the pump helps to close the wound and bring better circulation to the wound  area.  The foam dressing covers and protects the wound. It helps your wound heal faster.   HOW DOES IT FEEL?   · You might feel a little pulling when the pump is on.  · You might also feel a mild vibrating sensation.    · You might feel some discomfort when the dressing is taken off.  CAN I MOVE AROUND WITH VACUUM-ASSISTED CLOSURE THERAPY?  Yes, it has a backup battery which is used when the machine is not plugged in, as long as the battery is working, you can move freely.  WHAT ARE SOME THINGS I MUST KNOW?  · Do not turn off the pump yourself, unless instructed to do so by your healthcare provider, such as for bathing.  · Do not take off the dressing yourself, unless instructed to do so by your caregiver.  · You can wash or shower with the dressing. However, do not take the pump into the shower. Make sure the wound dressing is protected and covered with plastic. The wound area must stay dry.  · Do not turn off the pump for more than 2 hours. If the pump is off for more than 2 hours, your nurse must change your dressing.  · Check frequently that the machine is on, that the machine indicates the therapy is on, and that all clamps are open.  THE ALARM IS SOUNDING! WHAT SHOULD I DO?   · Stay calm.  · Do not turn off the pump or do anything with the dressing.  · Call your clinic or caregiver right away if the alarm goes off and you cannot fix the problem. Some reasons the alarm might go off include:  ¨ The fluid collection container is full.  ¨ The battery is low.  ¨ The dressing has a leak.  · Explain to your caregiver what is happening. Follow the instructions you receive.  WHEN SHOULD I CALL FOR HELP?   · You have severe pain.  · You have difficulty breathing.  · You have bleeding that will not stop.  · Your wound smells bad.  · You have redness, swelling, or fluid leaking from your wound.  · Your alarm goes off and you do not know what to do.  · You have a fever.  · Your wound itches severely.  · Your dressing  changes are often painful or bleeding often occurs.  · You have diarrhea.  · You have a sore throat.  · You have a rash around the dressing or anywhere else on your body.  · You feel nauseous.  · You feel dizzy or weak.  · The VAC machine has been off for more than 2 hours.  HOW DO I GET READY TO GO HOME WITH A PUMP?   A trained caregiver will talk to you and answer your questions about your vacuum-assisted closure therapy before you go home. He or she will explain what to expect. A caregiver will come to your home to apply the pump and care for your wound.  The at-home caregiver will be available for questions and will come back for the scheduled dressing changes, usually every 48-72 hours (or more often for severely infected wounds). Your at-home caregiver will also come if you are having an unexpected problem. If you have questions or do not know what to do when you go home, talk to your healthcare provider.  This information is not intended to replace advice given to you by your health care provider. Make sure you discuss any questions you have with your health care provider.  Document Released: 11/30/2009 Document Revised: 08/20/2014 Document Reviewed: 09/22/2016  [employer] Interactive Patient Education © 2017 [employer]    Depression / Suicide Risk    As you are discharged from this [facility] facility, it is important to learn how to keep safe from harming yourself.    Recognize the warning signs:  · Abrupt changes in personality, positive or negative- including increase in energy   · Giving away possessions  · Change in eating patterns- significant weight changes-  positive or negative  · Change in sleeping patterns- unable to sleep or sleeping all the time   · Unwillingness or inability to communicate  · Depression  · Unusual sadness, discouragement and loneliness  · Talk of wanting to die  · Neglect of personal appearance   · Rebelliousness- reckless behavior  · Withdrawal from people/activities they  love  · Confusion- inability to concentrate     If you or a loved one observes any of these behaviors or has concerns about self-harm, here's what you can do:  · Talk about it- your feelings and reasons for harming yourself  · Remove any means that you might use to hurt yourself (examples: pills, rope, extension cords, firearm)  · Get professional help from the community (Mental Health, Substance Abuse, psychological counseling)  · Do not be alone:Call your Safe Contact- someone whom you trust who will be there for you.  · Call your local CRISIS HOTLINE 489-2052 or 504-815-0019  · Call your local Children's Mobile Crisis Response Team [place] (612) 543-4903 or www.Shippo  · Call the toll free National Suicide Prevention Hotlines   · National Suicide Prevention Lifeline 971-970-MPMF (4078)  · National Hope Line Network 800-SUICIDE (854-1343)

## 2019-01-28 NOTE — DISCHARGE PLANNING
Anticipated Discharge Disposition: Home with HH and [place]    Action: This RN CM spoke with pt at bedside.  Pt has previously been on service with [facility].  Pt chose to continue service with [facility] for [place] Care and dressing changes.    Barriers to Discharge: HH acceptance    Plan: Faxed signed choice form for [facility] to [facility].

## 2019-01-28 NOTE — DISCHARGE PLANNING
Anticipated Discharge Disposition: Home with [place] and HH    Action: The pt has home [facility] [place] at bedside and [facility] accepted for changes.  Pt aware and Bedside RN Aware.    Barriers to Discharge: none    Plan: No dc needs identified at this time

## 2019-01-29 ENCOUNTER — PATIENT OUTREACH (OUTPATIENT)
Dept: HEALTH INFORMATION MANAGEMENT | Facility: OTHER | Age: 84
End: 2019-01-29

## 2019-01-29 NOTE — WOUND TEAM
"[facility]  Inpatient Services  Initial Wound and Skin Care Evaluation    Admission Date:  1/24/2019   HPI, PMH, SH: Reviewed  Unit where seen by Wound Team: [id number]    WOUND CONSULT RELATED TO:  Scheduled NPWT dressing change     SUBJECTIVE:  \"I don't have any pain\"     Self Report / Pain Level:  Premedicated by RN with good effect        OBJECTIVE:  Dressing intact; Dr. Kohli observed wound    WOUND TYPE, LOCATION, CHARACTERISTICS (Pressure ulcers: location, stage, POA or date identified)        Negative Pressure Wound Therapy Groin Left (Active)   NPWT Pump Mode / Pressure Setting Continuous;125 mmHg 1/28/2019  9:50 AM   Dressing Type Small 1/28/2019  9:50 AM   Number of Foam Pieces Used 2 1/28/2019  9:50 AM   Canister Changed No 1/28/2019  9:50 AM   Output (mL) 0 mL 1/26/2019  4:00 PM     Wound 01/24/19 Full Thickness Wound Groin L groin open surgical site (Active)   Wound Image   1/26/2019 12:00 PM   Site Assessment Clean 1/28/2019  9:50 AM   Angelica-wound Assessment Clean;Dry;Intact 1/28/2019  9:50 AM   Margins Attached edges 1/28/2019  9:50 AM   Wound Length (cm) 3.5 cm 1/26/2019 12:00 PM   Wound Width (cm) 2.5 cm 1/26/2019 12:00 PM   Wound Depth (cm) 2 cm 1/26/2019 12:00 PM   Wound Surface Area (cm^2) 8.75 cm^2 1/26/2019 12:00 PM   Tunneling 0 cm 1/28/2019  9:50 AM   Undermining 0 cm 1/28/2019  9:50 AM   Closure Secondary intention 1/28/2019  9:50 AM   Drainage Amount Scant 1/28/2019  9:50 AM   Drainage Description Serosanguineous 1/28/2019  9:50 AM   Non-staged Wound Description Full thickness 1/28/2019  9:50 AM   Treatments Site care;Cleansed 1/28/2019  9:50 AM   Cleansing Approved Wound Cleanser 1/28/2019  9:50 AM   Periwound Protectant Benzoin 1/28/2019  9:50 AM   Dressing Options Wound Vac 1/28/2019  9:50 AM   Dressing Cleansing/Solutions Not Applicable 1/28/2019  9:50 AM   Dressing Changed Changed 1/28/2019  9:50 AM   Dressing Status Intact 1/28/2019  9:50 AM   Dressing Change Frequency " Monday, Wednesday, Friday 1/28/2019  9:50 AM   NEXT Dressing Change  01/30/19 1/28/2019  9:50 AM   NEXT Weekly Photo (Inpatient Only) 01/30/19 1/28/2019  9:50 AM   WOUND NURSE ONLY - Odor None 1/28/2019  9:50 AM   WOUND NURSE ONLY - Exposed Structures None 1/28/2019  9:50 AM   WOUND NURSE ONLY - Tissue Type and Percentage red 100% 1/28/2019  9:50 AM   WOUND NURSE ONLY - Time Spent with Patient (mins) 60 1/28/2019  9:50 AM      Lab Values:    WBC:       WBC   Date/Time Value Ref Range Status   01/24/2019 12:19 PM 10.3 4.8 - 10.8 K/uL Final     AIC:      Lab Results   Component Value Date/Time    HBA1C 6.3 (H) 12/23/2018 01:53 AM         Culture:  NA    INTERVENTIONS BY WOUND TEAM:  Removed old dressing intact and cleansed wound with wound cleanser.  Benzoin to kay wound skin and filled wound with one piece black foam, secured with drape and applied trac pad over black foam button and resumed NPWT at 125mmHg continuously. Total black foam= 2 pieces.  Dr. Kohli observed wound.     Dressing selection:  NPWT        Interdisciplinary consultation:  RN, patient, Dr. Kohli     EVALUATION:  Wound continues to improve with increased granulation tissue      Factors affecting wound healing:  Hx fem bypass, with dehiscence.  Goals:  Steady decrease in wound area and depth weekly     NURSING PLAN OF CARE ORDERS (X):    Dressing changes: See Dressing Care orders:   X  Skin care: See Skin Care orders:   Rectal tube care: See Rectal Tube Care orders:   Other orders:      WOUND TEAM PLAN OF CARE (X):   NPWT change 3 x week:   X     Dressing changes by wound team:       Follow up as needed:       Other (explain):    Anticipated discharge plans (X):  SNF:           Home Care: X          Outpatient Wound Center:            Self Care:            Other:

## 2019-01-30 ENCOUNTER — TELEPHONE (OUTPATIENT)
Dept: MEDICAL GROUP | Facility: PHYSICIAN GROUP | Age: 84
End: 2019-01-30

## 2019-01-30 NOTE — TELEPHONE ENCOUNTER
1. Caller Name: Sandra (RN [facility])                                          Call Back Number: [phone] (home)        Patient approves a detailed voicemail message: N\A    [facility] would like to know if they can draw a PT INR because she was getting them done before her trip to the hospital but now she can't get there. Please advise.

## 2019-01-30 NOTE — TELEPHONE ENCOUNTER
They can draw them but will they be sending the results to anticoagulation clinic for monitoring? The coumadin pharmacist needs to be receiving the results to be able to help dose her coumadin

## 2019-02-01 ENCOUNTER — ANTICOAGULATION MONITORING (OUTPATIENT)
Dept: VASCULAR LAB | Facility: MEDICAL CENTER | Age: 84
End: 2019-02-01

## 2019-02-01 DIAGNOSIS — I73.9 PAD (PERIPHERAL ARTERY DISEASE) (HCC): ICD-10-CM

## 2019-02-01 DIAGNOSIS — Z79.01 CURRENT USE OF LONG TERM ANTICOAGULATION: ICD-10-CM

## 2019-02-01 LAB — INR PPP: 1.8 (ref 2–3.5)

## 2019-02-02 NOTE — PROGRESS NOTES
Anticoagulation Summary  As of 2019    INR goal:   2.0-3.0   TTR:   --   INR used for dosin.8! (2019)   Warfarin maintenance plan:   2.5 mg (2.5 mg x 1) every day   Weekly warfarin total:   17.5 mg   Plan last modified:   Fred Nayak, PharmD (2019)   Next INR check:   2019   Target end date:   2019    Indications    PAD (peripheral artery disease) (HCC) [I73.9]             Anticoagulation Episode Summary     INR check location:       Preferred lab:       Send INR reminders to:       Comments:         Anticoagulation Care Providers     Provider Role Specialty Phone number    Bong Quick M.D. Referring Internal Medicine [phone]    [facility] Anticoagulation Services Responsible  [phone]        Anticoagulation Patient Findings    Spoke with patient's daughter to report a sub therapeutic INR of 1.8.  Will bolus dose with 5mg tonight, then resume current dosing regimen. Follow up in 3 days, to reduce the risk of adverse events related to this high risk medication, warfarin.    Roseline Adan, Clinical Pharmacist

## 2019-02-08 ENCOUNTER — ANTICOAGULATION MONITORING (OUTPATIENT)
Dept: VASCULAR LAB | Facility: MEDICAL CENTER | Age: 84
End: 2019-02-08

## 2019-02-08 DIAGNOSIS — I73.9 PAD (PERIPHERAL ARTERY DISEASE) (HCC): ICD-10-CM

## 2019-02-08 LAB — INR PPP: 1.3 (ref 2–3.5)

## 2019-02-09 NOTE — PROGRESS NOTES
OP Anticoagulation Telephone Note    Date: 2019       Plan:  Spoke with pt on the phone. Pt is subtherapeutic today. Denies any changes in medications or diet. Denies any s/sx of bleeding or clotting. She is unsure if she missed a dose or not.  Will continue warfarin dosing as outlined below and follow-up with pt in 3 days.    FRI: 5MG    Anticoagulation Summary  As of 2019    INR goal:   2.0-3.0   TTR:   0.0 % (1 wk)   INR used for dosin.3! (2019)   Warfarin maintenance plan:   5 mg (2.5 mg x 2) every Mon, Fri; 2.5 mg (2.5 mg x 1) all other days   Weekly warfarin total:   22.5 mg   Plan last modified:   YANG Fang (2019)   Next INR check:   2019   Target end date:   2019    Indications    PAD (peripheral artery disease) (HCC) [I73.9]             Anticoagulation Episode Summary     INR check location:       Preferred lab:       Send INR reminders to:       Comments:         Anticoagulation Care Providers     Provider Role Specialty Phone number    Bong Quick M.D. Referring Internal Medicine [phone]    [facility] Anticoagulation Services Responsible  [phone]            EVELYN Chowdhury  [facility]

## 2019-02-11 ENCOUNTER — ANTICOAGULATION MONITORING (OUTPATIENT)
Dept: VASCULAR LAB | Facility: MEDICAL CENTER | Age: 84
End: 2019-02-11

## 2019-02-11 DIAGNOSIS — I73.9 PAD (PERIPHERAL ARTERY DISEASE) (HCC): ICD-10-CM

## 2019-02-11 DIAGNOSIS — Z79.01 LONG TERM (CURRENT) USE OF ANTICOAGULANTS: ICD-10-CM

## 2019-02-11 LAB — INR PPP: 1.4 (ref 2–3.5)

## 2019-02-11 NOTE — DISCHARGE SUMMARY
DISCHARGE SUMMARY    Radha Verduzco  [id number]  [id number]  _____________________________________    DATE OF ADMISSION: 1/24/2019    DATE OF DISCHARGE: 1/28/2019    ADMISSION DIAGNOSIS (ES):  Left groin wound dehiscence    DISCHARGE DIAGNOSIS (ES):  Left groin wound dehiscence    DISCHARGE CONDITION:  stable    CONSULTATIONS:  none    PROCEDURES:  1/24/2019  debridement of left groin wound and application of wound VAC    HOSPITAL COURSE:  Radha Verduzco is a 83 y.o. 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  The right groin incision healed well but the left groin incision developed a dehiscence with a wound traversing down into the subcutaneous tissue.  There was a concern for possible graft involvement and so the patient was brought to the operating room for exploration, debridement, and wound VAC placement. there was found to be tissue in the deep aspect of the wound covering the graft and there was no direct graft exposure.    Once the wound VAC was applied the patient was admitted to the hospital for IV antibiotic therapy.  We continue the IV antibiotics for 4 days after the surgery and then transition the patient to oral antibiotics.  We also made arrangements for home wound VAC therapy.  Once these arrangements were made the patient was discharged home in stable condition.    MEDICATIONS:  Current Outpatient Prescriptions   Medication Sig Dispense Refill   • doxycycline (VIBRAMYCIN) 100 MG Cap Take 1 Cap by mouth 2 times a day. 28 Cap 0   • warfarin (COUMADIN) 2.5 MG Tab Take 2.5 mg by mouth every day.     • enoxaparin (LOVENOX) 60 MG/0.6ML Solution inj Inject 60 mg as instructed every 12 hours. 10 Syringe 1   • docusate sodium (COLACE) 100 MG Cap Take 1 Cap by mouth 2 times a day. 15 Cap 3   • lisinopril (PRINIVIL, ZESTRIL) 40 MG tablet TAKE ONE TABLET BY MOUTH DAILY 90 Tab 1   • amLODIPine (NORVASC) 5 MG Tab TAKE ONE TABLET BY MOUTH DAILY 90 Tab 1   •  rosuvastatin (CRESTOR) 5 MG Tab TAKE ONE TABLET BY MOUTH DAILY 90 Tab 1   • prednisoLONE acetate (PRED FORTE) 1 % Suspension Place 1 Drop in both eyes every day.     • glucosamine 500 MG CAPS Take 500 mg by mouth 3 times a day.           FOLLOW-UP:  Please call my office at [phone] to make an appointment in 2 week(s)    DISCHARGE INSTRUCTIONS:  Resume preadmission diet.  Light activity for 4 weeks.  OK to shower and get the wound VAC dressing wet.  Call or go to ER if you have chest pain, shortness of breath, lightheadedness, stroke-like symptoms, fever, worsening pain, or any other concerns.    Garett Kohli MD  General and Vascular Surgery  [facility]

## 2019-02-11 NOTE — ADDENDUM NOTE
Encounter addended by: Garett Kohli M.D. on: 2/11/2019 11:14 AM<BR>    Actions taken: Sign clinical note

## 2019-02-12 NOTE — PROGRESS NOTES
OP Telephone Anticoagulation Service Note    Date: 2019      Anticoagulation Summary  As of 2019    INR goal:   2.0-3.0   TTR:   0.0 % (1.4 wk)   INR used for dosin.4! (2019)   Warfarin maintenance plan:   5 mg (2.5 mg x 2) every Mon, Fri; 2.5 mg (2.5 mg x 1) all other days   Weekly warfarin total:   22.5 mg   Plan last modified:   YANG Fang (2019)   Next INR check:   2019   Target end date:   2019    Indications    PAD (peripheral artery disease) (HCC) [I73.9]             Anticoagulation Episode Summary     INR check location:       Preferred lab:       Send INR reminders to:       Comments:         Anticoagulation Care Providers     Provider Role Specialty Phone number    Bong Quick M.D. Referring Internal Medicine [phone]    [facility] Anticoagulation Services Responsible  [phone]        Anticoagulation Patient Findings        Plan: Spoke with patient on the phone. Patient is sub therapeutic today. Confirmed dosing. No missed tablets in the last week. Patient denies any changes in medications or diet. Patient denies any signs or symptoms of bleeding or clotting. Instructed patient to call clinic if any unusual bleeding or bruising occurs. Will increased continue dosing as outlined. Will follow-up with patient in 3 day(s).        Eli Herrera, MilenaD

## 2019-02-14 ENCOUNTER — ANTICOAGULATION MONITORING (OUTPATIENT)
Dept: VASCULAR LAB | Facility: MEDICAL CENTER | Age: 84
End: 2019-02-14

## 2019-02-14 DIAGNOSIS — I73.9 PAD (PERIPHERAL ARTERY DISEASE) (HCC): ICD-10-CM

## 2019-02-14 LAB — INR PPP: 1.4 (ref 2–3.5)

## 2019-02-15 NOTE — PROGRESS NOTES
Anticoagulation Summary  As of 2019    INR goal:   2.0-3.0   TTR:   0.0 % (1.9 wk)   INR used for dosin.4! (2019)   Warfarin maintenance plan:   2.5 mg (2.5 mg x 1) every Mon, Wed, Fri; 5 mg (2.5 mg x 2) all other days   Weekly warfarin total:   27.5 mg   Plan last modified:   Milena TrentD (2019)   Next INR check:   2019   Target end date:   2019    Indications    PAD (peripheral artery disease) (HCC) [I73.9]             Anticoagulation Episode Summary     INR check location:       Preferred lab:       Send INR reminders to:       Comments:   Lakesha MATHIS fax [phone]      Anticoagulation Care Providers     Provider Role Specialty Phone number    Bong Quick M.D. Referring Internal Medicine [phone]    [facility] Anticoagulation Services Responsible  [phone]        Anticoagulation Patient Findings    HPI:  Radha Verduzco, on anticoagulation therapy with warfarin for PAD.   Changes to current medical/health status since last appt: none  Denies signs/symptoms of bleeding and/or thrombosis since the last appt.    Denies any interval changes to diet  Denies any interval changes to medications since last appt.   Denies any complications or cost restrictions with current therapy.     A/P   INR  SUB-therapeutic.   Begin 22% increased regimen.     Next INR in 5 days.     Reginaldo Oquendo, PharmD   Faxed to Lakesha

## 2019-02-18 LAB — INR PPP: 1.6 (ref 2–3.5)

## 2019-02-19 ENCOUNTER — ANTICOAGULATION MONITORING (OUTPATIENT)
Dept: VASCULAR LAB | Facility: MEDICAL CENTER | Age: 84
End: 2019-02-19

## 2019-02-19 DIAGNOSIS — Z79.01 CURRENT USE OF LONG TERM ANTICOAGULATION: ICD-10-CM

## 2019-02-19 DIAGNOSIS — I73.9 PAD (PERIPHERAL ARTERY DISEASE) (HCC): ICD-10-CM

## 2019-02-19 NOTE — PROGRESS NOTES
Anticoagulation Summary  As of 2019    INR goal:   2.0-3.0   TTR:   0.0 % (2.4 wk)   INR used for dosin.6! (2019)   Warfarin maintenance plan:   2.5 mg (2.5 mg x 1) every Mon, Wed, Fri; 5 mg (2.5 mg x 2) all other days   Weekly warfarin total:   27.5 mg   Plan last modified:   Reginaldo Oquendo, PharmD (2019)   Next INR check:   2019   Target end date:   2019    Indications    PAD (peripheral artery disease) (HCC) [I73.9]             Anticoagulation Episode Summary     INR check location:       Preferred lab:       Send INR reminders to:       Comments:   Lakesha MATHIS fax [phone]      Anticoagulation Care Providers     Provider Role Specialty Phone number    Bong Quick M.D. Referring Internal Medicine [phone]    [facility] Anticoagulation Services Responsible  [phone]        Anticoagulation Patient Findings    Spoke with Radha to report a sub therapeutic INR of 1.6.  Will Bolus dose with 7.5mg tonight, then continue current dosing regimen. Follow up in 1 weeks, to reduce the risk of adverse events related to this high risk medication, warfarin.    Roseline Aadn Clinical Pharmacist    New order faxed to [facility] MATHIS

## 2019-02-25 LAB — INR PPP: 2.3 (ref 2–3.5)

## 2019-02-26 ENCOUNTER — ANTICOAGULATION MONITORING (OUTPATIENT)
Dept: VASCULAR LAB | Facility: MEDICAL CENTER | Age: 84
End: 2019-02-26

## 2019-02-26 DIAGNOSIS — I73.9 PAD (PERIPHERAL ARTERY DISEASE) (HCC): ICD-10-CM

## 2019-02-26 NOTE — PROGRESS NOTES
Anticoagulation Summary  As of 2019    INR goal:   2.0-3.0   TTR:   12.5 % (3.4 wk)   INR used for dosin.3 (2019)   Warfarin maintenance plan:   2.5 mg (2.5 mg x 1) every Mon, Wed, Fri; 5 mg (2.5 mg x 2) all other days   Weekly warfarin total:   27.5 mg   Plan last modified:   Reginaldo Oquendo, PharmD (2019)   Next INR check:   3/4/2019   Target end date:   2019    Indications    PAD (peripheral artery disease) (HCC) [I73.9]             Anticoagulation Episode Summary     INR check location:       Preferred lab:       Send INR reminders to:       Comments:   Lakesha MATHIS fax [phone]      Anticoagulation Care Providers     Provider Role Specialty Phone number    Bong Quick M.D. Referring Internal Medicine [phone]    [facility] Anticoagulation Services Responsible  [phone]        Anticoagulation Patient Findings    Spoke with patient this morning to report a therapeutic INR of 2.3.      Patient instructed to continue with current warfarin dosing regimen. I reminded patient of her dosing regimen. Patient did mention she has a pill box for her warfarin.     Patient denies any s/s of bleeding, clotting or any changes to diet or medication.      Patient did mentioned some slight bruising on her wrist. I asked her if she has any future bruising to use a marker to draw around the bruise to track improvement and/or worsening.     Will follow up in 1 week(s) on       Derrick Zavala, Pharmacy Intern

## 2019-03-04 ENCOUNTER — ANTICOAGULATION MONITORING (OUTPATIENT)
Dept: VASCULAR LAB | Facility: MEDICAL CENTER | Age: 84
End: 2019-03-04

## 2019-03-04 DIAGNOSIS — I73.9 PAD (PERIPHERAL ARTERY DISEASE) (HCC): ICD-10-CM

## 2019-03-04 LAB — INR PPP: 2.2 (ref 2–3.5)

## 2019-03-11 ENCOUNTER — ANTICOAGULATION MONITORING (OUTPATIENT)
Dept: VASCULAR LAB | Facility: MEDICAL CENTER | Age: 84
End: 2019-03-11

## 2019-03-11 DIAGNOSIS — I73.9 PAD (PERIPHERAL ARTERY DISEASE) (HCC): ICD-10-CM

## 2019-03-11 LAB — INR PPP: 1.4 (ref 2–3.5)

## 2019-03-11 NOTE — PROGRESS NOTES
Anticoagulation Summary  As of 3/11/2019    INR goal:   2.0-3.0   TTR:   30.9 % (1.3 mo)   INR used for dosin.4! (3/11/2019)   Warfarin maintenance plan:   2.5 mg (2.5 mg x 1) every Mon, Wed, Fri; 5 mg (2.5 mg x 2) all other days   Weekly warfarin total:   27.5 mg   Plan last modified:   Milena TrentD (2019)   Next INR check:   3/19/2019   Target end date:   2019    Indications    PAD (peripheral artery disease) (HCC) [I73.9]             Anticoagulation Episode Summary     INR check location:       Preferred lab:       Send INR reminders to:       Comments:   Lakesha MATHIS fax [phone]      Anticoagulation Care Providers     Provider Role Specialty Phone number    Bong Quick M.D. Referring Internal Medicine [phone]    [facility] Anticoagulation Services Responsible  [phone]        Anticoagulation Patient Findings  Patient Findings     Negatives:   Signs/symptoms of thrombosis, Signs/symptoms of bleeding, Laboratory test error suspected, Change in health, Change in alcohol use, Change in activity, Upcoming invasive procedure, Emergency department visit, Upcoming dental procedure, Missed doses, Extra doses, Change in medications, Change in diet/appetite, Hospital admission, Bruising, Other complaints        Spoke with the patient on the phone today, reporting a SUB-therapeutic INR of 1.4. Confirmed the current warfarin dosing regimen and patient compliance. Patient does think she missed at least one dose. Patient denies any interval changes to diet and/or medications. Patient denies any signs/symptoms of bleeding or clotting.  Patient will bolus with 5mg TONIGHT, then will resume her current regimen of 2.5mg Mon, Wed, Fri and 5mg ROW. Patient being D/C from , so will follow up with clinic. She already scheduled for 2019 @[place] 10:30am.    Cassius AbbottD

## 2019-03-19 ENCOUNTER — ANTICOAGULATION VISIT (OUTPATIENT)
Dept: MEDICAL GROUP | Facility: PHYSICIAN GROUP | Age: 84
End: 2019-03-19
Payer: MEDICARE

## 2019-03-19 DIAGNOSIS — I73.9 PAD (PERIPHERAL ARTERY DISEASE) (HCC): ICD-10-CM

## 2019-03-19 LAB — INR PPP: 1.7 (ref 2–3.5)

## 2019-03-19 PROCEDURE — 99211 OFF/OP EST MAY X REQ PHY/QHP: CPT | Performed by: NURSE PRACTITIONER

## 2019-03-19 PROCEDURE — 85610 PROTHROMBIN TIME: CPT | Performed by: NURSE PRACTITIONER

## 2019-03-19 NOTE — PROGRESS NOTES
Anticoagulation Summary  As of 3/19/2019    INR goal:   2.0-3.0   TTR:   25.3 % (1.5 mo)   INR used for dosin.7! (3/19/2019)   Warfarin maintenance plan:   2.5 mg (2.5 mg x 1) every Mon, Fri; 5 mg (2.5 mg x 2) all other days   Weekly warfarin total:   30 mg   Plan last modified:   Fred Nayak, PharmD (3/19/2019)   Next INR check:   3/26/2019   Target end date:   2019    Indications    PAD (peripheral artery disease) (HCC) [I73.9]             Anticoagulation Episode Summary     INR check location:       Preferred lab:       Send INR reminders to:       Comments:         Anticoagulation Care Providers     Provider Role Specialty Phone number    Bong Quick M.D. Referring Internal Medicine [phone]    [facility] Anticoagulation Services Responsible  [phone]        Anticoagulation Patient Findings  Patient Findings     Negatives:   Signs/symptoms of thrombosis, Signs/symptoms of bleeding, Laboratory test error suspected, Change in health, Change in alcohol use, Change in activity, Upcoming invasive procedure, Emergency department visit, Upcoming dental procedure, Missed doses, Extra doses, Change in medications, Change in diet/appetite, Hospital admission, Bruising, Other complaints        HPI:   Radha Verduzco seen in clinic today, on anticoagulation therapy with warfarin for blood clot prevention due to history of PAD.    Patient's previous INR was subtherapeutic at 1.4 on 3-11-19, at which time patient was instructed to bolus with one dose, then resume current warfarin regimen.  She returns to clinic today to recheck INR to ensure it is therapeutic and thus preventing possible clotting and/or bleeding/bruising complications.    CHADS-VASc = n/a  (unadjusted ischemic stroke risk/year:  n/a)    Does patient have any changes to current medical/health status since last appt (Y/N):  NO  Does patient have any signs/symptoms of bleeding and/or thrombosis since the last appt (Y/N):  NO  Does patient have  "any interval changes to diet or medications since last appt (Y/N):  NO  Are there any complications or cost restrictions with current therapy (Y/N):  NO      Vitals:  BP check declined today    Weight  Scale unavailable   Height   5' 3\"     Asssessment:      INR remains subtherapeutic at 1.7, therefore increasing patient's risk of blood clots.   Reason(s) for out of range INR today:  Dose too low.      Plan:  Instructed patient to bolus with 7.5mg X 1, then increase weekly warfarin regimen by ~9% as detailed above in order to bring INR to therapeutic range.     Follow up:  Because warfarin is a high risk medication and current CHEST guidelines recommend regular monitoring intervals (few days up to 12 weeks), will have patient return to clinic in 1 weeks to recheck INR.    Fred Nayak, PharmD    "

## 2019-03-26 ENCOUNTER — ANTICOAGULATION VISIT (OUTPATIENT)
Dept: MEDICAL GROUP | Facility: PHYSICIAN GROUP | Age: 84
End: 2019-03-26
Payer: MEDICARE

## 2019-03-26 DIAGNOSIS — E78.5 HYPERLIPIDEMIA, UNSPECIFIED HYPERLIPIDEMIA TYPE: ICD-10-CM

## 2019-03-26 DIAGNOSIS — I73.9 PAD (PERIPHERAL ARTERY DISEASE) (HCC): ICD-10-CM

## 2019-03-26 DIAGNOSIS — Z76.0 MEDICATION REFILL: ICD-10-CM

## 2019-03-26 LAB — INR PPP: 1.8 (ref 2–3.5)

## 2019-03-26 PROCEDURE — 85610 PROTHROMBIN TIME: CPT | Performed by: NURSE PRACTITIONER

## 2019-03-26 PROCEDURE — 99211 OFF/OP EST MAY X REQ PHY/QHP: CPT | Performed by: NURSE PRACTITIONER

## 2019-03-26 RX ORDER — ROSUVASTATIN CALCIUM 5 MG/1
5 TABLET, COATED ORAL DAILY
Qty: 90 TAB | Refills: 1 | Status: SHIPPED | OUTPATIENT
Start: 2019-03-26 | End: 2019-08-29 | Stop reason: SDUPTHER

## 2019-03-26 RX ORDER — TRIAMTERENE AND HYDROCHLOROTHIAZIDE 37.5; 25 MG/1; MG/1
TABLET ORAL
Qty: 90 TAB | Refills: 0 | Status: SHIPPED | OUTPATIENT
Start: 2019-03-26 | End: 2019-06-28 | Stop reason: SDUPTHER

## 2019-03-26 NOTE — TELEPHONE ENCOUNTER
Dr Quick- Pt was discontinued on lisin/hctz during recent hospitalization 12/18. Please refill as you see fit.  -Pt has had OV within the 12 month protocol and lipid panel is current. 6 month supply sent to pharmacy.   Lab Results   Component Value Date/Time    CHOLSTRLTOT 52 (L) 12/20/2018 12:49 AM    LDL 13 12/20/2018 12:49 AM    HDL 13 (A) 12/20/2018 12:49 AM    TRIGLYCERIDE 130 12/20/2018 12:49 AM       Lab Results   Component Value Date/Time    SODIUM 138 01/26/2019 03:10 AM    POTASSIUM 4.4 01/26/2019 03:10 AM    CHLORIDE 109 01/26/2019 03:10 AM    CO2 21 01/26/2019 03:10 AM    GLUCOSE 100 (H) 01/26/2019 03:10 AM    BUN 32 (H) 01/26/2019 03:10 AM    CREATININE 1.08 01/26/2019 03:10 AM     Lab Results   Component Value Date/Time    ALKPHOSPHAT 45 12/28/2018 04:20 AM    ASTSGOT 19 12/28/2018 04:20 AM    ALTSGPT 16 12/28/2018 04:20 AM    TBILIRUBIN 1.4 12/28/2018 04:20 AM

## 2019-03-26 NOTE — TELEPHONE ENCOUNTER
Was the patient seen in the last year in this department? Yes    Does patient have an active prescription for medications requested? No     Received Request Via: Pharmacy      Pt met protocol?: Yes, OV 1/19   BP Readings from Last 1 Encounters:   01/28/19 157/67     Lab Results   Component Value Date/Time    CHOLSTRLTOT 52 (L) 12/20/2018 12:49 AM    LDL 13 12/20/2018 12:49 AM    HDL 13 (A) 12/20/2018 12:49 AM    TRIGLYCERIDE 130 12/20/2018 12:49 AM       Lab Results   Component Value Date/Time    SODIUM 138 01/26/2019 03:10 AM    POTASSIUM 4.4 01/26/2019 03:10 AM    CHLORIDE 109 01/26/2019 03:10 AM    CO2 21 01/26/2019 03:10 AM    GLUCOSE 100 (H) 01/26/2019 03:10 AM    BUN 32 (H) 01/26/2019 03:10 AM    CREATININE 1.08 01/26/2019 03:10 AM     Lab Results   Component Value Date/Time    ALKPHOSPHAT 45 12/28/2018 04:20 AM    ASTSGOT 19 12/28/2018 04:20 AM    ALTSGPT 16 12/28/2018 04:20 AM    TBILIRUBIN 1.4 12/28/2018 04:20 AM

## 2019-03-26 NOTE — PROGRESS NOTES
Anticoagulation Summary  As of 3/26/2019    INR goal:   2.0-3.0   TTR:   22.0 % (1.8 mo)   INR used for dosin.8! (3/26/2019)   Warfarin maintenance plan:   2.5 mg (2.5 mg x 1) every Mon, Fri; 5 mg (2.5 mg x 2) all other days   Weekly warfarin total:   30 mg   Plan last modified:   Fred Nayak, PharmD (3/19/2019)   Next INR check:      Target end date:   2019    Indications    PAD (peripheral artery disease) (HCC) [I73.9]             Anticoagulation Episode Summary     INR check location:       Preferred lab:       Send INR reminders to:       Comments:         Anticoagulation Care Providers     Provider Role Specialty Phone number    Bong Quick M.D. Referring Internal Medicine [phone]    [facility] Anticoagulation Services Responsible  [phone]        Anticoagulation Patient Findings  Patient Findings     Negatives:   Signs/symptoms of thrombosis, Signs/symptoms of bleeding, Laboratory test error suspected, Change in health, Change in alcohol use, Change in activity, Upcoming invasive procedure, Emergency department visit, Upcoming dental procedure, Missed doses, Extra doses, Change in medications, Change in diet/appetite, Hospital admission, Bruising, Other complaints        HPI:   Radha Verduzco seen in clinic today, on anticoagulation therapy with warfarin for blood clot prevention due to history of PAD.    Patient's previous INR was subtherapeutic at 1.7 on 3-19-19, at which time patient was instructed to bolus with one dose, then increase weekly warfarin regimen.  She returns to clinic today to recheck INR to ensure it is therapeutic and thus preventing possible clotting and/or bleeding/bruising complications.    CHADS-VASc = n/a  (unadjusted ischemic stroke risk/year:  n/a)    Does patient have any changes to current medical/health status since last appt (Y/N):  NO  Does patient have any signs/symptoms of bleeding and/or thrombosis since the last appt (Y/N):  NO  Does patient have any  "interval changes to diet or medications since last appt (Y/N):  NO  Are there any complications or cost restrictions with current therapy (Y/N):  NO      Vitals:  BP check declined today      Weight  Scale unavailable   Height   5' 3\"     Asssessment:      INR remains subtherapeutic at 1.8, therefore increasing patient's risk of blood clots   Reason(s) for out of range INR today:  Dose too low.      Plan:  Instructed patient to bolus with 7.5mg X 1, then increase weekly warfarin regimen by ~8% as detailed above in order to bring INR to therapeutic range.  Discussed lovenox injections given recency of her clotting event, she would like to forego at this time.  Patient is on dual therapy with warfarin and ASA.     Follow up:  Because warfarin is a high risk medication and current CHEST guidelines recommend regular monitoring intervals (few days up to 12 weeks), will have patient return to clinic in 1 weeks to recheck INR.    Fred Nayak, PharmD    "

## 2019-04-02 ENCOUNTER — ANTICOAGULATION VISIT (OUTPATIENT)
Dept: MEDICAL GROUP | Facility: PHYSICIAN GROUP | Age: 84
End: 2019-04-02
Payer: MEDICARE

## 2019-04-02 DIAGNOSIS — I73.9 PAD (PERIPHERAL ARTERY DISEASE) (HCC): ICD-10-CM

## 2019-04-02 LAB — INR PPP: 2.3 (ref 2–3.5)

## 2019-04-02 PROCEDURE — 85610 PROTHROMBIN TIME: CPT | Performed by: NURSE PRACTITIONER

## 2019-04-02 PROCEDURE — 93793 ANTICOAG MGMT PT WARFARIN: CPT | Performed by: FAMILY MEDICINE

## 2019-04-02 NOTE — PROGRESS NOTES
Anticoagulation Summary  As of 2019    INR goal:   2.0-3.0   TTR:   26.4 % (2 mo)   INR used for dosin.3 (2019)   Warfarin maintenance plan:   2.5 mg (2.5 mg x 1) every Mon; 5 mg (2.5 mg x 2) all other days   Weekly warfarin total:   32.5 mg   Plan last modified:   Fred Nayak, PharmD (3/26/2019)   Next INR check:   2019   Target end date:   2019    Indications    PAD (peripheral artery disease) (HCC) [I73.9]             Anticoagulation Episode Summary     INR check location:       Preferred lab:       Send INR reminders to:       Comments:         Anticoagulation Care Providers     Provider Role Specialty Phone number    Bong Quick M.D. Referring Internal Medicine [phone]    [facility] Anticoagulation Services Responsible  [phone]        Anticoagulation Patient Findings  Patient Findings     Negatives:   Signs/symptoms of thrombosis, Signs/symptoms of bleeding, Laboratory test error suspected, Change in health, Change in alcohol use, Change in activity, Upcoming invasive procedure, Emergency department visit, Upcoming dental procedure, Missed doses, Extra doses, Change in medications, Change in diet/appetite, Hospital admission, Bruising, Other complaints        HPI:   Radha Verduzco seen in clinic today, on anticoagulation therapy with warfarin for blood clot prevention due to hist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    Patient's previous INR was subtherapeutic at 1.8 on 3-26-19, at which time patient was instructed to bolus with one dose, then increase weekly warfarin regimen.  She returns to clinic today to recheck INR to ensure it is therapeutic and thus preventing possible clotting and/or bleeding/bruising complications.    CHADS-VASc = n/a  (unadjusted ischemic stroke risk/year:  n/a)    Does patient have any changes to current medical/health status since last appt (Y/N):  NO  Does patient have any signs/symptoms of bleeding and/or thrombosis since the last appt  "(Y/N):  NO  Does patient have any interval changes to diet or medications since last appt (Y/N):  NO  Are there any complications or cost restrictions with current therapy (Y/N):  NO      Vitals:  BP check declined today      Weight  declines   Height   5' 3\"     Asssessment:      INR therapeutic at 2.3, therefore decreasing patient's risk of blood clots and/or bleeding complications.   Reason(s) for out of range INR today:  n/a      Plan:  Pt is to continue with current warfarin dosing regimen in order to maintain INR in therapeutic range.     Follow up:  Because warfarin is a high risk medication and current CHEST guidelines recommend regular monitoring intervals (few days up to 12 weeks), will have patient return to clinic in 1 weeks to recheck INR.    Fred Nayak, PharmD    "

## 2019-04-09 ENCOUNTER — ANTICOAGULATION VISIT (OUTPATIENT)
Dept: MEDICAL GROUP | Facility: PHYSICIAN GROUP | Age: 84
End: 2019-04-09
Payer: MEDICARE

## 2019-04-09 VITALS — SYSTOLIC BLOOD PRESSURE: 120 MMHG | DIASTOLIC BLOOD PRESSURE: 68 MMHG | HEART RATE: 88 BPM

## 2019-04-09 DIAGNOSIS — I73.9 PAD (PERIPHERAL ARTERY DISEASE) (HCC): ICD-10-CM

## 2019-04-09 LAB — INR PPP: 2.5 (ref 2–3.5)

## 2019-04-09 PROCEDURE — 93793 ANTICOAG MGMT PT WARFARIN: CPT | Performed by: NURSE PRACTITIONER

## 2019-04-09 PROCEDURE — 85610 PROTHROMBIN TIME: CPT | Performed by: FAMILY MEDICINE

## 2019-04-09 RX ORDER — WARFARIN SODIUM 2.5 MG/1
TABLET ORAL
Qty: 180 TAB | Refills: 1 | Status: SHIPPED | OUTPATIENT
Start: 2019-04-09 | End: 2019-10-15

## 2019-04-09 NOTE — PROGRESS NOTES
Anticoagulation Summary  As of 2019    INR goal:   2.0-3.0   TTR:   34.0 % (2.2 mo)   INR used for dosin.5 (2019)   Warfarin maintenance plan:   2.5 mg (2.5 mg x 1) every Mon; 5 mg (2.5 mg x 2) all other days   Weekly warfarin total:   32.5 mg   Plan last modified:   Fred Nayak, PharmD (3/26/2019)   Next INR check:   2019   Target end date:   2019    Indications    PAD (peripheral artery disease) (HCC) [I73.9]             Anticoagulation Episode Summary     INR check location:       Preferred lab:       Send INR reminders to:       Comments:         Anticoagulation Care Providers     Provider Role Specialty Phone number    Bong Quick M.D. Referring Internal Medicine [phone]    [facility] Anticoagulation [facility] Responsible  [phone]        Anticoagulation Patient Findings  Patient Findings     Negatives:   Signs/symptoms of thrombosis, Signs/symptoms of bleeding, Laboratory test error suspected, Change in health, Change in alcohol use, Change in activity, Upcoming invasive procedure, Emergency department visit, Upcoming dental procedure, Missed doses, Extra doses, Change in medications, Change in diet/appetite, Hospital admission, Bruising, Other complaints        HPI:   Radha Verduzco seen in clinic today, on anticoagulation therapy with warfarin for blood clot prevention due to hist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    Patient's previous INR was therapeutic at 2.3 on 19, at which time patient was instructed to continue with current warfarin regimen.  She returns to clinic today to recheck INR to ensure it is therapeutic and thus preventing possible clotting and/or bleeding/bruising complications.    CHADS-VASc = n/a  (unadjusted ischemic stroke risk/year:  n/a)    Does patient have any changes to current medical/health status since last appt (Y/N):  No  Does patient have any signs/symptoms of bleeding and/or thrombosis since the last appt (Y/N):  NO  Does patient  "have any interval changes to diet or medications since last appt (Y/N):  NO  Are there any complications or cost restrictions with current therapy (Y/N):  NO      Vitals:  /68  HR 88    Weight  Scale unavailable    Height   5' 3\"     Asssessment:      INR therapeutic at 2.5, therefore decreasing patient's risk of blood clots and/or bleeding complications.   Reason(s) for out of range INR today:  n/a      Plan:  Pt is to continue with current warfarin dosing regimen in order to maintain INR in therapeutic range.     Follow up:  Because warfarin is a high risk medication and current CHEST guidelines recommend regular monitoring intervals (few days up to 12 weeks), will have patient return to clinic in 2 weeks to recheck INR.    Fred Nayak, PharmD    "

## 2019-04-23 ENCOUNTER — ANTICOAGULATION VISIT (OUTPATIENT)
Dept: MEDICAL GROUP | Facility: PHYSICIAN GROUP | Age: 84
End: 2019-04-23
Payer: MEDICARE

## 2019-04-23 VITALS — SYSTOLIC BLOOD PRESSURE: 117 MMHG | DIASTOLIC BLOOD PRESSURE: 62 MMHG | HEART RATE: 86 BPM

## 2019-04-23 DIAGNOSIS — I73.9 PAD (PERIPHERAL ARTERY DISEASE) (HCC): ICD-10-CM

## 2019-04-23 LAB — INR PPP: 1.6 (ref 2–3.5)

## 2019-04-23 PROCEDURE — 85610 PROTHROMBIN TIME: CPT | Performed by: NURSE PRACTITIONER

## 2019-04-23 PROCEDURE — 99211 OFF/OP EST MAY X REQ PHY/QHP: CPT | Performed by: NURSE PRACTITIONER

## 2019-04-23 NOTE — PROGRESS NOTES
Anticoagulation Summary  As of 2019    INR goal:   2.0-3.0   TTR:   37.7 % (2.7 mo)   INR used for dosin.6! (2019)   Warfarin maintenance plan:   2.5 mg (2.5 mg x 1) every Mon; 5 mg (2.5 mg x 2) all other days   Weekly warfarin total:   32.5 mg   Plan last modified:   Fred Nayak, PharmD (3/26/2019)   Next INR check:   2019   Target end date:   2019    Indications    PAD (peripheral artery disease) (HCC) [I73.9]             Anticoagulation Episode Summary     INR check location:       Preferred lab:       Send INR reminders to:       Comments:         Anticoagulation Care Providers     Provider Role Specialty Phone number    oBng Quick M.D. Referring Internal Medicine [phone]    [facility] Responsible  [phone]        Anticoagulation Patient Findings  Patient Findings     Negatives:   Signs/symptoms of thrombosis, Signs/symptoms of bleeding, Laboratory test error suspected, Change in health, Change in alcohol use, Change in activity, Upcoming invasive procedure, Emergency department visit, Upcoming dental procedure, Missed doses, Extra doses, Change in medications, Change in diet/appetite, Hospital admission, Bruising, Other complaints        HPI:   Radha Verduzco seen in clinic today, on anticoagulation therapy with warfarin for blood clot prevention due to hist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    Patient's previous INR was therapeutic at 2.5 on 19, at which time patient was instructed to continue with current warfarin regimen.  She returns to clinic today to recheck INR to ensure it is therapeutic and thus preventing possible clotting and/or bleeding/bruising complications.    CHADS-VASc = n/a  (unadjusted ischemic stroke risk/year:  n/a)    Does patient have any changes to current medical/health status since last appt (Y/N):  NO  Does patient have any signs/symptoms of bleeding and/or thrombosis since the last appt (Y/N):  NO  Does  "patient have any interval changes to diet or medications since last appt (Y/N):  NO  Are there any complications or cost restrictions with current therapy (Y/N):  NO      Vitals:  /62  HR 86    Weight  Scale unavailable    Height   5' 3\"     Asssessment:      INR subtherapeutic at 1.6, therefore increasing patient's risk of blood clots.   Reason(s) for out of range INR today:  Etiology unknown.      Plan:  Instructed patient to bolus with 7.5mg X 1, then resume current warfarin regimen in order to bring INR to therapeutic range.     Follow up:  Because warfarin is a high risk medication and current CHEST guidelines recommend regular monitoring intervals (few days up to 12 weeks), will have patient return to clinic in 1 weeks to recheck INR.    Fred Nayak, PharmD    "

## 2019-04-30 ENCOUNTER — ANTICOAGULATION VISIT (OUTPATIENT)
Dept: MEDICAL GROUP | Facility: PHYSICIAN GROUP | Age: 84
End: 2019-04-30
Payer: MEDICARE

## 2019-04-30 DIAGNOSIS — I73.9 PAD (PERIPHERAL ARTERY DISEASE) (HCC): ICD-10-CM

## 2019-04-30 LAB — INR PPP: 2.9 (ref 2–3.5)

## 2019-04-30 PROCEDURE — 85610 PROTHROMBIN TIME: CPT | Performed by: FAMILY MEDICINE

## 2019-04-30 PROCEDURE — 93793 ANTICOAG MGMT PT WARFARIN: CPT | Performed by: NURSE PRACTITIONER

## 2019-04-30 NOTE — PROGRESS NOTES
Anticoagulation Summary  As of 2019    INR goal:   2.0-3.0   TTR:   40.2 % (2.9 mo)   INR used for dosin.90 (2019)   Warfarin maintenance plan:   2.5 mg (2.5 mg x 1) every Mon; 5 mg (2.5 mg x 2) all other days   Weekly warfarin total:   32.5 mg   Plan last modified:   Fred Nayak, PharmD (3/26/2019)   Next INR check:   2019   Target end date:   2019    Indications    PAD (peripheral artery disease) (HCC) [I73.9]             Anticoagulation Episode Summary     INR check location:       Preferred lab:       Send INR reminders to:       Comments:         Anticoagulation Care Providers     Provider Role Specialty Phone number    Bong Quick M.D. Referring Internal Medicine [phone]    [facility] Responsible  [phone]        Anticoagulation Patient Findings  Patient Findings     Negatives:   Signs/symptoms of thrombosis, Signs/symptoms of bleeding, Laboratory test error suspected, Change in health, Change in alcohol use, Change in activity, Upcoming invasive procedure, Emergency department visit, Upcoming dental procedure, Missed doses, Extra doses, Change in medications, Change in diet/appetite, Hospital admission, Bruising, Other complaints        HPI:   Radha Verduzco seen in clinic today, on anticoagulation therapy with warfarin for blood clot prevention due to hist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    Patient's previous INR was subtherapeutic at 1.6 on 19, at which time patient was instructed to bolus with one dose, then resume current warfarin regimen.  She returns to clinic today to recheck INR to ensure it is therapeutic and thus preventing possible clotting and/or bleeding/bruising complications.    CHADS-VASc = n/a  (unadjusted ischemic stroke risk/year:  n/a)    Does patient have any changes to current medical/health status since last appt (Y/N):  NO  Does patient have any signs/symptoms of bleeding and/or thrombosis since the last  "appt (Y/N):  NO  Does patient have any interval changes to diet or medications since last appt (Y/N):  NO  Are there any complications or cost restrictions with current therapy (Y/N):  NO      Vitals:  BP check declined today, did not wish to remove coat      Weight  Scale unavailable   Height   5' 3\"     Asssessment:      INR therapeutic at 2.9, therefore decreasing patient's risk of blood clots and/or bleeding complications.   Reason(s) for out of range INR today:  n/a      Plan:  Pt is to continue with current warfarin dosing regimen in order to maintain INR in therapeutic range.     Follow up:  Because warfarin is a high risk medication and current CHEST guidelines recommend regular monitoring intervals (few days up to 12 weeks), will have patient return to clinic in 2 weeks to recheck INR.    Fred Nayak, PharmD    "

## 2019-05-01 RX ORDER — LISINOPRIL 40 MG/1
TABLET ORAL
Qty: 90 TAB | Refills: 1 | Status: SHIPPED | OUTPATIENT
Start: 2019-05-01 | End: 2019-08-29 | Stop reason: SDUPTHER

## 2019-05-01 NOTE — TELEPHONE ENCOUNTER
Refill X 6 months, sent to pharmacy.Pt. Seen in the last 6 months per protocol.   Lab Results   Component Value Date/Time    SODIUM 138 01/26/2019 03:10 AM    POTASSIUM 4.4 01/26/2019 03:10 AM    CHLORIDE 109 01/26/2019 03:10 AM    CO2 21 01/26/2019 03:10 AM    GLUCOSE 100 (H) 01/26/2019 03:10 AM    BUN 32 (H) 01/26/2019 03:10 AM    CREATININE 1.08 01/26/2019 03:10 AM

## 2019-05-01 NOTE — TELEPHONE ENCOUNTER
Was the patient seen in the last year in this department? Yes    Does patient have an active prescription for medications requested? No     Received Request Via: Pharmacy      Pt met protocol?: Yes   Pt last ov 1/19   BP Readings from Last 1 Encounters:   04/23/19 117/62

## 2019-05-14 ENCOUNTER — ANTICOAGULATION VISIT (OUTPATIENT)
Dept: MEDICAL GROUP | Facility: PHYSICIAN GROUP | Age: 84
End: 2019-05-14
Payer: MEDICARE

## 2019-05-14 DIAGNOSIS — I73.9 PAD (PERIPHERAL ARTERY DISEASE) (HCC): ICD-10-CM

## 2019-05-14 LAB — INR PPP: 2.9 (ref 2–3.5)

## 2019-05-14 PROCEDURE — 93793 ANTICOAG MGMT PT WARFARIN: CPT | Performed by: FAMILY MEDICINE

## 2019-05-14 PROCEDURE — 85610 PROTHROMBIN TIME: CPT | Performed by: NURSE PRACTITIONER

## 2019-05-14 NOTE — PROGRESS NOTES
Anticoagulation Summary  As of 2019    INR goal:   2.0-3.0   TTR:   48.4 % (3.4 mo)   INR used for dosin.90 (2019)   Warfarin maintenance plan:   2.5 mg (2.5 mg x 1) every Mon; 5 mg (2.5 mg x 2) all other days   Weekly warfarin total:   32.5 mg   Plan last modified:   Fred Nayak, PharmD (3/26/2019)   Next INR check:   2019   Target end date:   2019    Indications    PAD (peripheral artery disease) (HCC) [I73.9]             Anticoagulation Episode Summary     INR check location:       Preferred lab:       Send INR reminders to:       Comments:         Anticoagulation Care Providers     Provider Role Specialty Phone number    Bong Quick M.D. Referring Internal Medicine [phone]    [facility] Responsible  [phone]        Anticoagulation Patient Findings  Patient Findings     Negatives:   Signs/symptoms of thrombosis, Signs/symptoms of bleeding, Laboratory test error suspected, Change in health, Change in alcohol use, Change in activity, Upcoming invasive procedure, Emergency department visit, Upcoming dental procedure, Missed doses, Extra doses, Change in medications, Change in diet/appetite, Hospital admission, Bruising, Other complaints        HPI:   Radha Verduzco seen in clinic today, on anticoagulation therapy with warfarin for blood clot prevention due to hist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    Patient's previous INR was therapeutic at 2.9 on 19, at which time patient was instructed to continue with current warfarin regimen.  She returns to clinic today to recheck INR to ensure it is therapeutic and thus preventing possible clotting and/or bleeding/bruising complications.    CHADS-VASc = n/a  (unadjusted ischemic stroke risk/year:  n/a)    Does patient have any changes to current medical/health status since last appt (Y/N):  NO  Does patient have any signs/symptoms of bleeding and/or thrombosis since the last appt (Y/N):  NO  Does  "patient have any interval changes to diet or medications since last appt (Y/N):  NO  Are there any complications or cost restrictions with current therapy (Y/N):  NO      Vitals:  /59  HR 81    Weight  Scale unavailable   Height   5' 3\"     Asssessment:      INR remains therapeutic at 2.9, therefore decreasing patient's risk of blood clots and/or bleeding complications.   Reason(s) for out of range INR today:  n/a      Plan:  Pt is to continue with current warfarin dosing regimen in order to maintain INR in therapeutic range.     Follow up:  Because warfarin is a high risk medication and current CHEST guidelines recommend regular monitoring intervals (few days up to 12 weeks), will have patient return to clinic in 3 weeks to recheck INR.    Fred Nayak, PharmD    "

## 2019-05-21 RX ORDER — AMLODIPINE BESYLATE 5 MG/1
TABLET ORAL
Qty: 90 TAB | Refills: 1 | Status: SHIPPED | OUTPATIENT
Start: 2019-05-21 | End: 2019-08-29 | Stop reason: SDUPTHER

## 2019-05-21 NOTE — TELEPHONE ENCOUNTER
Was the patient seen in the last year in this department? Yes    Does patient have an active prescription for medications requested? No     Received Request Via: Pharmacy      Pt met protocol?: Yes    LAST OV 01/17/2019    BP Readings from Last 1 Encounters:   04/23/19 117/62     Lab Results   Component Value Date/Time    SODIUM 138 01/26/2019 03:10 AM    POTASSIUM 4.4 01/26/2019 03:10 AM    CHLORIDE 109 01/26/2019 03:10 AM    CO2 21 01/26/2019 03:10 AM    GLUCOSE 100 (H) 01/26/2019 03:10 AM    BUN 32 (H) 01/26/2019 03:10 AM    CREATININE 1.08 01/26/2019 03:10 AM

## 2019-06-04 ENCOUNTER — ANTICOAGULATION VISIT (OUTPATIENT)
Dept: MEDICAL GROUP | Facility: PHYSICIAN GROUP | Age: 84
End: 2019-06-04
Payer: MEDICARE

## 2019-06-04 VITALS — SYSTOLIC BLOOD PRESSURE: 123 MMHG | DIASTOLIC BLOOD PRESSURE: 67 MMHG | HEART RATE: 78 BPM

## 2019-06-04 DIAGNOSIS — I73.9 PAD (PERIPHERAL ARTERY DISEASE) (HCC): ICD-10-CM

## 2019-06-04 LAB — INR PPP: 2.6 (ref 2–3.5)

## 2019-06-04 PROCEDURE — 93793 ANTICOAG MGMT PT WARFARIN: CPT | Performed by: FAMILY MEDICINE

## 2019-06-04 PROCEDURE — 85610 PROTHROMBIN TIME: CPT | Performed by: FAMILY MEDICINE

## 2019-06-04 NOTE — PROGRESS NOTES
Anticoagulation Summary  As of 2019    INR goal:   2.0-3.0   TTR:   57.2 % (4.1 mo)   INR used for dosin.60 (2019)   Warfarin maintenance plan:   2.5 mg (2.5 mg x 1) every Mon; 5 mg (2.5 mg x 2) all other days   Weekly warfarin total:   32.5 mg   Plan last modified:   Fred Nayak, PharmD (3/26/2019)   Next INR check:   2019   Target end date:   2019    Indications    PAD (peripheral artery disease) (HCC) [I73.9]             Anticoagulation Episode Summary     INR check location:       Preferred lab:       Send INR reminders to:       Comments:         Anticoagulation Care Providers     Provider Role Specialty Phone number    Bong Quick M.D. Referring Internal Medicine [phone]    [facility] Responsible  [phone]        Anticoagulation Patient Findings  Patient Findings     Negatives:   Signs/symptoms of thrombosis, Signs/symptoms of bleeding, Laboratory test error suspected, Change in health, Change in alcohol use, Change in activity, Upcoming invasive procedure, Emergency department visit, Upcoming dental procedure, Missed doses, Extra doses, Change in medications, Change in diet/appetite, Hospital admission, Bruising, Other complaints        HPI:   Radha Verduzco seen in clinic today, on anticoagulation therapy with warfarin for blood clot prevention due to [id number]/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    Patient's previous INR was therapeutic at 2.9 on 19, at which time patient was instructed to continue with current warfarin regimen.  She returns to clinic today to recheck INR to ensure it is therapeutic and thus preventing possible clotting and/or bleeding/bruising complications.    CHADS-VASc = n/a  (unadjusted ischemic stroke risk/year:  n/a)    Does patient have any changes to current medical/health status since last appt (Y/N):  NO  Does patient have any signs/symptoms of bleeding and/or thrombosis since the last appt (Y/N):  NO  Does  "patient have any interval changes to diet or medications since last appt (Y/N):  NO  Are there any complications or cost restrictions with current therapy (Y/N):  NO      Vitals:  /67  HR 78    Weight  Scale unavailable    Height   5' 3\"     Asssessment:      INR therapeutic at 2.6, therefore decreasing patient's risk of blood clots and/or bleeding complications.   Reason(s) for out of range INR today:  n/a      Plan:  Pt is to continue with current warfarin dosing regimen in order to maintain INR in therapeutic range.     Follow up:  Because warfarin is a high risk medication and current CHEST guidelines recommend regular monitoring intervals (few days up to 12 weeks), will have patient return to clinic in 4 weeks to recheck INR.    Fred Nayak, PharmD         "

## 2019-06-28 NOTE — TELEPHONE ENCOUNTER
Was the patient seen in the last year in this department? Yes    Does patient have an active prescription for medications requested? No     Received Request Via: Pharmacy      Pt met protocol?: Yes, ov 1/19 bp 124/84

## 2019-07-01 RX ORDER — TRIAMTERENE AND HYDROCHLOROTHIAZIDE 37.5; 25 MG/1; MG/1
TABLET ORAL
Qty: 90 TAB | Refills: 0 | Status: SHIPPED | OUTPATIENT
Start: 2019-07-01 | End: 2019-08-29 | Stop reason: SDUPTHER

## 2019-07-02 ENCOUNTER — ANTICOAGULATION VISIT (OUTPATIENT)
Dept: MEDICAL GROUP | Facility: PHYSICIAN GROUP | Age: 84
End: 2019-07-02
Payer: MEDICARE

## 2019-07-02 VITALS — SYSTOLIC BLOOD PRESSURE: 118 MMHG | DIASTOLIC BLOOD PRESSURE: 63 MMHG | HEART RATE: 81 BPM

## 2019-07-02 DIAGNOSIS — I73.9 PAD (PERIPHERAL ARTERY DISEASE) (HCC): ICD-10-CM

## 2019-07-02 LAB — INR PPP: 2.6 (ref 2–3.5)

## 2019-07-02 PROCEDURE — 85610 PROTHROMBIN TIME: CPT | Performed by: FAMILY MEDICINE

## 2019-07-02 PROCEDURE — 93793 ANTICOAG MGMT PT WARFARIN: CPT | Performed by: NURSE PRACTITIONER

## 2019-07-02 NOTE — PROGRESS NOTES
Anticoagulation Summary  As of 2019    INR goal:   2.0-3.0   TTR:   65.1 % (5 mo)   INR used for dosin.60 (2019)   Warfarin maintenance plan:   2.5 mg (2.5 mg x 1) every Mon; 5 mg (2.5 mg x 2) all other days   Weekly warfarin total:   32.5 mg   Plan last modified:   Fred Nayak, PharmD (3/26/2019)   Next INR check:   2019   Target end date:   2019    Indications    PAD (peripheral artery disease) (HCC) [I73.9]             Anticoagulation Episode Summary     INR check location:       Preferred lab:       Send INR reminders to:       Comments:         Anticoagulation Care Providers     Provider Role Specialty Phone number    Bnog Quick M.D. Referring Internal Medicine [phone]    [facility] Responsible  [phone]        Anticoagulation Patient Findings  Patient Findings     Negatives:   Signs/symptoms of thrombosis, Signs/symptoms of bleeding, Laboratory test error suspected, Change in health, Change in alcohol use, Change in activity, Upcoming invasive procedure, Emergency department visit, Upcoming dental procedure, Missed doses, Extra doses, Change in medications, Change in diet/appetite, Hospital admission, Bruising, Other complaints        HPI:   Radha Verduzco seen in clinic today, on anticoagulation therapy with warfarin for blood clot prevention due to hist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    Patient's previous INR was therapeutic at 2.6 on 19, at which time patient was instructed to continue with current warfarin regimen.  She returns to clinic today to recheck INR to ensure it is therapeutic and thus preventing possible clotting and/or bleeding/bruising complications.    CHADS-VASc = n/a  (unadjusted ischemic stroke risk/year:  n/a)    Does patient have any changes to current medical/health status since last appt (Y/N):  NO  Does patient have any signs/symptoms of bleeding and/or thrombosis since the last appt (Y/N):  NO  Does patient  "have any interval changes to diet or medications since last appt (Y/N):  NO  Are there any complications or cost restrictions with current therapy (Y/N):  NO     Does patient have [facility] PCP? YES, Dr Bong Quick (If not, please document discussion that patient must be seen at [facility])       Vitals:  /63  HR 81    Weight  Scale unavailable   Height   5'  3\"     Asssessment:      INR remains therapeutic at 2.6, therefore decreasing patient's risk of blood clots and/or bleeding complications.   Reason(s) for out of range INR today:  n/a      Plan:  Pt is to continue with current warfarin dosing regimen in order to maintain INR in therapeutic range.     Patient has ultrasound and follow up with vascular surgeon next week to determine LOT with anticoagulation.    Follow up:  Because warfarin is a high risk medication and current CHEST guidelines recommend regular monitoring intervals (few days up to 12 weeks), will have patient return to clinic in 6 weeks to recheck INR.    Fred Nayak, PharmD, BCACP    "

## 2019-07-05 ENCOUNTER — HOSPITAL ENCOUNTER (OUTPATIENT)
Dept: RADIOLOGY | Facility: MEDICAL CENTER | Age: 84
End: 2019-07-05
Attending: SURGERY
Payer: MEDICARE

## 2019-07-05 DIAGNOSIS — I25.10 ATHEROSCLEROSIS OF NATIVE CORONARY ARTERY, ANGINA PRESENCE UNSPECIFIED, UNSPECIFIED WHETHER NATIVE OR TRANSPLANTED HEART: ICD-10-CM

## 2019-07-05 PROCEDURE — 93925 LOWER EXTREMITY STUDY: CPT

## 2019-07-05 PROCEDURE — 93922 UPR/L XTREMITY ART 2 LEVELS: CPT

## 2019-07-05 PROCEDURE — 93978 VASCULAR STUDY: CPT

## 2019-07-22 ENCOUNTER — HOSPITAL ENCOUNTER (OUTPATIENT)
Dept: LAB | Facility: MEDICAL CENTER | Age: 84
End: 2019-07-22
Attending: SURGERY
Payer: MEDICARE

## 2019-07-22 LAB
ANION GAP SERPL CALC-SCNC: 13 MMOL/L (ref 0–11.9)
BUN SERPL-MCNC: 36 MG/DL (ref 8–22)
CALCIUM SERPL-MCNC: 9 MG/DL (ref 8.5–10.5)
CHLORIDE SERPL-SCNC: 105 MMOL/L (ref 96–112)
CO2 SERPL-SCNC: 18 MMOL/L (ref 20–33)
CREAT SERPL-MCNC: 1.41 MG/DL (ref 0.5–1.4)
GLUCOSE SERPL-MCNC: 135 MG/DL (ref 65–99)
POTASSIUM SERPL-SCNC: 5.4 MMOL/L (ref 3.6–5.5)
SODIUM SERPL-SCNC: 136 MMOL/L (ref 135–145)

## 2019-07-22 PROCEDURE — 80048 BASIC METABOLIC PNL TOTAL CA: CPT

## 2019-07-22 PROCEDURE — 36415 COLL VENOUS BLD VENIPUNCTURE: CPT

## 2019-08-08 ENCOUNTER — APPOINTMENT (OUTPATIENT)
Dept: RADIOLOGY | Facility: MEDICAL CENTER | Age: 84
End: 2019-08-08
Attending: SURGERY
Payer: MEDICARE

## 2019-08-08 DIAGNOSIS — I70.203 ATHEROSCLEROSIS OF ARTERY OF BOTH LOWER EXTREMITIES (HCC): ICD-10-CM

## 2019-08-08 PROCEDURE — 75635 CT ANGIO ABDOMINAL ARTERIES: CPT

## 2019-08-08 PROCEDURE — 700117 HCHG RX CONTRAST REV CODE 255: Performed by: SURGERY

## 2019-08-08 RX ADMIN — IOHEXOL 100 ML: 350 INJECTION, SOLUTION INTRAVENOUS at 09:30

## 2019-08-13 ENCOUNTER — ANTICOAGULATION VISIT (OUTPATIENT)
Dept: MEDICAL GROUP | Facility: PHYSICIAN GROUP | Age: 84
End: 2019-08-13
Payer: MEDICARE

## 2019-08-13 VITALS — HEART RATE: 75 BPM | DIASTOLIC BLOOD PRESSURE: 72 MMHG | SYSTOLIC BLOOD PRESSURE: 97 MMHG

## 2019-08-13 DIAGNOSIS — I73.9 PAD (PERIPHERAL ARTERY DISEASE) (HCC): ICD-10-CM

## 2019-08-13 LAB — INR PPP: 3.5 (ref 2–3.5)

## 2019-08-13 PROCEDURE — 99211 OFF/OP EST MAY X REQ PHY/QHP: CPT | Performed by: FAMILY MEDICINE

## 2019-08-13 PROCEDURE — 85610 PROTHROMBIN TIME: CPT | Performed by: FAMILY MEDICINE

## 2019-08-13 NOTE — PROGRESS NOTES
Anticoagulation Summary  As of 8/13/2019    INR goal:   2.0-3.0   TTR:   60.6 % (6.4 mo)   INR used for dosing:   3.50! (8/13/2019)   Warfarin maintenance plan:   2.5 mg (2.5 mg x 1) every Mon; 5 mg (2.5 mg x 2) all other days   Weekly warfarin total:   32.5 mg   Plan last modified:   Fred Nayak, PharmD (3/26/2019)   Next INR check:   8/27/2019   Target end date:   6/12/2019    Indications    PAD (peripheral artery disease) (HCC) [I73.9]             Anticoagulation Episode Summary     INR check location:       Preferred lab:       Send INR reminders to:       Comments:         Anticoagulation Care Providers     Provider Role Specialty Phone number    Bong Quick M.D. Referring Internal Medicine [phone]    [facility] Responsible  [phone]        Anticoagulation Patient Findings  Patient Findings     Negatives:   Signs/symptoms of thrombosis, Signs/symptoms of bleeding, Laboratory test error suspected, Change in health, Change in alcohol use, Change in activity, Upcoming invasive procedure, Emergency department visit, Upcoming dental procedure, Missed doses, Extra doses, Change in medications, Change in diet/appetite, Hospital admission, Bruising, Other complaints        HPI:   Radha Verduzco seen in clinic today, on anticoagulation therapy with warfarin for blood clot prevention due to hist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    Patient's previous INR was therapeutic at 2.6 on 7-2-19, at which time patient was instructed to continue with current warfarin regimen.  She returns to clinic today to recheck INR to ensure it is therapeutic and thus preventing possible clotting and/or bleeding/bruising complications.    CHADS-VASc = n/a  (unadjusted ischemic stroke risk/year:  n/a)    Does patient have any changes to current medical/health status since last appt (Y/N):  NO  Does patient have any signs/symptoms of bleeding and/or thrombosis since the last appt (Y/N):  NO  Does  "patient have any interval changes to diet or medications since last appt (Y/N):  NO  Are there any complications or cost restrictions with current therapy (Y/N):  NO     Does patient have [facility] PCP? YES, Dr Bong Quick (If not, please document discussion that patient must be seen at [facility])       Vitals:  BP 97/72  HR 75    Weight  Scale unavailable   Height   5' 3\"     Asssessment:      INR supratherapeutic at 3.5, therefore increasing patient's risk of bleeding complications.   Reason(s) for out of range INR today:  Etiology unknown.      Plan:  Instructed patient to decrease today's dose to 2.5mg, then resume current warfarin regimen in order to bring INR to therapeutic range.    She had ultrasound and CT done in the last few weeks, will be meeting with Dr. Jara this week to review and possibly determine if anticoagulation should continue.     Follow up:  Because warfarin is a high risk medication and current CHEST guidelines recommend regular monitoring intervals (few days up to 12 weeks), will have patient return to clinic in 2 weeks to recheck INR.    Fred Nayak, PharmD, BCACP    "

## 2019-08-27 ENCOUNTER — ANTICOAGULATION VISIT (OUTPATIENT)
Dept: MEDICAL GROUP | Facility: PHYSICIAN GROUP | Age: 84
End: 2019-08-27
Payer: MEDICARE

## 2019-08-27 VITALS — SYSTOLIC BLOOD PRESSURE: 111 MMHG | DIASTOLIC BLOOD PRESSURE: 58 MMHG | HEART RATE: 81 BPM

## 2019-08-27 DIAGNOSIS — Z79.01 CHRONIC ANTICOAGULATION: Primary | ICD-10-CM

## 2019-08-27 DIAGNOSIS — I73.9 PAD (PERIPHERAL ARTERY DISEASE) (HCC): ICD-10-CM

## 2019-08-27 LAB — INR PPP: 2.4 (ref 2–3.5)

## 2019-08-27 PROCEDURE — 93793 ANTICOAG MGMT PT WARFARIN: CPT | Performed by: FAMILY MEDICINE

## 2019-08-27 PROCEDURE — 85610 PROTHROMBIN TIME: CPT | Performed by: FAMILY MEDICINE

## 2019-08-27 NOTE — PROGRESS NOTES
Anticoagulation Summary  As of 2019    INR goal:   2.0-3.0   TTR:   60.2 % (6.9 mo)   INR used for dosin.40 (2019)   Warfarin maintenance plan:   2.5 mg (2.5 mg x 1) every Mon; 5 mg (2.5 mg x 2) all other days   Weekly warfarin total:   32.5 mg   Plan last modified:   Fred Nayak, PharmD (3/26/2019)   Next INR check:   2019   Target end date:   Indefinite    Indications    PAD (peripheral artery disease) (HCC) [I73.9]             Anticoagulation Episode Summary     INR check location:       Preferred lab:       Send INR reminders to:       Comments:   Indefinite therapy per Dr Jara      Anticoagulation Care Providers     Provider Role Specialty Phone number    Bong Quick M.D. Referring Internal Medicine [phone]    [facility] Responsible  [phone]        Anticoagulation Patient Findings  Patient Findings     Negatives:   Signs/symptoms of thrombosis, Signs/symptoms of bleeding, Laboratory test error suspected, Change in health, Change in alcohol use, Change in activity, Upcoming invasive procedure, Emergency department visit, Upcoming dental procedure, Missed doses, Extra doses, Change in medications, Change in diet/appetite, Hospital admission, Bruising, Other complaints        HPI:   Radha Verduzco seen in clinic today, on anticoagulation therapy with warfarin for blood clot prevention 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    Patient's previous INR was supratherapeutic at 3.5 on 19, at which time patient was instructed to continue with current warfarin regimen.  Sh returns to clinic today to recheck INR to ensure it is therapeutic and thus preventing possible clotting and/or bleeding/bruising complications.    CHADS-VASc = n/a  (unadjusted ischemic stroke risk/year:  n/a)    Does patient have any changes to current medical/health status since last appt (Y/N):  NO  Does patient have any signs/symptoms of bleeding and/or  "thrombosis since the last appt (Y/N):  NO  Does patient have any interval changes to diet or medications since last appt (Y/N):  NO  Are there any complications or cost restrictions with current therapy (Y/N):  NO     Does patient have [facility] PCP? YES, Dr Bong Quick (If not, please document discussion that patient must be seen at [facility])       Vitals:  /58  HR 81    Weight  Scale unavailable    Height   5' 3\"     Asssessment:      INR therapeutic at 2.4, therefore decreasing patient's risk of blood clots and/or bleeding complications.   Reason(s) for out of range INR today:  n/a      Plan:  Pt is to continue with current warfarin dosing regimen in order to maintain INR in therapeutic range.    Patient states that after meeting with Dr. Jara last week, it has been decided that she remain on indefinite therapy with warfarin.       Follow up:  Because warfarin is a high risk medication and current CHEST guidelines recommend regular monitoring intervals (few days up to 12 weeks), will have patient return to clinic in 3 weeks to recheck INR.    Fred Nayak, PharmD, BCACP    "

## 2019-08-29 ENCOUNTER — OFFICE VISIT (OUTPATIENT)
Dept: MEDICAL GROUP | Facility: PHYSICIAN GROUP | Age: 84
End: 2019-08-29
Payer: MEDICARE

## 2019-08-29 VITALS
SYSTOLIC BLOOD PRESSURE: 110 MMHG | RESPIRATION RATE: 16 BRPM | BODY MASS INDEX: 29.27 KG/M2 | WEIGHT: 155 LBS | DIASTOLIC BLOOD PRESSURE: 70 MMHG | HEIGHT: 61 IN | TEMPERATURE: 98.4 F | OXYGEN SATURATION: 94 % | HEART RATE: 74 BPM

## 2019-08-29 DIAGNOSIS — Z76.0 MEDICATION REFILL: ICD-10-CM

## 2019-08-29 DIAGNOSIS — I10 ESSENTIAL HYPERTENSION: ICD-10-CM

## 2019-08-29 DIAGNOSIS — E78.5 HYPERLIPIDEMIA, UNSPECIFIED HYPERLIPIDEMIA TYPE: ICD-10-CM

## 2019-08-29 DIAGNOSIS — E11.9 TYPE 2 DIABETES MELLITUS WITHOUT COMPLICATION, WITHOUT LONG-TERM CURRENT USE OF INSULIN (HCC): ICD-10-CM

## 2019-08-29 PROCEDURE — 99214 OFFICE O/P EST MOD 30 MIN: CPT | Performed by: INTERNAL MEDICINE

## 2019-08-29 RX ORDER — LISINOPRIL 40 MG/1
40 TABLET ORAL DAILY
Qty: 90 TAB | Refills: 3 | Status: SHIPPED | OUTPATIENT
Start: 2019-08-29 | End: 2020-09-25 | Stop reason: SDUPTHER

## 2019-08-29 RX ORDER — TRIAMTERENE AND HYDROCHLOROTHIAZIDE 37.5; 25 MG/1; MG/1
1 TABLET ORAL DAILY
Qty: 90 TAB | Refills: 3 | Status: SHIPPED | OUTPATIENT
Start: 2019-08-29 | End: 2020-09-25 | Stop reason: SDUPTHER

## 2019-08-29 RX ORDER — ROSUVASTATIN CALCIUM 5 MG/1
5 TABLET, COATED ORAL DAILY
Qty: 90 TAB | Refills: 3 | Status: SHIPPED | OUTPATIENT
Start: 2019-08-29 | End: 2020-09-25 | Stop reason: SDUPTHER

## 2019-08-29 RX ORDER — AMLODIPINE BESYLATE 5 MG/1
5 TABLET ORAL DAILY
Qty: 90 TAB | Refills: 3 | Status: SHIPPED | OUTPATIENT
Start: 2019-08-29 | End: 2020-09-25 | Stop reason: SDUPTHER

## 2019-08-29 NOTE — PROGRESS NOTES
PRIMARY CARE CLINIC FOLLOW UP VISIT  Chief Complaint   Patient presents with   • Other     peripheral artery disease   • Hypertension   • Hyperlipidemia   • Diabetes Mellitus     History of Present Illness     Type 2 diabetes mellitus with kidney complication, without long-term current use of insulin (HCC)  Well controlled, diet controlled. Due for labs 12/2019.     Hypertension  Needs refill of her blood pressure medications, doing well on this regimen.     Hyperlipidemia  Stable on crestor 5 mg daily, needs a refill.     Current Outpatient Medications   Medication Sig Dispense Refill   • triamterene-hctz (MAXZIDE-25/DYAZIDE) 37.5-25 MG Tab Take 1 Tab by mouth every day. 90 Tab 3   • rosuvastatin (CRESTOR) 5 MG Tab Take 1 Tab by mouth every day. TAKE ONE TABLET BY MOUTH DAILY 90 Tab 3   • lisinopril (PRINIVIL, ZESTRIL) 40 MG tablet Take 1 Tab by mouth every day. TAKE ONE TABLET BY MOUTH DAILY 90 Tab 3   • amLODIPine (NORVASC) 5 MG Tab Take 1 Tab by mouth every day. TAKE ONE TABLET BY MOUTH DAILY 90 Tab 3   • warfarin (COUMADIN) 2.5 MG Tab Take one to two tablets by mouth one time daily or as directed by coumadin clinic 180 Tab 1   • enoxaparin (LOVENOX) 60 MG/0.6ML Solution inj Inject 60 mg as instructed every 12 hours. 10 Syringe 1   • docusate sodium (COLACE) 100 MG Cap Take 1 Cap by mouth 2 times a day. 15 Cap 3   • prednisoLONE acetate (PRED FORTE) 1 % Suspension Place 1 Drop in both eyes every day.     • glucosamine 500 MG CAPS Take 500 mg by mouth 3 times a day.         No current facility-administered medications for this visit.      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   • Diabetes (HCC) 4/19/2018   • GOUT 1/31/2011   • History of skin cancer 2/24/2016   • Hyperlipidemia 8/2/2010   • Hypertension 8/2/2010   • PAD (peripheral artery disease) 8/2/2010   • Pre-diabetes 8/2/2010     Past Surgical History:   Procedure Laterality Date   •  "INCISION AND DRAINAGE GENERAL Left 2019    Procedure: INCISION AND DRAINAGE GENERAL- GROIN WOUND WITH WOUND VAC PLACEMENT;  Surgeon: Garett Kohli M.D.;  Location: SURGERY [facility];  Service: Vascular   • THROMBECTOMY  2018    Procedure: THROMBECTOMY;  Surgeon: Garett Kohli M.D.;  Location: SURGERY [facility];  Service: Vascular   • AORTOFEMORAL BYPASS     • CAROTID ENDARTERECTOMY      right   • CATARACT PHACO WITH IOL     • NEUROMA EXCISION      in  from foot   • TOENAIL REMOVAL      in ,      Social History     Tobacco Use   • Smoking status: Former Smoker     Packs/day: 2.00     Years: 12.00     Pack years: 24.00     Types: Cigarettes     Last attempt to quit: 1985     Years since quittin.6   • Smokeless tobacco: Never Used   Substance Use Topics   • Alcohol use: Yes     Alcohol/week: 0.0 oz     Comment: rarely maybe one a year   • Drug use: No     Social History     Social History Narrative    Lives with her son      Family History   Problem Relation Age of Onset   • Lung Disease Mother         TB   • Stroke Father      Family Status   Relation Name Status   • Mo     • Fa       Allergies: Anesthetic [benzocaine]; Iodine; and Other drug    ROS  As per HPI above. All other systems reviewed and negative.        Objective   /70   Pulse 74   Temp 36.9 °C (98.4 °F)   Resp 16   Ht 1.549 m (5' 0.98\")   Wt 70.3 kg (155 lb)   SpO2 94%  Body mass index is 29.31 kg/m².    General: alert and oriented, pleasant, cooperative  HEENT: Normocephalic, atraumatic.   Monofilament testing with a 10 gram force: sensation intact: intact bilaterally  Visual Inspection: Feet without maceration, ulcers, fissures.  Skin: warm and dry, no lesions or rashes  Psychiatric: appropriate mood and affect. Good insight and appropriate judgment     Assessment and Plan   The following treatment plan was discussed     1. Medication refill    - rosuvastatin (CRESTOR) 5 MG " Tab; Take 1 Tab by mouth every day. TAKE ONE TABLET BY MOUTH DAILY  Dispense: 90 Tab; Refill: 3    2. Hyperlipidemia, unspecified hyperlipidemia type    - rosuvastatin (CRESTOR) 5 MG Tab; Take 1 Tab by mouth every day. TAKE ONE TABLET BY MOUTH DAILY  Dispense: 90 Tab; Refill: 3  - MICROALBUMIN CREAT RATIO URINE; Future    3. Type 2 diabetes mellitus without complication, without long-term current use of insulin (HCC)  Well controlled, advised to follow up for eye exam acnnually.   - Comp Metabolic Panel; Future  - CBC WITH DIFFERENTIAL; Future  - Lipid Profile; Future  - HEMOGLOBIN A1C; Future  - Diabetic Monofilament LE Exam    4. Essential hypertension  Well controlled, refills sent to pharmacy.     Healthcare maintenance     Health Maintenance Due   Topic Date Due   • RETINAL SCREENING  03/29/1953   • URINE ACR / MICROALBUMIN  03/29/1953   • IMM HEP B VACCINE (1 of 3 - Risk 3-dose series) 03/29/1954   • IMM DTaP/Tdap/Td Vaccine (1 - Tdap) 03/29/1954   • Annual Wellness Visit  12/19/2014   • A1C SCREENING  06/23/2019   • IMM INFLUENZA (1) 09/01/2019       No follow-ups on file.    Bong Quick MD  Internal Medicine  [facility]

## 2019-09-17 ENCOUNTER — ANTICOAGULATION VISIT (OUTPATIENT)
Dept: MEDICAL GROUP | Facility: PHYSICIAN GROUP | Age: 84
End: 2019-09-17
Payer: MEDICARE

## 2019-09-17 VITALS — HEART RATE: 84 BPM | SYSTOLIC BLOOD PRESSURE: 106 MMHG | DIASTOLIC BLOOD PRESSURE: 62 MMHG

## 2019-09-17 DIAGNOSIS — I73.9 PAD (PERIPHERAL ARTERY DISEASE) (HCC): ICD-10-CM

## 2019-09-17 LAB — INR PPP: 3.8 (ref 2–3.5)

## 2019-09-17 PROCEDURE — 93793 ANTICOAG MGMT PT WARFARIN: CPT | Performed by: FAMILY MEDICINE

## 2019-09-17 PROCEDURE — 85610 PROTHROMBIN TIME: CPT | Performed by: FAMILY MEDICINE

## 2019-09-17 NOTE — PROGRESS NOTES
Anticoagulation Summary  As of 9/17/2019    INR goal:   2.0-3.0   TTR:   58.6 % (7.6 mo)   INR used for dosing:   3.80! (9/17/2019)   Warfarin maintenance plan:   2.5 mg (2.5 mg x 1) every Mon; 5 mg (2.5 mg x 2) all other days   Weekly warfarin total:   32.5 mg   Plan last modified:   Fred Nayak, PharmD (3/26/2019)   Next INR check:   10/1/2019   Target end date:   Indefinite    Indications    PAD (peripheral artery disease) (HCC) [I73.9]             Anticoagulation Episode Summary     INR check location:       Preferred lab:       Send INR reminders to:       Comments:   Indefinite therapy per Dr Jara      Anticoagulation Care Providers     Provider Role Specialty Phone number    Bong Quick M.D. Referring Internal Medicine [phone]    [facility] Responsible  [phone]        Anticoagulation Patient Findings  Patient Findings     Negatives:   Signs/symptoms of thrombosis, Signs/symptoms of bleeding, Laboratory test error suspected, Change in health, Change in alcohol use, Change in activity, Upcoming invasive procedure, Emergency department visit, Upcoming dental procedure, Missed doses, Extra doses, Change in medications, Change in diet/appetite, Hospital admission, Bruising, Other complaints        HPI:   Radha Verduzco seen in clinic today, on anticoagulation therapy with warfarin for blood clot prevention 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    Patient's previous INR was therapeutic at 2.4 on 8-27-19, at which time patient was instructed to continue with current warfarin regimen.  She returns to clinic today to recheck INR to ensure it is therapeutic and thus preventing possible clotting and/or bleeding/bruising complications.    CHADS-VASc = n/a  (unadjusted ischemic stroke risk/year:  n/a)    Does patient have any changes to current medical/health status since last appt (Y/N):  NO  Does patient have any signs/symptoms of bleeding and/or  thrombosis since the last appt (Y/N):  NO  Does patient have any interval changes to diet or medications since last appt (Y/N):  NO  Are there any complications or cost restrictions with current therapy (Y/N):  NO     Does patient have [facility] PCP? YES, Dr Bong Quick (If not, please document discussion that patient must be seen at [facility])       Vitals:      Vitals:    09/17/19 1129   BP: 106/62   BP Location: Left arm   Patient Position: Sitting   BP Cuff Size: Adult   Pulse: 84        Asssessment:      INR supratherapeutic at 3.8, therefore increasing patient's risk of bleeding complications.   Reason(s) for out of range INR today:  Dose too high.    Plan:  Instructed patient to HOLD X 1, then decrease weekly warfarin regimen by ~7% as detailed above in order to bring INR to therapeutic range.     Follow up:  Because warfarin is a high risk medication and current CHEST guidelines recommend regular monitoring intervals (few days up to 12 weeks), will have patient return to clinic in 2 weeks to recheck INR.    Fred Nayak, PharmD, BCACP

## 2019-10-01 ENCOUNTER — ANTICOAGULATION VISIT (OUTPATIENT)
Dept: MEDICAL GROUP | Facility: PHYSICIAN GROUP | Age: 84
End: 2019-10-01
Payer: MEDICARE

## 2019-10-01 DIAGNOSIS — Z79.01 CHRONIC ANTICOAGULATION: Primary | ICD-10-CM

## 2019-10-01 DIAGNOSIS — I73.9 PAD (PERIPHERAL ARTERY DISEASE) (HCC): ICD-10-CM

## 2019-10-01 LAB — INR PPP: 3.4 (ref 2–3.5)

## 2019-10-01 PROCEDURE — 99211 OFF/OP EST MAY X REQ PHY/QHP: CPT | Performed by: FAMILY MEDICINE

## 2019-10-01 PROCEDURE — 85610 PROTHROMBIN TIME: CPT | Performed by: FAMILY MEDICINE

## 2019-10-01 NOTE — PROGRESS NOTES
Anticoagulation Summary  As of 10/1/2019    INR goal:   2.0-3.0   TTR:   55.2 % (8.1 mo)   INR used for dosing:   3.40! (10/1/2019)   Warfarin maintenance plan:   2.5 mg (2.5 mg x 1) every Mon, Wed, Fri; 5 mg (2.5 mg x 2) all other days   Weekly warfarin total:   27.5 mg   Plan last modified:   Fred Nayak, PharmD (10/1/2019)   Next INR check:   10/15/2019   Target end date:   Indefinite    Indications    PAD (peripheral artery disease) ([facility]) [I73.9]             Anticoagulation Episode Summary     INR check location:       Preferred lab:       Send INR reminders to:       Comments:   Indefinite therapy per Dr Jara      Anticoagulation Care Providers     Provider Role Specialty Phone number    Bong Quick M.D. Referring Internal Medicine [phone]    [facility] Anticoagulation Services Responsible  [phone]        Anticoagulation Patient Findings  Patient Findings     Negatives:   Signs/symptoms of thrombosis, Signs/symptoms of bleeding, Laboratory test error suspected, Change in health, Change in alcohol use, Change in activity, Upcoming invasive procedure, Emergency department visit, Upcoming dental procedure, Missed doses, Extra doses, Change in medications, Change in diet/appetite, Hospital admission, Bruising, Other complaints        HPI:   Radha Verduzco seen in clinic today, on anticoagulation therapy with warfarin for blood clot prevention 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    Patient's previous INR was supratherapeutic at 3.8 on 9-17-19, at which time patient was instructed to hold one dose, then decrease weekly warfarin regimen.  She returns to clinic today to recheck INR to ensure it is therapeutic and thus preventing possible clotting and/or bleeding/bruising complications.    CHADS-VASc = n/a  (unadjusted ischemic stroke risk/year:  n/a)    Does patient have any changes to current medical/health status since last appt (Y/N):  NO  Does patient have any  signs/symptoms of bleeding and/or thrombosis since the last appt (Y/N):  NO  Does patient have any interval changes to diet or medications since last appt (Y/N):  NO  Are there any complications or cost restrictions with current therapy (Y/N):  NO     Does patient have [facility] PCP? YES, Dr Bong uQick (If not, please document discussion that patient must be seen at [facility])       Vitals:  declined at today's visit    There were no vitals filed for this visit.     Asssessment:      INR remains supratherapeutic at 3.4, therefore increasing patient's risk of bleeding complications.   Reason(s) for out of range INR today:  Dose too high.      Plan:  Instructed patient to decrease weekly warfarin regimen a further 8% as detailed above in order to bring INR to therapeutic range.     Follow up:  Because warfarin is a high risk medication and current CHEST guidelines recommend regular monitoring intervals (few days up to 12 weeks), will have patient return to clinic in 2 weeks to recheck INR.    Fred Nayak, PharmD, BCACP

## 2019-10-15 ENCOUNTER — ANTICOAGULATION VISIT (OUTPATIENT)
Dept: MEDICAL GROUP | Facility: PHYSICIAN GROUP | Age: 84
End: 2019-10-15
Payer: MEDICARE

## 2019-10-15 DIAGNOSIS — I73.9 PAD (PERIPHERAL ARTERY DISEASE) (HCC): ICD-10-CM

## 2019-10-15 LAB — INR PPP: 2.9 (ref 2–3.5)

## 2019-10-15 PROCEDURE — 85610 PROTHROMBIN TIME: CPT | Performed by: FAMILY MEDICINE

## 2019-10-15 PROCEDURE — 93793 ANTICOAG MGMT PT WARFARIN: CPT | Performed by: FAMILY MEDICINE

## 2019-10-15 RX ORDER — WARFARIN SODIUM 2.5 MG/1
TABLET ORAL
Qty: 180 TAB | Refills: 1 | Status: SHIPPED | OUTPATIENT
Start: 2019-10-15 | End: 2020-06-02

## 2019-10-15 NOTE — PROGRESS NOTES
Anticoagulation Summary  As of 10/15/2019    INR goal:   2.0-3.0   TTR:   53.3 % (8.5 mo)   INR used for dosin.90 (10/15/2019)   Warfarin maintenance plan:   2.5 mg (2.5 mg x 1) every Mon, Wed, Fri; 5 mg (2.5 mg x 2) all other days   Weekly warfarin total:   27.5 mg   Plan last modified:   Fred Nayak, PharmD (10/1/2019)   Next INR check:   10/29/2019   Target end date:   Indefinite    Indications    PAD (peripheral artery disease) ([facility]) [I73.9]             Anticoagulation Episode Summary     INR check location:       Preferred lab:       Send INR reminders to:       Comments:   Indefinite therapy per Dr Jara      Anticoagulation Care Providers     Provider Role Specialty Phone number    Bong Quick M.D. Referring Internal Medicine [phone]    [facility] Anticoagulation Services Responsible  [phone]        Anticoagulation Patient Findings  Patient Findings     Negatives:   Signs/symptoms of thrombosis, Signs/symptoms of bleeding, Laboratory test error suspected, Change in health, Change in alcohol use, Change in activity, Upcoming invasive procedure, Emergency department visit, Upcoming dental procedure, Missed doses, Extra doses, Change in medications, Change in diet/appetite, Hospital admission, Bruising, Other complaints        HPI:   Radha Verduzco seen in clinic today, on anticoagulation therapy with warfarin for blood clot prevention 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    Patient's previous INR was supratherapeutic at 3.4 on 10-1-19, at which time patient was instructed to decrease one dose, then decrease weekly warfarin regimen.  She returns to clinic today to recheck INR to ensure it is therapeutic and thus preventing possible clotting and/or bleeding/bruising complications.    CHADS-VASc = n/a  (unadjusted ischemic stroke risk/year:  n/a)    Does patient have any changes to current medical/health status since last appt (Y/N):  NO  Does patient have any  signs/symptoms of bleeding and/or thrombosis since the last appt (Y/N):  NO  Does patient have any interval changes to diet or medications since last appt (Y/N):  NO  Are there any complications or cost restrictions with current therapy (Y/N):  NO     Does patient have [facility] PCP? YES, Dr Bong Quick  (If not, please document discussion that patient must be seen at [facility])       Vitals:  declined by patient at today's visit.    There were no vitals filed for this visit.     Asssessment:      INR therapeutic at 2.9, therefore decreasing patient's risk of blood clots and/or bleeding complications.   Reason(s) for out of range INR today:  n/a      Plan:  Pt is to continue with current warfarin dosing regimen in order to maintain INR in therapeutic range.     Follow up:  Because warfarin is a high risk medication and current CHEST guidelines recommend regular monitoring intervals (few days up to 12 weeks), will have patient return to clinic in 2 weeks to recheck INR.    Fred Nayak, PharmD, BCACP

## 2019-10-29 ENCOUNTER — ANTICOAGULATION VISIT (OUTPATIENT)
Dept: MEDICAL GROUP | Facility: PHYSICIAN GROUP | Age: 84
End: 2019-10-29
Payer: MEDICARE

## 2019-10-29 DIAGNOSIS — I73.9 PAD (PERIPHERAL ARTERY DISEASE) (HCC): ICD-10-CM

## 2019-10-29 LAB — INR PPP: 2.9 (ref 2–3.5)

## 2019-10-29 PROCEDURE — 85610 PROTHROMBIN TIME: CPT | Performed by: FAMILY MEDICINE

## 2019-10-29 NOTE — PROGRESS NOTES
Anticoagulation Summary  As of 10/29/2019    INR goal:   2.0-3.0   TTR:   55.7 % (9 mo)   INR used for dosin.90 (10/29/2019)   Warfarin maintenance plan:   2.5 mg (2.5 mg x 1) every Mon, Wed, Fri; 5 mg (2.5 mg x 2) all other days   Weekly warfarin total:   27.5 mg   Plan last modified:   Fred Nayak, PharmD (10/1/2019)   Next INR check:   2019   Target end date:   Indefinite    Indications    PAD (peripheral artery disease) (HCC) [I73.9]             Anticoagulation Episode Summary     INR check location:       Preferred lab:       Send INR reminders to:       Comments:   Indefinite therapy per Dr Jara      Anticoagulation Care Providers     Provider Role Specialty Phone number    Bong Quick M.D. Referring Internal Medicine [phone]    [facility] Anticoagulation Services Responsible  [phone]        Anticoagulation Patient Findings  Patient Findings     Negatives:   Signs/symptoms of thrombosis, Signs/symptoms of bleeding, Laboratory test error suspected, Change in health, Change in alcohol use, Change in activity, Upcoming invasive procedure, Emergency department visit, Upcoming dental procedure, Missed doses, Extra doses, Change in medications, Change in diet/appetite, Hospital admission, Bruising, Other complaints        HPI:   Radha Verduzco seen in clinic today, on anticoagulation therapy with warfarin for blood clot prevention 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    Patient's previous INR was therapeutic at 2.9 on 10-15-19, at which time patient was instructed to continue with current warfarin regimen.  She returns to clinic today to recheck INR to ensure it is therapeutic and thus preventing possible clotting and/or bleeding/bruising complications.    CHADS-VASc = n/a  (unadjusted ischemic stroke risk/year:  n/a)    Does patient have any changes to current medical/health status since last appt (Y/N):  NO  Does patient have any signs/symptoms of bleeding  and/or thrombosis since the last appt (Y/N):  NO  Does patient have any interval changes to diet or medications since last appt (Y/N):  NO  Are there any complications or cost restrictions with current therapy (Y/N):  NO     Does patient have [facility] PCP? YES, Dr Bong Quick, will be establishing with Dr Kathy Bolivar at follow up  (If not, please document discussion that patient must be seen at [facility])       Vitals:  declined by patient at today's visit.    There were no vitals filed for this visit.     Asssessment:      INR remains therapeutic at 2.9, therefore decreasing patient's risk of blood clots and/or bleeding complications.   Reason(s) for out of range INR today:  n/a      Plan:  Pt is to continue with current warfarin dosing regimen in order to maintain INR in therapeutic range.     Follow up:  Because warfarin is a high risk medication and current CHEST guidelines recommend regular monitoring intervals (few days up to 12 weeks), will have patient return to clinic in 3 weeks to recheck INR.    Fred Nayak, PharmD, BCACP

## 2019-11-19 ENCOUNTER — ANTICOAGULATION VISIT (OUTPATIENT)
Dept: MEDICAL GROUP | Facility: PHYSICIAN GROUP | Age: 84
End: 2019-11-19
Payer: MEDICARE

## 2019-11-19 DIAGNOSIS — I73.9 PAD (PERIPHERAL ARTERY DISEASE) (HCC): ICD-10-CM

## 2019-11-19 LAB — INR PPP: 3 (ref 2–3.5)

## 2019-11-19 PROCEDURE — 85610 PROTHROMBIN TIME: CPT | Performed by: FAMILY MEDICINE

## 2019-11-19 PROCEDURE — 93793 ANTICOAG MGMT PT WARFARIN: CPT | Performed by: FAMILY MEDICINE

## 2019-11-19 NOTE — PROGRESS NOTES
Anticoagulation Summary  As of 11/19/2019    INR goal:   2.0-3.0   TTR:   58.9 % (9.7 mo)   INR used for dosing:   3.00 (11/19/2019)   Warfarin maintenance plan:   2.5 mg (2.5 mg x 1) every Mon, Wed, Fri; 5 mg (2.5 mg x 2) all other days   Weekly warfarin total:   27.5 mg   Plan last modified:   Fred Nayak, PharmD (10/1/2019)   Next INR check:   12/17/2019   Target end date:   Indefinite    Indications    PAD (peripheral artery disease) ([facility]) [I73.9]             Anticoagulation Episode Summary     INR check location:       Preferred lab:       Send INR reminders to:       Comments:   Indefinite therapy per Dr Jara      Anticoagulation Care Providers     Provider Role Specialty Phone number    Bong Quick M.D. Referring Internal Medicine [phone]    [facility] Anticoagulation Services Responsible  [phone]        Anticoagulation Patient Findings  Patient Findings     Negatives:   Signs/symptoms of thrombosis, Signs/symptoms of bleeding, Laboratory test error suspected, Change in health, Change in alcohol use, Change in activity, Upcoming invasive procedure, Emergency department visit, Upcoming dental procedure, Missed doses, Extra doses, Change in medications, Change in diet/appetite, Hospital admission, Bruising, Other complaints        HPI:   Radha Verduzco seen in clinic today, on anticoagulation therapy with warfarin for blood clot prevention 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    Patient's previous INR was therapeutic at 2.9 on 10-29-19, at which time patient was instructed to continue with current warfarin regimen.  She returns to clinic today to recheck INR to ensure it is therapeutic and thus preventing possible clotting and/or bleeding/bruising complications.    CHADS-VASc = n/a  (unadjusted ischemic stroke risk/year:  n/a)    Does patient have any changes to current medical/health status since last appt (Y/N):  NO  Does patient have any signs/symptoms of bleeding  and/or thrombosis since the last appt (Y/N):  NO  Does patient have any interval changes to diet or medications since last appt (Y/N):  NO  Are there any complications or cost restrictions with current therapy (Y/N):  NO     Does patient have [facility] PCP? YES, Dr. Kathy Bolivar (If not, please document discussion that patient must be seen at [facility])       Vitals:  declined by patient at today's visit.    There were no vitals filed for this visit.     Asssessment:      INR remains therapeutic at 3.0, therefore decreasing patient's risk of blood clots and/or bleeding complications.   Reason(s) for out of range INR today:  n/a      Plan:  Pt is to continue with current warfarin dosing regimen in order to maintain INR in therapeutic range.     Follow up:  Because warfarin is a high risk medication and current CHEST guidelines recommend regular monitoring intervals (few days up to 12 weeks), will have patient return to clinic in 4 weeks to recheck INR.    Fred Nayak, PharmD, BCACP

## 2019-12-09 ENCOUNTER — HOSPITAL ENCOUNTER (OUTPATIENT)
Dept: LAB | Facility: MEDICAL CENTER | Age: 84
End: 2019-12-09
Attending: INTERNAL MEDICINE
Payer: MEDICARE

## 2019-12-09 DIAGNOSIS — E78.5 HYPERLIPIDEMIA, UNSPECIFIED HYPERLIPIDEMIA TYPE: ICD-10-CM

## 2019-12-09 DIAGNOSIS — E11.9 TYPE 2 DIABETES MELLITUS WITHOUT COMPLICATION, WITHOUT LONG-TERM CURRENT USE OF INSULIN (HCC): ICD-10-CM

## 2019-12-09 LAB
ALBUMIN SERPL BCP-MCNC: 4.3 G/DL (ref 3.2–4.9)
ALBUMIN/GLOB SERPL: 1.7 G/DL
ALP SERPL-CCNC: 50 U/L (ref 30–99)
ALT SERPL-CCNC: 12 U/L (ref 2–50)
ANION GAP SERPL CALC-SCNC: 9 MMOL/L (ref 0–11.9)
AST SERPL-CCNC: 19 U/L (ref 12–45)
BASOPHILS # BLD AUTO: 0.8 % (ref 0–1.8)
BASOPHILS # BLD: 0.06 K/UL (ref 0–0.12)
BILIRUB SERPL-MCNC: 0.3 MG/DL (ref 0.1–1.5)
BUN SERPL-MCNC: 29 MG/DL (ref 8–22)
CALCIUM SERPL-MCNC: 9.3 MG/DL (ref 8.5–10.5)
CHLORIDE SERPL-SCNC: 109 MMOL/L (ref 96–112)
CHOLEST SERPL-MCNC: 111 MG/DL (ref 100–199)
CO2 SERPL-SCNC: 20 MMOL/L (ref 20–33)
CREAT SERPL-MCNC: 1.32 MG/DL (ref 0.5–1.4)
CREAT UR-MCNC: 118.6 MG/DL
EOSINOPHIL # BLD AUTO: 0.19 K/UL (ref 0–0.51)
EOSINOPHIL NFR BLD: 2.5 % (ref 0–6.9)
ERYTHROCYTE [DISTWIDTH] IN BLOOD BY AUTOMATED COUNT: 52.4 FL (ref 35.9–50)
FASTING STATUS PATIENT QL REPORTED: NORMAL
GLOBULIN SER CALC-MCNC: 2.6 G/DL (ref 1.9–3.5)
GLUCOSE SERPL-MCNC: 121 MG/DL (ref 65–99)
HCT VFR BLD AUTO: 44.6 % (ref 37–47)
HDLC SERPL-MCNC: 32 MG/DL
HGB BLD-MCNC: 13.9 G/DL (ref 12–16)
IMM GRANULOCYTES # BLD AUTO: 0.02 K/UL (ref 0–0.11)
IMM GRANULOCYTES NFR BLD AUTO: 0.3 % (ref 0–0.9)
LDLC SERPL CALC-MCNC: 55 MG/DL
LYMPHOCYTES # BLD AUTO: 1.4 K/UL (ref 1–4.8)
LYMPHOCYTES NFR BLD: 18.3 % (ref 22–41)
MCH RBC QN AUTO: 28.2 PG (ref 27–33)
MCHC RBC AUTO-ENTMCNC: 31.2 G/DL (ref 33.6–35)
MCV RBC AUTO: 90.5 FL (ref 81.4–97.8)
MICROALBUMIN UR-MCNC: 2.3 MG/DL
MICROALBUMIN/CREAT UR: 19 MG/G (ref 0–30)
MONOCYTES # BLD AUTO: 0.63 K/UL (ref 0–0.85)
MONOCYTES NFR BLD AUTO: 8.2 % (ref 0–13.4)
NEUTROPHILS # BLD AUTO: 5.36 K/UL (ref 2–7.15)
NEUTROPHILS NFR BLD: 69.9 % (ref 44–72)
NRBC # BLD AUTO: 0 K/UL
NRBC BLD-RTO: 0 /100 WBC
PLATELET # BLD AUTO: 279 K/UL (ref 164–446)
PMV BLD AUTO: 9.6 FL (ref 9–12.9)
POTASSIUM SERPL-SCNC: 4.6 MMOL/L (ref 3.6–5.5)
PROT SERPL-MCNC: 6.9 G/DL (ref 6–8.2)
RBC # BLD AUTO: 4.93 M/UL (ref 4.2–5.4)
SODIUM SERPL-SCNC: 138 MMOL/L (ref 135–145)
TRIGL SERPL-MCNC: 118 MG/DL (ref 0–149)
WBC # BLD AUTO: 7.7 K/UL (ref 4.8–10.8)

## 2019-12-09 PROCEDURE — 82043 UR ALBUMIN QUANTITATIVE: CPT

## 2019-12-09 PROCEDURE — 85025 COMPLETE CBC W/AUTO DIFF WBC: CPT

## 2019-12-09 PROCEDURE — 82570 ASSAY OF URINE CREATININE: CPT

## 2019-12-09 PROCEDURE — 36415 COLL VENOUS BLD VENIPUNCTURE: CPT

## 2019-12-09 PROCEDURE — 80061 LIPID PANEL: CPT

## 2019-12-09 PROCEDURE — 80053 COMPREHEN METABOLIC PANEL: CPT

## 2019-12-17 ENCOUNTER — ANTICOAGULATION VISIT (OUTPATIENT)
Dept: MEDICAL GROUP | Facility: PHYSICIAN GROUP | Age: 84
End: 2019-12-17
Payer: MEDICARE

## 2019-12-17 VITALS — DIASTOLIC BLOOD PRESSURE: 66 MMHG | SYSTOLIC BLOOD PRESSURE: 134 MMHG | HEART RATE: 83 BPM

## 2019-12-17 DIAGNOSIS — I73.9 PAD (PERIPHERAL ARTERY DISEASE) (HCC): ICD-10-CM

## 2019-12-17 LAB — INR PPP: 2.1 (ref 2–3.5)

## 2019-12-17 PROCEDURE — 93793 ANTICOAG MGMT PT WARFARIN: CPT | Performed by: FAMILY MEDICINE

## 2019-12-17 PROCEDURE — 85610 PROTHROMBIN TIME: CPT | Performed by: FAMILY MEDICINE

## 2019-12-17 NOTE — PROGRESS NOTES
Anticoagulation Summary  As of 2019    INR goal:   2.0-3.0   TTR:   62.5 % (10.6 mo)   INR used for dosin.10 (2019)   Warfarin maintenance plan:   2.5 mg (2.5 mg x 1) every Mon, Wed, Fri; 5 mg (2.5 mg x 2) all other days   Weekly warfarin total:   27.5 mg   Plan last modified:   Fred Nayak, PharmD (10/1/2019)   Next INR check:   2020   Target end date:   Indefinite    Indications    PAD (peripheral artery disease) ([facility]) [I73.9]             Anticoagulation Episode Summary     INR check location:       Preferred lab:       Send INR reminders to:       Comments:   Indefinite therapy per Dr Jara      Anticoagulation Care Providers     Provider Role Specialty Phone number    Bong Quick M.D. Referring Internal Medicine [phone]    [facility] Anticoagulation Services Responsible  [phone]        Anticoagulation Patient Findings  Patient Findings     Negatives:   Signs/symptoms of thrombosis, Signs/symptoms of bleeding, Laboratory test error suspected, Change in health, Change in alcohol use, Change in activity, Upcoming invasive procedure, Emergency department visit, Upcoming dental procedure, Missed doses, Extra doses, Change in medications, Change in diet/appetite, Hospital admission, Bruising, Other complaints        HPI:   Radha Verduzco seen in clinic today, on anticoagulation therapy with warfarin for blood clot prevention 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    Patient's previous INR was therapeutic at 3.0 on 19, at which time patient was instructed to continue with current warfarin regimen.  She returns to clinic today to recheck INR to ensure it is therapeutic and thus preventing possible clotting and/or bleeding/bruising complications.    CHADS-VASc = n/a  (unadjusted ischemic stroke risk/year:  n/a)    Does patient have any changes to current medical/health status since last appt (Y/N):  NO  Does patient have any signs/symptoms of bleeding  and/or thrombosis since the last appt (Y/N):  NO  Does patient have any interval changes to diet or medications since last appt (Y/N):  NO  Are there any complications or cost restrictions with current therapy (Y/N):  NO     Does patient have [facility] PCP? YES, Dr Kathy Bolivar (If not, please document discussion that patient must be seen at [facility])       Vitals:      Vitals:    12/17/19 1056   BP: 134/66   BP Location: Left arm   Patient Position: Sitting   BP Cuff Size: Adult   Pulse: 83        Asssessment:      INR remains therapeutic at 2.1, therefore decreasing patient's risk of blood clots and/or bleeding complications.   Reason(s) for out of range INR today:  n/a      Plan:  Pt is to continue with current warfarin dosing regimen in order to maintain INR in therapeutic range.     Follow up:  Because warfarin is a high risk medication and current CHEST guidelines recommend regular monitoring intervals (few days up to 12 weeks), will have patient return to clinic in 6 weeks to recheck INR.    Fred Nayak, PharmD, BCACP

## 2020-01-10 DIAGNOSIS — Z79.01 CHRONIC ANTICOAGULATION: ICD-10-CM

## 2020-01-23 ENCOUNTER — OFFICE VISIT (OUTPATIENT)
Dept: MEDICAL GROUP | Facility: PHYSICIAN GROUP | Age: 85
End: 2020-01-23
Payer: MEDICARE

## 2020-01-23 VITALS
DIASTOLIC BLOOD PRESSURE: 66 MMHG | SYSTOLIC BLOOD PRESSURE: 126 MMHG | OXYGEN SATURATION: 93 % | BODY MASS INDEX: 31.22 KG/M2 | WEIGHT: 159 LBS | RESPIRATION RATE: 12 BRPM | HEIGHT: 60 IN | TEMPERATURE: 97.2 F | HEART RATE: 90 BPM

## 2020-01-23 DIAGNOSIS — Z76.89 ENCOUNTER TO ESTABLISH CARE: ICD-10-CM

## 2020-01-23 DIAGNOSIS — E78.5 HYPERLIPIDEMIA, UNSPECIFIED HYPERLIPIDEMIA TYPE: ICD-10-CM

## 2020-01-23 DIAGNOSIS — Z23 NEED FOR VACCINATION: ICD-10-CM

## 2020-01-23 DIAGNOSIS — I77.9 CAROTID ARTERY DISEASE, UNSPECIFIED LATERALITY, UNSPECIFIED TYPE (HCC): ICD-10-CM

## 2020-01-23 DIAGNOSIS — I73.9 PAD (PERIPHERAL ARTERY DISEASE) (HCC): ICD-10-CM

## 2020-01-23 DIAGNOSIS — R73.09 ELEVATED HEMOGLOBIN A1C: ICD-10-CM

## 2020-01-23 DIAGNOSIS — Z85.828 HISTORY OF SKIN CANCER: ICD-10-CM

## 2020-01-23 DIAGNOSIS — E11.22 TYPE 2 DIABETES MELLITUS WITH STAGE 3 CHRONIC KIDNEY DISEASE, WITHOUT LONG-TERM CURRENT USE OF INSULIN (HCC): ICD-10-CM

## 2020-01-23 DIAGNOSIS — N18.30 TYPE 2 DIABETES MELLITUS WITH STAGE 3 CHRONIC KIDNEY DISEASE, WITHOUT LONG-TERM CURRENT USE OF INSULIN (HCC): ICD-10-CM

## 2020-01-23 PROBLEM — E83.51 HYPOCALCEMIA: Status: RESOLVED | Noted: 2018-12-20 | Resolved: 2020-01-23

## 2020-01-23 PROBLEM — D64.9 ANEMIA: Status: RESOLVED | Noted: 2018-12-20 | Resolved: 2020-01-23

## 2020-01-23 LAB
HBA1C MFR BLD: 6.5 % (ref 0–5.6)
INT CON NEG: NEGATIVE
INT CON POS: POSITIVE

## 2020-01-23 PROCEDURE — 90471 IMMUNIZATION ADMIN: CPT | Performed by: NURSE PRACTITIONER

## 2020-01-23 PROCEDURE — 92250 FUNDUS PHOTOGRAPHY W/I&R: CPT | Mod: 26 | Performed by: NURSE PRACTITIONER

## 2020-01-23 PROCEDURE — 99214 OFFICE O/P EST MOD 30 MIN: CPT | Mod: 25 | Performed by: NURSE PRACTITIONER

## 2020-01-23 PROCEDURE — 83036 HEMOGLOBIN GLYCOSYLATED A1C: CPT | Performed by: NURSE PRACTITIONER

## 2020-01-23 PROCEDURE — 90715 TDAP VACCINE 7 YRS/> IM: CPT | Performed by: NURSE PRACTITIONER

## 2020-01-23 ASSESSMENT — PATIENT HEALTH QUESTIONNAIRE - PHQ9: CLINICAL INTERPRETATION OF PHQ2 SCORE: 0

## 2020-01-23 NOTE — ASSESSMENT & PLAN NOTE
Established problem, new to examiner.  Currently taking Norvasc, lisinopril, and Maxzide/dyazide.  She is compliant with her medications.  Denies any chest pain, dizziness, palpitations.

## 2020-01-23 NOTE — ASSESSMENT & PLAN NOTE
Established problem, new to examiner.  Patient has a hx right carotid endarterectomy.  She is closely monitored by Dr. Jara.  She is compliant with her blood pressure, cholesterol, and Coumadin medications.

## 2020-01-23 NOTE — PROGRESS NOTES
Subjective:     CC: Establish care     HISTORY OF THE PRESENT ILLNESS: Patient is a 84 y.o. female. This pleasant patient is here today to establish care and discuss the following. Her prior PCP was Dr. Quick.    Type 2 diabetes mellitus with stage 3 chronic kidney disease, without long-term current use of insulin (Formerly [facility])  Established problem, new to examiner.  Patient's last A1C 6.3% in December 2018.  She does not take any medications for this.  She currently tries to watch her diet and exercise when she can.  She reports that she does walk around frequently in her house for exercise.     PAD (peripheral artery disease) (Formerly [facility])  Established problem, new to examiner.  History of bilateral thrombectomies of aortofemoral bypass grafts in 12/2018 with Dr. Jara.  She has close follow-up with Dr. Jara.  She is compliant with her blood pressure, cholesterol medications, and Coumadin. Coumadin clinic is following her INR.    Hypertension  Established problem, new to examiner.  Currently taking Norvasc, lisinopril, and Maxzide/dyazide.  She is compliant with her medications.  Denies any chest pain, dizziness, palpitations.    Hyperlipidemia  Established problem, new to examiner.  Patient currently on Crestor 5 mg daily.  She is compliant with her medication.  Last lipid panel in December 2019 was normal.    History of skin cancer  Patient is a history of skin cancer on her nose.  She is followed by dermatology.    Carotid artery disease  Established problem, new to examiner.  Patient has a hx right carotid endarterectomy.  She is closely monitored by Dr. Jara.  She is compliant with her blood pressure, cholesterol, and Coumadin medications.      Allergies: Anesthetic [benzocaine]; Iodine; and Other drug    Current Outpatient Medications Ordered in Epic   Medication Sig Dispense Refill   • warfarin (COUMADIN) 2.5 MG Tab Take one to two tablets by mouth one time daily or as directed by coumadin clinic 180 Tab 1   •  triamterene-hctz (MAXZIDE-25/DYAZIDE) 37.5-25 MG Tab Take 1 Tab by mouth every day. 90 Tab 3   • rosuvastatin (CRESTOR) 5 MG Tab Take 1 Tab by mouth every day. TAKE ONE TABLET BY MOUTH DAILY 90 Tab 3   • lisinopril (PRINIVIL, ZESTRIL) 40 MG tablet Take 1 Tab by mouth every day. TAKE ONE TABLET BY MOUTH DAILY 90 Tab 3   • amLODIPine (NORVASC) 5 MG Tab Take 1 Tab by mouth every day. TAKE ONE TABLET BY MOUTH DAILY 90 Tab 3   • glucosamine 500 MG CAPS Take 500 mg by mouth as needed.         No current Epic-ordered facility-administered medications on file.        Past Medical History:   Diagnosis Date   • Carotid artery disease (HCC) 2010   • CPPD (pseudo-gout) 2011   • Diabetes (HCC) 2018   • GOUT 2011   • History of skin cancer 2016   • Hyperlipidemia 2010   • Hypertension 2010   • PAD (peripheral artery disease) 2010   • Pre-diabetes 2010       Past Surgical History:   Procedure Laterality Date   • INCISION AND DRAINAGE GENERAL Left 2019    Procedure: INCISION AND DRAINAGE GENERAL- GROIN WOUND WITH WOUND VAC PLACEMENT;  Surgeon: Garett Kohli M.D.;  Location: SURGERY [facility];  Service: Vascular   • THROMBECTOMY  2018    Procedure: THROMBECTOMY;  Surgeon: Garett Kohli M.D.;  Location: SURGERY [facility];  Service: Vascular   • AORTOFEMORAL BYPASS     • CAROTID ENDARTERECTOMY      right   • CATARACT PHACO WITH IOL     • NEUROMA EXCISION      in  from foot   • TOENAIL REMOVAL      in ,        Social History     Tobacco Use   • Smoking status: Former Smoker     Packs/day: 2.00     Years: 12.00     Pack years: 24.00     Types: Cigarettes     Last attempt to quit: 1985     Years since quittin.0   • Smokeless tobacco: Never Used   Substance Use Topics   • Alcohol use: Not Currently     Alcohol/week: 0.0 oz     Comment: rarely maybe one a year   • Drug use: No       Social History     Patient does not qualify to have social  determinant information on file (likely too young).   Social History Narrative    Lives with her son        Family History   Problem Relation Age of Onset   • Lung Disease Mother 20        TB   • Stroke Father 42       Health Maintenance: Tdap given today.  Retinal eye exam completed today.  A1c completed today.  Declines Shingrix prescription today.    ROS:   Gen: no fevers/chills, no changes in weight  Eyes: no changes in vision  Pulm: no sob, no cough  CV: no chest pain, no palpitations  GI: no nausea/vomiting, no diarrhea  MSk: no myalgias  Neuro: no headaches, no numbness/tingling, no dizziness  Heme/Lymph: + easy bruising (on Coumadin)      Objective:     Vital signs reviewed  Exam: /66 (BP Location: Right arm, Patient Position: Sitting, BP Cuff Size: Adult)   Pulse 90   Temp 36.2 °C (97.2 °F) (Temporal)   Resp 12   Ht 1.524 m (5')   Wt 72.1 kg (159 lb)   SpO2 93%  Body mass index is 31.05 kg/m².    General: Normal appearing. No distress.  HENT: Normocephalic. Ears normal shape and contour, canals are clear bilaterally, tympanic membranes are benign, oropharynx is without erythema, edema or exudates.   Eyes: Eyes conjunctiva clear lids without ptosis, pupils equal and reactive to light accommodation, lids normal.  Neck: Supple without JVD. Thyroid is not enlarged.  Pulmonary: Clear to ausculation.  Normal effort. No rales, ronchi, or wheezing.  Cardiovascular: Regular rate and rhythm without murmur. Radial pulses are intact and equal bilaterally.  Abdomen: Soft, nontender, nondistended. Normal bowel sounds.   Lymph: No cervical or supraclavicular lymph nodes are palpable  Skin: Warm and dry.  No obvious lesions.  Musculoskeletal: Normal gait. No extremity cyanosis, clubbing, or edema.  Psych: Normal mood and affect. Smiling and good eye contact. Alert and oriented x3. Judgment and insight is normal.    Labs: A1C 6.5%, results discussed with patient    Assessment & Plan:   84 y.o. female with the  following -    1. PAD (peripheral artery disease) (HCC)  Chronic stable.  This is new to me.  Continue amlodipine, lisinopril, maxzide/dyazide, Crestor, and Coumadin.  Continue follow-up with Coumadin clinic for INR checks.  Continue follow-up with Dr. Jara.  Monitor.    2. Type 2 diabetes mellitus with stage 3 chronic kidney disease, without long-term current use of insulin (HCC)  Chronic unstable.  This is new to me.  Last A1c in December 2018 was 6.3%.  Today in clinic was 6.5%.  Discussed and educated on the following:  • Annual eye examinations at Ophthalmology  • Diabetic Meal Plan: carb containing foods   • Factors affecting blood glucose control: food, illness, medication and stress  • Foot care: what to look for when checking feet every day and when to contact health care provider  • HbA1c: target and what A1C is  • Long term diabetic complications  • Weight control and daily exercise    Health maintenance completed today in office  - POCT Hemoglobin A1C  - POCT Retinal Eye Exam    3. Carotid artery disease, unspecified laterality, unspecified type (HCC)  Chronic stable.  Continue blood pressure medicines, cholesterol and Coumadin.  Continue follow-up with Dr. Jara.    4. Hyperlipidemia, unspecified hyperlipidemia type  Chronic stable.  This is new to me.  Continue Crestor 5 mg daily.  Monitor.  She is not due for labs, recent lipid panel normal December 2019.    5. Elevated hemoglobin A1c  See #2 above  - POCT Retinal Eye Exam    6. History of skin cancer  Chronic stable.  This is new to me.  Continue follow-up with dermatology.  Monitor.    7. Need for vaccination  Vaccine indicated.  Patient is agreeable.  I have placed the below orders and discussed them with an approved delegating provider. The MA is performing the below orders under the direction of Dr. Longoria.  - Tdap Vaccine =>8YO IM    8. Encounter to establish care  New issue to me.  Care established.  Patient is not due for lab work as  she had labs completed December 2019.  Diabetic health maintenance completed today.  Follow-up as needed.    Return in about 1 year (around 1/23/2021) for AWV.    Please note that this dictation was created using voice recognition software. I have made every reasonable attempt to correct obvious errors, but I expect that there are errors of grammar and possibly content that I did not discover before finalizing the note.

## 2020-01-23 NOTE — ASSESSMENT & PLAN NOTE
Established problem, new to examiner.  Patient currently on Crestor 5 mg daily.  She is compliant with her medication.  Last lipid panel in December 2019 was normal.

## 2020-01-23 NOTE — ASSESSMENT & PLAN NOTE
Established problem, new to examiner.  History of bilateral thrombectomies of aortofemoral bypass grafts in 12/2018 with Dr. Jara.  She has close follow-up with Dr. Jara.  She is compliant with her blood pressure, cholesterol medications, and Coumadin.  Coumadin clinic is following her INR.

## 2020-01-23 NOTE — ASSESSMENT & PLAN NOTE
Established problem, new to examiner.  Patient's last A1C 6.3% in December 2018.  She does not take any medications for this.  She currently tries to watch her diet and exercise when she can.  She reports that she does walk around frequently in her house for exercise.

## 2020-01-28 ENCOUNTER — ANTICOAGULATION VISIT (OUTPATIENT)
Dept: MEDICAL GROUP | Facility: PHYSICIAN GROUP | Age: 85
End: 2020-01-28
Payer: MEDICARE

## 2020-01-28 DIAGNOSIS — I73.9 PAD (PERIPHERAL ARTERY DISEASE) (HCC): ICD-10-CM

## 2020-01-28 LAB — INR PPP: 2.7 (ref 2–3.5)

## 2020-01-28 PROCEDURE — 85610 PROTHROMBIN TIME: CPT | Performed by: FAMILY MEDICINE

## 2020-01-28 PROCEDURE — 93793 ANTICOAG MGMT PT WARFARIN: CPT | Performed by: FAMILY MEDICINE

## 2020-01-28 NOTE — PROGRESS NOTES
Anticoagulation Summary  As of 2020    INR goal:   2.0-3.0   TTR:   66.9 % (1 y)   INR used for dosin.70 (2020)   Warfarin maintenance plan:   2.5 mg (2.5 mg x 1) every Mon, Wed, Fri; 5 mg (2.5 mg x 2) all other days   Weekly warfarin total:   27.5 mg   Plan last modified:   Fred Nayak, PharmD (10/1/2019)   Next INR check:   3/24/2020   Target end date:   Indefinite    Indications    PAD (peripheral artery disease) (HCC) [I73.9]             Anticoagulation Episode Summary     INR check location:       Preferred lab:       Send INR reminders to:       Comments:   Indefinite therapy per Dr Jara      Anticoagulation Care Providers     Provider Role Specialty Phone number    Bong Quick M.D. Referring Internal Medicine [phone]    [facility] Anticoagulation Services Responsible  [phone]        Anticoagulation Patient Findings  Patient Findings     Negatives:   Signs/symptoms of thrombosis, Signs/symptoms of bleeding, Laboratory test error suspected, Change in health, Change in alcohol use, Change in activity, Upcoming invasive procedure, Emergency department visit, Upcoming dental procedure, Missed doses, Extra doses, Change in medications, Change in diet/appetite, Hospital admission, Bruising, Other complaints        HPI:   Maci Verduzco seen in clinic today, on anticoagulation therapy with warfarin for VTE prevention 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    Patient's previous INR was therapeutic at 2.1 on 19, at which time patient was instructed to continue with current warfarin regimen.  She returns to clinic today to recheck INR to ensure it is therapeutic and thus preventing possible clotting and/or bleeding/bruising complications.    CHADS-VASc = n/a  (unadjusted ischemic stroke risk/year:  n/a)    Does patient have any changes to current medical/health status since last appt (Y/N):  NO  Does patient have any signs/symptoms of bleeding and/or  thrombosis since the last appt (Y/N):  NO  Does patient have any interval changes to diet or medications since last appt (Y/N):  NO  Are there any complications or cost restrictions with current therapy (Y/N):  NO     Does patient have [facility] PCP? YES, Celia RIVAS (If not, please document discussion that patient must be seen at [facility])       Vitals:  declined, did not wish to remove coat.    There were no vitals filed for this visit.     Asssessment:      INR remains therapeutic at 2.7, therefore decreasing patient's risk of VTE and/or bleeding complications.   Reason(s) for out of range INR today:  n/a      Plan:  Pt is to continue with current warfarin dosing regimen in order to maintain INR in therapeutic range.     Follow up:  Because warfarin is a high risk medication and current CHEST guidelines recommend regular monitoring intervals (few days up to 12 weeks), will have patient return to clinic in 8 weeks to recheck INR.    Fred Nayak, PharmD, BCACP

## 2020-01-29 LAB — RETINAL SCREEN: NORMAL

## 2020-03-05 ENCOUNTER — HOSPITAL ENCOUNTER (OUTPATIENT)
Dept: RADIOLOGY | Facility: MEDICAL CENTER | Age: 85
End: 2020-03-05
Attending: SURGERY
Payer: MEDICARE

## 2020-03-05 DIAGNOSIS — I70.213 ATHEROSCLEROSIS OF NATIVE ARTERY OF BOTH LOWER EXTREMITIES WITH INTERMITTENT CLAUDICATION (HCC): ICD-10-CM

## 2020-03-05 PROCEDURE — 93922 UPR/L XTREMITY ART 2 LEVELS: CPT

## 2020-03-05 PROCEDURE — 93925 LOWER EXTREMITY STUDY: CPT

## 2020-03-06 PROCEDURE — 93922 UPR/L XTREMITY ART 2 LEVELS: CPT | Mod: 26 | Performed by: INTERNAL MEDICINE

## 2020-03-06 PROCEDURE — 93925 LOWER EXTREMITY STUDY: CPT | Mod: 26 | Performed by: INTERNAL MEDICINE

## 2020-03-24 ENCOUNTER — ANTICOAGULATION VISIT (OUTPATIENT)
Dept: MEDICAL GROUP | Facility: PHYSICIAN GROUP | Age: 85
End: 2020-03-24
Payer: MEDICARE

## 2020-03-24 DIAGNOSIS — I73.9 PAD (PERIPHERAL ARTERY DISEASE) (HCC): ICD-10-CM

## 2020-03-24 LAB — INR PPP: 2.5 (ref 2–3.5)

## 2020-03-24 PROCEDURE — 93793 ANTICOAG MGMT PT WARFARIN: CPT | Performed by: FAMILY MEDICINE

## 2020-03-24 PROCEDURE — 85610 PROTHROMBIN TIME: CPT | Performed by: FAMILY MEDICINE

## 2020-03-24 NOTE — PROGRESS NOTES
Anticoagulation Summary  As of 3/24/2020    INR goal:   2.0-3.0   TTR:   71.3 % (1.1 y)   INR used for dosin.50 (3/24/2020)   Warfarin maintenance plan:   2.5 mg (2.5 mg x 1) every Mon, Wed, Fri; 5 mg (2.5 mg x 2) all other days   Weekly warfarin total:   27.5 mg   Plan last modified:   Fred Nayak, PharmD (10/1/2019)   Next INR check:   2020   Target end date:   Indefinite    Indications    PAD (peripheral artery disease) ([facility]) [I73.9]             Anticoagulation Episode Summary     INR check location:       Preferred lab:       Send INR reminders to:       Comments:   Indefinite therapy per Dr Jara      Anticoagulation Care Providers     Provider Role Specialty Phone number    CHACORTA Issa Referring Nurse Practitioner [phone]    [facility] Anticoagulation Services Responsible  [phone]        Anticoagulation Patient Findings  Patient Findings     Negatives:   Signs/symptoms of thrombosis, Signs/symptoms of bleeding, Laboratory test error suspected, Change in health, Change in alcohol use, Change in activity, Upcoming invasive procedure, Emergency department visit, Upcoming dental procedure, Missed doses, Extra doses, Change in medications, Change in diet/appetite, Hospital admission, Bruising, Other complaints        HPI:   Radha Verduzco seen in clinic today, on anticoagulation therapy with warfarin for VTE prevention 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    Patient's previous INR was therapeutic at 2.7 on 20, at which time patient was instructed to continue with current warfarin regimen.  She returns to clinic today to recheck INR to ensure it is therapeutic and thus preventing possible clotting and/or bleeding/bruising complications.    CHADS-VASc = n/a  (unadjusted ischemic stroke risk/year:  n/a)    Does patient have any changes to current medical/health status since last appt (Y/N):  NO  Does patient have any signs/symptoms of bleeding  and/or thrombosis since the last appt (Y/N):  NO  Does patient have any interval changes to diet or medications since last appt (Y/N):  NO  Are there any complications or cost restrictions with current therapy (Y/N):  NO     Does patient have [facility] PCP? YES, Celia RIVAS (If not, please document discussion that patient must be seen at [facility])       Vitals:  declined today.    There were no vitals filed for this visit.     Asssessment:      INR remains therapeutic at 2.5, therefore decreasing patient's risk of VTE and/or bleeding complications.   Reason(s) for out of range INR today:  n/a      Plan:  Pt is to continue with current warfarin dosing regimen in order to maintain INR in therapeutic range.     Follow up:  Because warfarin is a high risk medication and current CHEST guidelines recommend regular monitoring intervals (few days up to 12 weeks), will have patient return to clinic in 10 weeks to recheck INR.    Fred Nayak, PharmD, BCACP

## 2020-06-02 ENCOUNTER — ANTICOAGULATION VISIT (OUTPATIENT)
Dept: MEDICAL GROUP | Facility: PHYSICIAN GROUP | Age: 85
End: 2020-06-02
Payer: MEDICARE

## 2020-06-02 DIAGNOSIS — I73.9 PAD (PERIPHERAL ARTERY DISEASE) (HCC): ICD-10-CM

## 2020-06-02 DIAGNOSIS — Z79.01 LONG TERM (CURRENT) USE OF ANTICOAGULANTS: Primary | ICD-10-CM

## 2020-06-02 DIAGNOSIS — Z79.01 CHRONIC ANTICOAGULATION: ICD-10-CM

## 2020-06-02 LAB — INR PPP: 2.9 (ref 2–3.5)

## 2020-06-02 PROCEDURE — 93793 ANTICOAG MGMT PT WARFARIN: CPT | Performed by: FAMILY MEDICINE

## 2020-06-02 PROCEDURE — 85610 PROTHROMBIN TIME: CPT | Performed by: FAMILY MEDICINE

## 2020-06-02 RX ORDER — WARFARIN SODIUM 2.5 MG/1
TABLET ORAL
Qty: 180 TAB | Refills: 1 | Status: SHIPPED | OUTPATIENT
Start: 2020-06-02 | End: 2020-09-25 | Stop reason: SDUPTHER

## 2020-06-02 NOTE — PROGRESS NOTES
Anticoagulation Summary  As of 2020    INR goal:   2.0-3.0   TTR:   75.4 % (1.3 y)   INR used for dosin.90 (2020)   Warfarin maintenance plan:   2.5 mg (2.5 mg x 1) every Mon, Wed, Fri; 5 mg (2.5 mg x 2) all other days   Weekly warfarin total:   27.5 mg   Plan last modified:   Fred Nayak, PharmD (10/1/2019)   Next INR check:   2020   Target end date:   Indefinite    Indications    Long term (current) use of anticoagulants [Z79.01]  PAD (peripheral artery disease) (HCC) [I73.9]             Anticoagulation Episode Summary     INR check location:       Preferred lab:       Send INR reminders to:       Comments:   Indefinite therapy per Dr Jara      Anticoagulation Care Providers     Provider Role Specialty Phone number    CHACORTA Issa Referring Nurse Practitioner [phone]    [facility] Anticoagulation Services Responsible  [phone]        Anticoagulation Patient Findings  Patient Findings     Negatives:   Signs/symptoms of thrombosis, Signs/symptoms of bleeding, Laboratory test error suspected, Change in health, Change in alcohol use, Change in activity, Upcoming invasive procedure, Emergency department visit, Upcoming dental procedure, Missed doses, Extra doses, Change in medications, Change in diet/appetite, Hospital admission, Bruising, Other complaints         HPI:   Radha Verduzco seen in clinic today, on anticoagulation therapy with warfarin for VTE prevention 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    Patient's previous INR was therapeutic at 2.5 on 3-24-20, at which time patient was instructed to continue with current warfarin regimen.  She returns to clinic today to recheck INR to ensure it is therapeutic and thus preventing possible clotting and/or bleeding/bruising complications.    CHADS-VASc = n/a  (unadjusted ischemic stroke risk/year:  n/a,)    Does patient have any changes to current medical/health status since last appt (Y/N):   NO  Does patient have any signs/symptoms of bleeding and/or thrombosis since the last appt (Y/N):  NO  Does patient have any interval changes to diet or medications since last appt (Y/N):  NO  Are there any complications or cost restrictions with current therapy (Y/N):  NO     Does patient have [facility] PCP? YES, Celia RIVAS (If not, please document discussion that patient must be seen at [facility])       Vitals:  declined today, vehicle visit.    There were no vitals filed for this visit.     Asssessment:      INR remains therapeutic at 2.9, therefore decreasing patient's VTE and/or bleeding risk.   Reason(s) for out of range INR today:  n/a      Plan:  Pt is to continue with current warfarin dosing regimen in order to maintain INR in therapeutic range.     Follow up:  Because warfarin is a high risk medication and current CHEST guidelines recommend regular monitoring intervals (few days up to 12 weeks), will have patient return to clinic in 8 weeks to recheck INR.    Fred Nayak, PharmD, BCACP

## 2020-07-28 ENCOUNTER — ANTICOAGULATION VISIT (OUTPATIENT)
Dept: MEDICAL GROUP | Facility: PHYSICIAN GROUP | Age: 85
End: 2020-07-28
Payer: MEDICARE

## 2020-07-28 DIAGNOSIS — I73.9 PAD (PERIPHERAL ARTERY DISEASE) (HCC): ICD-10-CM

## 2020-07-28 DIAGNOSIS — Z79.01 LONG TERM (CURRENT) USE OF ANTICOAGULANTS: Primary | ICD-10-CM

## 2020-07-28 LAB — INR PPP: 3.4 (ref 2–3.5)

## 2020-07-28 PROCEDURE — 85610 PROTHROMBIN TIME: CPT | Performed by: FAMILY MEDICINE

## 2020-07-28 PROCEDURE — 99211 OFF/OP EST MAY X REQ PHY/QHP: CPT | Performed by: FAMILY MEDICINE

## 2020-07-28 NOTE — PROGRESS NOTES
Anticoagulation Summary  As of 7/28/2020    INR goal:   2.0-3.0   TTR:   69.7 % (1.5 y)   INR used for dosing:   3.40! (7/28/2020)   Warfarin maintenance plan:   2.5 mg (2.5 mg x 1) every Mon, Wed, Fri; 5 mg (2.5 mg x 2) all other days   Weekly warfarin total:   27.5 mg   Plan last modified:   Fred Nayak, PharmD (10/1/2019)   Next INR check:   8/11/2020   Target end date:   Indefinite    Indications    Long term (current) use of anticoagulants [Z79.01]  PAD (peripheral artery disease) (HCC) [I73.9]             Anticoagulation Episode Summary     INR check location:       Preferred lab:       Send INR reminders to:       Comments:   Indefinite therapy per Dr Jara      Anticoagulation Care Providers     Provider Role Specialty Phone number    CHACORTA Issa Referring Nurse Practitioner [phone]    [facility] Anticoagulation Services Responsible  [phone]        Anticoagulation Patient Findings  Patient Findings     Negatives:   Signs/symptoms of thrombosis, Signs/symptoms of bleeding, Laboratory test error suspected, Change in health, Change in alcohol use, Change in activity, Upcoming invasive procedure, Emergency department visit, Upcoming dental procedure, Missed doses, Extra doses, Change in medications, Change in diet/appetite, Hospital admission, Bruising, Other complaints              HPI:   Nirali Verduzco seen in clinic today, on anticoagulation therapy with warfarin for VTE prevention due to history of PAD    Patient's previous INR was  therapeutic at 2.9 on 06/02/20, at which time patient was instructed to continue current warfarin regimen.  She returns to clinic today to recheck INR to ensure it is therapeutic and thus preventing possible clotting and/or bleeding/bruising complications.    CHADS-VASc = na  (unadjusted ischemic stroke risk/year:  na, which is na)    Does patient have any changes to current medical/health status since last appt (Y/N):  no  Does patient have any  signs/symptoms of bleeding and/or thrombosis since the last appt (Y/N):  no  Does patient have any interval changes to diet or medications since last appt (Y/N):  no  Are there any complications or cost restrictions with current therapy (Y/N):  no     Does patient have [facility] PCP? Yes, Celia Kaur (If not, please document discussion that patient must be seen at [facility])       Vitals:  pt declined    There were no vitals filed for this visit.     Asssessment:      INR supra-therapeutic at 3.4, therefore increased risk of bleeding   Reason(s) for out of range INR today:  Etiology unknown      Plan:  instructed patient to decrease today's dose to 2.5 mg then continue current warfarin regimen as detailed above     Follow up:  Because warfarin is a high risk medication and current CHEST guidelines recommend regular monitoring intervals (few days up to 12 weeks), will have patient return to clinic in 2 weeks to recheck INR.    Deepa AbbottD candidate  Fred Nayak, PharmD, BCACP

## 2020-08-11 ENCOUNTER — ANTICOAGULATION VISIT (OUTPATIENT)
Dept: MEDICAL GROUP | Facility: PHYSICIAN GROUP | Age: 85
End: 2020-08-11
Payer: MEDICARE

## 2020-08-11 DIAGNOSIS — Z79.01 LONG TERM (CURRENT) USE OF ANTICOAGULANTS: Primary | ICD-10-CM

## 2020-08-11 DIAGNOSIS — I73.9 PAD (PERIPHERAL ARTERY DISEASE) (HCC): ICD-10-CM

## 2020-08-11 LAB — INR PPP: 3.2 (ref 2–3.5)

## 2020-08-11 PROCEDURE — 99211 OFF/OP EST MAY X REQ PHY/QHP: CPT | Performed by: FAMILY MEDICINE

## 2020-08-11 PROCEDURE — 85610 PROTHROMBIN TIME: CPT | Performed by: FAMILY MEDICINE

## 2020-08-11 NOTE — PROGRESS NOTES
Anticoagulation Summary  As of 8/11/2020    INR goal:  2.0-3.0   TTR:  68.0 % (1.5 y)   INR used for dosing:  3.20 (8/11/2020)   Warfarin maintenance plan:  5 mg (2.5 mg x 2) every Sun, Tue, Thu; 2.5 mg (2.5 mg x 1) all other days   Weekly warfarin total:  25 mg   Plan last modified:  Fred Nayak, PharmD (8/11/2020)   Next INR check:  8/25/2020   Target end date:  Indefinite    Indications    Long term (current) use of anticoagulants [Z79.01]  PAD (peripheral artery disease) (HCC) [I73.9]             Anticoagulation Episode Summary     INR check location:      Preferred lab:      Send INR reminders to:      Comments:  Indefinite therapy per Dr Jara      Anticoagulation Care Providers     Provider Role Specialty Phone number    CHACORTA Issa Referring Nurse Practitioner [phone]    [facility] Responsible  [phone]        Anticoagulation Patient Findings  Patient Findings     Negatives:  Signs/symptoms of thrombosis, Signs/symptoms of bleeding, Laboratory test error suspected, Change in health, Change in alcohol use, Change in activity, Upcoming invasive procedure, Emergency department visit, Upcoming dental procedure, Missed doses, Extra doses, Change in medications, Change in diet/appetite, Hospital admission, Bruising, Other complaints              HPI:    Radha Verduzco was seen in clinic today, on anticoagulation therapy with warfarin for stroke prevention due to history of PAD.    Patient's previous INR was supratherapeutic at 3.4 on 7-28-20, at which time patient was instructed to take half a dose on the visit day and continue taking warfarin as before.  She returns to clinic today to recheck INR to ensure it is therapeutic and thus preventing possible clotting and/or bleeding/bruising complications.    CHADS-VASc = n/a  (unadjusted ischemic stroke risk/year:  n/a)    Does patient have any changes to current medical/health status since last appt (Y/N):   No  Does patient have any signs/symptoms of bleeding and/or thrombosis since the last appt (Y/N):  No  Does patient have any interval changes to diet or medications since last appt (Y/N):  No  Are there any complications or cost restrictions with current therapy (Y/N):  No     Does patient have [facility] PCP? Dr. Celia Kaur (If not, please document discussion that patient must be seen at [facility])       Vitals:  declined by patient today.    There were no vitals filed for this visit.     Asssessment:      INR supratherapeutic at 3.2, therefore increasing patient's risk of bleeding.    Reason(s) for out of range INR today:  Dose too high.      Plan:  patient was advised to reduce the weekly regimen of warfarin as detailed above.     Follow up:  Because warfarin is a high risk medication and current CHEST guidelines recommend regular monitoring intervals (few days up to 12 weeks), will have patient return to clinic in 2 weeks to recheck INR.    Fortino Root, Pharmacy intern  Fred Nayak, PharmD, BCACP

## 2020-08-25 ENCOUNTER — ANTICOAGULATION VISIT (OUTPATIENT)
Dept: MEDICAL GROUP | Facility: PHYSICIAN GROUP | Age: 85
End: 2020-08-25
Payer: MEDICARE

## 2020-08-25 DIAGNOSIS — Z79.01 LONG TERM (CURRENT) USE OF ANTICOAGULANTS: Primary | ICD-10-CM

## 2020-08-25 DIAGNOSIS — I73.9 PAD (PERIPHERAL ARTERY DISEASE) (HCC): ICD-10-CM

## 2020-08-25 LAB — INR PPP: 2.7 (ref 2–3.5)

## 2020-08-25 PROCEDURE — 93793 ANTICOAG MGMT PT WARFARIN: CPT | Performed by: FAMILY MEDICINE

## 2020-08-25 PROCEDURE — 85610 PROTHROMBIN TIME: CPT | Performed by: FAMILY MEDICINE

## 2020-08-25 NOTE — PROGRESS NOTES
Anticoagulation Summary  As of 2020    INR goal:  2.0-3.0   TTR:  67.8 % (1.6 y)   INR used for dosin.70 (2020)   Warfarin maintenance plan:  5 mg (2.5 mg x 2) every Sun, e, Thu; 2.5 mg (2.5 mg x 1) all other days   Weekly warfarin total:  25 mg   Plan last modified:  Fred Nayak, PharmD (2020)   Next INR check:  2020   Target end date:  Indefinite    Indications    Long term (current) use of anticoagulants [Z79.01]  PAD (peripheral artery disease) (HCC) [I73.9]             Anticoagulation Episode Summary     INR check location:      Preferred lab:      Send INR reminders to:      Comments:  Indefinite therapy per Dr Jara      Anticoagulation Care Providers     Provider Role Specialty Phone number    CHACORTA Issa Referring Nurse Practitioner [phone]    [facility] Anticoagulation Services Responsible  [phone]        Anticoagulation Patient Findings  Patient Findings     Negatives:  Signs/symptoms of thrombosis, Signs/symptoms of bleeding, Laboratory test error suspected, Change in health, Change in alcohol use, Change in activity, Upcoming invasive procedure, Emergency department visit, Upcoming dental procedure, Missed doses, Extra doses, Change in medications, Change in diet/appetite, Hospital admission, Bruising, Other complaints         HPI:   Radha Verduzco seen in clinic today, on anticoagulation therapy with warfarin for VTE prevention 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    Patient's previous INR was supratherapeutic at 3.2 on 20, at which time patient was instructed to decrease weekly warfarin regimen.  She returns to clinic today to recheck INR to ensure it is therapeutic and thus preventing possible clotting and/or bleeding/bruising complications.    CHADS-VASc = n/a  (unadjusted ischemic stroke risk/year:  n/a)    Does patient have any changes to current medical/health status since last appt (Y/N):  NO  Does patient  have any signs/symptoms of bleeding and/or thrombosis since the last appt (Y/N):  NO  Does patient have any interval changes to diet or medications since last appt (Y/N):  NO  Are there any complications or cost restrictions with current therapy (Y/N):  NO     Does patient have [facility] PCP? Yes, Celia RIVAS (If not, please document discussion that patient must be seen at [facility])       Vitals:  declined by patient today.    There were no vitals filed for this visit.     Asssessment:      INR therapeutic at 2.7, therefore decreasing patient's VTE and/or bleeding risk.   Reason(s) for out of range INR today:  n/a      Plan:  Pt is to continue with current warfarin dosing regimen in order to maintain INR in therapeutic range.     Follow up:  Because warfarin is a high risk medication and current CHEST guidelines recommend regular monitoring intervals (few days up to 12 weeks), will have patient return to clinic in 2 weeks to recheck INR.    Fred Nayak, PharmD, BCACP

## 2020-09-15 ENCOUNTER — ANTICOAGULATION VISIT (OUTPATIENT)
Dept: MEDICAL GROUP | Facility: PHYSICIAN GROUP | Age: 85
End: 2020-09-15
Payer: MEDICARE

## 2020-09-15 DIAGNOSIS — Z79.01 LONG TERM (CURRENT) USE OF ANTICOAGULANTS: Primary | ICD-10-CM

## 2020-09-15 DIAGNOSIS — I73.9 PAD (PERIPHERAL ARTERY DISEASE) (HCC): ICD-10-CM

## 2020-09-15 LAB — INR PPP: 2.6 (ref 2–3.5)

## 2020-09-15 PROCEDURE — 93793 ANTICOAG MGMT PT WARFARIN: CPT | Performed by: FAMILY MEDICINE

## 2020-09-15 PROCEDURE — 85610 PROTHROMBIN TIME: CPT | Performed by: FAMILY MEDICINE

## 2020-09-15 NOTE — PROGRESS NOTES
Anticoagulation Summary  As of 9/15/2020    INR goal:  2.0-3.0   TTR:  68.9 % (1.6 y)   INR used for dosin.60 (9/15/2020)   Warfarin maintenance plan:  5 mg (2.5 mg x 2) every Sun, e, Thu; 2.5 mg (2.5 mg x 1) all other days   Weekly warfarin total:  25 mg   Plan last modified:  Fred Nayak, PharmD (2020)   Next INR check:  10/13/2020   Target end date:  Indefinite    Indications    Long term (current) use of anticoagulants [Z79.01]  PAD (peripheral artery disease) (HCC) [I73.9]             Anticoagulation Episode Summary     INR check location:      Preferred lab:      Send INR reminders to:      Comments:  Indefinite therapy per Dr Jara      Anticoagulation Care Providers     Provider Role Specialty Phone number    CHACORTA Issa Referring Nurse Practitioner [phone]    [facility] Anticoagulation Services Responsible  [phone]        Anticoagulation Patient Findings  Patient Findings     Negatives:  Signs/symptoms of thrombosis, Signs/symptoms of bleeding, Laboratory test error suspected, Change in health, Change in alcohol use, Change in activity, Upcoming invasive procedure, Emergency department visit, Upcoming dental procedure, Missed doses, Extra doses, Change in medications, Change in diet/appetite, Hospital admission, Bruising, Other complaints         HPI:   Radha Verduzco seen in clinic today, on anticoagulation therapy with warfarin for VTE prevention 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    Patient's previous INR was therapeutic at 2.7 on 20, at which time patient was instructed to continue with current warfarin regimen.  She returns to clinic today to recheck INR to ensure it is therapeutic and thus preventing possible clotting and/or bleeding/bruising complications.    CHADS-VASc = n/a  (unadjusted ischemic stroke risk/year:  n/a)    Does patient have any changes to current medical/health status since last appt (Y/N):  NO  Does  patient have any signs/symptoms of bleeding and/or thrombosis since the last appt (Y/N):  NO  Does patient have any interval changes to diet or medications since last appt (Y/N):  NO  Are there any complications or cost restrictions with current therapy (Y/N):  NO     Does patient have [facility] PCP? Yes, Celia RIVAS (If not, please document discussion that patient must be seen at [facility])       Vitals:  declined today.    There were no vitals filed for this visit.     Asssessment:      INR remains therapeutic at 2.6, therefore decreasing patient's VTE and/or bleeding complications.   Reason(s) for out of range INR today:  n/a      Plan:  Pt is to continue with current warfarin dosing regimen in order to maintain INR in therapeutic range.     Follow up:  Because warfarin is a high risk medication and current CHEST guidelines recommend regular monitoring intervals (few days up to 12 weeks), will have patient return to clinic in 4 weeks to recheck INR.    Fred Nayak, PharmD, BCACP

## 2020-09-25 DIAGNOSIS — N28.9 RENAL INSUFFICIENCY: ICD-10-CM

## 2020-09-25 DIAGNOSIS — R73.03 PRE-DIABETES: ICD-10-CM

## 2020-09-25 DIAGNOSIS — I10 ESSENTIAL HYPERTENSION: ICD-10-CM

## 2020-09-25 DIAGNOSIS — E78.5 HYPERLIPIDEMIA, UNSPECIFIED HYPERLIPIDEMIA TYPE: ICD-10-CM

## 2020-09-25 DIAGNOSIS — Z79.01 CHRONIC ANTICOAGULATION: ICD-10-CM

## 2020-09-25 DIAGNOSIS — Z76.0 MEDICATION REFILL: ICD-10-CM

## 2020-09-25 DIAGNOSIS — E78.5 HYPERLIPIDEMIA: ICD-10-CM

## 2020-09-25 RX ORDER — LISINOPRIL 40 MG/1
40 TABLET ORAL DAILY
Qty: 90 TAB | Refills: 1 | Status: SHIPPED | OUTPATIENT
Start: 2020-09-25 | End: 2020-11-06 | Stop reason: SDUPTHER

## 2020-09-25 RX ORDER — GLUCOSAMINE SULFATE 500 MG
500 CAPSULE ORAL PRN
Qty: 90 CAP | Refills: 1 | Status: SHIPPED
Start: 2020-09-25 | End: 2021-01-04

## 2020-09-25 RX ORDER — ROSUVASTATIN CALCIUM 5 MG/1
5 TABLET, COATED ORAL DAILY
Qty: 90 TAB | Refills: 1 | Status: SHIPPED | OUTPATIENT
Start: 2020-09-25 | End: 2021-03-22 | Stop reason: SDUPTHER

## 2020-09-25 RX ORDER — AMLODIPINE BESYLATE 5 MG/1
5 TABLET ORAL DAILY
Qty: 90 TAB | Refills: 1 | Status: SHIPPED | OUTPATIENT
Start: 2020-09-25 | End: 2021-03-22 | Stop reason: SDUPTHER

## 2020-09-25 RX ORDER — TRIAMTERENE AND HYDROCHLOROTHIAZIDE 37.5; 25 MG/1; MG/1
1 TABLET ORAL DAILY
Qty: 90 TAB | Refills: 1 | Status: SHIPPED | OUTPATIENT
Start: 2020-09-25 | End: 2021-03-22 | Stop reason: SDUPTHER

## 2020-09-25 RX ORDER — WARFARIN SODIUM 2.5 MG/1
TABLET ORAL
Qty: 180 TAB | Refills: 0 | Status: SHIPPED | OUTPATIENT
Start: 2020-09-25 | End: 2021-02-18

## 2020-09-25 NOTE — TELEPHONE ENCOUNTER
Received request via: Patient    Was the patient seen in the last year in this department? Yes lov 1/23/2020    Does the patient have an active prescription (recently filled or refills available) for medication(s) requested? No

## 2020-09-26 NOTE — TELEPHONE ENCOUNTER
Requested Prescriptions     Signed Prescriptions Disp Refills   • triamterene-hctz (MAXZIDE-25/DYAZIDE) 37.5-25 MG Tab 90 Tab 1     Sig: Take 1 Tab by mouth every day.     Authorizing Provider: JANE CHEN   • warfarin (COUMADIN) 2.5 MG Tab 180 Tab 0     Sig: Take 5 mg (2.5 mc x 2) every Sun, Tue, Thu; 2.5 mg (2.5 mg x 1) all other days. 9/15/2020 recent anticoagulation clinic appointment.     Authorizing Provider: JANE CHEN   • rosuvastatin (CRESTOR) 5 MG Tab 90 Tab 1     Sig: Take 1 Tab by mouth every day. TAKE ONE TABLET BY MOUTH DAILY     Authorizing Provider: JANE CHEN   • lisinopril (PRINIVIL) 40 MG tablet 90 Tab 1     Sig: Take 1 Tab by mouth every day. TAKE ONE TABLET BY MOUTH DAILY     Authorizing Provider: JANE CHEN   • amLODIPine (NORVASC) 5 MG Tab 90 Tab 1     Sig: Take 1 Tab by mouth every day. TAKE ONE TABLET BY MOUTH DAILY     Authorizing Provider: JANE CHEN   • glucosamine 500 MG Cap 90 Cap 1     Sig: Take 1 Cap by mouth as needed.     Authorizing Provider: JANE CHEN A.P.R.N.

## 2020-10-13 ENCOUNTER — ANTICOAGULATION VISIT (OUTPATIENT)
Dept: MEDICAL GROUP | Facility: PHYSICIAN GROUP | Age: 85
End: 2020-10-13
Payer: MEDICARE

## 2020-10-13 DIAGNOSIS — I73.9 PAD (PERIPHERAL ARTERY DISEASE) (HCC): ICD-10-CM

## 2020-10-13 DIAGNOSIS — Z79.01 LONG TERM (CURRENT) USE OF ANTICOAGULANTS: Primary | ICD-10-CM

## 2020-10-13 LAB — INR PPP: 2.8 (ref 2–3.5)

## 2020-10-13 PROCEDURE — 93793 ANTICOAG MGMT PT WARFARIN: CPT | Performed by: FAMILY MEDICINE

## 2020-10-13 PROCEDURE — 85610 PROTHROMBIN TIME: CPT | Performed by: FAMILY MEDICINE

## 2020-10-13 NOTE — PROGRESS NOTES
Anticoagulation Summary  As of 10/13/2020    INR goal:  2.0-3.0   TTR:  70.3 % (1.7 y)   INR used for dosin.80 (10/13/2020)   Warfarin maintenance plan:  5 mg (2.5 mg x 2) every Sun, e, Thu; 2.5 mg (2.5 mg x 1) all other days   Weekly warfarin total:  25 mg   Plan last modified:  Fred Nayak, PharmD (2020)   Next INR check:  2020   Target end date:  Indefinite    Indications    Long term (current) use of anticoagulants [Z79.01]  PAD (peripheral artery disease) (HCC) [I73.9]             Anticoagulation Episode Summary     INR check location:      Preferred lab:      Send INR reminders to:      Comments:  Indefinite therapy per Dr Jara      Anticoagulation Care Providers     Provider Role Specialty Phone number    CHACORTA Issa Referring Nurse Practitioner [phone]    [facility] Anticoagulation Services Responsible  [phone]        Anticoagulation Patient Findings  Patient Findings     Negatives:  Signs/symptoms of thrombosis, Signs/symptoms of bleeding, Laboratory test error suspected, Change in health, Change in alcohol use, Change in activity, Upcoming invasive procedure, Emergency department visit, Upcoming dental procedure, Missed doses, Extra doses, Change in medications, Change in diet/appetite, Hospital admission, Bruising, Other complaints        HPI:   Radha Verduzco seen in clinic today, on anticoagulation therapy with warfarin for VTE prophylaxis due to history of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     Patient's previous INR was therapeutic at 2.6 on 9-15-20, at which time patient was instructed to continue with current warfarin regimen.  She returns to clinic today to recheck INR to ensure it is therapeutic and thus preventing possible clotting and/or bleeding/bruising complications.    CHADS-VASc = n/a  (unadjusted ischemic stroke risk/year:  n/a)    Does patient have any changes to current medical/health status since last appt (Y/N):  NO  Does  patient have any signs/symptoms of bleeding and/or thrombosis since the last appt (Y/N):  NO  Does patient have any interval changes to diet or medications since last appt (Y/N):  NO  Are there any complications or cost restrictions with current therapy (Y/N):  NO     Does patient have [facility] PCP? Yes, Celia RIVAS (If not, please document discussion that patient must be seen at [facility])       Vitals:  declined at today's visit.    There were no vitals filed for this visit.     Medication reconciliation completed today.    Asssessment:      INR remains therapeutic at 2.8, therefore decreasing patient's VTE and/or bleeding risk.   Reason(s) for out of range INR today:  n/a      Plan:  Pt is to continue with current warfarin dosing regimen in order to maintain INR in therapeutic range.     Follow up:  Because warfarin is a high risk medication and current CHEST guidelines recommend regular monitoring intervals (few days up to 12 weeks), will have patient return to clinic in 6 weeks to recheck INR.    Fred Nayak, PharmD, BCACP

## 2020-11-06 DIAGNOSIS — I10 ESSENTIAL HYPERTENSION: ICD-10-CM

## 2020-11-06 RX ORDER — LISINOPRIL 40 MG/1
40 TABLET ORAL DAILY
Qty: 90 TAB | Refills: 2 | Status: SHIPPED | OUTPATIENT
Start: 2020-11-06 | End: 2021-08-09

## 2020-11-06 NOTE — TELEPHONE ENCOUNTER
Requested Prescriptions     Signed Prescriptions Disp Refills   • lisinopril (PRINIVIL) 40 MG tablet 90 Tab 2     Sig: Take 1 Tab by mouth every day. TAKE ONE TABLET BY MOUTH DAILY     Authorizing Provider: JANE CHEN A.P.R.N.

## 2020-11-24 ENCOUNTER — ANTICOAGULATION VISIT (OUTPATIENT)
Dept: MEDICAL GROUP | Facility: PHYSICIAN GROUP | Age: 85
End: 2020-11-24
Payer: MEDICARE

## 2020-11-24 DIAGNOSIS — I73.9 PAD (PERIPHERAL ARTERY DISEASE) (HCC): ICD-10-CM

## 2020-11-24 DIAGNOSIS — Z79.01 LONG TERM (CURRENT) USE OF ANTICOAGULANTS: Primary | ICD-10-CM

## 2020-11-24 LAB — INR PPP: 2.8 (ref 2–3.5)

## 2020-11-24 PROCEDURE — 85610 PROTHROMBIN TIME: CPT | Performed by: FAMILY MEDICINE

## 2020-11-24 PROCEDURE — 93793 ANTICOAG MGMT PT WARFARIN: CPT | Performed by: FAMILY MEDICINE

## 2020-11-24 NOTE — PROGRESS NOTES
Anticoagulation Summary  As of 2020    INR goal:  2.0-3.0   TTR:  72.2 % (1.8 y)   INR used for dosin.80 (2020)   Warfarin maintenance plan:  5 mg (2.5 mg x 2) every Sun, Tue, Thu; 2.5 mg (2.5 mg x 1) all other days   Weekly warfarin total:  25 mg   Plan last modified:  Fred Nayak, PharmD (2020)   Next INR check:  2021   Target end date:  Indefinite    Indications    Long term (current) use of anticoagulants [Z79.01]  PAD (peripheral artery disease) (HCC) [I73.9]             Anticoagulation Episode Summary     INR check location:      Preferred lab:      Send INR reminders to:      Comments:  Indefinite therapy per Dr Jara      Anticoagulation Care Providers     Provider Role Specialty Phone number    CHACORTA Issa Referring Nurse Practitioner [phone]    [facility] Anticoagulation Services Responsible  [phone]        Anticoagulation Patient Findings  Patient Findings     Negatives:  Signs/symptoms of thrombosis, Signs/symptoms of bleeding, Laboratory test error suspected, Change in health, Change in alcohol use, Change in activity, Upcoming invasive procedure, Emergency department visit, Upcoming dental procedure, Missed doses, Extra doses, Change in medications, Change in diet/appetite, Hospital admission, Bruising, Other complaints        HPI:   Radha Verduzco seen in clinic today, on anticoagulation therapy with warfarin for VTE prophylaxis due to hist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    Patient's previous INR was therapeutic at 2.8 on 10-13-20, at which time patient was instructed to continue with current warfarin regimen.  She returns to clinic today to recheck INR to ensure it is therapeutic and thus preventing possible clotting and/or bleeding/bruising complications.    CHADS-VASc = n/a  (unadjusted ischemic stroke risk/year:  n/a)    Does patient have any changes to current medical/health status since last appt (Y/N):  NO  Does patient  have any signs/symptoms of bleeding and/or thrombosis since the last appt (Y/N):  NO  Does patient have any interval changes to diet or medications since last appt (Y/N):  NO  Are there any complications or cost restrictions with current therapy (Y/N):  NO     Does patient have [facility] PCP? Yes, Celia RIVAS (If not, please document discussion that patient must be seen at [facility])       Vitals:  declined at today's visit.    There were no vitals filed for this visit.     Medication reconciliation completed today.    Asssessment:      INR remains therapeutic at 2.8, therefore decreasing patient's VTE and/or bleeding risk.   Reason(s) for out of range INR today:  n/a      Plan:  Pt is to continue with current warfarin dosing regimen in order to maintain INR in therapeutic range.     Follow up:  Because warfarin is a high risk medication and current CHEST guidelines recommend regular monitoring intervals (few days up to 12 weeks), will have patient return to clinic in 8 weeks to recheck INR.    Fred Nayak, PharmD, BCACP

## 2021-01-04 ENCOUNTER — OFFICE VISIT (OUTPATIENT)
Dept: MEDICAL GROUP | Facility: PHYSICIAN GROUP | Age: 86
End: 2021-01-04
Payer: MEDICARE

## 2021-01-04 VITALS
HEIGHT: 60 IN | DIASTOLIC BLOOD PRESSURE: 62 MMHG | BODY MASS INDEX: 32.2 KG/M2 | HEART RATE: 96 BPM | WEIGHT: 164 LBS | RESPIRATION RATE: 14 BRPM | OXYGEN SATURATION: 92 % | SYSTOLIC BLOOD PRESSURE: 132 MMHG | TEMPERATURE: 97.3 F

## 2021-01-04 DIAGNOSIS — E21.3 HYPERPARATHYROIDISM (HCC): ICD-10-CM

## 2021-01-04 DIAGNOSIS — I10 ESSENTIAL HYPERTENSION: ICD-10-CM

## 2021-01-04 DIAGNOSIS — N18.32 STAGE 3B CHRONIC KIDNEY DISEASE: ICD-10-CM

## 2021-01-04 DIAGNOSIS — E78.5 HYPERLIPIDEMIA, UNSPECIFIED HYPERLIPIDEMIA TYPE: ICD-10-CM

## 2021-01-04 DIAGNOSIS — I77.9 CAROTID ARTERY DISEASE, UNSPECIFIED LATERALITY, UNSPECIFIED TYPE (HCC): ICD-10-CM

## 2021-01-04 DIAGNOSIS — Z13.820 ENCOUNTER FOR OSTEOPOROSIS SCREENING IN ASYMPTOMATIC POSTMENOPAUSAL PATIENT: ICD-10-CM

## 2021-01-04 DIAGNOSIS — Z78.0 ENCOUNTER FOR OSTEOPOROSIS SCREENING IN ASYMPTOMATIC POSTMENOPAUSAL PATIENT: ICD-10-CM

## 2021-01-04 DIAGNOSIS — E11.22 TYPE 2 DIABETES MELLITUS WITH STAGE 3B CHRONIC KIDNEY DISEASE, WITHOUT LONG-TERM CURRENT USE OF INSULIN (HCC): ICD-10-CM

## 2021-01-04 DIAGNOSIS — E66.9 OBESITY (BMI 30-39.9): ICD-10-CM

## 2021-01-04 DIAGNOSIS — I73.9 PAD (PERIPHERAL ARTERY DISEASE) (HCC): ICD-10-CM

## 2021-01-04 DIAGNOSIS — E55.9 VITAMIN D DEFICIENCY: ICD-10-CM

## 2021-01-04 DIAGNOSIS — N18.32 TYPE 2 DIABETES MELLITUS WITH STAGE 3B CHRONIC KIDNEY DISEASE, WITHOUT LONG-TERM CURRENT USE OF INSULIN (HCC): ICD-10-CM

## 2021-01-04 DIAGNOSIS — Z79.01 CHRONIC ANTICOAGULATION: ICD-10-CM

## 2021-01-04 DIAGNOSIS — Z00.00 ANNUAL VISIT FOR GENERAL ADULT MEDICAL EXAMINATION WITHOUT ABNORMAL FINDINGS: ICD-10-CM

## 2021-01-04 PROCEDURE — 99214 OFFICE O/P EST MOD 30 MIN: CPT | Performed by: NURSE PRACTITIONER

## 2021-01-04 ASSESSMENT — FIBROSIS 4 INDEX: FIB4 SCORE: 1.65

## 2021-01-04 ASSESSMENT — PATIENT HEALTH QUESTIONNAIRE - PHQ9: CLINICAL INTERPRETATION OF PHQ2 SCORE: 0

## 2021-01-04 NOTE — ASSESSMENT & PLAN NOTE
Chronic medical problem. She is followed by Dr. Kohli. Her BP is controlled. She is on rosuvastatin and is followed by anticoagulation clinic.

## 2021-01-04 NOTE — ASSESSMENT & PLAN NOTE
Chronic medical problem. She is not taking any medication. She is due for labs and health maintenance.

## 2021-01-04 NOTE — ASSESSMENT & PLAN NOTE
Chronic medical problem. She is followed by dr. Kohli and will schedule follow-up appointment for this year with him. Her BP is at goal.

## 2021-01-04 NOTE — ASSESSMENT & PLAN NOTE
Chronic medical problem. She is taking rosuvastatin 5 mg daily. She is tolerating the medication. Denies myalgias. She is due for updated labs.

## 2021-01-04 NOTE — ASSESSMENT & PLAN NOTE
Chronic medical problem. She is taking amlodipine 5 mg, triameterene-hctz 37.5-25 mg and lisinopril 40 mg daily. She does not check her blood pressure at home. Her BP is at goal today. Denies any chest pain, shortness of breath, palpations, dizziness, blurry vision or headaches.

## 2021-01-04 NOTE — PROGRESS NOTES
Subjective:     CC:   Chief Complaint   Patient presents with   • Annual Exam       HPI:   Radha Verduzco is a 85 y.o. female who presents for annual exam. She is feeling well and denies any complaints.    PAD (peripheral artery disease) ([facility])  Chronic medical problem. She is followed by Dr. Kohli. Her BP is controlled. She is on rosuvastatin and is followed by anticoagulation clinic.     Hypertension  Chronic medical problem. She is taking amlodipine 5 mg, triameterene-hctz 37.5-25 mg and lisinopril 40 mg daily. She does not check her blood pressure at home. Her BP is at goal today. Denies any chest pain, shortness of breath, palpations, dizziness, blurry vision or headaches.     Hyperlipidemia  Chronic medical problem. She is taking rosuvastatin 5 mg daily. She is tolerating the medication. Denies myalgias. She is due for updated labs.     Chronic anticoagulation  Chronic medical problem. She is taking warfarin and followed by the anticoagulation clinic.     Type 2 diabetes mellitus with stage 3 chronic kidney disease, without long-term current use of insulin ([facility])  Chronic medical problem. She is not taking any medication. She is due for labs and health maintenance.     Vitamin D deficiency  Chronic medical problem. She is not taking any medication. She is due for updated labs.     CKD (chronic kidney disease) stage 3, GFR 30-59 ml/min ([facility])  Chronic medical problem. Her BP is at goal. She is not on NSAID's. She is due for updated labs.     Hyperparathyroidism (HCC)  Chronic medical problem. She is not on calcium or vitamin d supplementation. She is due for updated labs.     Carotid artery disease  Chronic medical problem. She is followed by dr. Kohli and will schedule follow-up appointment for this year with him. Her BP is at goal.     Obesity (BMI 30-39.9)  Chronic medical problem. Her BMI today is 32.03.         Ob-Gyn/ History:    Gyn Surgery:  No.  No significant bloating/fluid retention, pelvic  pain, or dyspareunia. No vaginal discharge  Post-menopausal bleeding: None  Urinary incontinence: No  Folate intake: n/a     Health Maintenance  Advanced directive: Yes   Osteoporosis Screen/ DEXA: Ordered today   PT/vit D for falls prevention: Not taking   Cholesterol Screening: Due for update labs.   Diabetes Screening: Due for updated labs today.    Aspirin Use: On warfarin.     Diet: She does not eat green foods as she is on warfarin. She eats 2 meals a day. She has decreased her red meat intake. She does drinks regular mountain dew.     Exercise: She does walk around inside her house. She stay home due to COVID-19.   Substance Abuse: discussed and reviewed    Safe in relationship.   Seat belts, bike helmet, gun safety discussed.  Sun protection used.    Cancer screening  Colorectal Cancer Screening: Aged out.  Lung Cancer Screening: N/a, 24 pack years, aged out.     Cervical Cancer Screening: Aged out.    Breast Cancer Screening: Aged out.      Infectious disease screening/Immunizations  --Immunizations:    Influenza: Completed 9/25/2020.     Tetanus: Completed 1/23/2020.    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  She  has a past surgical history that includes aortofemoral bypass; toenail removal; neuroma excision; cataract phaco with iol; carotid endarterectomy; thrombectomy (12/19/2018); and incision and drainage general (Left, 1/24/2019).    Family History   Problem Relation Age of Onset   • Lung Disease Mother 20        TB   • Stroke Father 42       Social History     Socioeconomic History   • Marital  status:      Spouse name: Not on file   • Number of children: Not on file   • Years of education: Not on file   • Highest education level: Not on file   Occupational History   • Not on file   Social Needs   • Financial resource strain: Not on file   • Food insecurity     Worry: Not on file     Inability: Not on file   • Transportation needs     Medical: Not on file     Non-medical: Not on file   Tobacco Use   • Smoking status: Former Smoker     Packs/day: 2.00     Years: 12.00     Pack years: 24.00     Types: Cigarettes     Quit date:      Years since quittin.0   • Smokeless tobacco: Never Used   Substance and Sexual Activity   • Alcohol use: Not Currently     Alcohol/week: 0.0 oz     Comment: rarely maybe one a year   • Drug use: No   • Sexual activity: Not on file   Lifestyle   • Physical activity     Days per week: Not on file     Minutes per session: Not on file   • Stress: Not on file   Relationships   • Social connections     Talks on phone: Not on file     Gets together: Not on file     Attends [employer] service: Not on file     Active member of club or organization: Not on file     Attends meetings of clubs or organizations: Not on file     Relationship status: Not on file   • Intimate partner violence     Fear of current or ex partner: Not on file     Emotionally abused: Not on file     Physically abused: Not on file     Forced sexual activity: Not on file   Other Topics Concern   • Not on file   Social History Narrative    Lives with her son        Patient Active Problem List    Diagnosis Date Noted   • PAD (peripheral artery disease) ([facility]) 2010     Priority: High   • Vitamin D deficiency 2018     Priority: Medium   • Hyperparathyroidism ([facility]) 2018     Priority: Medium   • CKD (chronic kidney disease) stage 3, GFR 30-59 ml/min 2018     Priority: Medium   • Type 2 diabetes mellitus with stage 3 chronic kidney disease, without long-term current use of insulin ([facility])  04/19/2018     Priority: Medium   • Hypertension 08/02/2010     Priority: Medium   • Hyperlipidemia 08/02/2010     Priority: Low   • Chronic anticoagulation 06/02/2020   • Obesity (BMI 30-39.9) 04/19/2018   • History of skin cancer 02/24/2016   • Personal history of calcium pyrophosphate deposition disease (CPPD) 04/07/2011   • Carotid artery disease (HCC) 08/02/2010         Current Outpatient Medications   Medication Sig Dispense Refill   • lisinopril (PRINIVIL) 40 MG tablet Take 1 Tab by mouth every day. TAKE ONE TABLET BY MOUTH DAILY 90 Tab 2   • triamterene-hctz (MAXZIDE-25/DYAZIDE) 37.5-25 MG Tab Take 1 Tab by mouth every day. 90 Tab 1   • warfarin (COUMADIN) 2.5 MG Tab Take 5 mg (2.5 mc x 2) every Sun, Tue, Thu; 2.5 mg (2.5 mg x 1) all other days. 9/15/2020 recent anticoagulation clinic appointment. 180 Tab 0   • rosuvastatin (CRESTOR) 5 MG Tab Take 1 Tab by mouth every day. TAKE ONE TABLET BY MOUTH DAILY 90 Tab 1   • amLODIPine (NORVASC) 5 MG Tab Take 1 Tab by mouth every day. TAKE ONE TABLET BY MOUTH DAILY 90 Tab 1     No current facility-administered medications for this visit.      Allergies   Allergen Reactions   • Anesthetic [Benzocaine] Vomiting and Nausea     Got nausea from anesthesia     • Iodine Rash     RASH     • Other Drug Vomiting     Opiate make her vomit       Review of Systems   Constitutional: Negative for fever, chills and fatigue.   HENT: Negative for congestion.    Eyes: Negative for pain.   Respiratory: Negative for cough and shortness of breath.    Cardiovascular: Negative for chest pain.   Gastrointestinal: Negative for nausea, vomiting, abdominal pain, constipation and diarrhea.   Genitourinary: Negative for dysuria and hematuria.   Skin: Negative for rash.   Neurological: Negative for dizziness and headaches.   Endo/Heme/Allergies: Does not bruise/bleed easily.   Psychiatric/Behavioral: Negative for depression.  The patient is not nervous/anxious.      Objective:     Vital signs  reviewed   /62 (BP Location: Left arm, Patient Position: Sitting, BP Cuff Size: Large adult)   Pulse 96   Temp 36.3 °C (97.3 °F) (Temporal)   Resp 14   Ht 1.524 m (5')   Wt 74.4 kg (164 lb)   SpO2 92%   BMI 32.03 kg/m²   Body mass index is 32.03 kg/m².  Wt Readings from Last 4 Encounters:   01/04/21 74.4 kg (164 lb)   01/23/20 72.1 kg (159 lb)   08/29/19 70.3 kg (155 lb)   01/24/19 66.7 kg (147 lb 0.8 oz)       Physical Exam:  Constitutional: Well-developed and well-nourished. Not diaphoretic. No distress.   Skin: Skin is warm and dry. No rash noted.  Head: Atraumatic without lesions.  Eyes: Conjunctivae and extraocular motions are normal. Pupils are equal, round, and reactive to light. No scleral icterus.   Ears:  External ears unremarkable. Tympanic membranes clear and intact.  Neck: Supple, trachea midline. Normal range of motion. No thyromegaly present. No lymphadenopathy--cervical or supraclavicular.  Cardiovascular: Regular rate and rhythm, S1 and S2 without murmur, rubs, or gallops. Bilateral radial pulses 2+.   Lungs: Normal inspiratory effort, CTA bilaterally, no wheezes/rhonchi/rales  Breast: Deferred  Abdomen: Soft, non tender, and without distention. Active bowel sounds in all four quadrants.   :Deferred  Extremities: No cyanosis, clubbing, erythema, nor edema. Distal pulses intact and symmetric.   Musculoskeletal: All major joints AROM full in all directions without pain.  Neurological: Alert and oriented x 3. Sensation intact.   Psychiatric:  Behavior, mood, and affect are appropriate.    Monofilament testing with a 10 gram force: sensation intact: intact bilaterally  Visual Inspection: Feet without maceration, ulcers, fissures.  Pedal pulses: intact bilaterally      Assessment and Plan:     1. Annual visit for general adult medical examination without abnormal findings  New problem to examiner.  Annual wellness visit completed today.    2. Essential hypertension  Chronic stable medical  problem.  Continue amlodipine, triamterene-hydrochlorothiazide, and lisinopril.  BP is at goal today.  Red flags discussed.  Monitor and follow.    3. PAD (peripheral artery disease) ([facility])  Chronic stable medical problem.  Discussed making appointment with Dr. Kohli.  BP is controlled.  Continue rosuvastatin and follow-up with anticoagulation clinic.  Monitor and follow.    4. Hyperlipidemia, unspecified hyperlipidemia type  Chronic stable medical problem.  Continue rosuvastatin.  She is due for updated labs, orders placed.  Monitor and follow.    5. Type 2 diabetes mellitus with stage 3b chronic kidney disease, without long-term current use of insulin ([facility])  Chronic stable medical problem.  Discussed diet and lifestyle modifications.  She is due for health maintenance as below, orders placed.  Monofilament foot exam completed today.  Monitor and follow.  - Comp Metabolic Panel; Future  - HEMOGLOBIN A1C; Future  - Lipid Profile; Future  - MICROALBUMIN CREAT RATIO URINE (LAB COLLECT); Future  - CBC WITH DIFFERENTIAL; Future  - Diabetic Monofilament Lower Extremity Exam    6. Chronic anticoagulation  Chronic stable medical problem.  Continue follow-up with anticoagulation clinic.  Continue warfarin.  Red flags discussed.  Monitor and follow.    7. Hyperparathyroidism (HCC)  Chronic unstable medical problem.  Due for reassessment.  Orders placed.  Monitor and follow.  - PTH INTACT (PTH ONLY); Future    8. Stage 3b chronic kidney disease  Chronic stable medical problem.  She is due for updated labs.  Continue to avoid NSAIDs and renal dose medications.  Monitor and follow.    9. Carotid artery disease, unspecified laterality, unspecified type (HCC)  Chronic stable medical problem.  Continue follow-up with Dr. Kohli.  Monitor.    10. Obesity (BMI 30-39.9)  Chronic stable medical problem.  Discussed diet and lifestyle modifications.  Monitor and follow.  - Patient identified as having weight management issue.   Appropriate orders and counseling given.    11. Vitamin D deficiency  Chronic stable medical problem.  Due for labs.  Monitor and follow.  - VITAMIN D,25 HYDROXY; Future    12. Encounter for osteoporosis screening in asymptomatic postmenopausal patient  New problem to examiner.  Due for DEXA scan, orders placed.  Monitor and follow.  - DS-BONE DENSITY STUDY (DEXA); Future      HCM:  Due for labs as above   Labs per orders  Immunizations per orders  Patient counseled about skin care, diet, supplements, prenatal vitamins, safe sex and exercise.    Follow-up: Return in about 6 months (around 7/4/2021) for HTN, Diabetes.

## 2021-01-04 NOTE — ASSESSMENT & PLAN NOTE
Chronic medical problem. She is not on calcium or vitamin d supplementation. She is due for updated labs.

## 2021-01-08 DIAGNOSIS — Z79.01 CHRONIC ANTICOAGULATION: ICD-10-CM

## 2021-01-11 DIAGNOSIS — Z23 NEED FOR VACCINATION: ICD-10-CM

## 2021-01-19 ENCOUNTER — ANTICOAGULATION VISIT (OUTPATIENT)
Dept: MEDICAL GROUP | Facility: PHYSICIAN GROUP | Age: 86
End: 2021-01-19
Payer: MEDICARE

## 2021-01-19 DIAGNOSIS — I73.9 PAD (PERIPHERAL ARTERY DISEASE) (HCC): ICD-10-CM

## 2021-01-19 DIAGNOSIS — Z79.01 CHRONIC ANTICOAGULATION: Primary | ICD-10-CM

## 2021-01-19 LAB — INR PPP: 3.4 (ref 2–3.5)

## 2021-01-19 PROCEDURE — 99211 OFF/OP EST MAY X REQ PHY/QHP: CPT | Performed by: FAMILY MEDICINE

## 2021-01-19 PROCEDURE — 85610 PROTHROMBIN TIME: CPT | Performed by: PHYSICIAN ASSISTANT

## 2021-01-19 NOTE — PROGRESS NOTES
Anticoagulation Summary  As of 1/19/2021    INR goal:  2.0-3.0   TTR:  69.2 % (2 y)   INR used for dosing:  3.40 (1/19/2021)   Warfarin maintenance plan:  5 mg (2.5 mg x 2) every Sun, Tue, Thu; 2.5 mg (2.5 mg x 1) all other days   Weekly warfarin total:  25 mg   Plan last modified:  Fred Nayak, PharmD (8/11/2020)   Next INR check:  2/2/2021   Target end date:  Indefinite    Indications    Chronic anticoagulation [Z79.01]  PAD (peripheral artery disease) (HCC) [I73.9]             Anticoagulation Episode Summary     INR check location:      Preferred lab:      Send INR reminders to:      Comments:  Indefinite therapy per Dr Jara      Anticoagulation Care Providers     Provider Role Specialty Phone number    CHACORTA Issa Referring Nurse Practitioner [phone]    [facility] Anticoagulation Services Responsible  [phone]        Anticoagulation Patient Findings  Patient Findings     Negatives:  Signs/symptoms of thrombosis, Signs/symptoms of bleeding, Laboratory test error suspected, Change in health, Change in alcohol use, Change in activity, Upcoming invasive procedure, Emergency department visit, Upcoming dental procedure, Missed doses, Extra doses, Change in medications, Change in diet/appetite, Hospital admission, Bruising, Other complaints        HPI:   Radha Verduzco seen in clinic today, on anticoagulation therapy with warfarin for VTE prophylaxis 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    Patient's previous INR was therapeutic at 2.8 on 11-24-20, at which time patient was instructed to continue with current warfarin regimen.  She returns to clinic today to recheck INR to ensure it is therapeutic and thus preventing possible clotting and/or bleeding/bruising complications.    CHADS-VASc = n/a  (unadjusted ischemic stroke risk/year:  n/a)    Does patient have any changes to current medical/health status since last appt (Y/N):  NO  Does patient have any  signs/symptoms of bleeding and/or thrombosis since the last appt (Y/N):  NO  Does patient have any interval changes to diet or medications since last appt (Y/N):  NO  Are there any complications or cost restrictions with current therapy (Y/N):  NO     Does patient have [facility] PCP? Yes, Celia RIVAS (If not, please document discussion that patient must be seen at [facility])       Vitals:  Declined by patient today due to Covid-19 transmission concerns.    There were no vitals filed for this visit.     Asssessment:      INR supratherapeutic at 3.4, therefore increasing patient's bleeding risk.   Reason(s) for out of range INR today:  Etiology unknown.      Pt NOT on antiplatelet therapy.    Medication reconciliation completed today.    Plan:  Instructed patient to decrease today's dose to 2.5mg, then resume current warfarin regimen.     Follow up:  Because warfarin is a high risk medication and current CHEST guidelines recommend regular monitoring intervals (few days up to 12 weeks), will have patient return to clinic in 2 weeks to recheck INR.    Fred Nayak, PharmD, BCACP

## 2021-01-20 ENCOUNTER — HOSPITAL ENCOUNTER (OUTPATIENT)
Dept: RADIOLOGY | Facility: MEDICAL CENTER | Age: 86
End: 2021-01-20
Attending: NURSE PRACTITIONER
Payer: MEDICARE

## 2021-01-20 DIAGNOSIS — Z13.820 ENCOUNTER FOR OSTEOPOROSIS SCREENING IN ASYMPTOMATIC POSTMENOPAUSAL PATIENT: ICD-10-CM

## 2021-01-20 DIAGNOSIS — Z78.0 ENCOUNTER FOR OSTEOPOROSIS SCREENING IN ASYMPTOMATIC POSTMENOPAUSAL PATIENT: ICD-10-CM

## 2021-01-20 PROCEDURE — 77080 DXA BONE DENSITY AXIAL: CPT

## 2021-02-02 ENCOUNTER — ANTICOAGULATION VISIT (OUTPATIENT)
Dept: MEDICAL GROUP | Facility: PHYSICIAN GROUP | Age: 86
End: 2021-02-02
Payer: MEDICARE

## 2021-02-02 DIAGNOSIS — Z79.01 CHRONIC ANTICOAGULATION: Primary | ICD-10-CM

## 2021-02-02 DIAGNOSIS — I73.9 PAD (PERIPHERAL ARTERY DISEASE) (HCC): ICD-10-CM

## 2021-02-02 LAB — INR PPP: 2.9 (ref 2–3.5)

## 2021-02-02 PROCEDURE — 93793 ANTICOAG MGMT PT WARFARIN: CPT | Performed by: NURSE PRACTITIONER

## 2021-02-02 PROCEDURE — 85610 PROTHROMBIN TIME: CPT | Performed by: NURSE PRACTITIONER

## 2021-02-02 PROCEDURE — 99999 PR NO CHARGE: CPT | Performed by: NURSE PRACTITIONER

## 2021-02-02 NOTE — PROGRESS NOTES
Anticoagulation Summary  As of 2021    INR goal:  2.0-3.0   TTR:  68.2 % (2 y)   INR used for dosin.90 (2021)   Warfarin maintenance plan:  5 mg (2.5 mg x 2) every Sun, e, Thu; 2.5 mg (2.5 mg x 1) all other days   Weekly warfarin total:  25 mg   Plan last modified:  Fred Nayak, PharmD (2020)   Next INR check:  3/2/2021   Target end date:  Indefinite    Indications    Chronic anticoagulation [Z79.01]  PAD (peripheral artery disease) (HCC) [I73.9]             Anticoagulation Episode Summary     INR check location:      Preferred lab:      Send INR reminders to:      Comments:  Indefinite therapy per Dr Jara      Anticoagulation Care Providers     Provider Role Specialty Phone number    CHACORTA Issa Referring Nurse Practitioner [phone]    [facility] Anticoagulation Services Responsible  [phone]        Anticoagulation Patient Findings  Patient Findings     Positives:  Hospital admission (attempted to open scanned admission report, patient states she was admitted for increased swelling of lower extremities)    Negatives:  Signs/symptoms of thrombosis, Signs/symptoms of bleeding, Laboratory test error suspected, Change in health, Change in alcohol use, Change in activity, Upcoming invasive procedure, Emergency department visit, Upcoming dental procedure, Missed doses, Extra doses, Change in medications, Change in diet/appetite, Bruising, Other complaints        HPI:   Maci Verduzco seen in clinic today, on anticoagulation therapy with warfarin for VTE prophylaxis due to history of PAD and  thrombosis of aortoiliac arterial graft.    Patient's previous INR was supratherapeutic at 3.4 on 21, at which time patient was instructed to decrease one dose, then resume current warfarin regimen.  She returns to clinic today to recheck INR to ensure it is therapeutic and thus preventing possible clotting and/or bleeding/bruising complications.    CHADS-VASc = n/a  (unadjusted  "ischemic stroke risk/year:  n/a)    Does patient have any changes to current medical/health status since last appt (Y/N):  Admitted to [facility] on 1-29-21 for what patient's states was \"increased swelling in both legs\".  Attempted to review scanned admission summary, would not load.  She confirms f/u visit with Dr. Jara next week.  Does patient have any signs/symptoms of bleeding and/or thrombosis since the last appt (Y/N):  NO  Does patient have any interval changes to diet or medications since last appt (Y/N):  Denies any med changes from hospital admission.    Are there any complications or cost restrictions with current therapy (Y/N):  NO     Does patient have [facility] PCP? Yes, Celia RIVAS (If not, please document discussion that patient must be seen at [facility])       Vitals:  Declined by patient today due to Covid-19 transmission concerns.    There were no vitals filed for this visit.     Asssessment:      INR therapeutic at 2.9, therefore decreasing patient's VTE and/or bleeding risk.   Reason(s) for out of range INR today:  n/a      Pt on antiplatelet therapy Aspirin 81mg for thrombosis of aortoiliac arterial graft and must be reviewed again on six months with vascular medicine.    Medication reconciliation completed today.    Plan:  Pt is to continue with current warfarin dosing regimen.     Follow up:  Because warfarin is a high risk medication and current CHEST guidelines recommend regular monitoring intervals (few days up to 12 weeks), will have patient return to clinic in 4 weeks to recheck INR.    Fred Nayak, PharmD, BCACP    "

## 2021-02-09 ENCOUNTER — HOSPITAL ENCOUNTER (OUTPATIENT)
Dept: RADIOLOGY | Facility: MEDICAL CENTER | Age: 86
End: 2021-02-09
Attending: SURGERY
Payer: MEDICARE

## 2021-02-09 DIAGNOSIS — I70.203 ATHEROSCLEROSIS OF NATIVE ARTERY OF BOTH LOWER EXTREMITIES, WITH UNSPECIFIED PRESENCE OF CLINICAL MANIFESTATION (HCC): ICD-10-CM

## 2021-02-09 PROCEDURE — 93922 UPR/L XTREMITY ART 2 LEVELS: CPT | Mod: 26 | Performed by: INTERNAL MEDICINE

## 2021-02-09 PROCEDURE — 93925 LOWER EXTREMITY STUDY: CPT | Mod: 26 | Performed by: INTERNAL MEDICINE

## 2021-02-09 PROCEDURE — 93922 UPR/L XTREMITY ART 2 LEVELS: CPT

## 2021-02-09 PROCEDURE — 93925 LOWER EXTREMITY STUDY: CPT

## 2021-02-18 DIAGNOSIS — Z79.01 CHRONIC ANTICOAGULATION: ICD-10-CM

## 2021-02-19 RX ORDER — WARFARIN SODIUM 2.5 MG/1
TABLET ORAL
Qty: 180 TABLET | Refills: 1 | Status: SHIPPED | OUTPATIENT
Start: 2021-02-19 | End: 2021-12-09

## 2021-03-02 ENCOUNTER — ANTICOAGULATION VISIT (OUTPATIENT)
Dept: MEDICAL GROUP | Facility: PHYSICIAN GROUP | Age: 86
End: 2021-03-02
Payer: MEDICARE

## 2021-03-02 DIAGNOSIS — Z79.01 CHRONIC ANTICOAGULATION: Primary | ICD-10-CM

## 2021-03-02 DIAGNOSIS — I73.9 PAD (PERIPHERAL ARTERY DISEASE) (HCC): ICD-10-CM

## 2021-03-02 LAB — INR PPP: 3 (ref 2–3.5)

## 2021-03-02 PROCEDURE — 99999 PR NO CHARGE: CPT | Performed by: FAMILY MEDICINE

## 2021-03-02 PROCEDURE — 85610 PROTHROMBIN TIME: CPT | Performed by: FAMILY MEDICINE

## 2021-03-02 PROCEDURE — 93793 ANTICOAG MGMT PT WARFARIN: CPT | Performed by: FAMILY MEDICINE

## 2021-03-02 NOTE — PROGRESS NOTES
Anticoagulation Summary  As of 3/2/2021    INR goal:  2.0-3.0   TTR:  69.4 % (2.1 y)   INR used for dosing:  3.00 (3/2/2021)   Warfarin maintenance plan:  5 mg (2.5 mg x 2) every Sun, Tue, Thu; 2.5 mg (2.5 mg x 1) all other days   Weekly warfarin total:  25 mg   Plan last modified:  Fred Nayak, PharmD (8/11/2020)   Next INR check:  4/13/2021   Target end date:  Indefinite    Indications    Chronic anticoagulation [Z79.01]  PAD (peripheral artery disease) ([facility]) [I73.9]             Anticoagulation Episode Summary     INR check location:      Preferred lab:      Send INR reminders to:      Comments:  Indefinite therapy per Dr Jara      Anticoagulation Care Providers     Provider Role Specialty Phone number    CHACORTA Issa Referring Nurse Practitioner [phone]    [facility] Anticoagulation Services Responsible  [phone]        Anticoagulation Patient Findings  Patient Findings     Negatives:  Signs/symptoms of thrombosis, Signs/symptoms of bleeding, Laboratory test error suspected, Change in health, Change in alcohol use, Change in activity, Upcoming invasive procedure, Emergency department visit, Upcoming dental procedure, Missed doses, Extra doses, Change in medications, Change in diet/appetite, Hospital admission, Bruising, Other complaints        HPI:   Radha Verduzco seen in clinic today, on anticoagulation therapy with warfarin for VTE prophylaxis due to history of  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    Patient's previous INR was therapeutic at 2.9 on 2-2-21, at which time patient was instructed to continue with current warfarin regimen.  She returns to clinic today to recheck INR to ensure it is therapeutic and thus preventing possible clotting and/or bleeding/bruising complications.    CHADS-VASc = n/a  (unadjusted ischemic stroke risk/year:  n/a)    Does patient have any changes to current medical/health status since last appt (Y/N):  NO  Does patient have any  signs/symptoms of bleeding and/or thrombosis since the last appt (Y/N):  NO  Does patient have any interval changes to diet or medications since last appt (Y/N):  NO  Are there any complications or cost restrictions with current therapy (Y/N):  NO     Does patient have [facility] PCP? Yes, Celia RIVAS (If not, please document discussion that patient must be seen at [facility])       Vitals:  Declined by patient today due to Covid-19 transmission concerns.    There were no vitals filed for this visit.     Asssessment:      INR remains therapeutic at 3.0, therefore decreasing patient's VTE and/or bleeding risk.  Reason(s) for out of range INR today:  n/a      Pt on antiplatelet therapy Aspirin 81mg for arterial graft and must be reviewed again on six months with Dr Jara.    Medication reconciliation completed today.    Plan:  Pt is to continue with current warfarin dosing regimen.     Follow up:  Because warfarin is a high risk medication and current CHEST guidelines recommend regular monitoring intervals (few days up to 12 weeks), will have patient return to clinic in 6 weeks to recheck INR.    Fred Nayak, PharmD, BCACP

## 2021-03-22 DIAGNOSIS — I10 ESSENTIAL HYPERTENSION: ICD-10-CM

## 2021-03-22 DIAGNOSIS — E78.5 HYPERLIPIDEMIA, UNSPECIFIED HYPERLIPIDEMIA TYPE: ICD-10-CM

## 2021-03-22 NOTE — TELEPHONE ENCOUNTER
Received request via: Pharmacy    Was the patient seen in the last year in this department? Yes LOV 01/04/2021    Does the patient have an active prescription (recently filled or refills available) for medication(s) requested? No

## 2021-03-23 RX ORDER — AMLODIPINE BESYLATE 5 MG/1
5 TABLET ORAL DAILY
Qty: 90 TABLET | Refills: 0 | Status: SHIPPED | OUTPATIENT
Start: 2021-03-23 | End: 2021-08-09 | Stop reason: SDUPTHER

## 2021-03-23 RX ORDER — ROSUVASTATIN CALCIUM 5 MG/1
5 TABLET, COATED ORAL DAILY
Qty: 90 TABLET | Refills: 0 | Status: SHIPPED | OUTPATIENT
Start: 2021-03-23 | End: 2021-06-23

## 2021-03-23 RX ORDER — TRIAMTERENE AND HYDROCHLOROTHIAZIDE 37.5; 25 MG/1; MG/1
1 TABLET ORAL DAILY
Qty: 90 TABLET | Refills: 0 | Status: SHIPPED | OUTPATIENT
Start: 2021-03-23 | End: 2021-08-09 | Stop reason: SDUPTHER

## 2021-03-23 NOTE — TELEPHONE ENCOUNTER
Requested Prescriptions     Pending Prescriptions Disp Refills   • rosuvastatin (CRESTOR) 5 MG Tab 90 tablet 0     Sig: Take 1 tablet by mouth every day.   • amLODIPine (NORVASC) 5 MG Tab 90 tablet 0     Sig: Take 1 tablet by mouth every day.   • triamterene-hctz (MAXZIDE-25/DYAZIDE) 37.5-25 MG Tab 90 tablet 0     Sig: Take 1 tablet by mouth every day.       Nieves Londono A.P.R.N.

## 2021-04-13 ENCOUNTER — ANTICOAGULATION VISIT (OUTPATIENT)
Dept: MEDICAL GROUP | Facility: PHYSICIAN GROUP | Age: 86
End: 2021-04-13
Payer: MEDICARE

## 2021-04-13 DIAGNOSIS — I73.9 PAD (PERIPHERAL ARTERY DISEASE) (HCC): ICD-10-CM

## 2021-04-13 DIAGNOSIS — Z79.01 CHRONIC ANTICOAGULATION: Primary | ICD-10-CM

## 2021-04-13 LAB — INR PPP: 2.1 (ref 2–3.5)

## 2021-04-13 PROCEDURE — 99999 PR NO CHARGE: CPT | Performed by: STUDENT IN AN ORGANIZED HEALTH CARE EDUCATION/TRAINING PROGRAM

## 2021-04-13 PROCEDURE — 93793 ANTICOAG MGMT PT WARFARIN: CPT | Performed by: STUDENT IN AN ORGANIZED HEALTH CARE EDUCATION/TRAINING PROGRAM

## 2021-04-13 PROCEDURE — 85610 PROTHROMBIN TIME: CPT | Performed by: FAMILY MEDICINE

## 2021-04-13 NOTE — PROGRESS NOTES
Anticoagulation Summary  As of 2021    INR goal:  2.0-3.0   TTR:  71.0 % (2.2 y)   INR used for dosin.10 (2021)   Warfarin maintenance plan:  5 mg (2.5 mg x 2) every Sun, Tue, u; 2.5 mg (2.5 mg x 1) all other days   Weekly warfarin total:  25 mg   Plan last modified:  Fred Nayak, PharmD (2020)   Next INR check:  2021   Target end date:  Indefinite    Indications    Chronic anticoagulation [Z79.01]  PAD (peripheral artery disease) (HCC) [I73.9]             Anticoagulation Episode Summary     INR check location:      Preferred lab:      Send INR reminders to:      Comments:  Indefinite therapy per Dr Jara      Anticoagulation Care Providers     Provider Role Specialty Phone number    CHACORTA Issa Referring Nurse Practitioner [phone]    [facility] Anticoagulation Services Responsible  [phone]        Anticoagulation Patient Findings  Patient Findings     Negatives:  Signs/symptoms of thrombosis, Signs/symptoms of bleeding, Laboratory test error suspected, Change in health, Change in alcohol use, Change in activity, Upcoming invasive procedure, Emergency department visit, Upcoming dental procedure, Missed doses, Extra doses, Change in medications, Change in diet/appetite, Hospital admission, Bruising, Other complaints        HPI:   Radha Verduzco seen in clinic today, on anticoagulation therapy with warfarin for VTE prophylaxis due to 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  She returns to clinic today to recheck INR to ensure it is therapeutic and thus preventing possible clotting and/or bleeding/bruising complications.    CHADS-VASc = n/a  (unadjusted ischemic stroke risk/year:  n/a)    Does patient have any changes to current medical/health status since last appt (Y/N):  NO  Does patient have any  signs/symptoms of bleeding and/or thrombosis since the last appt (Y/N):  NO  Does patient have any interval changes to diet or medications since last appt (Y/N):  NO  Are there any complications or cost restrictions with current therapy (Y/N):  NO     Does patient have [facility] PCP? Yes, Celia RIVAS (If not, please document discussion that patient must be seen at [facility])       Vitals:  Declined by patient today due to Covid-19 transmission concerns.    There were no vitals filed for this visit.     Asssessment:      INR remains therapeutic at 2.1, therefore decreasing patient's VTE and/or bleeding risk.   Reason(s) for out of range INR today:  n/a      Pt NOT on antiplatelet therapy.    Medication reconciliation completed today.    Plan:  Pt is to continue with current warfarin dosing regimen.     Follow up:  Because warfarin is a high risk medication and current CHEST guidelines recommend regular monitoring intervals (few days up to 12 weeks), will have patient return to clinic in 7 weeks to recheck INR.    Fred Nayak, PharmD, BCACP

## 2021-06-15 ENCOUNTER — ANTICOAGULATION VISIT (OUTPATIENT)
Dept: MEDICAL GROUP | Facility: PHYSICIAN GROUP | Age: 86
End: 2021-06-15
Payer: MEDICARE

## 2021-06-15 DIAGNOSIS — I73.9 PAD (PERIPHERAL ARTERY DISEASE) (HCC): ICD-10-CM

## 2021-06-15 DIAGNOSIS — Z79.01 CHRONIC ANTICOAGULATION: Primary | ICD-10-CM

## 2021-06-15 LAB — INR PPP: 2 (ref 2–3.5)

## 2021-06-15 PROCEDURE — 93793 ANTICOAG MGMT PT WARFARIN: CPT | Performed by: FAMILY MEDICINE

## 2021-06-15 PROCEDURE — 85610 PROTHROMBIN TIME: CPT | Performed by: FAMILY MEDICINE

## 2021-06-15 NOTE — PROGRESS NOTES
Anticoagulation Summary  As of 6/15/2021    INR goal:  2.0-3.0   TTR:  73.1 % (2.4 y)   INR used for dosin.00 (6/15/2021)   Warfarin maintenance plan:  5 mg (2.5 mg x 2) every Sun, Tue, Thu; 2.5 mg (2.5 mg x 1) all other days   Weekly warfarin total:  25 mg   Plan last modified:  Fred Nayak, PharmD (2020)   Next INR check:  2021   Target end date:  Indefinite    Indications    Chronic anticoagulation [Z79.01]  PAD (peripheral artery disease) ([facility]) [I73.9]             Anticoagulation Episode Summary     INR check location:      Preferred lab:      Send INR reminders to:      Comments:  Indefinite therapy per Dr Jara      Anticoagulation Care Providers     Provider Role Specialty Phone number    CHACORTA Issa Referring Nurse Practitioner [phone]    [facility] Anticoagulation Services Responsible  [phone]        Anticoagulation Patient Findings  Patient Findings     Negatives:  Signs/symptoms of thrombosis, Signs/symptoms of bleeding, Laboratory test error suspected, Change in health, Change in alcohol use, Change in activity, Upcoming invasive procedure, Emergency department visit, Upcoming dental procedure, Missed doses, Extra doses, Change in medications, Change in diet/appetite, Hospital admission, Bruising, Other complaints       HPI:   Radha Verduzco seen in clinic today, on anticoagulation therapy with warfarin for VTE prophylaxis 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    Patient's previous INR was therapeutic at 2.1 on 21, at which time patient was instructed to continue with current warfarin regimen.  She returns to clinic today to recheck INR to ensure it is therapeutic and thus preventing possible clotting and/or bleeding/bruising complications.    CHADS-VASc = n/a  (unadjusted ischemic stroke risk/year:  n/a)    Does patient have any changes to current medical/health status since last appt (Y/N):  NO  Does patient have any  signs/symptoms of bleeding and/or thrombosis since the last appt (Y/N):  NO  Does patient have any interval changes to diet or medications since last appt (Y/N):  NO  Are there any complications or cost restrictions with current therapy (Y/N):  NO     Does patient have [facility] PCP? Yes, Celia RIVAS (If not, please document discussion that patient must be seen at [facility])       Vitals:  declined by patient at today's visit.    There were no vitals filed for this visit.     Asssessment:      INR remains therapeutic at 2.0, therefore decreasing patient's VTE and/or bleeding risk.   Reason(s) for out of range INR today:  n/a      Pt on antiplatelet therapy Aspirin 81mg for graft and PAD and must be reviewed again on vascular surgeon PRN.    Medication reconciliation completed today.    Plan:  Pt is to continue with current warfarin dosing regimen.     Follow up:  Because warfarin is a high risk medication and current CHEST guidelines recommend regular monitoring intervals (few days up to 12 weeks), will have patient return to clinic in 10 weeks to recheck INR.    Fred Nayak, PharmD, BCACP

## 2021-06-22 DIAGNOSIS — E78.5 HYPERLIPIDEMIA, UNSPECIFIED HYPERLIPIDEMIA TYPE: ICD-10-CM

## 2021-06-23 RX ORDER — ROSUVASTATIN CALCIUM 5 MG/1
TABLET, COATED ORAL
Qty: 90 TABLET | Refills: 0 | Status: SHIPPED | OUTPATIENT
Start: 2021-06-23 | End: 2021-08-27 | Stop reason: SDUPTHER

## 2021-06-23 NOTE — TELEPHONE ENCOUNTER
Requested Prescriptions     Signed Prescriptions Disp Refills   • rosuvastatin (CRESTOR) 5 MG Tab 90 tablet 0     Sig: TAKE ONE TABLET BY MOUTH DAILY     Authorizing Provider: JANE CHEN A.P.R.N.

## 2021-06-25 ENCOUNTER — TELEPHONE (OUTPATIENT)
Dept: MEDICAL GROUP | Facility: PHYSICIAN GROUP | Age: 86
End: 2021-06-25

## 2021-06-25 NOTE — TELEPHONE ENCOUNTER
VOICEMAIL  1. Caller Name: Radha                      Call Back Number: [phone] (home)    2. Message: Patient called stating she believes her Warfarin is causing her to bruise easily and a lot. She wants to know if she should make an appointment sooner than 7/2/21    3. Patient approves office to leave a detailed voicemail/MyChart message: N\A

## 2021-06-25 NOTE — TELEPHONE ENCOUNTER
Patient having complaints of easy bruising and is currently on warfarin. She is managed by anticoagulation clinic. I would have patient schedule appointment with anticoagulation clinic to have her INR rechecked to see if it needs adjustment on her dosing. If she has any blood in her stool, blood in her urine, bleeding gums, or bloody nose she should definitely be seen.

## 2021-06-25 NOTE — TELEPHONE ENCOUNTER
Phone Number Called: [phone] (home)     Call outcome: Spoke to patient regarding message below.    Message: Patient will call the anticoagulation clinic and see when she can get in the soonest. If she can't get in soon patient will make appointment to be seen by Alona

## 2021-06-29 ENCOUNTER — DOCUMENTATION (OUTPATIENT)
Dept: VASCULAR LAB | Facility: MEDICAL CENTER | Age: 86
End: 2021-06-29

## 2021-06-29 NOTE — PROGRESS NOTES
Received the following message from patient's PCP.    LUCIO Issa.  Fred Nayak, PharmD  Hi Radha Ibarra called this afternoon to state that she was bruising more easily.  We gave her your contact information and recommended that she call and schedule appointment with you.  Would you mind reaching out to her to make sure she was able to schedule an appointment?     Thank you,  Celia    Attempted to reach patient to discuss, LM asking her to call back to discuss and possibly get to clinic sooner than currently scheduled.  Fred Nayak, PharmD, BCACP

## 2021-07-06 ENCOUNTER — ANTICOAGULATION MONITORING (OUTPATIENT)
Dept: VASCULAR LAB | Facility: MEDICAL CENTER | Age: 86
End: 2021-07-06

## 2021-07-06 DIAGNOSIS — Z79.01 CHRONIC ANTICOAGULATION: ICD-10-CM

## 2021-07-06 DIAGNOSIS — I73.9 PAD (PERIPHERAL ARTERY DISEASE) (HCC): ICD-10-CM

## 2021-07-07 NOTE — PROGRESS NOTES
S/w patient today regarding increase in bruising over the last few weeks.  She denies s/s of hematoma, but does endorse surface bruising in places she does not recall injuring.  Will have her decrease tonight's dose of warfarin to 2.5mg, then resume current warfarin regimen.  Appt for f/u INR made for 7-13-21.  Fred Nayak, PharmD, BCACP

## 2021-07-13 ENCOUNTER — ANTICOAGULATION VISIT (OUTPATIENT)
Dept: MEDICAL GROUP | Facility: PHYSICIAN GROUP | Age: 86
End: 2021-07-13
Payer: MEDICARE

## 2021-07-13 DIAGNOSIS — Z79.01 CHRONIC ANTICOAGULATION: Primary | ICD-10-CM

## 2021-07-13 DIAGNOSIS — I73.9 PAD (PERIPHERAL ARTERY DISEASE) (HCC): ICD-10-CM

## 2021-07-13 LAB — INR PPP: 2.1 (ref 2–3.5)

## 2021-07-13 PROCEDURE — 93793 ANTICOAG MGMT PT WARFARIN: CPT | Performed by: FAMILY MEDICINE

## 2021-07-13 PROCEDURE — 99999 PR NO CHARGE: CPT | Performed by: FAMILY MEDICINE

## 2021-07-13 PROCEDURE — 85610 PROTHROMBIN TIME: CPT | Performed by: FAMILY MEDICINE

## 2021-07-13 NOTE — PROGRESS NOTES
Anticoagulation Summary  As of 2021    INR goal:  2.0-3.0   TTR:  74.0 % (2.4 y)   INR used for dosin.10 (2021)   Warfarin maintenance plan:  5 mg (2.5 mg x 2) every Sun, Thu; 2.5 mg (2.5 mg x 1) all other days   Weekly warfarin total:  22.5 mg   Plan last modified:  Fred Nayak, PharmD (2021)   Next INR check:  2021   Target end date:  Indefinite    Indications    Chronic anticoagulation [Z79.01]  PAD (peripheral artery disease) (HCC) [I73.9]             Anticoagulation Episode Summary     INR check location:      Preferred lab:      Send INR reminders to:      Comments:  Indefinite therapy per Dr Jara      Anticoagulation Care Providers     Provider Role Specialty Phone number    CHACORTA Issa Referring Nurse Practitioner [phone]    [facility] Anticoagulation Services Responsible  [phone]        Anticoagulation Patient Findings  Patient Findings     Positives:  Bruising    Negatives:  Signs/symptoms of thrombosis, Signs/symptoms of bleeding, Laboratory test error suspected, Change in health, Change in alcohol use, Change in activity, Upcoming invasive procedure, Emergency department visit, Upcoming dental procedure, Missed doses, Extra doses, Change in medications, Change in diet/appetite, Hospital admission, Other complaints        HPI:   Radha Verduzco seen in clinic today, on anticoagulation therapy with warfarin for VTE prophylaxis due to history of thrombosis of aortoiliac arterial graft    Patient's previous INR was therapeutic at 2.0 on 6-15-21, at which time patient was instructed to continue with current warfarin regimen.  She returns to clinic today to recheck INR to ensure it is therapeutic and thus preventing possible clotting and/or bleeding/bruising complications.    CHADS-VASc = n/a  (unadjusted ischemic stroke risk/year:  n/a)    Does patient have any changes to current medical/health status since last appt (Y/N):  NO  Does patient have any  signs/symptoms of bleeding and/or thrombosis since the last appt (Y/N):  C/o random bruising on legs, started in the last couple weeks.  Does patient have any interval changes to diet or medications since last appt (Y/N):  Green vegetable intake consistent, overall caloric intake down some.  Are there any complications or cost restrictions with current therapy (Y/N):  NO     Does patient have [facility] PCP? Yes, Celia RIVAS (If not, please document discussion that patient must be seen at [facility])       Vitals:  declined at today's visit.    There were no vitals filed for this visit.     Asssessment:      INR therapeutic at 2.1, therefore decreasing patient's VTE and/or bleeding risk.   Reason(s) for out of range INR today:  n/a      Pt on antiplatelet therapy Aspirin 81mg for PAD and must be reviewed again on with vasc surgeon yearly.    Medication reconciliation completed today.    Plan:  Instructed patient to decrease weekly warfarin regimen as detailed above to account for dosing over the last week and decrease in caloric intake.     Follow up:  Because warfarin is a high risk medication and current CHEST guidelines recommend regular monitoring intervals (few days up to 12 weeks), will have patient return to clinic in 2 weeks to recheck INR.    Fred Nayak, PharmD, BCACP

## 2021-08-09 DIAGNOSIS — I10 ESSENTIAL HYPERTENSION: ICD-10-CM

## 2021-08-09 RX ORDER — AMLODIPINE BESYLATE 5 MG/1
5 TABLET ORAL DAILY
Qty: 90 TABLET | Refills: 0 | Status: SHIPPED | OUTPATIENT
Start: 2021-08-09 | End: 2021-08-27 | Stop reason: SDUPTHER

## 2021-08-09 RX ORDER — LISINOPRIL 40 MG/1
40 TABLET ORAL DAILY
Qty: 90 TABLET | Refills: 0 | Status: SHIPPED | OUTPATIENT
Start: 2021-08-09 | End: 2021-08-27 | Stop reason: SDUPTHER

## 2021-08-09 RX ORDER — TRIAMTERENE AND HYDROCHLOROTHIAZIDE 37.5; 25 MG/1; MG/1
1 TABLET ORAL DAILY
Qty: 90 TABLET | Refills: 0 | Status: SHIPPED | OUTPATIENT
Start: 2021-08-09 | End: 2021-08-27 | Stop reason: SDUPTHER

## 2021-08-09 NOTE — TELEPHONE ENCOUNTER
Requested Prescriptions     Signed Prescriptions Disp Refills   • amLODIPine (NORVASC) 5 MG Tab 90 tablet 0     Sig: Take 1 tablet by mouth every day.     Authorizing Provider: EDMUND WINSLOW   • triamterene-hctz (MAXZIDE-25/DYAZIDE) 37.5-25 MG Tab 90 tablet 0     Sig: Take 1 tablet by mouth every day.     Authorizing Provider: EDMUND WINSLOW   • lisinopril (PRINIVIL) 40 MG tablet 90 tablet 0     Sig: Take 1 tablet by mouth every day. TAKE ONE TABLET BY MOUTH DAILY     Authorizing Provider: EDMUND WINSLOW A.P.R.N.

## 2021-08-10 ENCOUNTER — ANTICOAGULATION VISIT (OUTPATIENT)
Dept: MEDICAL GROUP | Facility: PHYSICIAN GROUP | Age: 86
End: 2021-08-10
Payer: MEDICARE

## 2021-08-10 ENCOUNTER — TELEPHONE (OUTPATIENT)
Dept: MEDICAL GROUP | Facility: PHYSICIAN GROUP | Age: 86
End: 2021-08-10

## 2021-08-10 DIAGNOSIS — I10 ESSENTIAL HYPERTENSION: ICD-10-CM

## 2021-08-10 DIAGNOSIS — Z79.01 CHRONIC ANTICOAGULATION: Primary | ICD-10-CM

## 2021-08-10 DIAGNOSIS — I73.9 PAD (PERIPHERAL ARTERY DISEASE) (HCC): ICD-10-CM

## 2021-08-10 LAB — INR PPP: 2 (ref 2–3.5)

## 2021-08-10 PROCEDURE — 93793 ANTICOAG MGMT PT WARFARIN: CPT | Performed by: FAMILY MEDICINE

## 2021-08-10 PROCEDURE — 85610 PROTHROMBIN TIME: CPT | Performed by: FAMILY MEDICINE

## 2021-08-10 PROCEDURE — 99999 PR NO CHARGE: CPT | Performed by: FAMILY MEDICINE

## 2021-08-10 NOTE — PROGRESS NOTES
Anticoagulation Summary  As of 8/10/2021    INR goal:  2.0-3.0   TTR:  74.7 % (2.5 y)   INR used for dosin.00 (8/10/2021)   Warfarin maintenance plan:  5 mg (2.5 mg x 2) every Sun, Thu; 2.5 mg (2.5 mg x 1) all other days   Weekly warfarin total:  22.5 mg   Plan last modified:  Fred Nayak, PharmD (2021)   Next INR check:  2021   Target end date:  Indefinite    Indications    Chronic anticoagulation [Z79.01]  PAD (peripheral artery disease) ([facility]) [I73.9]             Anticoagulation Episode Summary     INR check location:      Preferred lab:      Send INR reminders to:      Comments:  Indefinite therapy per Dr Jara      Anticoagulation Care Providers     Provider Role Specialty Phone number    CHACORTA Issa Referring Nurse Practitioner [phone]    [facility] Anticoagulation Services Responsible  [phone]        Anticoagulation Patient Findings  Patient Findings     Negatives:  Signs/symptoms of thrombosis, Signs/symptoms of bleeding, Laboratory test error suspected, Change in health, Change in alcohol use, Change in activity, Upcoming invasive procedure, Emergency department visit, Upcoming dental procedure, Missed doses, Extra doses, Change in medications, Change in diet/appetite, Hospital admission, Bruising, Other complaints         HPI:   Radha Verduzco seen in clinic today, on anticoagulation therapy with warfarin for VTE prophylaxis due to history of PAD andthrombosis of aortoiliac arterial graft.     Patient's previous INR was therapeutic at 2.1 on 21, at which time patient was instructed to continue with current warfarin regimen.  She returns to clinic today to recheck INR to ensure it is therapeutic and thus preventing possible clotting and/or bleeding/bruising complications.    CHADS-VASc = n/a  (unadjusted ischemic stroke risk/year:  n/a)    Does patient have any changes to current medical/health status since last appt (Y/N):  NO  Does patient have any  signs/symptoms of bleeding and/or thrombosis since the last appt (Y/N):  NO  Does patient have any interval changes to diet or medications since last appt (Y/N):  NO  Are there any complications or cost restrictions with current therapy (Y/N):  NO     Does patient have [facility] PCP? Yes, Celia RIVAS (If not, please document discussion that patient must be seen at [facility])       Vitals:  declined today.    There were no vitals filed for this visit.     Asssessment:      INR remains subtherapeutic at 2.0, therefore decreasing patient's VTE and/or bleeding risk.   Reason(s) for out of range INR today:  n/a    Pt on antiplatelet therapy for PAD, to be reviewed by vascular surgeon every six to 12 months.    Medication reconciliation completed today.    Plan:  Pt is to continue with current warfarin dosing regimen.     Follow up:  Because warfarin is a high risk medication and current CHEST guidelines recommend regular monitoring intervals (few days up to 12 weeks), will have patient return to clinic in 5 weeks to recheck INR.    Fred Nayak, PharmD, BCACP

## 2021-08-10 NOTE — TELEPHONE ENCOUNTER
Radha is requesting for her medication to be refilled. She is having trouble with her phone and being able to get those refilled. The pharmacy listed is correct.

## 2021-08-27 ENCOUNTER — OFFICE VISIT (OUTPATIENT)
Dept: MEDICAL GROUP | Facility: PHYSICIAN GROUP | Age: 86
End: 2021-08-27
Payer: MEDICARE

## 2021-08-27 VITALS
DIASTOLIC BLOOD PRESSURE: 58 MMHG | HEART RATE: 77 BPM | WEIGHT: 160 LBS | TEMPERATURE: 97.9 F | HEIGHT: 60 IN | BODY MASS INDEX: 31.41 KG/M2 | SYSTOLIC BLOOD PRESSURE: 116 MMHG | RESPIRATION RATE: 16 BRPM | OXYGEN SATURATION: 94 %

## 2021-08-27 DIAGNOSIS — E55.9 VITAMIN D DEFICIENCY: ICD-10-CM

## 2021-08-27 DIAGNOSIS — E11.22 TYPE 2 DIABETES MELLITUS WITH STAGE 3B CHRONIC KIDNEY DISEASE, WITHOUT LONG-TERM CURRENT USE OF INSULIN (HCC): ICD-10-CM

## 2021-08-27 DIAGNOSIS — E21.3 HYPERPARATHYROIDISM (HCC): ICD-10-CM

## 2021-08-27 DIAGNOSIS — N18.32 TYPE 2 DIABETES MELLITUS WITH STAGE 3B CHRONIC KIDNEY DISEASE, WITHOUT LONG-TERM CURRENT USE OF INSULIN (HCC): ICD-10-CM

## 2021-08-27 DIAGNOSIS — I10 ESSENTIAL HYPERTENSION: ICD-10-CM

## 2021-08-27 DIAGNOSIS — E78.5 HYPERLIPIDEMIA, UNSPECIFIED HYPERLIPIDEMIA TYPE: ICD-10-CM

## 2021-08-27 DIAGNOSIS — Z79.01 CHRONIC ANTICOAGULATION: ICD-10-CM

## 2021-08-27 PROBLEM — N18.30 CKD (CHRONIC KIDNEY DISEASE) STAGE 3, GFR 30-59 ML/MIN: Status: RESOLVED | Noted: 2018-12-19 | Resolved: 2021-08-27

## 2021-08-27 LAB
HBA1C MFR BLD: 6.7 % (ref 0–5.6)
INT CON NEG: NEGATIVE
INT CON POS: POSITIVE

## 2021-08-27 PROCEDURE — 99214 OFFICE O/P EST MOD 30 MIN: CPT | Performed by: NURSE PRACTITIONER

## 2021-08-27 PROCEDURE — 83036 HEMOGLOBIN GLYCOSYLATED A1C: CPT | Performed by: NURSE PRACTITIONER

## 2021-08-27 RX ORDER — AMLODIPINE BESYLATE 5 MG/1
5 TABLET ORAL DAILY
Qty: 90 TABLET | Refills: 3 | Status: SHIPPED
Start: 2021-08-27 | End: 2021-10-19

## 2021-08-27 RX ORDER — ROSUVASTATIN CALCIUM 5 MG/1
TABLET, COATED ORAL
Qty: 90 TABLET | Refills: 3 | Status: SHIPPED | OUTPATIENT
Start: 2021-08-27 | End: 2022-10-10 | Stop reason: SDUPTHER

## 2021-08-27 RX ORDER — TRIAMTERENE AND HYDROCHLOROTHIAZIDE 37.5; 25 MG/1; MG/1
1 TABLET ORAL DAILY
Qty: 90 TABLET | Refills: 3 | Status: SHIPPED
Start: 2021-08-27 | End: 2021-10-19

## 2021-08-27 RX ORDER — LISINOPRIL 40 MG/1
40 TABLET ORAL DAILY
Qty: 90 TABLET | Refills: 3 | Status: SHIPPED | OUTPATIENT
Start: 2021-08-27 | End: 2022-10-10

## 2021-08-27 ASSESSMENT — FIBROSIS 4 INDEX: FIB4 SCORE: 1.67

## 2021-08-27 NOTE — ASSESSMENT & PLAN NOTE
Chronic medical problem. She is not taking any medication. She watches her diet and watches her sugar intake. She does not exercise. She is due for A1C today and updated labs.  She is not taking any NSAIDs.  She is on lisinopril.

## 2021-08-27 NOTE — ASSESSMENT & PLAN NOTE
Chronic medical problem. Her blood pressure today is 116/58. She is taking amlodipine 5 mg daily, lisinopril 40 mg daily and triamterene-hctz 37.5-25 mg daily. She does not check her blood pressure at home. She is due for updated labs.

## 2021-08-27 NOTE — ASSESSMENT & PLAN NOTE
Chronic medial problem. She is taking warfarin and followed by anticoagulation clinic. Her last INR's have been therapeutic.

## 2021-08-27 NOTE — PROGRESS NOTES
Subjective:     CC: Diabetes    HPI:   Radha presents today with the following:    Type 2 diabetes mellitus with stage 3 chronic kidney disease, without long-term current use of insulin (HCC)  Chronic medical problem. She is not taking any medication. She watches her diet and watches her sugar intake. She does not exercise. She is due for A1C today and updated labs.  She is not taking any NSAIDs.  She is on lisinopril.    Hypertension  Chronic medical problem. Her blood pressure today is 116/58. She is taking amlodipine 5 mg daily, lisinopril 40 mg daily and triamterene-hctz 37.5-25 mg daily. She does not check her blood pressure at home. She is due for updated labs.    Hyperlipidemia  Chronic medical problem. She is taking rosuvastatin 5 mg daily.  She is tolerating the medication.  Denies myalgias.  She is due for updated labs.    Chronic anticoagulation  Chronic medial problem. She is taking warfarin and followed by anticoagulation clinic. Her last INR's have been therapeutic.       Past Medical History:   Diagnosis Date   • Carotid artery disease (HCC) 2010   • CPPD (pseudo-gout) 2011   • Diabetes (HCC) 2018   • GOUT 2011   • History of skin cancer 2016   • Hyperlipidemia 2010   • Hypertension 2010   • PAD (peripheral artery disease) 2010   • Pre-diabetes 2010   • S/P hip replacement 2011       Social History     Tobacco Use   • Smoking status: Former Smoker     Packs/day: 2.00     Years: 12.00     Pack years: 24.00     Types: Cigarettes     Quit date:      Years since quittin.6   • Smokeless tobacco: Never Used   Vaping Use   • Vaping Use: Never used   Substance Use Topics   • Alcohol use: Not Currently     Alcohol/week: 0.0 oz     Comment: rarely maybe one a year   • Drug use: No       Current Outpatient Medications Ordered in Epic   Medication Sig Dispense Refill   • amLODIPine (NORVASC) 5 MG Tab Take 1 Tablet by mouth every day. 90 Tablet 3   •  lisinopril (PRINIVIL) 40 MG tablet Take 1 Tablet by mouth every day. TAKE ONE TABLET BY MOUTH DAILY 90 Tablet 3   • rosuvastatin (CRESTOR) 5 MG Tab TAKE ONE TABLET BY MOUTH DAILY 90 Tablet 3   • triamterene-hctz (MAXZIDE-25/DYAZIDE) 37.5-25 MG Tab Take 1 Tablet by mouth every day. 90 Tablet 3   • warfarin (COUMADIN) 2.5 MG Tab TAKE TWO TALETS BY MOUTH DAILY ON SUNDAY, TUESDAY, THURSDAY AND TAKE ONE TABLET BY MOUTH DAILY ON ALL OTHER DAYS 180 tablet 1     No current Epic-ordered facility-administered medications on file.       Allergies:  Anesthetic [benzocaine], Iodine, and Other drug    Health Maintenance: Due for zoster vaccine, annual wellness visit, A1c, lipid panel, urine microalbumin, serum creatinine and retinal screening.    ROS:  Gen: no fevers/chills, no changes in weight  Eyes: no changes in vision  ENT: no sore throat, no epistaxis  Pulm: no shortness of breath  CV: no chest pain, no palpitations  GI: no nausea/vomiting, no bloody stools  : no dysuria, no hematuria  MSk: no myalgias  Skin: no rash  Neuro: no headaches, no dizziness  Heme/Lymph: no easy bruising      Objective:     Vital signs reviewed  Exam:  /58 (BP Location: Left arm, Patient Position: Sitting, BP Cuff Size: Adult)   Pulse 77   Temp 36.6 °C (97.9 °F) (Temporal)   Resp 16   Ht 1.524 m (5')   Wt 72.6 kg (160 lb)   SpO2 94%   BMI 31.25 kg/m²  Body mass index is 31.25 kg/m².    Gen: Alert and oriented, No apparent distress.  Neck: Neck is supple without lymphadenopathy.  Lungs: Normal effort, CTA bilaterally, no wheezes, rhonchi, or rales  CV: Regular rate and rhythm. No murmurs, rubs, or gallops.  Ext: No clubbing, cyanosis, edema.      Labs:     Results for RILEY, JAMIN JANESSA (MRN [id number]) as of 8/27/2021 15:46   Ref. Range 8/27/2021 13:46   Glycohemoglobin Latest Ref Range: 0.0 - 5.6 % 6.7 (A)       Assessment & Plan:     86 y.o. female with the following -     1. Type 2 diabetes mellitus with stage 3b chronic kidney  disease, without long-term current use of insulin (HCC)  Chronic stable problem.  Discussed her recent A1c results today.  She will continue with her diet and lifestyle modifications.  She is due for updated labs.  We will complete her retinal eye exam today.  She will return in 6 months for follow-up.  - POCT Hemoglobin A1C  - Comp Metabolic Panel; Future  - Lipid Profile; Future  - MICROALBUMIN CREAT RATIO URINE; Future  - POCT Retinal Eye Exam    2. Essential hypertension  Chronic stable problem.  Her blood pressure is at goal today.  She will continue with her amlodipine, lisinopril, and triamterene-HCTZ.  Medication refilled today.  She is due for updated labs.  Encouraged her to complete her labs as I previously ordered labs in January 2021 that she did not complete.  She verbalized understanding.  Red flags discussed.  - Comp Metabolic Panel; Future  - CBC WITH DIFFERENTIAL; Future  - amLODIPine (NORVASC) 5 MG Tab; Take 1 Tablet by mouth every day.  Dispense: 90 Tablet; Refill: 3  - lisinopril (PRINIVIL) 40 MG tablet; Take 1 Tablet by mouth every day. TAKE ONE TABLET BY MOUTH DAILY  Dispense: 90 Tablet; Refill: 3  - triamterene-hctz (MAXZIDE-25/DYAZIDE) 37.5-25 MG Tab; Take 1 Tablet by mouth every day.  Dispense: 90 Tablet; Refill: 3    3. Hyperlipidemia, unspecified hyperlipidemia type  Chronic stable problem.  She will continue with rosuvastatin.  Medication refilled today.  She is due for updated labs.  Orders placed.  - Lipid Profile; Future  - rosuvastatin (CRESTOR) 5 MG Tab; TAKE ONE TABLET BY MOUTH DAILY  Dispense: 90 Tablet; Refill: 3    4. Chronic anticoagulation  Chronic stable problem.  Reviewed her recent INRs.  She will continue with her warfarin and monitoring with anticoagulation clinic.  Red flags discussed.    5. Hyperparathyroidism (HCC)  Chronic stable problem.  She is due for updated labs.  Orders placed.  - PTH INTACT (PTH ONLY); Future    6. Vitamin D deficiency  Chronic stable problem.   She is due for updated labs.  Orders placed.  - VITAMIN D,25 HYDROXY; Future        Return in about 6 months (around 2/27/2022) for Diabetes, A1C.    Please note that this dictation was created using voice recognition software. I have made every reasonable attempt to correct obvious errors, but I expect that there are errors of grammar and possibly content that I did not discover before finalizing the note.

## 2021-09-10 ENCOUNTER — TELEPHONE (OUTPATIENT)
Dept: MEDICAL GROUP | Facility: PHYSICIAN GROUP | Age: 86
End: 2021-09-10

## 2021-09-13 NOTE — TELEPHONE ENCOUNTER
Retinal eye exam came back low quality.  We will have MAs reach out to patient to inform and offer referral to ophthalmology.

## 2021-09-14 ENCOUNTER — ANTICOAGULATION VISIT (OUTPATIENT)
Dept: MEDICAL GROUP | Facility: PHYSICIAN GROUP | Age: 86
End: 2021-09-14
Payer: MEDICARE

## 2021-09-14 DIAGNOSIS — I73.9 PAD (PERIPHERAL ARTERY DISEASE) (HCC): ICD-10-CM

## 2021-09-14 DIAGNOSIS — Z79.01 CHRONIC ANTICOAGULATION: Primary | ICD-10-CM

## 2021-09-14 LAB — INR PPP: 2.8 (ref 2–3.5)

## 2021-09-14 PROCEDURE — 93793 ANTICOAG MGMT PT WARFARIN: CPT | Performed by: FAMILY MEDICINE

## 2021-09-14 PROCEDURE — 99999 PR NO CHARGE: CPT | Performed by: FAMILY MEDICINE

## 2021-09-14 PROCEDURE — 85610 PROTHROMBIN TIME: CPT | Performed by: FAMILY MEDICINE

## 2021-09-14 NOTE — PROGRESS NOTES
Anticoagulation Summary  As of 2021    INR goal:  2.0-3.0   TTR:  75.7 % (2.6 y)   INR used for dosin.80 (2021)   Warfarin maintenance plan:  5 mg (2.5 mg x 2) every Sun, Thu; 2.5 mg (2.5 mg x 1) all other days   Weekly warfarin total:  22.5 mg   Plan last modified:  Fred Nayak, PharmD (2021)   Next INR check:  10/26/2021   Target end date:  Indefinite    Indications    Chronic anticoagulation [Z79.01]  PAD (peripheral artery disease) (HCC) [I73.9]             Anticoagulation Episode Summary     INR check location:      Preferred lab:      Send INR reminders to:      Comments:  Indefinite therapy per Dr Jara      Anticoagulation Care Providers     Provider Role Specialty Phone number    CHACORTA Issa Referring Nurse Practitioner Family [phone]    [facility] Anticoagulation Services Responsible  [phone]        Anticoagulation Patient Findings  Patient Findings     Negatives:  Signs/symptoms of thrombosis, Signs/symptoms of bleeding, Laboratory test error suspected, Change in health, Change in alcohol use, Change in activity, Upcoming invasive procedure, Emergency department visit, Upcoming dental procedure, Missed doses, Extra doses, Change in medications, Change in diet/appetite, Hospital admission, Bruising, Other complaints         HPI:   Radha Verduzco seen in clinic today, on anticoagulation therapy with warfarin for 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  She returns to clinic today to recheck INR to ensure it is therapeutic and thus preventing possible clotting and/or bleeding/bruising complications.    CHADS-VASc = n/a  (unadjusted ischemic stroke risk/year:  n/a)    Does patient have any changes to current medical/health status since last appt (Y/N):  NO  Does patient have any  signs/symptoms of bleeding and/or thrombosis since the last appt (Y/N):  NO  Does patient have any interval changes to diet or medications since last appt (Y/N):  NO  Are there any complications or cost restrictions with current therapy (Y/N):  NO     Does patient have [facility] PCP? Yes, Celia RIVAS (If not, please document discussion that patient must be seen at [facility])       Vitals:  declined at today's visit.    There were no vitals filed for this visit.     Asssessment:      INR remains therapeutic at 2.8, therefore decreasing patient's VTE and/or bleeding risk.   Reason(s) for out of range INR today:  n/a      Pt taking ASA 81mg daily per Vascular Med and Vascular surgeon.    Medication reconciliation completed today.    Plan:  Pt is to continue with current warfarin dosing regimen.     Follow up:  Because warfarin is a high risk medication and current CHEST guidelines recommend regular monitoring intervals (few days up to 12 weeks), will have patient return to clinic in 6 weeks to recheck INR.    Fred Nayak, PharmD, BCACP

## 2021-10-19 ENCOUNTER — OFFICE VISIT (OUTPATIENT)
Dept: MEDICAL GROUP | Facility: PHYSICIAN GROUP | Age: 86
End: 2021-10-19
Payer: MEDICARE

## 2021-10-19 ENCOUNTER — ANTICOAGULATION VISIT (OUTPATIENT)
Dept: MEDICAL GROUP | Facility: PHYSICIAN GROUP | Age: 86
End: 2021-10-19
Payer: MEDICARE

## 2021-10-19 VITALS
OXYGEN SATURATION: 97 % | HEART RATE: 78 BPM | BODY MASS INDEX: 30.63 KG/M2 | SYSTOLIC BLOOD PRESSURE: 102 MMHG | HEIGHT: 60 IN | WEIGHT: 156 LBS | DIASTOLIC BLOOD PRESSURE: 56 MMHG | TEMPERATURE: 97.6 F

## 2021-10-19 DIAGNOSIS — I73.9 PAD (PERIPHERAL ARTERY DISEASE) (HCC): ICD-10-CM

## 2021-10-19 DIAGNOSIS — Z09 HOSPITAL DISCHARGE FOLLOW-UP: ICD-10-CM

## 2021-10-19 DIAGNOSIS — Z79.01 CHRONIC ANTICOAGULATION: ICD-10-CM

## 2021-10-19 DIAGNOSIS — I10 PRIMARY HYPERTENSION: ICD-10-CM

## 2021-10-19 DIAGNOSIS — Z79.01 CHRONIC ANTICOAGULATION: Primary | ICD-10-CM

## 2021-10-19 LAB — INR PPP: 1 (ref 2–3.5)

## 2021-10-19 PROCEDURE — 99214 OFFICE O/P EST MOD 30 MIN: CPT | Performed by: NURSE PRACTITIONER

## 2021-10-19 PROCEDURE — 85610 PROTHROMBIN TIME: CPT | Performed by: FAMILY MEDICINE

## 2021-10-19 PROCEDURE — 99999 PR NO CHARGE: CPT | Performed by: FAMILY MEDICINE

## 2021-10-19 RX ORDER — SUCRALFATE 1 G/1
1 TABLET ORAL 4 TIMES DAILY
COMMUNITY
Start: 2021-10-16 | End: 2022-10-10

## 2021-10-19 RX ORDER — PANTOPRAZOLE SODIUM 40 MG/1
40 TABLET, DELAYED RELEASE ORAL 2 TIMES DAILY
COMMUNITY
End: 2022-10-10

## 2021-10-19 RX ORDER — ASPIRIN 81 MG/1
81 TABLET, CHEWABLE ORAL DAILY
COMMUNITY
End: 2021-11-08

## 2021-10-19 ASSESSMENT — FIBROSIS 4 INDEX: FIB4 SCORE: 1.67

## 2021-10-19 NOTE — Clinical Note
Quinton Yang,  I restarted Radha's warfarin yesterday given her hx of graft thrombosis in the past.  The discharging physician from [facility] gave no specific recommendations, so wanted to make sure you agreed with this assessment.  Looking at the chart notes from [facility] it looks like she started some high dose naproxen couple weeks back, which is likely culprit for bleed.  Let me know what you think.  Thanks!  Fred

## 2021-10-19 NOTE — PROGRESS NOTES
Anticoagulation Summary  As of 10/19/2021    INR goal:  2.0-3.0   TTR:  74.6 % (2.7 y)   INR used for dosin.00 (10/19/2021)   Warfarin maintenance plan:  2.5 mg (2.5 mg x 1) every day   Weekly warfarin total:  17.5 mg   Plan last modified:  Fred Nayak, PharmD (10/19/2021)   Next INR check:  10/26/2021   Target end date:  Indefinite    Indications    Chronic anticoagulation [Z79.01]  PAD (peripheral artery disease) ([facility]) [I73.9]             Anticoagulation Episode Summary     INR check location:      Preferred lab:      Send INR reminders to:      Comments:  Indefinite therapy per Dr Jara      Anticoagulation Care Providers     Provider Role Specialty Phone number    CHACORTA Issa Referring Nurse Practitioner Family [phone]    [facility] Anticoagulation Services Responsible  [phone]        Anticoagulation Patient Findings  Patient Findings     Positives:  Hospital admission    Negatives:  Signs/symptoms of thrombosis, Signs/symptoms of bleeding, Laboratory test error suspected, Change in health, Change in alcohol use, Change in activity, Upcoming invasive procedure, Emergency department visit, Upcoming dental procedure, Missed doses, Extra doses, Change in medications, Change in diet/appetite, Bruising, Other complaints         HPI:   Radha Verduzco seen in clinic today, 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    Patient's previous INR was therapeutic at 2.8 on 21, at which time patient was instructed to continue with current warfarin regimen.  She returns to clinic today to recheck INR to ensure it is therapeutic and thus preventing possible clotting and/or bleeding/bruising complications.    CHADS-VASc = n/a  (unadjusted ischemic stroke risk/year:  n/a)    Does patient have any changes to current medical/health status since last appt (Y/N):  Recently hospitalized for severe knee pain  "and possibly GI bleed?  Records from [facility] show GI consult d/t possible bleed, but no mention of bleed, INR, or management of anticoagulation.  Does patient have any signs/symptoms of bleeding and/or thrombosis since the last appt (Y/N):  See above.  Does patient have any interval changes to diet or medications since last appt (Y/N):  Warfarin stopped inpatient at [facility].  Are there any complications or cost restrictions with current therapy (Y/N):  NO     Does patient have [facility] PCP? Yes, Celia Kaur (If not, please document discussion that patient must be seen at [facility])       Vitals:  declined at today's visit.    There were no vitals filed for this visit.     Asssessment:      INR subtherapeutic at 1.0, therefore increasing patient's risk of VTE.   Reason(s) for out of range INR today:  Warfarin was reversed and has been on hold since last week.      Pt is on ASA 81mg  as antiplatelet therapy for PAD and arterial graft and must be reviewed again on six months with vascular surgeon.    Medication reconciliation completed today.    Plan:  Instructed patient to begin warfarin at 2.5mg daily until next INR.    **UPDATE:  Updated records from [facility] have been scanned to media.  Patient was admitted for hematemesis and anemia.  She was unable to provide indication to admitted staff for anticoagulation therapy, therefore they reversed and discontinued warfarin.  They gave no specific instructions as far as restarting, only to \"follow up with PCP as we do not know why she is on Coumadin\".  Given her hx of compromised arterial graft d/t thrombosis, will continue with plan above and advise PCP and vascular.     Follow up:  Because warfarin is a high risk medication and current CHEST guidelines recommend regular monitoring intervals (few days up to 12 weeks), will have patient return to clinic in 1 weeks to recheck INR.    Fred Nayak, PharmD, BCACP  "

## 2021-10-19 NOTE — ASSESSMENT & PLAN NOTE
Acute medical problem. She was seen at [facility] ER on 10/10/2021 for bilateral knee joint pain and finger pain. She was diagnosed with osteoarthritis and given prescription for oral and topical antiinflammatories. Her blood pressure was 95/45. On 10/13/2021 she returned to the ER for melana and weakness. She was discharged on 10/16/2021. In the hospital she had a colonoscopy completed on 10/14/2021 that showed Gastritis.  She was started on Protonix 40 mg twice daily for 12 weeks and Carafate 1 g 4 times a day for 12 weeks.  Her anti-inflammatories and warfarin were stopped. She has not had recurrent bloody stool or melana. Her last bowel movement was several days ago. She lives with her adult son. He will be gone for 3 days.

## 2021-10-19 NOTE — PROGRESS NOTES
Subjective:     CC: hospital discharge     HPI:   Jamin presents today with her daughter for the following:    Hospital discharge follow-up  Acute medical problem. She was seen at [facility] ER on 10/10/2021 for bilateral knee joint pain and finger pain. She was diagnosed with osteoarthritis and given prescription for oral and topical antiinflammatories. Her blood pressure was 95/45. On 10/13/2021 she returned to the ER for melana and weakness. She was discharged on 10/16/2021. In the hospital she had a colonoscopy completed on 10/14/2021 that showed Gastritis.  She was started on Protonix 40 mg twice daily for 12 weeks and Carafate 1 g 4 times a day for 12 weeks.  Her anti-inflammatories and warfarin were stopped. She has not had recurrent bloody stool or melana. Her last bowel movement was several days ago. She lives with her adult son. He will be gone for 3 days.    Hypertension  Chronic medical problem.  She is taking lisinopril 40 mg daily.  She was recently seen at [facility] Emergency Room for melana and weakness. She reports that her blood pressure was low.  We do not have her most recent hospital discharge summary, we have requested the records.  Patient brings in her discharge paperwork from the hospital and reports that they stopped her triamterene-hydrochlorothiazide.  For the last 3 days she has been taking lisinopril 40 mg daily.         Chronic anticoagulation  Chronic medical problem.  She was recently in the hospital for melena.  She did have a colonoscopy that showed gastritis.  She is currently on Protonix and Carafate.  Her warfarin was stopped in the hospital.  She has had follow-up with the anticoagulation clinic this morning.  She did have her INR checked today.  She reports that her warfarin was restarted today and she has upcoming schedule appointment for 10/26/2021 with the pharmacist.    Results for JAMIN RILEY (MRN [id number]) as of 10/19/2021 12:06   Ref. Range 10/19/2021  09:48   INR Latest Ref Range: 2 - 3.5  1.00 (A)       Past Medical History:   Diagnosis Date   • Carotid artery disease (HCC) 2010   • CPPD (pseudo-gout) 2011   • Diabetes (HCC) 2018   • GOUT 2011   • History of skin cancer 2016   • Hyperlipidemia 2010   • Hypertension 2010   • PAD (peripheral artery disease) 2010   • Pre-diabetes 2010   • S/P hip replacement 2011       Social History     Tobacco Use   • Smoking status: Former Smoker     Packs/day: 2.00     Years: 12.00     Pack years: 24.00     Types: Cigarettes     Quit date: 1985     Years since quittin.8   • Smokeless tobacco: Never Used   Vaping Use   • Vaping Use: Never used   Substance Use Topics   • Alcohol use: Not Currently     Alcohol/week: 0.0 oz     Comment: rarely maybe one a year   • Drug use: No       Current Outpatient Medications Ordered in Epic   Medication Sig Dispense Refill   • sucralfate (CARAFATE) 1 GM Tab Take 1 Tablet by mouth 4 times a day.     • Psyllium (METAMUCIL PO) Take  by mouth.     • Multiple Vitamins-Minerals (MULTIVITAL PO) Take  by mouth.     • aspirin (ASA) 81 MG Chew Tab chewable tablet Chew 81 mg every day.     • Glucosamine HCl (GLUCOSAMINE PO) Take  by mouth.     • doxylamine (UNISOM) 25 MG Tab tablet Take 25 mg by mouth at bedtime.     • pantoprazole (PROTONIX) 40 MG Tablet Delayed Response Take 40 mg by mouth 2 times a day.     • lisinopril (PRINIVIL) 40 MG tablet Take 1 Tablet by mouth every day. TAKE ONE TABLET BY MOUTH DAILY 90 Tablet 3   • rosuvastatin (CRESTOR) 5 MG Tab TAKE ONE TABLET BY MOUTH DAILY 90 Tablet 3   • warfarin (COUMADIN) 2.5 MG Tab TAKE TWO TALETS BY MOUTH DAILY ON , TUESDAY, THURSDAY AND TAKE ONE TABLET BY MOUTH DAILY ON ALL OTHER DAYS (Patient not taking: Reported on 10/19/2021) 180 tablet 1     No current Epic-ordered facility-administered medications on file.       Allergies:  Anesthetic [benzocaine], Iodine, and Other drug    Health  Maintenance: Reviewed    ROS:  Gen: no fevers/chills, no changes in weight  Eyes: no changes in vision  ENT: no sore throat, no epistaxis, no bleeding gums  Pulm: no shortness of breath  CV: no chest pain, no palpitations  GI: no nausea/vomiting, no melena, + constipation  : no dysuria, no hematuria  MSk: no myalgias  Skin: no rash  Neuro: no headaches, no dizziness    Objective:     Vital signs reviewed   Exam:  /56 (BP Location: Right arm, Patient Position: Sitting, BP Cuff Size: Adult)   Pulse 78   Temp 36.4 °C (97.6 °F) (Temporal)   Ht 1.524 m (5')   Wt 70.8 kg (156 lb)   SpO2 97%   BMI 30.47 kg/m²  Body mass index is 30.47 kg/m².    Gen: Alert and oriented, No apparent distress. Daughter present in exam room.   Eyes:   Lids normal. Glasses in place.   Lungs: Normal effort, CTA bilaterally, no wheezes, rhonchi, or rales  CV: Regular rate and rhythm. No murmurs, rubs, or gallops.  Ext: No clubbing, cyanosis, edema.      Assessment & Plan:     86 y.o. female with the following -     1. Hospital discharge follow-up  Acute uncomplicated problem.  I was able to review her first ER admission records.  We have requested her most recent urination records from [facility].  I was able to review patient's discharge paperwork and we reconciled her medications.  Discussed that she should make appointment with GI consultants and continue with her Protonix and Carafate.  She states that she has not bowel movement since being in the hospital, we discussed staying hydrated and she may try over-the-counter MiraLAX.  She verbalized understanding.  Red flags discussed.    2. Primary hypertension  Chronic exacerbated problem.  Her blood pressure today is 102/56.  She will continue with lisinopril 40 mg daily.  She does have a blood pressure cuff at home and we discussed the importance of starting to monitor blood pressure at home as we have stopped 2 of her blood pressure medications.  She verbalized  understanding.  Home blood pressure log provided today.  She will return in 1 week for MA BP visit check and in 3 weeks with me.  I informed her that I would like her to bring her blood pressure log with her to her upcoming visit with me.  She verbalized understanding.  Red flags discussed.    3. Chronic anticoagulation  Chronic exacerbated problem.  She will continue her warfarin per the anticoagulation clinic.  Encouraged her to keep her upcoming appointment.  Red flags discussed.      Return in about 3 weeks (around 11/9/2021).    Please note that this dictation was created using voice recognition software. I have made every reasonable attempt to correct obvious errors, but I expect that there are errors of grammar and possibly content that I did not discover before finalizing the note.

## 2021-10-19 NOTE — ASSESSMENT & PLAN NOTE
Chronic medical problem.  She is taking lisinopril 40 mg daily.  She was recently seen at [facility] Emergency Room for melana and weakness. She reports that her blood pressure was low.  We do not have her most recent hospital discharge summary, we have requested the records.  Patient brings in her discharge paperwork from the hospital and reports that they stopped her triamterene-hydrochlorothiazide.  For the last 3 days she has been taking lisinopril 40 mg daily.

## 2021-10-19 NOTE — ASSESSMENT & PLAN NOTE
Chronic medical problem.  She was recently in the hospital for melena.  She did have a colonoscopy that showed gastritis.  She is currently on Protonix and Carafate.  Her warfarin was stopped in the hospital.  She has had follow-up with the anticoagulation clinic this morning.  She did have her INR checked today.  She reports that her warfarin was restarted today and she has upcoming schedule appointment for 10/26/2021 with the pharmacist.    Results for ROSEMARY JAMIN ANN (MRN [id number]) as of 10/19/2021 12:06   Ref. Range 10/19/2021 09:48   INR Latest Ref Range: 2 - 3.5  1.00 (A)

## 2021-10-26 ENCOUNTER — ANTICOAGULATION VISIT (OUTPATIENT)
Dept: MEDICAL GROUP | Facility: PHYSICIAN GROUP | Age: 86
End: 2021-10-26
Payer: MEDICARE

## 2021-10-26 ENCOUNTER — NON-PROVIDER VISIT (OUTPATIENT)
Dept: MEDICAL GROUP | Facility: PHYSICIAN GROUP | Age: 86
End: 2021-10-26
Payer: MEDICARE

## 2021-10-26 VITALS — DIASTOLIC BLOOD PRESSURE: 68 MMHG | SYSTOLIC BLOOD PRESSURE: 122 MMHG

## 2021-10-26 DIAGNOSIS — I73.9 PAD (PERIPHERAL ARTERY DISEASE) (HCC): ICD-10-CM

## 2021-10-26 DIAGNOSIS — Z79.01 CHRONIC ANTICOAGULATION: Primary | ICD-10-CM

## 2021-10-26 LAB — INR PPP: 1.2 (ref 2–3.5)

## 2021-10-26 PROCEDURE — 99211 OFF/OP EST MAY X REQ PHY/QHP: CPT | Performed by: FAMILY MEDICINE

## 2021-10-26 PROCEDURE — 85610 PROTHROMBIN TIME: CPT | Performed by: STUDENT IN AN ORGANIZED HEALTH CARE EDUCATION/TRAINING PROGRAM

## 2021-10-26 NOTE — PROGRESS NOTES
Radha Verduzco is a 86 y.o. female here for a non-provider visit for Blood Pressure check    Vitals:    10/26/21 1113   BP: 122/68   BP Location: Right arm   Patient Position: Sitting   BP Cuff Size: Adult     If abnormal, was the Registered Nurse (office provider if RN is unavailable) notified today? No    Routed to PCP? Yes

## 2021-10-26 NOTE — PROGRESS NOTES
Anticoagulation Summary  As of 10/26/2021    INR goal:  2.0-3.0   TTR:  74.0 % (2.7 y)   INR used for dosin.20 (10/26/2021)   Warfarin maintenance plan:  2.5 mg (2.5 mg x 1) every day   Weekly warfarin total:  17.5 mg   Plan last modified:  Fred Nayak, PharmD (10/19/2021)   Next INR check:  2021   Target end date:  Indefinite    Indications    Chronic anticoagulation [Z79.01]  PAD (peripheral artery disease) ([facility]) [I73.9]             Anticoagulation Episode Summary     INR check location:      Preferred lab:      Send INR reminders to:      Comments:  Indefinite therapy per Dr Jara      Anticoagulation Care Providers     Provider Role Specialty Phone number    CHACORTA Issa Referring Nurse Practitioner Family [phone]    [facility] Anticoagulation Services Responsible  [phone]        Anticoagulation Patient Findings  Patient Findings     Negatives:  Signs/symptoms of thrombosis, Signs/symptoms of bleeding, Laboratory test error suspected, Change in health, Change in alcohol use, Change in activity, Upcoming invasive procedure, Emergency department visit, Upcoming dental procedure, Missed doses, Extra doses, Change in medications, Change in diet/appetite, Hospital admission, Bruising, Other complaints        HPI:   Radha Verduzco seen in clinic today, on anticoagulation therapy with warfarin for VTE prophylaxis 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    Patient's previous INR was subtherapeutic at 1.0 on 10-19-21, at which time patient was instructed to restart warfarin at 2.5mg daily.  She returns to clinic today to recheck INR to ensure it is therapeutic and thus preventing possible clotting and/or bleeding/bruising complications.    CHADS-VASc = n/a  (unadjusted ischemic stroke risk/year:  n/a)    Does patient have any changes to current medical/health status since last appt (Y/N):  NO  Does patient have any signs/symptoms of bleeding and/or  thrombosis since the last appt (Y/N):  NO  Does patient have any interval changes to diet or medications since last appt (Y/N):  NO  Are there any complications or cost restrictions with current therapy (Y/N):  NO     Does patient have [facility] PCP? Yes, Celia RIVAS (If not, please document discussion that patient must be seen at [facility])       Vitals:  conducted by MA staff today.    There were no vitals filed for this visit.     Asssessment:      INR remains subtherapeutic at 1.2, therefore increasing patient's VTE risk   Reason(s) for out of range INR today:  Just restarted warfarin last week at lower overall dose      Patient taking ASA 81mg daily at direction of vascular surgeon secondary to PAD.    Medication reconciliation completed today.    Plan:  Instructed patient to bolus with 5mg X 1, then resume current warfarin regimen as detailed above     Follow up:  Because warfarin is a high risk medication and current CHEST guidelines recommend regular monitoring intervals (few days up to 12 weeks), will have patient return to clinic in 1 weeks to recheck INR.    Fred Nayak, PharmD, BCACP

## 2021-11-02 ENCOUNTER — APPOINTMENT (OUTPATIENT)
Dept: URGENT CARE | Facility: PHYSICIAN GROUP | Age: 86
End: 2021-11-02
Payer: MEDICARE

## 2021-11-02 ENCOUNTER — ANTICOAGULATION VISIT (OUTPATIENT)
Dept: MEDICAL GROUP | Facility: PHYSICIAN GROUP | Age: 86
End: 2021-11-02
Payer: MEDICARE

## 2021-11-02 DIAGNOSIS — Z79.01 CHRONIC ANTICOAGULATION: Primary | ICD-10-CM

## 2021-11-02 DIAGNOSIS — I73.9 PAD (PERIPHERAL ARTERY DISEASE) (HCC): ICD-10-CM

## 2021-11-02 LAB — INR PPP: 1.8 (ref 2–3.5)

## 2021-11-02 PROCEDURE — 99211 OFF/OP EST MAY X REQ PHY/QHP: CPT | Performed by: FAMILY MEDICINE

## 2021-11-02 PROCEDURE — 85610 PROTHROMBIN TIME: CPT | Performed by: STUDENT IN AN ORGANIZED HEALTH CARE EDUCATION/TRAINING PROGRAM

## 2021-11-02 NOTE — PROGRESS NOTES
Anticoagulation Summary  As of 2021    INR goal:  2.0-3.0   TTR:  73.5 % (2.8 y)   INR used for dosin.80 (2021)   Warfarin maintenance plan:  5 mg (2.5 mg x 2) every Tue, Sat; 2.5 mg (2.5 mg x 1) all other days   Weekly warfarin total:  22.5 mg   Plan last modified:  Fred Nayak, PharmD (2021)   Next INR check:  2021   Target end date:  Indefinite    Indications    Chronic anticoagulation [Z79.01]  PAD (peripheral artery disease) (HCC) [I73.9]             Anticoagulation Episode Summary     INR check location:      Preferred lab:      Send INR reminders to:      Comments:  Indefinite therapy per Dr Jara      Anticoagulation Care Providers     Provider Role Specialty Phone number    CHACORTA Issa Referring Nurse Practitioner Family [phone]    [facility] Anticoagulation Services Responsible  [phone]        Anticoagulation Patient Findings  Patient Findings     Negatives:  Signs/symptoms of thrombosis, Signs/symptoms of bleeding, Laboratory test error suspected, Change in health, Change in alcohol use, Change in activity, Upcoming invasive procedure, Emergency department visit, Upcoming dental procedure, Missed doses, Extra doses, Change in medications, Change in diet/appetite, Hospital admission, Bruising, Other complaints        HPI:   Radha Verduzco seen in clinic today, on anticoagulation therapy with warfarin for VTE prophylaxis due to history ofthrombosis of aortoiliac arterial graft     Patient's previous INR was subtherapeutic at 1.2 on 10-26-21, at which time patient was instructed to bolus with one dose, then increase weekly warfarin regimen.  She returns to clinic today to recheck INR to ensure it is therapeutic and thus preventing possible clotting and/or bleeding/bruising complications.    CHADS-VASc = n/a  (unadjusted ischemic stroke risk/year:  n/a)    Does patient have any changes to current medical/health status since last appt (Y/N):  NO  Does  patient have any signs/symptoms of bleeding and/or thrombosis since the last appt (Y/N):  NO  Does patient have any interval changes to diet or medications since last appt (Y/N):  NO  Are there any complications or cost restrictions with current therapy (Y/N):  NO     Does patient have [facility] PCP? Yes, Celia RIVAS (If not, please document discussion that patient must be seen at [facility])       Vitals:  declined at today's visit.    There were no vitals filed for this visit.     Asssessment:      INR subtherapeutic at 1.8, therefore increasing patient's VTE risk.   Reason(s) for out of range INR today:  Dose too low.      Patient taking ASA 81mg daily for PAD and graft, regular f/u with vascular.    Medication reconciliation completed today.    Plan:  Instructed patient to increase weekly warfarin regimen as detailed above.     Follow up:  Because warfarin is a high risk medication and current CHEST guidelines recommend regular monitoring intervals (few days up to 12 weeks), will have patient return to clinic in 1 weeks to recheck INR.    Fred Nayak, PharmD, BCACP

## 2021-11-03 ENCOUNTER — HOSPITAL ENCOUNTER (OUTPATIENT)
Dept: RADIOLOGY | Facility: MEDICAL CENTER | Age: 86
End: 2021-11-03
Attending: NURSE PRACTITIONER
Payer: MEDICARE

## 2021-11-03 ENCOUNTER — OFFICE VISIT (OUTPATIENT)
Dept: URGENT CARE | Facility: PHYSICIAN GROUP | Age: 86
End: 2021-11-03
Payer: MEDICARE

## 2021-11-03 ENCOUNTER — TELEPHONE (OUTPATIENT)
Dept: URGENT CARE | Facility: PHYSICIAN GROUP | Age: 86
End: 2021-11-03

## 2021-11-03 VITALS
HEIGHT: 60 IN | TEMPERATURE: 98.4 F | HEART RATE: 87 BPM | DIASTOLIC BLOOD PRESSURE: 54 MMHG | OXYGEN SATURATION: 94 % | RESPIRATION RATE: 12 BRPM | SYSTOLIC BLOOD PRESSURE: 124 MMHG | BODY MASS INDEX: 30.43 KG/M2 | WEIGHT: 155 LBS

## 2021-11-03 DIAGNOSIS — M79.89 BILATERAL HAND SWELLING: ICD-10-CM

## 2021-11-03 DIAGNOSIS — Z79.01 CHRONIC ANTICOAGULATION: ICD-10-CM

## 2021-11-03 DIAGNOSIS — I80.8 SUPERFICIAL THROMBOPHLEBITIS OF RIGHT UPPER EXTREMITY: ICD-10-CM

## 2021-11-03 DIAGNOSIS — R22.31 LOCALIZED SWELLING ON RIGHT HAND: ICD-10-CM

## 2021-11-03 PROCEDURE — 73130 X-RAY EXAM OF HAND: CPT | Mod: LT

## 2021-11-03 PROCEDURE — 93971 EXTREMITY STUDY: CPT | Mod: RT

## 2021-11-03 PROCEDURE — 73130 X-RAY EXAM OF HAND: CPT | Mod: RT

## 2021-11-03 PROCEDURE — 99214 OFFICE O/P EST MOD 30 MIN: CPT | Performed by: NURSE PRACTITIONER

## 2021-11-03 ASSESSMENT — ENCOUNTER SYMPTOMS
HEADACHES: 0
CHILLS: 0
FEVER: 0
FALLS: 0
TINGLING: 0
CARDIOVASCULAR NEGATIVE: 1
GASTROINTESTINAL NEGATIVE: 1
RESPIRATORY NEGATIVE: 1
SENSORY CHANGE: 0
WEAKNESS: 0
EYES NEGATIVE: 1
BRUISES/BLEEDS EASILY: 0
MYALGIAS: 1

## 2021-11-03 ASSESSMENT — FIBROSIS 4 INDEX: FIB4 SCORE: 1.67

## 2021-11-03 NOTE — PROGRESS NOTES
Subjective     Radha Verduzco is a 86 y.o. female who presents with Hand Pain (R hand pain/[id number])            HPI  Recent bilat hand swelling, R>L x 2 days. Recent discharge from [facility] on 10/10/21 with knee joint and finger pain, dx OA and given topical anti-inflammatory as well as Naproxen. Had complications with this as she is on Warfarin. Seen by her PCP on 10/19/21 for hospital discharge follow up. Had Warfarin checked yesterday at level of 1.8, below 2.0-3.0 optimal level. Admits to missing dose last night. Taking 5 mg Tues, Sat and 2.5 mg all other days. Takes daily ASA 81 mg as well. Has appt with PCP on 11/8/21 and INR check on 11/9/21. Daughter present with patient, provides additional information about symptoms. Concern for arthritis vs blood clot in right arm.     PMH:  has a past medical history of Carotid artery disease (HCC) (8/2/2010), CPPD (pseudo-gout) (4/7/2011), Diabetes (HCC) (4/19/2018), GOUT (1/31/2011), History of skin cancer (2/24/2016), Hyperlipidemia (8/2/2010), Hypertension (8/2/2010), PAD (peripheral artery disease) (8/2/2010), Pre-diabetes (8/2/2010), and S/P hip replacement (12/9/2011).  MEDS:   Current Outpatient Medications:   •  Psyllium (METAMUCIL PO), Take  by mouth., Disp: , Rfl:   •  Multiple Vitamins-Minerals (MULTIVITAL PO), Take  by mouth., Disp: , Rfl:   •  Glucosamine HCl (GLUCOSAMINE PO), Take  by mouth., Disp: , Rfl:   •  doxylamine (UNISOM) 25 MG Tab tablet, Take 25 mg by mouth at bedtime., Disp: , Rfl:   •  pantoprazole (PROTONIX) 40 MG Tablet Delayed Response, Take 40 mg by mouth 2 times a day., Disp: , Rfl:   •  warfarin (COUMADIN) 2.5 MG Tab, TAKE TWO TALETS BY MOUTH DAILY ON SUNDAY, TUESDAY, THURSDAY AND TAKE ONE TABLET BY MOUTH DAILY ON ALL OTHER DAYS, Disp: 180 tablet, Rfl: 1  •  sucralfate (CARAFATE) 1 GM Tab, Take 1 Tablet by mouth 4 times a day., Disp: , Rfl:   •  aspirin (ASA) 81 MG Chew Tab chewable tablet, Chew 81 mg every day. (Patient not taking:  Reported on 11/3/2021), Disp: , Rfl:   •  lisinopril (PRINIVIL) 40 MG tablet, Take 1 Tablet by mouth every day. TAKE ONE TABLET BY MOUTH DAILY, Disp: 90 Tablet, Rfl: 3  •  rosuvastatin (CRESTOR) 5 MG Tab, TAKE ONE TABLET BY MOUTH DAILY, Disp: 90 Tablet, Rfl: 3  ALLERGIES:   Allergies   Allergen Reactions   • Anesthetic [Benzocaine] Vomiting and Nausea     Got nausea from anesthesia     • Iodine Rash     RASH     • Other Drug Vomiting     Opiate make her vomit   • Naproxen      Taking Coumadin      SURGHX:   Past Surgical History:   Procedure Laterality Date   • INCISION AND DRAINAGE GENERAL Left 1/24/2019    Procedure: INCISION AND DRAINAGE GENERAL- GROIN WOUND WITH WOUND VAC PLACEMENT;  Surgeon: Garett Kohli M.D.;  Location: SURGERY [facility];  Service: Vascular   • THROMBECTOMY  12/19/2018    Procedure: THROMBECTOMY;  Surgeon: Garett Kohli M.D.;  Location: SURGERY [facility];  Service: Vascular   • AORTOFEMORAL BYPASS     • CAROTID ENDARTERECTOMY      right   • CATARACT PHACO WITH IOL     • NEUROMA EXCISION      in 2000 from foot   • TOENAIL REMOVAL      in 2009,2008      SOCHX:  reports that she quit smoking about 36 years ago. Her smoking use included cigarettes. She has a 24.00 pack-year smoking history. She has never used smokeless tobacco. She reports previous alcohol use. She reports that she does not use drugs.  FH: Family history was reviewed, no pertinent findings to report    Review of Systems   Constitutional: Negative for chills, fever and malaise/fatigue.   Eyes: Negative.    Respiratory: Negative.    Cardiovascular: Negative.    Gastrointestinal: Negative.    Musculoskeletal: Positive for joint pain and myalgias. Negative for falls.   Skin: Negative for itching and rash.   Neurological: Negative for tingling, sensory change, weakness and headaches.   Endo/Heme/Allergies: Does not bruise/bleed easily.   All other systems reviewed and are negative.             Objective     BP  124/54   Pulse 87   Temp 36.9 °C (98.4 °F) (Temporal)   Resp 12   Ht 1.524 m (5')   Wt 70.3 kg (155 lb) Comment: pt states  SpO2 94%   BMI 30.27 kg/m²      Physical Exam  Vitals reviewed.   Constitutional:       General: She is awake. She is not in acute distress.     Appearance: Normal appearance. She is well-developed. She is not ill-appearing, toxic-appearing or diaphoretic.   HENT:      Head: Normocephalic and atraumatic.   Eyes:      Pupils: Pupils are equal, round, and reactive to light.   Cardiovascular:      Rate and Rhythm: Normal rate and regular rhythm.   Pulmonary:      Effort: Pulmonary effort is normal. No tachypnea, accessory muscle usage or respiratory distress.      Breath sounds: Normal breath sounds and air entry.   Musculoskeletal:      Right hand: Swelling, tenderness and bony tenderness present. No deformity or lacerations. Normal range of motion. Decreased strength of finger abduction. Normal sensation. There is no disruption of two-point discrimination. Normal capillary refill. Normal pulse.      Left hand: Swelling, tenderness and bony tenderness present. No deformity or lacerations. Normal range of motion. Decreased strength of finger abduction. Normal sensation. There is no disruption of two-point discrimination. Normal capillary refill. Normal pulse.      Comments: Swelling to dorsal aspect of right hand, R>L. Tenderness to touch along entire arm from upper arm to hand.    Skin:     General: Skin is warm and dry.      Coloration: Skin is not ashen, cyanotic, jaundiced, pale or sallow.      Findings: No abrasion, bruising, ecchymosis, erythema, signs of injury, laceration, rash or wound.   Neurological:      Mental Status: She is alert and oriented to person, place, and time.   Psychiatric:         Attention and Perception: Attention normal.         Mood and Affect: Mood normal.         Speech: Speech normal.         Behavior: Behavior normal. Behavior is cooperative.          Thought Content: Thought content normal.         Cognition and Memory: Cognition and memory normal.         Judgment: Judgment normal.                             Assessment & Plan        1. Bilateral hand swelling    - DX-HAND 3+ RIGHT; Future  - DX-HAND 3+ LEFT; Future    2. Chronic anticoagulation    - US-EXTREMITY VENOUS UPPER UNILAT RIGHT; Future    3. Localized swelling on right hand    - US-EXTREMITY VENOUS UPPER UNILAT RIGHT; Future    4. Superficial thrombophlebitis of right upper extremity    -Take Warfarin as directed  -May use over the counter NSIAD as needed for pain, avoid Ibuprofen products due to Warfarin, use Tylenol only as needed as this may affect bleeding time as well (discussed with patient and daughter in clinic)  -May elevate arm(s) to heart level resting on pillow when not mobile  -May use warm heat application to arm for swelling, redness or pain  -Cooling affect may help as well for comfort measure  -May use compression sleeve/knee high socks/fitted long sleeve shirt/ace wrap for upper extremity to prevent further swelling  -Follow up with PCP at scheduled appt on 11/8/21   -Keep appt with Vascular Med for recheck of INR on 11/9/21 and notify of Tylenol use   -Monitor for excessive bruising, epistaxis- stop Tylenol use, may need INR checked immediately    -Education provided: see above    -Return to urgent care as needed if new or worsening symptoms  -Patient expresses understanding to treatment and plan of care  -Questions were encouraged and answered  -Recommended over the counter meds were written down when requested   -Advised to follow-up with the primary care provider for recheck, reevaluation, and consideration of further management as needed     -Time spent evaluating this patient was at least 30 minutes. This time comprises of the following: preparing for visit/chart review, patient history taken into account pertinent to symptoms, patient counseling/education for needed treatment  outpatient, exam/evaluation/treatment plan provided, ordering any appropriate lab/test/procedures/meds, independent interpretation of any clinic testing, medication management with any other listed medications and chart documentation.

## 2021-11-04 NOTE — TELEPHONE ENCOUNTER
Called and spoke to patient regarding her US- patient prefers I speak to her son. Discussed no indication for DVT in right upper arm. Recommend Tylenol, compression, heat/ice as needed for comfort reasons. May call with any questions once daughter is notified of US results. Follow up with PCP next week at scheduled appt.

## 2021-11-08 ENCOUNTER — OFFICE VISIT (OUTPATIENT)
Dept: MEDICAL GROUP | Facility: PHYSICIAN GROUP | Age: 86
End: 2021-11-08
Payer: MEDICARE

## 2021-11-08 VITALS
DIASTOLIC BLOOD PRESSURE: 50 MMHG | OXYGEN SATURATION: 95 % | WEIGHT: 157 LBS | SYSTOLIC BLOOD PRESSURE: 114 MMHG | TEMPERATURE: 97 F | BODY MASS INDEX: 30.82 KG/M2 | HEART RATE: 79 BPM | HEIGHT: 60 IN

## 2021-11-08 DIAGNOSIS — Z79.01 CHRONIC ANTICOAGULATION: ICD-10-CM

## 2021-11-08 DIAGNOSIS — I73.9 PAD (PERIPHERAL ARTERY DISEASE) (HCC): ICD-10-CM

## 2021-11-08 DIAGNOSIS — E78.5 HYPERLIPIDEMIA, UNSPECIFIED HYPERLIPIDEMIA TYPE: ICD-10-CM

## 2021-11-08 DIAGNOSIS — I80.8 SUPERFICIAL THROMBOPHLEBITIS OF RIGHT UPPER EXTREMITY: ICD-10-CM

## 2021-11-08 DIAGNOSIS — N18.32 TYPE 2 DIABETES MELLITUS WITH STAGE 3B CHRONIC KIDNEY DISEASE, WITHOUT LONG-TERM CURRENT USE OF INSULIN (HCC): ICD-10-CM

## 2021-11-08 DIAGNOSIS — E11.22 TYPE 2 DIABETES MELLITUS WITH STAGE 3B CHRONIC KIDNEY DISEASE, WITHOUT LONG-TERM CURRENT USE OF INSULIN (HCC): ICD-10-CM

## 2021-11-08 DIAGNOSIS — I10 PRIMARY HYPERTENSION: ICD-10-CM

## 2021-11-08 PROBLEM — Z09 HOSPITAL DISCHARGE FOLLOW-UP: Status: RESOLVED | Noted: 2021-10-19 | Resolved: 2021-11-08

## 2021-11-08 PROCEDURE — 99214 OFFICE O/P EST MOD 30 MIN: CPT | Performed by: NURSE PRACTITIONER

## 2021-11-08 RX ORDER — HYDROCHLOROTHIAZIDE 12.5 MG/1
12.5 TABLET ORAL DAILY
Qty: 30 TABLET | Refills: 2 | Status: SHIPPED | OUTPATIENT
Start: 2021-11-08 | End: 2022-02-28 | Stop reason: SDUPTHER

## 2021-11-08 ASSESSMENT — FIBROSIS 4 INDEX: FIB4 SCORE: 1.67

## 2021-11-08 NOTE — PROGRESS NOTES
Subjective:     CC: follow-up, edema and referral for physical therapy    HPI:   Radha presents today with her daughter for the following:     Superficial thrombophlebitis of right upper extremity  Chronic medical problem.  She was seen in Urgent Care 1 day ago for right hand pain and swelling.  She continues with her warfarin.  She did have an ultrasound of her upper extremity which showed the following results:    IMPRESSION:        1. No deep venous thrombosis.     2. Superficial thrombophlebitis seen in the cephalic vein and median cubital vein.    Hypertension  Chronic medical problem.  She is taking lisinopril 40 mg daily.  Her initial blood pressure today is 108/62.  She has been checking her blood pressure at home and her home blood pressure log shows that her systolic has been ranging 125-185 and her diastolic has been ranging 60-87.  Her pulse rate has been ranging 78-88. Daughter lives in [place] and son lives with her but travels frequently.  Daughter would like to have physical therapy ordered for strengthening.  She is also having swelling in her hands and her feet.  She continues to hold on her triamterene-hydrochlorothiazide since her hospital discharge.      Past Medical History:   Diagnosis Date   • Carotid artery disease (HCC) 2010   • CPPD (pseudo-gout) 2011   • Diabetes (HCC) 2018   • GOUT 2011   • History of skin cancer 2016   • Hyperlipidemia 2010   • Hypertension 2010   • PAD (peripheral artery disease) 2010   • Pre-diabetes 2010   • S/P hip replacement 2011       Social History     Tobacco Use   • Smoking status: Former Smoker     Packs/day: 2.00     Years: 12.00     Pack years: 24.00     Types: Cigarettes     Quit date:      Years since quittin.8   • Smokeless tobacco: Never Used   Vaping Use   • Vaping Use: Never used   Substance Use Topics   • Alcohol use: Not Currently     Alcohol/week: 0.0 oz     Comment: rarely maybe one a  year   • Drug use: No       Current Outpatient Medications Ordered in Epic   Medication Sig Dispense Refill   • hydroCHLOROthiazide (HYDRODIURIL) 12.5 MG tablet Take 1 Tablet by mouth every day. 30 Tablet 2   • sucralfate (CARAFATE) 1 GM Tab Take 1 Tablet by mouth 4 times a day.     • Psyllium (METAMUCIL PO) Take  by mouth.     • Multiple Vitamins-Minerals (MULTIVITAL PO) Take  by mouth.     • Glucosamine HCl (GLUCOSAMINE PO) Take  by mouth.     • doxylamine (UNISOM) 25 MG Tab tablet Take 25 mg by mouth at bedtime.     • pantoprazole (PROTONIX) 40 MG Tablet Delayed Response Take 40 mg by mouth 2 times a day.     • lisinopril (PRINIVIL) 40 MG tablet Take 1 Tablet by mouth every day. TAKE ONE TABLET BY MOUTH DAILY 90 Tablet 3   • rosuvastatin (CRESTOR) 5 MG Tab TAKE ONE TABLET BY MOUTH DAILY 90 Tablet 3   • warfarin (COUMADIN) 2.5 MG Tab TAKE TWO TALETS BY MOUTH DAILY ON SUNDAY, TUESDAY, THURSDAY AND TAKE ONE TABLET BY MOUTH DAILY ON ALL OTHER DAYS 180 tablet 1     No current Epic-ordered facility-administered medications on file.       Allergies:  Anesthetic [benzocaine], Iodine, Other drug, and Naproxen    Health Maintenance: Reviewed    ROS:  Gen: no fevers/chills, no changes in weight, no bleeding gums, no epistaxis  Eyes: no changes in vision  Pulm: no shortness of breath no cough  CV: no chest pain, no palpitations, + lower extremity edema and bilateral hand edema  GI: no nausea/vomiting, no bloody stools  : no dysuria, no hematuria  MSk: no myalgias  Skin: no rash  Neuro: no headaches, no dizziness    Objective:     Vital signs reviewed  Exam:  /50 (BP Location: Left arm, Patient Position: Sitting)   Pulse 79   Temp 36.1 °C (97 °F) (Temporal)   Ht 1.524 m (5')   Wt 71.2 kg (157 lb)   SpO2 95%   BMI 30.66 kg/m²  Body mass index is 30.66 kg/m².    Gen: Alert and oriented, No apparent distress.  Daughter present in exam room.  Eyes:   Lids normal. Glasses in place.   Lungs: Normal effort, CTA  bilaterally, no wheezes, rhonchi, or rales  CV: Regular rate and rhythm. No murmurs, rubs, or gallops.  Ext: No clubbing, cyanosis.  +1 edema to bilateral lower extremities and +1 bilateral hands.      Assessment & Plan:     86 y.o. female with the following -     1. Superficial thrombophlebitis of right upper extremity  Chronic stable problem.  She will continue with her warfarin to continue follow-up with anticoagulation clinic.  She is not have any acute complaints today.  Recommend that she continue to rest, ice and elevate the area.  - Referral to Home Health    2. Primary hypertension  Chronic stable problem.  Her repeat by me her blood pressure is 114/50.  I reviewed her home blood pressure log which shows that majority of her blood pressures are greater than 140/90.  We will restart low-dose hydrochlorothiazide 12.5 mg daily.  She will continue with her lisinopril 40 mg daily.  She will continue her blood pressure monitoring.  Daughter is interested referral to home health.  Referral placed today.  - hydroCHLOROthiazide (HYDRODIURIL) 12.5 MG tablet; Take 1 Tablet by mouth every day.  Dispense: 30 Tablet; Refill: 2  - Referral to Home Health    3. Type 2 diabetes mellitus with stage 3b chronic kidney disease, without long-term current use of insulin ([facility])  4. Chronic anticoagulation  5. PAD (peripheral artery disease) ([facility])  6. Hyperlipidemia, unspecified hyperlipidemia type  Chronic stable problem.  Patient and daughter would like to have home health ordered for PT OT, and nursing.  Patient does live at home.  She does not drive.  Her daughter lives in [place] and will take her to her appointments.  Patient does live with her son but he leaves frequently for work.      Return if symptoms worsen or fail to improve.    Please note that this dictation was created using voice recognition software. I have made every reasonable attempt to correct obvious errors, but I expect that there are errors of grammar  and possibly content that I did not discover before finalizing the note.

## 2021-11-08 NOTE — ASSESSMENT & PLAN NOTE
Chronic medical problem.  She is taking lisinopril 40 mg daily.  Her initial blood pressure today is 108/62.  She has been checking her blood pressure at home and her home blood pressure log shows that her systolic has been ranging 125-185 and her diastolic has been ranging 60-87.  Her pulse rate has been ranging 78-88. Daughter lives in [place] and son lives with her but travels frequently.  Daughter would like to have physical therapy ordered for strengthening.  She is also having swelling in her hands and her feet.  She continues to hold on her triamterene-hydrochlorothiazide since her hospital discharge.

## 2021-11-08 NOTE — ASSESSMENT & PLAN NOTE
Chronic medical problem.  She was seen in Urgent Care 1 day ago for right hand pain and swelling.  She continues with her warfarin.  She did have an ultrasound of her upper extremity which showed the following results:    IMPRESSION:        1. No deep venous thrombosis.     2. Superficial thrombophlebitis seen in the cephalic vein and median cubital vein.

## 2021-11-09 ENCOUNTER — ANTICOAGULATION VISIT (OUTPATIENT)
Dept: MEDICAL GROUP | Facility: PHYSICIAN GROUP | Age: 86
End: 2021-11-09
Payer: MEDICARE

## 2021-11-09 DIAGNOSIS — I73.9 PAD (PERIPHERAL ARTERY DISEASE) (HCC): ICD-10-CM

## 2021-11-09 DIAGNOSIS — Z79.01 CHRONIC ANTICOAGULATION: Primary | ICD-10-CM

## 2021-11-09 LAB — INR PPP: 2.6 (ref 2–3.5)

## 2021-11-09 PROCEDURE — 93793 ANTICOAG MGMT PT WARFARIN: CPT | Performed by: FAMILY MEDICINE

## 2021-11-09 PROCEDURE — 85610 PROTHROMBIN TIME: CPT | Performed by: FAMILY MEDICINE

## 2021-11-09 PROCEDURE — 99999 PR NO CHARGE: CPT | Performed by: FAMILY MEDICINE

## 2021-11-09 NOTE — PROGRESS NOTES
Anticoagulation Summary  As of 2021    INR goal:  2.0-3.0   TTR:  73.5 % (2.8 y)   INR used for dosin.60 (2021)   Warfarin maintenance plan:  5 mg (2.5 mg x 2) every Tue, Sat; 2.5 mg (2.5 mg x 1) all other days   Weekly warfarin total:  22.5 mg   Plan last modified:  Fred Nayak, PharmD (2021)   Next INR check:  2021   Target end date:  Indefinite    Indications    Chronic anticoagulation [Z79.01]  PAD (peripheral artery disease) ([facility]) [I73.9]             Anticoagulation Episode Summary     INR check location:      Preferred lab:      Send INR reminders to:      Comments:  Indefinite therapy per Dr Jara      Anticoagulation Care Providers     Provider Role Specialty Phone number    CHACORTA Issa Referring Nurse Practitioner Family [phone]    [facility] Anticoagulation Services Responsible  [phone]        Anticoagulation Patient Findings  Patient Findings     Negatives:  Signs/symptoms of thrombosis, Signs/symptoms of bleeding, Laboratory test error suspected, Change in health, Change in alcohol use, Change in activity, Upcoming invasive procedure, Emergency department visit, Upcoming dental procedure, Missed doses, Extra doses, Change in medications, Change in diet/appetite, Hospital admission, Bruising, Other complaints        HPI:   Radha Verduzco seen in clinic today, on anticoagulation therapy with warfarin for VTE prophylaxis 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    Patient's previous INR was subtherapeutic at 1.8 on 21, at which time patient was instructed to increase weekly warfarin regimen.  She returns to clinic today to recheck INR to ensure it is therapeutic and thus preventing possible clotting and/or bleeding/bruising complications.    CHADS-VASc = n/a  (unadjusted ischemic stroke risk/year:  n/a)    Does patient have any changes to current medical/health status since last appt (Y/N):  NO  Does patient have any  signs/symptoms of bleeding and/or thrombosis since the last appt (Y/N):  NO  Does patient have any interval changes to diet or medications since last appt (Y/N):  NO  Are there any complications or cost restrictions with current therapy (Y/N):  NO     Does patient have [facility] PCP? Yes, Celia RIVAS (If not, please document discussion that patient must be seen at [facility])       Vitals:  declined today.    There were no vitals filed for this visit.     Asssessment:      INR therapeutic at 2.6, therefore decreasing patient's VTE and/or bleeding risk.   Reason(s) for out of range INR today:  n/a      Pt is on ASA 81mg as antiplatelet therapy for arterial graft and must be reviewed again on with vascular surgeon.    Medication reconciliation completed today.    Plan:  Pt is to continue with current warfarin dosing regimen.     Follow up:  Because warfarin is a high risk medication and current CHEST guidelines recommend regular monitoring intervals (few days up to 12 weeks), will have patient return to clinic in 2 weeks to recheck INR (has been referred to , may be able to obtain INR at home visits).    Fred Nayak, PharmD, BCACP

## 2021-11-23 ENCOUNTER — ANTICOAGULATION VISIT (OUTPATIENT)
Dept: MEDICAL GROUP | Facility: PHYSICIAN GROUP | Age: 86
End: 2021-11-23
Payer: MEDICARE

## 2021-11-23 DIAGNOSIS — I73.9 PAD (PERIPHERAL ARTERY DISEASE) (HCC): ICD-10-CM

## 2021-11-23 DIAGNOSIS — Z79.01 CHRONIC ANTICOAGULATION: ICD-10-CM

## 2021-11-23 DIAGNOSIS — Z79.01 CHRONIC ANTICOAGULATION: Primary | ICD-10-CM

## 2021-11-23 LAB — INR PPP: 1.5 (ref 2–3.5)

## 2021-11-23 PROCEDURE — 85610 PROTHROMBIN TIME: CPT | Performed by: STUDENT IN AN ORGANIZED HEALTH CARE EDUCATION/TRAINING PROGRAM

## 2021-11-23 PROCEDURE — 99211 OFF/OP EST MAY X REQ PHY/QHP: CPT | Performed by: STUDENT IN AN ORGANIZED HEALTH CARE EDUCATION/TRAINING PROGRAM

## 2021-11-23 NOTE — PROGRESS NOTES
Anticoagulation Summary  As of 2021    INR goal:  2.0-3.0   TTR:  73.3 % (2.8 y)   INR used for dosin.50 (2021)   Warfarin maintenance plan:  5 mg (2.5 mg x 2) every Tue, Sat; 2.5 mg (2.5 mg x 1) all other days   Weekly warfarin total:  22.5 mg   Plan last modified:  Fred Nayak, PharmD (2021)   Next INR check:  2021   Target end date:  Indefinite    Indications    Chronic anticoagulation [Z79.01]  PAD (peripheral artery disease) ([facility]) [I73.9]             Anticoagulation Episode Summary     INR check location:      Preferred lab:      Send INR reminders to:      Comments:  Indefinite therapy per Dr Jara      Anticoagulation Care Providers     Provider Role Specialty Phone number    CHACORTA Issa Referring Nurse Practitioner Family [phone]    [facility] Anticoagulation Services Responsible  [phone]        Anticoagulation Patient Findings  Patient Findings     Negatives:  Signs/symptoms of thrombosis, Signs/symptoms of bleeding, Laboratory test error suspected, Change in health, Change in alcohol use, Change in activity, Upcoming invasive procedure, Emergency department visit, Upcoming dental procedure, Missed doses, Extra doses, Change in medications, Change in diet/appetite, Hospital admission, Bruising, Other complaints        HPI:   Radha Verduzco seen in clinic today, on anticoagulation therapy with warfarin for VTE prophylaxis 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    Patient's previous INR was therapeutic at 2.6 on 21, at which time patient was instructed to continue with current warfarin regimen.  She returns to clinic today to recheck INR to ensure it is therapeutic and thus preventing possible clotting and/or bleeding/bruising complications.    CHADS-VASc = n/a  (unadjusted ischemic stroke risk/year:  n/a)    Does patient have any changes to current medical/health status since last appt (Y/N):  NO  Does patient have any  signs/symptoms of bleeding and/or thrombosis since the last appt (Y/N):  NO  Does patient have any interval changes to diet or medications since last appt (Y/N):  NO  Are there any complications or cost restrictions with current therapy (Y/N):  NO     Does patient have [facility] PCP? Yes, Celia RIVAS (If not, please document discussion that patient must be seen at [facility])       Vitals:  declined at today's visit.    There were no vitals filed for this visit.     Asssessment:      INR subtherapeutic at 1.5, therefore increasing patient's VTE risk.   Reason(s) for out of range INR today:  Etiology unknown, patient denies missed doses or increases in Vitamin K intake.      Pt is on ASA 81mg as antiplatelet therapy for PAD and must be reviewed again on vascular surgeon.    Medication reconciliation completed today.    Plan:  Instructed patient to bolus with 7.5mg X 1, then resume current warfarin regimen as detailed above.     Follow up:  Because warfarin is a high risk medication and current CHEST guidelines recommend regular monitoring intervals (few days up to 12 weeks), will have patient return to clinic in 2 weeks to recheck INR (per patient availability).    Fred Nayak, PharmD, BCACP

## 2021-12-09 DIAGNOSIS — Z79.01 CHRONIC ANTICOAGULATION: ICD-10-CM

## 2021-12-09 RX ORDER — WARFARIN SODIUM 2.5 MG/1
TABLET ORAL
Qty: 120 TABLET | Refills: 1 | Status: SHIPPED | OUTPATIENT
Start: 2021-12-09 | End: 2022-06-09 | Stop reason: SDUPTHER

## 2021-12-28 ENCOUNTER — ANTICOAGULATION VISIT (OUTPATIENT)
Dept: MEDICAL GROUP | Facility: PHYSICIAN GROUP | Age: 86
End: 2021-12-28
Payer: MEDICARE

## 2021-12-28 DIAGNOSIS — I73.9 PAD (PERIPHERAL ARTERY DISEASE) (HCC): ICD-10-CM

## 2021-12-28 DIAGNOSIS — Z79.01 CHRONIC ANTICOAGULATION: Primary | ICD-10-CM

## 2021-12-28 LAB — INR PPP: 2.6 (ref 2–3.5)

## 2021-12-28 PROCEDURE — 99999 PR NO CHARGE: CPT | Performed by: NURSE PRACTITIONER

## 2021-12-28 PROCEDURE — 93793 ANTICOAG MGMT PT WARFARIN: CPT | Performed by: NURSE PRACTITIONER

## 2021-12-28 PROCEDURE — 85610 PROTHROMBIN TIME: CPT | Performed by: NURSE PRACTITIONER

## 2021-12-28 NOTE — PROGRESS NOTES
Anticoagulation Summary  As of 2021    INR goal:  2.0-3.0   TTR:  72.7 % (2.9 y)   INR used for dosin.60 (2021)   Warfarin maintenance plan:  5 mg (2.5 mg x 2) every Sun, Tue; 2.5 mg (2.5 mg x 1) all other days   Weekly warfarin total:  22.5 mg   Plan last modified:  Fred Nayak, PharmD (2021)   Next INR check:  2022   Target end date:  Indefinite    Indications    Chronic anticoagulation [Z79.01]  PAD (peripheral artery disease) (HCC) [I73.9]             Anticoagulation Episode Summary     INR check location:      Preferred lab:      Send INR reminders to:      Comments:  Indefinite therapy per Dr Jara      Anticoagulation Care Providers     Provider Role Specialty Phone number    CHACORTA Issa Referring Nurse Practitioner Family [phone]    [facility] Anticoagulation Services Responsible  [phone]        Anticoagulation Patient Findings  Patient Findings     Negatives:  Signs/symptoms of thrombosis, Signs/symptoms of bleeding, Laboratory test error suspected, Change in health, Change in alcohol use, Change in activity, Upcoming invasive procedure, Emergency department visit, Upcoming dental procedure, Missed doses, Extra doses, Change in medications, Change in diet/appetite, Hospital admission, Bruising, Other complaints        HPI:   Radha Verduzco seen in clinic today, on anticoagulation therapy with warfarin for VTE prophylaxis due to hist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    Patient's previous INR was subtherapeutic at 1.5 on 21, at which time patient was instructed to bolus with one dose, then resume current warfarin regimen.  She returns to clinic today to recheck INR to ensure it is therapeutic and thus preventing possible clotting and/or bleeding/bruising complications.    CHADS-VASc = n/a  (unadjusted ischemic stroke risk/year:  n/a)    Does patient have any changes to current medical/health status since last appt (Y/N):  NO  Does  patient have any signs/symptoms of bleeding and/or thrombosis since the last appt (Y/N):  NO  Does patient have any interval changes to diet or medications since last appt (Y/N):  NO  Are there any complications or cost restrictions with current therapy (Y/N):  NO     Does patient have [facility] PCP? Yes, Celia Kaur (If not, please document discussion that patient must be seen at [facility])       Vitals:  declined at today's visit.    There were no vitals filed for this visit.     Asssessment:      INR therapeutic at 2.6, therefore decreasing patient's VTE and/or bleeding risk.   Reason(s) for out of range INR today:  n/a      Pt is on ASA 81mg as antiplatelet therapy for graft and PAD and must be reviewed again on with vascular surgeon.    Medication reconciliation completed today.    Plan:  Pt is to continue with current warfarin dosing regimen.     Follow up:  Because warfarin is a high risk medication and current CHEST guidelines recommend regular monitoring intervals (few days up to 12 weeks), will have patient return to clinic in 4 weeks to recheck INR.    Fred Nayak, PharmD, BCACP

## 2022-02-01 ENCOUNTER — ANTICOAGULATION VISIT (OUTPATIENT)
Dept: MEDICAL GROUP | Facility: PHYSICIAN GROUP | Age: 87
End: 2022-02-01
Payer: MEDICARE

## 2022-02-01 DIAGNOSIS — I73.9 PAD (PERIPHERAL ARTERY DISEASE) (HCC): ICD-10-CM

## 2022-02-01 DIAGNOSIS — Z79.01 CHRONIC ANTICOAGULATION: Primary | ICD-10-CM

## 2022-02-01 LAB — INR PPP: 1.8 (ref 2–3.5)

## 2022-02-01 PROCEDURE — 85610 PROTHROMBIN TIME: CPT | Performed by: STUDENT IN AN ORGANIZED HEALTH CARE EDUCATION/TRAINING PROGRAM

## 2022-02-01 PROCEDURE — 99211 OFF/OP EST MAY X REQ PHY/QHP: CPT | Performed by: STUDENT IN AN ORGANIZED HEALTH CARE EDUCATION/TRAINING PROGRAM

## 2022-02-01 NOTE — PROGRESS NOTES
Anticoagulation Summary  As of 2022    INR goal:  2.0-3.0   TTR:  72.7 % (3 y)   INR used for dosin.80 (2022)   Warfarin maintenance plan:  5 mg (2.5 mg x 2) every Sun, Tue; 2.5 mg (2.5 mg x 1) all other days   Weekly warfarin total:  22.5 mg   Plan last modified:  Fred Nayak, PharmD (2021)   Next INR check:  2/15/2022   Target end date:  Indefinite    Indications    Chronic anticoagulation [Z79.01]  PAD (peripheral artery disease) ([facility]) [I73.9]             Anticoagulation Episode Summary     INR check location:      Preferred lab:      Send INR reminders to:      Comments:  Indefinite therapy per Dr Jara      Anticoagulation Care Providers     Provider Role Specialty Phone number    CHACORTA Issa Referring Nurse Practitioner Family [phone]    [facility] Anticoagulation Services Responsible  [phone]        Anticoagulation Patient Findings  Patient Findings     Negatives:  Signs/symptoms of thrombosis, Signs/symptoms of bleeding, Laboratory test error suspected, Change in health, Change in alcohol use, Change in activity, Upcoming invasive procedure, Emergency department visit, Upcoming dental procedure, Missed doses, Extra doses, Change in medications, Change in diet/appetite, Hospital admission, Bruising, Other complaints        HPI:   Radha Verduzco seen in clinic today, on anticoagulation therapy with warfarin for VTE prophylaxis 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    Patient's previous INR was therapeutic at 2.6 on 21, at which time patient was instructed to continue with current warfarin regimen.  She returns to clinic today to recheck INR to ensure it is therapeutic and thus preventing possible clotting and/or bleeding/bruising complications.    CHADS-VASc = n/a  (unadjusted ischemic stroke risk/year:  n/a)    Does patient have any changes to current medical/health status since last appt (Y/N):  NO  Does patient have any  signs/symptoms of bleeding and/or thrombosis since the last appt (Y/N):  NO  Does patient have any interval changes to diet or medications since last appt (Y/N):  NO  Are there any complications or cost restrictions with current therapy (Y/N):  NO     Does patient have [facility] PCP? Yes, Celia RIVAS (If not, please document discussion that patient must be seen at [facility])       Vitals:  declined today.    There were no vitals filed for this visit.     Asssessment:      INR subtherapeutic at 1.8, therefore increasing patient's VTE and/or bleeding risk.   Reason(s) for out of range INR today:  Etiology unknown.      Pt is on ASA 81mg as antiplatelet therapy for PAD and must be reviewed again on with vascular surgeon..    Medication reconciliation completed today.    Plan:  Instructed patient to bolus with 6.25mg X 1, then resume current warfarin regimen as detailed above.     Follow up:  Because warfarin is a high risk medication and current CHEST guidelines recommend regular monitoring intervals (few days up to 12 weeks), will have patient return to clinic in 2 weeks to recheck INR.    Fred Nayak, PharmD, BCACP

## 2022-02-15 ENCOUNTER — APPOINTMENT (OUTPATIENT)
Dept: MEDICAL GROUP | Facility: PHYSICIAN GROUP | Age: 87
End: 2022-02-15
Payer: MEDICARE

## 2022-02-28 ENCOUNTER — OFFICE VISIT (OUTPATIENT)
Dept: MEDICAL GROUP | Facility: PHYSICIAN GROUP | Age: 87
End: 2022-02-28
Payer: MEDICARE

## 2022-02-28 VITALS
DIASTOLIC BLOOD PRESSURE: 64 MMHG | BODY MASS INDEX: 30.23 KG/M2 | TEMPERATURE: 97.6 F | SYSTOLIC BLOOD PRESSURE: 124 MMHG | HEART RATE: 83 BPM | OXYGEN SATURATION: 94 % | HEIGHT: 60 IN | WEIGHT: 154 LBS

## 2022-02-28 DIAGNOSIS — Z79.01 CHRONIC ANTICOAGULATION: ICD-10-CM

## 2022-02-28 DIAGNOSIS — R01.1 SYSTOLIC MURMUR: ICD-10-CM

## 2022-02-28 DIAGNOSIS — N18.32 TYPE 2 DIABETES MELLITUS WITH STAGE 3B CHRONIC KIDNEY DISEASE, WITHOUT LONG-TERM CURRENT USE OF INSULIN (HCC): ICD-10-CM

## 2022-02-28 DIAGNOSIS — E11.22 TYPE 2 DIABETES MELLITUS WITH STAGE 3B CHRONIC KIDNEY DISEASE, WITHOUT LONG-TERM CURRENT USE OF INSULIN (HCC): ICD-10-CM

## 2022-02-28 DIAGNOSIS — E78.5 HYPERLIPIDEMIA, UNSPECIFIED HYPERLIPIDEMIA TYPE: ICD-10-CM

## 2022-02-28 DIAGNOSIS — I10 PRIMARY HYPERTENSION: ICD-10-CM

## 2022-02-28 LAB
HBA1C MFR BLD: 6.5 % (ref 0–5.6)
INT CON NEG: ABNORMAL
INT CON POS: ABNORMAL

## 2022-02-28 PROCEDURE — 83036 HEMOGLOBIN GLYCOSYLATED A1C: CPT | Performed by: NURSE PRACTITIONER

## 2022-02-28 PROCEDURE — 99214 OFFICE O/P EST MOD 30 MIN: CPT | Performed by: NURSE PRACTITIONER

## 2022-02-28 RX ORDER — HYDROCHLOROTHIAZIDE 12.5 MG/1
12.5 TABLET ORAL DAILY
Qty: 90 TABLET | Refills: 2 | Status: SHIPPED | OUTPATIENT
Start: 2022-02-28 | End: 2022-10-14 | Stop reason: SDUPTHER

## 2022-02-28 ASSESSMENT — PATIENT HEALTH QUESTIONNAIRE - PHQ9: CLINICAL INTERPRETATION OF PHQ2 SCORE: 0

## 2022-02-28 NOTE — ASSESSMENT & PLAN NOTE
She is followed by pharmacotherapy.  She has upcoming appointment in 1 day for repeat INR.  Her INR on 2/1/2022 was subtherapeutic at 1.80.

## 2022-02-28 NOTE — ASSESSMENT & PLAN NOTE
She does not take any blood glucose lowering medications. She is watching her diet.  She is due for A1c today.  She has upcoming appointment in 1 day with pharmacotherapy.

## 2022-02-28 NOTE — ASSESSMENT & PLAN NOTE
Her blood pressure is at goal today.  She continues with her lisinopril 40 mg daily and hydrochlorothiazide 12.5 mg daily.  She does need a refill on her hydrochlorothiazide today. She has not been checking her blood pressure at home.  She is due for updated labs.

## 2022-02-28 NOTE — PROGRESS NOTES
Subjective:     CC: Diabetes and medication refill    HPI:   Radha presents today with the following:    Primary hypertension  Her blood pressure is at goal today.  She continues with her lisinopril 40 mg daily and hydrochlorothiazide 12.5 mg daily.  She does need a refill on her hydrochlorothiazide today. She has not been checking her blood pressure at home.  She is due for updated labs.    Type 2 diabetes mellitus with stage 3 chronic kidney disease, without long-term current use of insulin (HCC)  She does not take any blood glucose lowering medications. She is watching her diet.  She is due for A1c today.  She has upcoming appointment in 1 day with pharmacotherapy.     Chronic anticoagulation  She is followed by pharmacotherapy.  She has upcoming appointment in 1 day for repeat INR.  Her INR on 2022 was subtherapeutic at 1.80.    Hyperlipidemia  She continues with her rosuvastatin 5 mg daily.  She is due for updated labs.      Past Medical History:   Diagnosis Date   • Carotid artery disease (HCC) 2010   • CPPD (pseudo-gout) 2011   • Diabetes (HCC) 2018   • GOUT 2011   • History of skin cancer 2016   • Hyperlipidemia 2010   • Hypertension 2010   • PAD (peripheral artery disease) 2010   • Pre-diabetes 2010   • S/P hip replacement 2011       Social History     Tobacco Use   • Smoking status: Former Smoker     Packs/day: 2.00     Years: 12.00     Pack years: 24.00     Types: Cigarettes     Quit date: 1985     Years since quittin.1   • Smokeless tobacco: Never Used   Vaping Use   • Vaping Use: Never used   Substance Use Topics   • Alcohol use: Not Currently     Alcohol/week: 0.0 oz     Comment: rarely maybe one a year   • Drug use: No       Current Outpatient Medications Ordered in Epic   Medication Sig Dispense Refill   • hydroCHLOROthiazide (HYDRODIURIL) 12.5 MG tablet Take 1 Tablet by mouth every day. 90 Tablet 2   • warfarin (COUMADIN) 2.5 MG Tab TAKE TWO  TABLETS BY MOUTH DAILY ON SUNDAY, TUESDAY, THURSDAY AND TAKE ONE TABLET BY MOUTH DAILY ON ALL OTHER DAYS 120 Tablet 1   • sucralfate (CARAFATE) 1 GM Tab Take 1 Tablet by mouth 4 times a day.     • Psyllium (METAMUCIL PO) Take  by mouth.     • Multiple Vitamins-Minerals (MULTIVITAL PO) Take  by mouth.     • Glucosamine HCl (GLUCOSAMINE PO) Take  by mouth.     • doxylamine (UNISOM) 25 MG Tab tablet Take 25 mg by mouth at bedtime.     • pantoprazole (PROTONIX) 40 MG Tablet Delayed Response Take 40 mg by mouth 2 times a day.     • lisinopril (PRINIVIL) 40 MG tablet Take 1 Tablet by mouth every day. TAKE ONE TABLET BY MOUTH DAILY 90 Tablet 3   • rosuvastatin (CRESTOR) 5 MG Tab TAKE ONE TABLET BY MOUTH DAILY 90 Tablet 3     No current Epic-ordered facility-administered medications on file.       Allergies:  Anesthetic [benzocaine], Iodine, Other drug, and Naproxen    Health Maintenance: Due for health maintenance labs, monofilament exam, retinal screening, zoster vaccine, [place] wellness visit and Covid booster.  She reports that she has had her influenza vaccine this season.      Objective:     Vital signs reviewed  Exam:  /64 (BP Location: Right arm, Patient Position: Sitting, BP Cuff Size: Adult)   Pulse 83   Temp 36.4 °C (97.6 °F) (Temporal)   Ht 1.524 m (5')   Wt 69.9 kg (154 lb)   SpO2 94%   BMI 30.08 kg/m²  Body mass index is 30.08 kg/m².    Gen: Alert and oriented, No apparent distress.  Neck: Neck is supple without lymphadenopathy.  Lungs: Normal effort, CTA bilaterally, no wheezes, rhonchi, or rales  CV: Regular rate and rhythm with systolic murmur. No rubs or gallops.  Ext: No clubbing, cyanosis, edema.    Monofilament testing with a 10 gram force: sensation intact: intact bilaterally  Visual Inspection: Feet without maceration, ulcers, fissures. Small callus to left lateral side.   Pedal pulses: intact bilaterally      Assessment & Plan:     86 y.o. female with the following -     1. Type 2  diabetes mellitus with stage 3b chronic kidney disease, without long-term current use of insulin (HCC)  Chronic stable problem.  Her A1c was at goal today at 6.5%.  She will continue with diet and lifestyle modifications.  Monofilament exam completed today.  We are completing a retinal eye exam today.  We attempted to complete a retinal eye exam today and were unable to complete her right eye.  Will place referral to ophthalmology to have a retinal screening completed.  She is due for updated labs, discussed completing labs soon as she is able to.  She return in 6 months for follow-up on her A1c.  - POCT Hemoglobin A1C  - Comp Metabolic Panel; Future  - Lipid Profile; Future  - CBC WITH DIFFERENTIAL; Future  - MICROALBUMIN CREAT RATIO URINE; Future  - POCT Retinal Eye Exam  - Diabetic Monofilament Lower Extremity Exam    2. Primary hypertension  Chronic stable problem.  Her blood pressure is at goal today.  She will continue with her lisinopril 40 mg daily and hydrochlorothiazide 12.5 mg daily.  Due for updated labs.  - hydroCHLOROthiazide (HYDRODIURIL) 12.5 MG tablet; Take 1 Tablet by mouth every day.  Dispense: 90 Tablet; Refill: 2    3. Chronic anticoagulation  Chronic stable problem.  Reviewed her recent anticoagulation clinic visit.  She will continue with her warfarin per anticoagulation clinic.  Keep upcoming appointment in 1 day.    4. Systolic murmur  Acute uncomplicated problem.  New on exam today.  Will check echocardiogram.  She is not having any orthopnea, dyspnea or leg swelling.  - EC-ECHOCARDIOGRAM COMPLETE W/O CONT; Future    5. Hyperlipidemia, unspecified hyperlipidemia type  Chronic stable problem.  She will continue with her rosuvastatin 5 mg daily.  She is due for updated labs.        Return in about 6 months (around 8/28/2022) for Diabetes, A1C.    Please note that this dictation was created using voice recognition software. I have made every reasonable attempt to correct obvious errors, but I  expect that there are errors of grammar and possibly content that I did not discover before finalizing the note.

## 2022-03-01 ENCOUNTER — ANTICOAGULATION VISIT (OUTPATIENT)
Dept: MEDICAL GROUP | Facility: PHYSICIAN GROUP | Age: 87
End: 2022-03-01
Payer: MEDICARE

## 2022-03-01 DIAGNOSIS — I73.9 PAD (PERIPHERAL ARTERY DISEASE) (HCC): ICD-10-CM

## 2022-03-01 DIAGNOSIS — Z79.01 CHRONIC ANTICOAGULATION: Primary | ICD-10-CM

## 2022-03-01 LAB — INR PPP: 2.3 (ref 2–3.5)

## 2022-03-01 PROCEDURE — 99999 PR NO CHARGE: CPT | Performed by: STUDENT IN AN ORGANIZED HEALTH CARE EDUCATION/TRAINING PROGRAM

## 2022-03-01 PROCEDURE — 85610 PROTHROMBIN TIME: CPT | Performed by: STUDENT IN AN ORGANIZED HEALTH CARE EDUCATION/TRAINING PROGRAM

## 2022-03-01 PROCEDURE — 93793 ANTICOAG MGMT PT WARFARIN: CPT | Performed by: STUDENT IN AN ORGANIZED HEALTH CARE EDUCATION/TRAINING PROGRAM

## 2022-03-01 NOTE — PROGRESS NOTES
Anticoagulation Summary  As of 3/1/2022    INR goal:  2.0-3.0   TTR:  72.4 % (3.1 y)   INR used for dosin.30 (3/1/2022)   Warfarin maintenance plan:  5 mg (2.5 mg x 2) every Sun, Tue; 2.5 mg (2.5 mg x 1) all other days   Weekly warfarin total:  22.5 mg   Plan last modified:  Fred Nayak, PharmD (2021)   Next INR check:  3/29/2022   Target end date:  Indefinite    Indications    Chronic anticoagulation [Z79.01]  PAD (peripheral artery disease) ([facility]) [I73.9]             Anticoagulation Episode Summary     INR check location:      Preferred lab:      Send INR reminders to:      Comments:  Indefinite therapy per Dr Jara      Anticoagulation Care Providers     Provider Role Specialty Phone number    CHACORTA Issa Referring Nurse Practitioner Family [phone]    [facility] Anticoagulation Services Responsible  [phone]        Anticoagulation Patient Findings  Patient Findings     Negatives:  Signs/symptoms of thrombosis, Signs/symptoms of bleeding, Laboratory test error suspected, Change in health, Change in alcohol use, Change in activity, Upcoming invasive procedure, Emergency department visit, Upcoming dental procedure, Missed doses, Extra doses, Change in medications, Change in diet/appetite, Hospital admission, Bruising, Other complaints        HPI:   Radha Verduzco seen in clinic today, on anticoagulation therapy with warfarin for VTE prophylaxis 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    Patient's previous INR was subtherapeutic at 1.8 on 22, at which time patient was instructed to bolus with one dose, then resume current warfarin regimen.  She returns to clinic today to recheck INR to ensure it is therapeutic and thus preventing possible clotting and/or bleeding/bruising complications.    CHADS-VASc = n/a  (unadjusted ischemic stroke risk/year:  n/a)    Does patient have any changes to current medical/health status since last appt (Y/N):  No  Does  patient have any signs/symptoms of bleeding and/or thrombosis since the last appt (Y/N):  NO  Does patient have any interval changes to diet or medications since last appt (Y/N):  NO  Are there any complications or cost restrictions with current therapy (Y/N):  NO     Does patient have [facility] PCP? Yes, Celia RIVAS (If not, please document discussion that patient must be seen at [facility])       Vitals:  declined today.    There were no vitals filed for this visit.     Asssessment:      INR therapeutic at 2.3, therefore decreasing patient's clotting and/or bleeding risk.   Reason(s) for out of range INR today:  n/a      Patient is NOT on antiplatelet therapy.    Medication reconciliation completed today.    Plan:  Pt is to continue with current warfarin dosing regimen.     Follow up:  Because warfarin is a high risk medication and current CHEST guidelines recommend regular monitoring intervals (few days up to 12 weeks), will have patient return to clinic in 4 weeks to recheck INR.    Fred Nayak, PharmD, BCACP

## 2022-03-29 ENCOUNTER — ANTICOAGULATION VISIT (OUTPATIENT)
Dept: MEDICAL GROUP | Facility: PHYSICIAN GROUP | Age: 87
End: 2022-03-29
Payer: MEDICARE

## 2022-03-29 DIAGNOSIS — I73.9 PAD (PERIPHERAL ARTERY DISEASE) (HCC): ICD-10-CM

## 2022-03-29 DIAGNOSIS — Z79.01 CHRONIC ANTICOAGULATION: Primary | ICD-10-CM

## 2022-03-29 LAB — INR PPP: 3.6 (ref 2–3.5)

## 2022-03-29 PROCEDURE — 85610 PROTHROMBIN TIME: CPT | Performed by: STUDENT IN AN ORGANIZED HEALTH CARE EDUCATION/TRAINING PROGRAM

## 2022-03-29 PROCEDURE — 99211 OFF/OP EST MAY X REQ PHY/QHP: CPT | Performed by: STUDENT IN AN ORGANIZED HEALTH CARE EDUCATION/TRAINING PROGRAM

## 2022-03-29 NOTE — PROGRESS NOTES
Anticoagulation Summary  As of 3/29/2022    INR goal:  2.0-3.0   TTR:  72.0 % (3.2 y)   INR used for dosing:  3.60 (3/29/2022)   Warfarin maintenance plan:  5 mg (2.5 mg x 2) every Sun, Tue; 2.5 mg (2.5 mg x 1) all other days   Weekly warfarin total:  22.5 mg   Plan last modified:  Fred Nayak, PharmD (12/28/2021)   Next INR check:  4/12/2022   Target end date:  Indefinite    Indications    Chronic anticoagulation [Z79.01]  PAD (peripheral artery disease) ([facility]) [I73.9]             Anticoagulation Episode Summary     INR check location:      Preferred lab:      Send INR reminders to:      Comments:  Indefinite therapy per Dr Jara      Anticoagulation Care Providers     Provider Role Specialty Phone number    CHACORTA Issa Referring Family Medicine [phone]    [facility] Anticoagulation Services Responsible  [phone]        Anticoagulation Patient Findings  Patient Findings     Negatives:  Signs/symptoms of thrombosis, Signs/symptoms of bleeding, Laboratory test error suspected, Change in health, Change in alcohol use, Change in activity, Upcoming invasive procedure, Emergency department visit, Upcoming dental procedure, Missed doses, Extra doses, Change in medications, Change in diet/appetite, Hospital admission, Bruising, Other complaints        HPI:   Radha Verduzco seen in clinic today, on anticoagulation therapy with warfarin for VTE prophylaxis 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     Patient's previous INR was therapeutic at 2.3 on 3-1-22, at which time patient was instructed to continue with current warfarin regimen.  She returns to clinic today to recheck INR to ensure it is therapeutic and thus preventing possible clotting and/or bleeding/bruising complications.    CHADS-VASc = n/a  (unadjusted ischemic stroke risk/year:  n/a)    Does patient have any changes to current medical/health status since last appt (Y/N):  NO  Does patient have any  signs/symptoms of bleeding and/or thrombosis since the last appt (Y/N):  NO  Does patient have any interval changes to diet or medications since last appt (Y/N):  NO  Are there any complications or cost restrictions with current therapy (Y/N):  NO     Does patient have [facility] PCP? Yes, Celia RIVAS (If not, please document discussion that patient must be seen at [facility])       Vitals:  declined at today's visit.    There were no vitals filed for this visit.     Asssessment:      INR supratherapeutic at 3.6, therefore increasing patient's bleeding risk.   Reason(s) for out of range INR today:  Etiology unknown      Pt is not on antiplatelet therapy    Medication reconciliation completed today.    Plan:  Instructed patient to HOLD tomorrow's dose (already took today's), then resume current warfarin regimen.     Follow up:  Because warfarin is a high risk medication and current CHEST guidelines recommend regular monitoring intervals (few days up to 12 weeks), will have patient return to clinic in 2 weeks to recheck INR.    Fred Nayak, PharmD, BCACP

## 2022-04-12 ENCOUNTER — ANTICOAGULATION VISIT (OUTPATIENT)
Dept: MEDICAL GROUP | Facility: PHYSICIAN GROUP | Age: 87
End: 2022-04-12
Payer: MEDICARE

## 2022-04-12 DIAGNOSIS — Z79.01 CHRONIC ANTICOAGULATION: Primary | ICD-10-CM

## 2022-04-12 DIAGNOSIS — I73.9 PAD (PERIPHERAL ARTERY DISEASE) (HCC): ICD-10-CM

## 2022-04-12 LAB — INR PPP: 4.3 (ref 2–3.5)

## 2022-04-12 PROCEDURE — 99211 OFF/OP EST MAY X REQ PHY/QHP: CPT | Performed by: STUDENT IN AN ORGANIZED HEALTH CARE EDUCATION/TRAINING PROGRAM

## 2022-04-12 PROCEDURE — 85610 PROTHROMBIN TIME: CPT | Performed by: STUDENT IN AN ORGANIZED HEALTH CARE EDUCATION/TRAINING PROGRAM

## 2022-04-12 RX ORDER — AMLODIPINE BESYLATE 5 MG/1
TABLET ORAL
COMMUNITY
Start: 2022-03-11 | End: 2022-10-05

## 2022-04-12 NOTE — PROGRESS NOTES
Anticoagulation Summary  As of 2022    INR goal:  2.0-3.0   TTR:  71.1 % (3.2 y)   INR used for dosin.30 (2022)   Warfarin maintenance plan:  5 mg (2.5 mg x 2) every Tue; 2.5 mg (2.5 mg x 1) all other days   Weekly warfarin total:  20 mg   Plan last modified:  Fred Nayak, PharmD (2022)   Next INR check:  2022   Target end date:  Indefinite    Indications    Chronic anticoagulation [Z79.01]  PAD (peripheral artery disease) (HCC) [I73.9]             Anticoagulation Episode Summary     INR check location:      Preferred lab:      Send INR reminders to:      Comments:  Indefinite therapy per Dr Jara      Anticoagulation Care Providers     Provider Role Specialty Phone number    CHACORTA Issa Referring Family Medicine [phone]    [facility] Anticoagulation Services Responsible  [phone]        Anticoagulation Patient Findings  Patient Findings     Negatives:  Signs/symptoms of thrombosis, Signs/symptoms of bleeding, Laboratory test error suspected, Change in health, Change in alcohol use, Change in activity, Upcoming invasive procedure, Emergency department visit, Upcoming dental procedure, Missed doses, Extra doses, Change in medications, Change in diet/appetite, Hospital admission, Bruising, Other complaints        HPI:   Radha Verduzco seen in clinic today, on anticoagulation therapy with warfarin for VTE prophylaxis 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    Patient's previous INR was supratherapeutic at 3.6 on 3-29-22, at which time patient was instructed to hold one dose then resume current warfarin reigmen.  She returns to clinic today to recheck INR to ensure it is therapeutic and thus preventing possible clotting and/or bleeding/bruising complications.    CHADS-VASc = n/a  (unadjusted ischemic stroke risk/year:  n/)    Does patient have any changes to current medical/health status since last appt (Y/N):  NO  Does patient have any  signs/symptoms of bleeding and/or thrombosis since the last appt (Y/N):  NO  Does patient have any interval changes to diet or medications since last appt (Y/N):  NO  Are there any complications or cost restrictions with current therapy (Y/N):  NO     Does patient have [facility] PCP? Yes, Celia RIVAS (If not, please document discussion that patient must be seen at [facility])       Vitals:  declined at today's visit.    There were no vitals filed for this visit.     Asssessment:      INR supratherapeutic at 4.3, therefore increasing patient's bleeding risk.   Reason(s) for out of range INR today:  Dose too high.      Pt is not on antiplatelet therapy    Medication reconciliation completed today.    Plan:  Instructed patient to HOLD tomorrow's dose (already took today's), then decrease weekly warfarin regimen as detailed above.     Follow up:  Because warfarin is a high risk medication and current CHEST guidelines recommend regular monitoring intervals (few days up to 12 weeks), will have patient return to clinic in 2 weeks to recheck INR.    Fred Nayak, PharmD, BCACP

## 2022-04-26 ENCOUNTER — TELEPHONE (OUTPATIENT)
Dept: VASCULAR LAB | Facility: MEDICAL CENTER | Age: 87
End: 2022-04-26
Payer: MEDICARE

## 2022-04-26 NOTE — TELEPHONE ENCOUNTER
Received call from pt's daughter Mary Ann (ph: [phone])  Pt missed today's INR appt because pt was not awake at that time  Mary Ann states ideal appt time for pt is around 11am because she is the only mode of transportation for pt and she lives in [place] and works at 2pm    R/s INR appt to 5/10 at 1045.    Pt's daughter unable to bring pt to lab or clinic at any other time.    Janiya Dolan, PharmD    CC kelly Nayak

## 2022-05-12 ENCOUNTER — TELEPHONE (OUTPATIENT)
Dept: VASCULAR LAB | Facility: MEDICAL CENTER | Age: 87
End: 2022-05-12
Payer: MEDICARE

## 2022-05-24 ENCOUNTER — ANTICOAGULATION VISIT (OUTPATIENT)
Dept: MEDICAL GROUP | Facility: PHYSICIAN GROUP | Age: 87
End: 2022-05-24
Payer: MEDICARE

## 2022-05-24 DIAGNOSIS — Z79.01 CHRONIC ANTICOAGULATION: Primary | ICD-10-CM

## 2022-05-24 DIAGNOSIS — I73.9 PAD (PERIPHERAL ARTERY DISEASE) (HCC): ICD-10-CM

## 2022-05-24 LAB — INR PPP: 2 (ref 2–3.5)

## 2022-05-24 PROCEDURE — 99999 PR NO CHARGE: CPT | Performed by: STUDENT IN AN ORGANIZED HEALTH CARE EDUCATION/TRAINING PROGRAM

## 2022-05-24 PROCEDURE — 85610 PROTHROMBIN TIME: CPT | Performed by: STUDENT IN AN ORGANIZED HEALTH CARE EDUCATION/TRAINING PROGRAM

## 2022-05-24 PROCEDURE — 93793 ANTICOAG MGMT PT WARFARIN: CPT | Performed by: STUDENT IN AN ORGANIZED HEALTH CARE EDUCATION/TRAINING PROGRAM

## 2022-05-24 NOTE — PROGRESS NOTES
Anticoagulation Summary  As of 2022    INR goal:  2.0-3.0   TTR:  70.2 % (3.3 y)   INR used for dosin.00 (2022)   Warfarin maintenance plan:  5 mg (2.5 mg x 2) every Tue; 2.5 mg (2.5 mg x 1) all other days   Weekly warfarin total:  20 mg   Plan last modified:  Fred Nayak, PharmD (2022)   Next INR check:  2022   Target end date:  Indefinite    Indications    Chronic anticoagulation [Z79.01]  PAD (peripheral artery disease) (HCC) [I73.9]             Anticoagulation Episode Summary     INR check location:      Preferred lab:      Send INR reminders to:      Comments:  Indefinite therapy per Dr Jara      Anticoagulation Care Providers     Provider Role Specialty Phone number    CHACORTA Issa Referring Family Medicine [phone]    [facility] Anticoagulation Services Responsible  [phone]        Anticoagulation Patient Findings  Patient Findings     Negatives:  Signs/symptoms of thrombosis, Signs/symptoms of bleeding, Laboratory test error suspected, Change in health, Change in alcohol use, Change in activity, Upcoming invasive procedure, Emergency department visit, Upcoming dental procedure, Missed doses, Extra doses, Change in medications, Change in diet/appetite, Hospital admission, Bruising, Other complaints        HPI:   Radha Verduzco seen in clinic today, on anticoagulation therapy with warfarin for VTE prophylaxis 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    Patient's previous INR was supratherapeutic at 4.3 on 22, at which time patient was instructed to hold one dose, then decrease weekly warfarin regimen.  She returns to clinic today to recheck INR to ensure it is therapeutic and thus preventing possible clotting and/or bleeding/bruising complications.    CHADS-VASc = n/a  (unadjusted ischemic stroke risk/year:  n/a)    Does patient have any changes to current medical/health status since last appt (Y/N):  NO  Does patient have any  signs/symptoms of bleeding and/or thrombosis since the last appt (Y/N):  NO  Does patient have any interval changes to diet or medications since last appt (Y/N):  NO  Are there any complications or cost restrictions with current therapy (Y/N):  NO     Does patient have [facility] PCP? Yes, Celia RIVAS (If not, please document discussion that patient must be seen at [facility])       Vitals:  declined today.    There were no vitals filed for this visit.     Asssessment:      INR therapeutic at 2.0, therefore decreasing patient's clotting and/or bleeding risk.   Reason(s) for out of range INR today:  n/a      Pt is not on antiplatelet therapy    Medication reconciliation completed today.    Plan:  Pt is to continue with current warfarin dosing regimen.     Follow up:  Because warfarin is a high risk medication and current CHEST guidelines recommend regular monitoring intervals (few days up to 12 weeks), will have patient return to clinic in 4 weeks to recheck INR.    Fred Nayak, PharmD, BCACP

## 2022-06-06 ENCOUNTER — HOSPITAL ENCOUNTER (OUTPATIENT)
Dept: LAB | Facility: MEDICAL CENTER | Age: 87
End: 2022-06-06
Attending: NURSE PRACTITIONER
Payer: MEDICARE

## 2022-06-06 DIAGNOSIS — E21.3 HYPERPARATHYROIDISM (HCC): ICD-10-CM

## 2022-06-06 DIAGNOSIS — N18.32 TYPE 2 DIABETES MELLITUS WITH STAGE 3B CHRONIC KIDNEY DISEASE, WITHOUT LONG-TERM CURRENT USE OF INSULIN (HCC): ICD-10-CM

## 2022-06-06 DIAGNOSIS — E55.9 VITAMIN D DEFICIENCY: ICD-10-CM

## 2022-06-06 DIAGNOSIS — E11.22 TYPE 2 DIABETES MELLITUS WITH STAGE 3B CHRONIC KIDNEY DISEASE, WITHOUT LONG-TERM CURRENT USE OF INSULIN (HCC): ICD-10-CM

## 2022-06-06 LAB
25(OH)D3 SERPL-MCNC: 13 NG/ML (ref 30–100)
ALBUMIN SERPL BCP-MCNC: 4.1 G/DL (ref 3.2–4.9)
ALBUMIN/GLOB SERPL: 1.2 G/DL
ALP SERPL-CCNC: 79 U/L (ref 30–99)
ALT SERPL-CCNC: 11 U/L (ref 2–50)
ANION GAP SERPL CALC-SCNC: 10 MMOL/L (ref 7–16)
AST SERPL-CCNC: 21 U/L (ref 12–45)
BASOPHILS # BLD AUTO: 0.6 % (ref 0–1.8)
BASOPHILS # BLD: 0.05 K/UL (ref 0–0.12)
BILIRUB SERPL-MCNC: 0.4 MG/DL (ref 0.1–1.5)
BUN SERPL-MCNC: 18 MG/DL (ref 8–22)
CALCIUM SERPL-MCNC: 9.2 MG/DL (ref 8.5–10.5)
CHLORIDE SERPL-SCNC: 102 MMOL/L (ref 96–112)
CHOLEST SERPL-MCNC: 136 MG/DL (ref 100–199)
CO2 SERPL-SCNC: 26 MMOL/L (ref 20–33)
CREAT SERPL-MCNC: 0.95 MG/DL (ref 0.5–1.4)
CREAT UR-MCNC: 241.36 MG/DL
EOSINOPHIL # BLD AUTO: 0.13 K/UL (ref 0–0.51)
EOSINOPHIL NFR BLD: 1.5 % (ref 0–6.9)
ERYTHROCYTE [DISTWIDTH] IN BLOOD BY AUTOMATED COUNT: 49.1 FL (ref 35.9–50)
FASTING STATUS PATIENT QL REPORTED: NORMAL
GFR SERPLBLD CREATININE-BSD FMLA CKD-EPI: 58 ML/MIN/1.73 M 2
GLOBULIN SER CALC-MCNC: 3.4 G/DL (ref 1.9–3.5)
GLUCOSE SERPL-MCNC: 133 MG/DL (ref 65–99)
HCT VFR BLD AUTO: 45.1 % (ref 37–47)
HDLC SERPL-MCNC: 40 MG/DL
HGB BLD-MCNC: 14.1 G/DL (ref 12–16)
IMM GRANULOCYTES # BLD AUTO: 0.03 K/UL (ref 0–0.11)
IMM GRANULOCYTES NFR BLD AUTO: 0.4 % (ref 0–0.9)
LDLC SERPL CALC-MCNC: 71 MG/DL
LYMPHOCYTES # BLD AUTO: 1.42 K/UL (ref 1–4.8)
LYMPHOCYTES NFR BLD: 16.7 % (ref 22–41)
MCH RBC QN AUTO: 25.3 PG (ref 27–33)
MCHC RBC AUTO-ENTMCNC: 31.3 G/DL (ref 33.6–35)
MCV RBC AUTO: 81 FL (ref 81.4–97.8)
MICROALBUMIN UR-MCNC: 65.7 MG/DL
MICROALBUMIN/CREAT UR: 272 MG/G (ref 0–30)
MONOCYTES # BLD AUTO: 0.64 K/UL (ref 0–0.85)
MONOCYTES NFR BLD AUTO: 7.5 % (ref 0–13.4)
NEUTROPHILS # BLD AUTO: 6.22 K/UL (ref 2–7.15)
NEUTROPHILS NFR BLD: 73.3 % (ref 44–72)
NRBC # BLD AUTO: 0 K/UL
NRBC BLD-RTO: 0 /100 WBC
PLATELET # BLD AUTO: 337 K/UL (ref 164–446)
PMV BLD AUTO: 9.2 FL (ref 9–12.9)
POTASSIUM SERPL-SCNC: 3.4 MMOL/L (ref 3.6–5.5)
PROT SERPL-MCNC: 7.5 G/DL (ref 6–8.2)
PTH-INTACT SERPL-MCNC: 77.8 PG/ML (ref 14–72)
RBC # BLD AUTO: 5.57 M/UL (ref 4.2–5.4)
SODIUM SERPL-SCNC: 138 MMOL/L (ref 135–145)
TRIGL SERPL-MCNC: 124 MG/DL (ref 0–149)
WBC # BLD AUTO: 8.5 K/UL (ref 4.8–10.8)

## 2022-06-06 PROCEDURE — 82043 UR ALBUMIN QUANTITATIVE: CPT

## 2022-06-06 PROCEDURE — 82306 VITAMIN D 25 HYDROXY: CPT

## 2022-06-06 PROCEDURE — 83970 ASSAY OF PARATHORMONE: CPT

## 2022-06-06 PROCEDURE — 36415 COLL VENOUS BLD VENIPUNCTURE: CPT

## 2022-06-06 PROCEDURE — 85025 COMPLETE CBC W/AUTO DIFF WBC: CPT

## 2022-06-06 PROCEDURE — 80061 LIPID PANEL: CPT

## 2022-06-06 PROCEDURE — 82570 ASSAY OF URINE CREATININE: CPT

## 2022-06-06 PROCEDURE — 80053 COMPREHEN METABOLIC PANEL: CPT

## 2022-06-09 DIAGNOSIS — Z79.01 CHRONIC ANTICOAGULATION: ICD-10-CM

## 2022-06-09 RX ORDER — WARFARIN SODIUM 2.5 MG/1
TABLET ORAL
Qty: 180 TABLET | Refills: 1 | Status: SHIPPED | OUTPATIENT
Start: 2022-06-09 | End: 2022-10-11

## 2022-06-21 ENCOUNTER — ANTICOAGULATION VISIT (OUTPATIENT)
Dept: MEDICAL GROUP | Facility: PHYSICIAN GROUP | Age: 87
End: 2022-06-21
Payer: MEDICARE

## 2022-06-21 DIAGNOSIS — I73.9 PAD (PERIPHERAL ARTERY DISEASE) (HCC): ICD-10-CM

## 2022-06-21 DIAGNOSIS — Z79.01 CHRONIC ANTICOAGULATION: Primary | ICD-10-CM

## 2022-06-21 LAB — INR PPP: 3 (ref 2–3.5)

## 2022-06-21 PROCEDURE — 99999 PR NO CHARGE: CPT | Performed by: NURSE PRACTITIONER

## 2022-06-21 PROCEDURE — 85610 PROTHROMBIN TIME: CPT | Performed by: NURSE PRACTITIONER

## 2022-06-21 PROCEDURE — 93793 ANTICOAG MGMT PT WARFARIN: CPT | Performed by: NURSE PRACTITIONER

## 2022-06-21 NOTE — PROGRESS NOTES
Anticoagulation Summary  As of 6/21/2022    INR goal:  2.0-3.0   TTR:  70.8 % (3.4 y)   INR used for dosing:  3.00 (6/21/2022)   Warfarin maintenance plan:  5 mg (2.5 mg x 2) every Tue; 2.5 mg (2.5 mg x 1) all other days   Weekly warfarin total:  20 mg   Plan last modified:  Fred Nayak, PharmD (4/12/2022)   Next INR check:  7/26/2022   Target end date:  Indefinite    Indications    Chronic anticoagulation [Z79.01]  PAD (peripheral artery disease) (HCC) [I73.9]             Anticoagulation Episode Summary     INR check location:      Preferred lab:      Send INR reminders to:      Comments:  Indefinite therapy per Dr Jara      Anticoagulation Care Providers     Provider Role Specialty Phone number    CHACORTA Issa Referring Family Medicine [phone]    [facility] Anticoagulation Services Responsible  [phone]        Anticoagulation Patient Findings  Patient Findings     Negatives:  Signs/symptoms of thrombosis, Signs/symptoms of bleeding, Laboratory test error suspected, Change in health, Change in alcohol use, Change in activity, Upcoming invasive procedure, Emergency department visit, Upcoming dental procedure, Missed doses, Extra doses, Change in medications, Change in diet/appetite, Hospital admission, Bruising, Other complaints        HPI:   Radha Verduzco seen in clinic today, on anticoagulation therapy with warfarin for VTE prophylaxis due to 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  She returns to clinic today to recheck INR to ensure it is therapeutic and thus preventing possible clotting and/or bleeding/bruising complications.    CHADS-VASc = n/a  (unadjusted ischemic stroke risk/year:  n/a)    Does patient have any changes to current medical/health status since last appt (Y/N):  NO  Does patient have any signs/symptoms of  bleeding and/or thrombosis since the last appt (Y/N):  NO  Does patient have any interval changes to diet or medications since last appt (Y/N):  NO  Are there any complications or cost restrictions with current therapy (Y/N):  NO     Does patient have [facility] PCP? Yes, Cleia RIVAS (If not, please document discussion that patient must be seen at [facility])       Vitals:  declined today    There were no vitals filed for this visit.     Asssessment:      INR remains therapeutic at 3.0, therefore decreasing VTE and/or bleeding risk.   Reason(s) for out of range INR today: n/a      Pt is not on antiplatelet therapy    Medication reconciliation completed today.    Plan:  Pt is to continue with current warfarin dosing regimen.     Follow up:  Because warfarin is a high risk medication and current CHEST guidelines recommend regular monitoring intervals (few days up to 12 weeks), will have patient return to clinic in 5 weeks to recheck INR.    Fred Nayak, PharmD, BCACP

## 2022-07-26 ENCOUNTER — ANTICOAGULATION VISIT (OUTPATIENT)
Dept: MEDICAL GROUP | Facility: PHYSICIAN GROUP | Age: 87
End: 2022-07-26
Payer: MEDICARE

## 2022-07-26 DIAGNOSIS — Z79.01 CHRONIC ANTICOAGULATION: Primary | ICD-10-CM

## 2022-07-26 DIAGNOSIS — I73.9 PAD (PERIPHERAL ARTERY DISEASE) (HCC): ICD-10-CM

## 2022-07-26 LAB — INR PPP: 1.7 (ref 2–3.5)

## 2022-07-26 PROCEDURE — 85610 PROTHROMBIN TIME: CPT | Performed by: NURSE PRACTITIONER

## 2022-07-26 PROCEDURE — 99211 OFF/OP EST MAY X REQ PHY/QHP: CPT | Performed by: NURSE PRACTITIONER

## 2022-07-26 NOTE — PROGRESS NOTES
Anticoagulation Summary  As of 2022    INR goal:  2.0-3.0   TTR:  71.0 % (3.5 y)   INR used for dosin.70 (2022)   Warfarin maintenance plan:  5 mg (2.5 mg x 2) every Tue; 2.5 mg (2.5 mg x 1) all other days   Weekly warfarin total:  20 mg   Plan last modified:  Fred Nayak, PharmD (2022)   Next INR check:  2022   Target end date:  Indefinite    Indications    Chronic anticoagulation [Z79.01]  PAD (peripheral artery disease) (HCC) [I73.9]             Anticoagulation Episode Summary     INR check location:      Preferred lab:      Send INR reminders to:      Comments:  Indefinite therapy per Dr Jara      Anticoagulation Care Providers     Provider Role Specialty Phone number    CHACORTA Issa Referring Family Medicine [phone]    [facility] Anticoagulation Services Responsible  [phone]        Anticoagulation Patient Findings  Patient Findings     Negatives:  Signs/symptoms of thrombosis, Signs/symptoms of bleeding, Laboratory test error suspected, Change in health, Change in alcohol use, Change in activity, Upcoming invasive procedure, Emergency department visit, Upcoming dental procedure, Missed doses, Extra doses, Change in medications, Change in diet/appetite, Hospital admission, Bruising, Other complaints        HPI:   Radha Verduzco seen in clinic today, on anticoagulation therapy with warfarin for VTE prophylaxis 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    Patient's previous INR was therapeutic at 3.0 on 22, at which time patient was instructed to continue with current warfarin regimen.  She returns to clinic today to recheck INR to ensure it is therapeutic and thus preventing possible clotting and/or bleeding/bruising complications.    CHADS-VASc = n/a  (unadjusted ischemic stroke risk/year:  n/a)    Does patient have any changes to current medical/health status since last appt (Y/N):  NO  Does patient have any signs/symptoms  of bleeding and/or thrombosis since the last appt (Y/N):  NO  Does patient have any interval changes to diet or medications since last appt (Y/N):  May have been taking higher dose than instructed.  Are there any complications or cost restrictions with current therapy (Y/N):  NO     Does patient have [facility] PCP? Yes,  (If not, please document discussion that patient must be seen at [facility])       Vitals:  declined today.    There were no vitals filed for this visit.     Asssessment:      INR subtherapeutic at 1.7, therefore increasing patient's VTE risk.   Reason(s) for out of range INR today:  Etiology unknown.      Pt is not on antiplatelet therapy    Medication reconciliation completed today.    Plan:  Instructed patient to bolus with 5mg X 1 tomorrow, then resume current warfarin regimen.     Follow up:  Because warfarin is a high risk medication and current CHEST guidelines recommend regular monitoring intervals (few days up to 12 weeks), will have patient return to clinic in 3 weeks to recheck INR.    Fred Nayak, PharmD, BCACP

## 2022-08-01 ENCOUNTER — OFFICE VISIT (OUTPATIENT)
Dept: MEDICAL GROUP | Facility: PHYSICIAN GROUP | Age: 87
End: 2022-08-01
Payer: MEDICARE

## 2022-08-01 VITALS
HEIGHT: 63 IN | DIASTOLIC BLOOD PRESSURE: 60 MMHG | HEART RATE: 82 BPM | WEIGHT: 147 LBS | OXYGEN SATURATION: 95 % | TEMPERATURE: 97.9 F | BODY MASS INDEX: 26.05 KG/M2 | SYSTOLIC BLOOD PRESSURE: 120 MMHG

## 2022-08-01 DIAGNOSIS — R80.9 TYPE 2 DIABETES MELLITUS WITH MICROALBUMINURIA, WITHOUT LONG-TERM CURRENT USE OF INSULIN (HCC): ICD-10-CM

## 2022-08-01 DIAGNOSIS — E11.22 TYPE 2 DIABETES MELLITUS WITH STAGE 3A CHRONIC KIDNEY DISEASE, WITHOUT LONG-TERM CURRENT USE OF INSULIN (HCC): ICD-10-CM

## 2022-08-01 DIAGNOSIS — E55.9 VITAMIN D DEFICIENCY: ICD-10-CM

## 2022-08-01 DIAGNOSIS — I10 PRIMARY HYPERTENSION: ICD-10-CM

## 2022-08-01 DIAGNOSIS — N18.31 TYPE 2 DIABETES MELLITUS WITH STAGE 3A CHRONIC KIDNEY DISEASE, WITHOUT LONG-TERM CURRENT USE OF INSULIN (HCC): ICD-10-CM

## 2022-08-01 DIAGNOSIS — E11.29 TYPE 2 DIABETES MELLITUS WITH MICROALBUMINURIA, WITHOUT LONG-TERM CURRENT USE OF INSULIN (HCC): ICD-10-CM

## 2022-08-01 DIAGNOSIS — Z79.01 CHRONIC ANTICOAGULATION: ICD-10-CM

## 2022-08-01 LAB
HBA1C MFR BLD: 6.3 % (ref 0–5.6)
INT CON NEG: ABNORMAL
INT CON POS: ABNORMAL

## 2022-08-01 PROCEDURE — 99214 OFFICE O/P EST MOD 30 MIN: CPT | Performed by: NURSE PRACTITIONER

## 2022-08-01 PROCEDURE — 83036 HEMOGLOBIN GLYCOSYLATED A1C: CPT | Performed by: NURSE PRACTITIONER

## 2022-08-01 ASSESSMENT — FIBROSIS 4 INDEX: FIB4 SCORE: 1.62

## 2022-08-01 NOTE — ASSESSMENT & PLAN NOTE
She is due for A1C today. She does not take any glucose lowering medication.  She is due for retinal eye exam and at her last visit we did refer her to ophthalmology as we were unable to complete her POCT retinal screening.  She has not scheduled an appointment.  She will drink mostly water with her daily Mountain Dew. She has not tried diet Mountain Dew. She is eating banana every other day.  Recent labs show elevated micro urine albumin.  GFR is improved from previous 38 to current 58.  She is not taking any NSAIDs.

## 2022-08-01 NOTE — PATIENT INSTRUCTIONS
[facility]  [address]  [place]  Phone: [phone]

## 2022-08-01 NOTE — PROGRESS NOTES
Subjective:     CC: Diabetes follow-up    HPI:   Radha presents today with her daughter for the following:    Type 2 diabetes mellitus with stage 3a chronic kidney disease, without long-term current use of insulin ([facility])  She is due for A1C today. She does not take any glucose lowering medication.  She is due for retinal eye exam and at her last visit we did refer her to ophthalmology as we were unable to complete her POCT retinal screening.  She has not scheduled an appointment.  She will drink mostly water with her daily Mountain Dew. She has not tried diet Mountain Dew. She is eating banana every other day.  Recent labs show elevated micro urine albumin.  GFR is improved from previous 38 to current 58.  She is not taking any NSAIDs.    Chronic anticoagulation  She continues monthly follow-up with anticoagulation clinic. She continues with warfarin.    Primary hypertension  She continues lisinopril 40 mg daily and hydrochlorothiazide 12.5 mg daily.  Her blood pressure is at goal today.    Vitamin D deficiency  She does not take any vitamin D supplementation.  Recent labs show vitamin D level of 13.      Past Medical History:   Diagnosis Date   • Carotid artery disease ([facility]) 2010   • CPPD (pseudo-gout) 2011   • Diabetes ([facility]) 2018   • GOUT 2011   • History of skin cancer 2016   • Hyperlipidemia 2010   • Hypertension 2010   • PAD (peripheral artery disease) 2010   • Pre-diabetes 2010   • S/P hip replacement 2011       Social History     Tobacco Use   • Smoking status: Former Smoker     Packs/day: 2.00     Years: 12.00     Pack years: 24.00     Types: Cigarettes     Quit date:      Years since quittin.6   • Smokeless tobacco: Never Used   Vaping Use   • Vaping Use: Never used   Substance Use Topics   • Alcohol use: Not Currently     Alcohol/week: 0.0 oz     Comment: rarely maybe one a year   • Drug use: No       Current Outpatient Medications Ordered in Epic  "  Medication Sig Dispense Refill   • warfarin (COUMADIN) 2.5 MG Tab TAKE ONE TO TWO TABLETS (2.5mg - 5mg) BY MOUTH EACH DAY OR AS DIRECTED BY  Tablet 1   • amLODIPine (NORVASC) 5 MG Tab      • hydroCHLOROthiazide (HYDRODIURIL) 12.5 MG tablet Take 1 Tablet by mouth every day. 90 Tablet 2   • sucralfate (CARAFATE) 1 GM Tab Take 1 Tablet by mouth 4 times a day.     • Psyllium (METAMUCIL PO) Take  by mouth.     • Multiple Vitamins-Minerals (MULTIVITAL PO) Take  by mouth.     • Glucosamine HCl (GLUCOSAMINE PO) Take  by mouth.     • doxylamine (UNISOM) 25 MG Tab tablet Take 25 mg by mouth at bedtime.     • pantoprazole (PROTONIX) 40 MG Tablet Delayed Response Take 40 mg by mouth 2 times a day.     • lisinopril (PRINIVIL) 40 MG tablet Take 1 Tablet by mouth every day. TAKE ONE TABLET BY MOUTH DAILY 90 Tablet 3   • rosuvastatin (CRESTOR) 5 MG Tab TAKE ONE TABLET BY MOUTH DAILY 90 Tablet 3     No current Epic-ordered facility-administered medications on file.       Allergies:  Anesthetic [benzocaine], Iodine, Other drug, and Naproxen    Health Maintenance: Due for retinal screening, zoster vaccine,  Annual wellness visit and COVID booster.      Objective:     Vital signs reviewed  Exam:  /60 (BP Location: Right arm, Patient Position: Sitting, BP Cuff Size: Adult)   Pulse 82   Temp 36.6 °C (97.9 °F) (Temporal)   Ht 1.6 m (5' 3\")   Wt 66.7 kg (147 lb)   SpO2 95%   BMI 26.04 kg/m²  Body mass index is 26.04 kg/m².    Gen: Alert and oriented, No apparent distress.  Daughter present in exam room.  Neck: Neck is supple without lymphadenopathy.  Lungs: Normal effort, CTA bilaterally, no wheezes, rhonchi, or rales  CV: Regular rate and rhythm. No murmurs, rubs, or gallops.  Ext: No clubbing, cyanosis, edema.      Labs:      Latest Reference Range & Units 08/01/22 09:30   Glycohemoglobin 0.0 - 5.6 % 6.3 !   !: Data is abnormal    Assessment & Plan:     87 y.o. female with the following -     1. Type 2 diabetes " mellitus with stage 3a chronic kidney disease, without long-term current use of insulin (HCC)  Chronic stable problem.  A1c is at goal.  Recommend she continue to watch her sugar and carbohydrate intake.  Activity as tolerated.  Check A1c every 6 months.  - POCT Hemoglobin A1C    2. Chronic anticoagulation  Chronic stable problem.  Continue with warfarin.  Continue with anticoagulation clinic.    3. Primary hypertension  Chronic stable problem.  Reviewed her recent lab results.  GFR has improved.  Continue with lisinopril 40 mg daily and hydrochlorothiazide 12.5 mg daily.  Potassium slightly low recommend she start daily banana.    4. Type 2 diabetes mellitus with microalbuminuria, without long-term current use of insulin (HCC)  Chronic stable problem.  A1c is at goal.  Reinforced diet and lifestyle modifications.  She is on lisinopril for blood pressure.  Avoid NSAIDs.    5. Vitamin D deficiency  Chronic exacerbated problem.  Discussed she can start taking over-the-counter vitamin D3 2000 units daily.      Return in about 6 months (around 2/1/2023) for Diabetes, A1C.    Please note that this dictation was created using voice recognition software. I have made every reasonable attempt to correct obvious errors, but I expect that there are errors of grammar and possibly content that I did not discover before finalizing the note.

## 2022-08-01 NOTE — ASSESSMENT & PLAN NOTE
She continues lisinopril 40 mg daily and hydrochlorothiazide 12.5 mg daily.  Her blood pressure is at goal today.

## 2022-08-30 ENCOUNTER — ANTICOAGULATION VISIT (OUTPATIENT)
Dept: MEDICAL GROUP | Facility: PHYSICIAN GROUP | Age: 87
End: 2022-08-30
Payer: MEDICARE

## 2022-08-30 DIAGNOSIS — Z79.01 CHRONIC ANTICOAGULATION: Primary | ICD-10-CM

## 2022-08-30 DIAGNOSIS — I73.9 PAD (PERIPHERAL ARTERY DISEASE) (HCC): ICD-10-CM

## 2022-08-30 LAB — INR PPP: 1.3 (ref 2–3.5)

## 2022-08-30 PROCEDURE — 99211 OFF/OP EST MAY X REQ PHY/QHP: CPT | Performed by: NURSE PRACTITIONER

## 2022-08-30 PROCEDURE — 85610 PROTHROMBIN TIME: CPT | Performed by: NURSE PRACTITIONER

## 2022-08-30 NOTE — PROGRESS NOTES
Anticoagulation Summary  As of 2022      INR goal:  2.0-3.0   TTR:  69.0 % (3.6 y)   INR used for dosin.30 (2022)   Warfarin maintenance plan:  5 mg (2.5 mg x 2) every Tue, Sat; 2.5 mg (2.5 mg x 1) all other days   Weekly warfarin total:  22.5 mg   Plan last modified:  Fred Nayak, PharmD (2022)   Next INR check:  2022   Target end date:  Indefinite    Indications    Chronic anticoagulation [Z79.01]  PAD (peripheral artery disease) (HCC) [I73.9]                 Anticoagulation Episode Summary       INR check location:      Preferred lab:      Send INR reminders to:      Comments:  Indefinite therapy per Dr Jara          Anticoagulation Care Providers       Provider Role Specialty Phone number    CHACORTA Issa Referring Family Medicine [phone]    [facility] Anticoagulation Services Responsible  [phone]          Anticoagulation Patient Findings  Patient Findings       Negatives:  Signs/symptoms of thrombosis, Signs/symptoms of bleeding, Laboratory test error suspected, Change in health, Change in alcohol use, Change in activity, Upcoming invasive procedure, Emergency department visit, Upcoming dental procedure, Missed doses, Extra doses, Change in medications, Change in diet/appetite, Hospital admission, Bruising, Other complaints          HPI:   Radha Ruiz Dax seen in clinic today, on anticoagulation therapy with warfarin for VTE prophylaxis due to 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  She returns to clinic today to recheck INR to ensure it is therapeutic and thus preventing possible clotting and/or bleeding/bruising complications.    CHADS-VASc = n/a  (unadjusted ischemic stroke risk/year:  n/a)    Does patient have any changes to current medical/health status since last appt  (Y/N):  NO  Does patient have any signs/symptoms of bleeding and/or thrombosis since the last appt (Y/N):  NO  Does patient have any interval changes to diet or medications since last appt (Y/N):  NO  Are there any complications or cost restrictions with current therapy (Y/N):  NO     Does patient have [facility] PCP? Yes, Celia RIVAS (If not, please document discussion that patient must be seen at [facility])       Vitals:  declined today    There were no vitals filed for this visit.     Asssessment:      INR subtherapeutic at 1.3, therefore increasing VTE risk   Reason(s) for out of range INR today:  dose too low.      Pt is not on antiplatelet therapy    Medication reconciliation completed today.    Plan:  Instructed patient to bolus with 7.5mg X 1, then increase weekly warfarin regimen as detailed above.     Follow up:  Because warfarin is a high risk medication and current CHEST guidelines recommend regular monitoring intervals (few days up to 12 weeks), will have patient return to clinic in 2 weeks to recheck INR (per patient preference and schedule).    Fred Nayak, PharmD, BCACP

## 2022-09-13 ENCOUNTER — ANTICOAGULATION VISIT (OUTPATIENT)
Dept: MEDICAL GROUP | Facility: PHYSICIAN GROUP | Age: 87
End: 2022-09-13
Payer: MEDICARE

## 2022-09-13 DIAGNOSIS — Z79.01 CHRONIC ANTICOAGULATION: Primary | ICD-10-CM

## 2022-09-13 DIAGNOSIS — I73.9 PAD (PERIPHERAL ARTERY DISEASE) (HCC): ICD-10-CM

## 2022-09-13 LAB — INR PPP: 1.7 (ref 2–3.5)

## 2022-09-13 PROCEDURE — 85610 PROTHROMBIN TIME: CPT | Performed by: NURSE PRACTITIONER

## 2022-09-13 PROCEDURE — 99211 OFF/OP EST MAY X REQ PHY/QHP: CPT | Performed by: NURSE PRACTITIONER

## 2022-09-13 RX ORDER — AMLODIPINE BESYLATE 5 MG/1
TABLET ORAL
Qty: 90 TABLET | Refills: 0 | OUTPATIENT
Start: 2022-09-13

## 2022-09-13 NOTE — PROGRESS NOTES
Anticoagulation Summary  As of 2022      INR goal:  2.0-3.0   TTR:  68.3 % (3.6 y)   INR used for dosin.70 (2022)   Warfarin maintenance plan:  5 mg (2.5 mg x 2) every Tue, Sat; 2.5 mg (2.5 mg x 1) all other days   Weekly warfarin total:  22.5 mg   Plan last modified:  Fred Nayak, PharmD (2022)   Next INR check:  2022   Target end date:  Indefinite    Indications    Chronic anticoagulation [Z79.01]  PAD (peripheral artery disease) (HCC) [I73.9]                 Anticoagulation Episode Summary       INR check location:      Preferred lab:      Send INR reminders to:      Comments:  Indefinite therapy per Dr Jara          Anticoagulation Care Providers       Provider Role Specialty Phone number    CHACORTA Issa Referring Family Medicine [phone]    [facility] Anticoagulation Services Responsible  [phone]          Anticoagulation Patient Findings  Patient Findings       Negatives:  Signs/symptoms of thrombosis, Signs/symptoms of bleeding, Laboratory test error suspected, Change in health, Change in alcohol use, Change in activity, Upcoming invasive procedure, Emergency department visit, Upcoming dental procedure, Missed doses, Extra doses, Change in medications, Change in diet/appetite, Hospital admission, Bruising, Other complaints          HPI:   Radha Ruiz Dax seen in clinic today, on anticoagulation therapy with warfarin for VTE prophylaxis due to 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  She returns to clinic today to recheck INR to ensure it is therapeutic and thus preventing possible clotting and/or bleeding/bruising complications.    CHADS-VASc = n/a  (unadjusted ischemic stroke risk/year:  n/a)    Does patient have any changes to current medical/health status since last appt  (Y/N):  NO  Does patient have any signs/symptoms of bleeding and/or thrombosis since the last appt (Y/N):  NO  Does patient have any interval changes to diet or medications since last appt (Y/N):  NO  Are there any complications or cost restrictions with current therapy (Y/N):  NO     Does patient have [facility] PCP? Yes, Celia Kaur (If not, please document discussion that patient must be seen at [facility])       Vitals:  declined today.    There were no vitals filed for this visit.     Asssessment:      INR subtherapeutic at 1.7, therefore increasing VTE risk.   Reason(s) for out of range INR today:  etiology unknown,      Pt is not on antiplatelet therapy    Medication reconciliation completed today.    Plan:  Instructed patient to bolus with 7.5mg X 1, then resume current warfarin regimen.     Follow up:  Because warfarin is a high risk medication and current CHEST guidelines recommend regular monitoring intervals (few days up to 12 weeks), will have patient return to clinic in 2 weeks to recheck INR.    Fred Nayak, PharmD, BCACP

## 2022-09-13 NOTE — TELEPHONE ENCOUNTER
Requested Prescriptions     Refused Prescriptions Disp Refills    amLODIPine (NORVASC) 5 MG Tab [Pharmacy Med Name: amLODIPine BESYLATE 5 MG TAB] 90 Tablet 0     Sig: TAKE ONE TABLET BY MOUTH DAILY     Refused By: JANE CHEN     Reason for Refusal: Refill not appropriate.     CHACORTA Issa

## 2022-09-27 ENCOUNTER — ANTICOAGULATION VISIT (OUTPATIENT)
Dept: MEDICAL GROUP | Facility: PHYSICIAN GROUP | Age: 87
End: 2022-09-27
Payer: MEDICARE

## 2022-09-27 DIAGNOSIS — Z79.01 CHRONIC ANTICOAGULATION: Primary | ICD-10-CM

## 2022-09-27 DIAGNOSIS — I73.9 PAD (PERIPHERAL ARTERY DISEASE) (HCC): ICD-10-CM

## 2022-09-27 LAB — INR PPP: 1.7 (ref 2–3.5)

## 2022-09-27 PROCEDURE — 85610 PROTHROMBIN TIME: CPT | Performed by: NURSE PRACTITIONER

## 2022-09-27 PROCEDURE — 99211 OFF/OP EST MAY X REQ PHY/QHP: CPT | Performed by: NURSE PRACTITIONER

## 2022-09-27 NOTE — PROGRESS NOTES
Anticoagulation Summary  As of 2022      INR goal:  2.0-3.0   TTR:  67.6 % (3.7 y)   INR used for dosin.70 (2022)   Warfarin maintenance plan:  5 mg (2.5 mg x 2) every Tue, Thu, Sat; 2.5 mg (2.5 mg x 1) all other days   Weekly warfarin total:  25 mg   Plan last modified:  Fred Nayak, PharmD (2022)   Next INR check:  10/11/2022   Target end date:  Indefinite    Indications    Chronic anticoagulation [Z79.01]  PAD (peripheral artery disease) (HCC) [I73.9]                 Anticoagulation Episode Summary       INR check location:      Preferred lab:      Send INR reminders to:      Comments:  Indefinite therapy per Dr Jara          Anticoagulation Care Providers       Provider Role Specialty Phone number    CHACORTA Issa Referring Family Medicine [phone]    [facility] Anticoagulation Services Responsible  [phone]          Anticoagulation Patient Findings  Patient Findings       Negatives:  Signs/symptoms of thrombosis, Signs/symptoms of bleeding, Laboratory test error suspected, Change in health, Change in alcohol use, Change in activity, Upcoming invasive procedure, Emergency department visit, Upcoming dental procedure, Missed doses, Extra doses, Change in medications, Change in diet/appetite, Hospital admission, Bruising, Other complaints          HPI:   Radha Verduzco seen in clinic today, on anticoagulation therapy with warfarin for VTE prophylaxis 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    Patient's previous INR was subtherapeutic at 1.7 on 22, at which time patient was instructed to increase weekly warfarin regimen.  She returns to clinic today to recheck INR to ensure it is therapeutic and thus preventing possible clotting and/or bleeding/bruising complications.    CHADS-VASc = n/a  (unadjusted ischemic stroke risk/year:  n/a)    Does patient have any changes to current medical/health status since last appt (Y/N):  NO  Does  patient have any signs/symptoms of bleeding and/or thrombosis since the last appt (Y/N):  NO  Does patient have any interval changes to diet or medications since last appt (Y/N):  NO  Are there any complications or cost restrictions with current therapy (Y/N):  NO     Does patient have [facility] PCP? Yes, Celia RIVAS (If not, please document discussion that patient must be seen at [facility])       Vitals:  declined today    There were no vitals filed for this visit.     Asssessment:      INR subtherapeutic at 1.7, therefore increasing VTE risk   Reason(s) for out of range INR today:  dose too low.      Pt is not on antiplatelet therapy    Medication reconciliation completed today.    Plan:  Instructed patient to increase weekly warfarin regimen as detailed above.     Follow up:  Because warfarin is a high risk medication and current CHEST guidelines recommend regular monitoring intervals (few days up to 12 weeks), will have patient return to clinic in 2 weeks to recheck INR.    Fred Nayak, PharmD, BCACP

## 2022-10-05 ENCOUNTER — TELEPHONE (OUTPATIENT)
Dept: MEDICAL GROUP | Facility: PHYSICIAN GROUP | Age: 87
End: 2022-10-05
Payer: MEDICARE

## 2022-10-05 DIAGNOSIS — I10 PRIMARY HYPERTENSION: ICD-10-CM

## 2022-10-05 RX ORDER — AMLODIPINE BESYLATE 5 MG/1
TABLET ORAL
Qty: 90 TABLET | Refills: 0 | OUTPATIENT
Start: 2022-10-05

## 2022-10-05 RX ORDER — AMLODIPINE BESYLATE 5 MG/1
5 TABLET ORAL DAILY
Qty: 30 TABLET | Refills: 0 | Status: SHIPPED | OUTPATIENT
Start: 2022-10-05 | End: 2022-10-10 | Stop reason: SDUPTHER

## 2022-10-05 NOTE — TELEPHONE ENCOUNTER
Received request via: Pharmacy pt is out of medication     Was the patient seen in the last year in this department? Yes    Does the patient have an active prescription (recently filled or refills available) for medication(s) requested? No

## 2022-10-06 NOTE — TELEPHONE ENCOUNTER
Telephone call to patient regarding refill on amlodipine. Review of chart shows amlodipine was discontinued 10/19/ 21 at hospital discharge follow-up appointment after hospital visit to [facility] on 10/15/2021.  After hospital discharge she was discharged home on lisinopril 40 mg daily and her triamterene-hydrochlorothiazide was discontinued. At her 10/19/2021 appointment with me she was to continue with lisinopril 40 mg daily return in 1 month for follow-up.  At her 1 month follow-up visit on 11/8/2021 her blood pressures were elevated so we added back hydrochlorothiazide 12.5 mg daily and she was to continue with her lisinopril 40 mg daily.  At her next office visit on 2/20/2022 it was noted that she was taking lisinopril 40 mg daily and hydrochlorothiazide 12.5 mg daily.  Again at recent appointment on 8/1/2022 she was taking lisinopril 40 mg daily and hydrochlorothiazide 12.5 mg daily.  During the last 3 appointments she has not stated that she was taking the amlodipine.  During the telephone call today patient stated that she was only taking the following medications: Warfarin, hydrochlorothiazide, rosuvastatin, amlodipine (recently ran out of medication).  She states that she does not remember ever being told to stop the amlodipine.  Patient depends on her daughter for transportation.  Patient did seem very unsure of the medications and repeatedly told me the same for medications that she has been taking.  I discussed with her that I would do a 30-day refill of amlodipine and I would like her to come in with her daughter bring all her medications so we can verify what her current medication list is.  Patient verbalized understanding.  Follow-up MAs reach out to patient to schedule appointment soon as possible.

## 2022-10-10 ENCOUNTER — OFFICE VISIT (OUTPATIENT)
Dept: MEDICAL GROUP | Facility: PHYSICIAN GROUP | Age: 87
End: 2022-10-10
Payer: MEDICARE

## 2022-10-10 VITALS
DIASTOLIC BLOOD PRESSURE: 54 MMHG | HEIGHT: 65 IN | HEART RATE: 83 BPM | WEIGHT: 155 LBS | OXYGEN SATURATION: 93 % | SYSTOLIC BLOOD PRESSURE: 110 MMHG | RESPIRATION RATE: 16 BRPM | TEMPERATURE: 98.3 F | BODY MASS INDEX: 25.83 KG/M2

## 2022-10-10 DIAGNOSIS — E78.5 HYPERLIPIDEMIA, UNSPECIFIED HYPERLIPIDEMIA TYPE: ICD-10-CM

## 2022-10-10 DIAGNOSIS — I10 PRIMARY HYPERTENSION: ICD-10-CM

## 2022-10-10 DIAGNOSIS — Z23 NEED FOR VACCINATION: ICD-10-CM

## 2022-10-10 DIAGNOSIS — E21.3 HYPERPARATHYROIDISM (HCC): ICD-10-CM

## 2022-10-10 DIAGNOSIS — I74.3 EMBOLISM AND THROMBOSIS OF ARTERIES OF THE LOWER EXTREMITIES (HCC): ICD-10-CM

## 2022-10-10 PROBLEM — I80.8 SUPERFICIAL THROMBOPHLEBITIS OF RIGHT UPPER EXTREMITY: Status: RESOLVED | Noted: 2021-11-08 | Resolved: 2022-10-10

## 2022-10-10 PROBLEM — Z86.718 HISTORY OF THROMBOSIS: Status: ACTIVE | Noted: 2022-10-10

## 2022-10-10 PROCEDURE — 99214 OFFICE O/P EST MOD 30 MIN: CPT | Mod: 25 | Performed by: NURSE PRACTITIONER

## 2022-10-10 PROCEDURE — G0008 ADMIN INFLUENZA VIRUS VAC: HCPCS | Performed by: NURSE PRACTITIONER

## 2022-10-10 PROCEDURE — 90662 IIV NO PRSV INCREASED AG IM: CPT | Performed by: NURSE PRACTITIONER

## 2022-10-10 RX ORDER — AMLODIPINE BESYLATE 5 MG/1
5 TABLET ORAL DAILY
Qty: 100 TABLET | Refills: 3 | Status: SHIPPED | OUTPATIENT
Start: 2022-10-10 | End: 2023-11-16

## 2022-10-10 RX ORDER — ROSUVASTATIN CALCIUM 5 MG/1
TABLET, COATED ORAL
Qty: 90 TABLET | Refills: 3 | Status: SHIPPED | OUTPATIENT
Start: 2022-10-10 | End: 2023-06-19

## 2022-10-10 RX ORDER — VITAMIN B COMPLEX
1000 TABLET ORAL DAILY
COMMUNITY

## 2022-10-10 ASSESSMENT — FIBROSIS 4 INDEX: FIB4 SCORE: 1.62

## 2022-10-10 NOTE — PROGRESS NOTES
Subjective:     CC: Medication follow-up    HPI:   Radha presents today with her daughter for the following:    Primary hypertension  She brings in her home medications today to clarify the medications that she is currently taking. She is taking amlodipine 5 mg daily and hydrochlorothiazide 12.5 mg daily. She does not check her blood pressure at home.  She has not been taking her lisinopril although her past appointments she has stated that she was taking.  Her blood pressure is at goal today.  She denies any chest pain, shortness of breath, dizziness, blurry vision or headaches.    Hyperlipidemia  She continues with rosuvastatin 5 mg daily.  She is tolerating the medication.  She does need medication refills today.  Family is interested in possible mail order.  Number provided for [facility] to make sure they are her current mail order pharmacy.    Embolism and thrombosis of arteries of the lower extremities (HCC)  She has hist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  Currently on warfarin and managed by anticoagulation clinic.  She has an upcoming appointment tomorrow.    Hyperparathyroidism (HCC)  Recent labs show slightly elevated PTH and recent calcium of 9.2 mg/dL.  Recent vitamin D level is 13.  She continues with vitamin D3 2000 units daily.    Past Medical History:   Diagnosis Date    Carotid artery disease (HCC) 2010    CPPD (pseudo-gout) 2011    Diabetes (HCC) 2018    GOUT 2011    History of skin cancer 2016    Hyperlipidemia 2010    Hypertension 2010    PAD (peripheral artery disease) 2010    Pre-diabetes 2010    S/P hip replacement 2011    Superficial thrombophlebitis of right upper extremity 2021       Social History     Tobacco Use    Smoking status: Former     Packs/day: 2.00     Years: 12.00     Pack years: 24.00     Types: Cigarettes     Quit date:      Years since quittin.7    Smokeless tobacco: Never   Vaping Use    Vaping Use:  "Never used   Substance Use Topics    Alcohol use: Not Currently     Alcohol/week: 0.0 oz     Comment: rarely maybe one a year    Drug use: No       Current Outpatient Medications Ordered in Epic   Medication Sig Dispense Refill    vitamin D3 (CHOLECALCIFEROL) 1000 Unit (25 mcg) Tab Take 1,000 Units by mouth every day.      Misc Natural Products (NEURIVA PO) Take  by mouth.      amLODIPine (NORVASC) 5 MG Tab Take 1 Tablet by mouth every day. 100 Tablet 3    rosuvastatin (CRESTOR) 5 MG Tab TAKE ONE TABLET BY MOUTH DAILY 90 Tablet 3    warfarin (COUMADIN) 2.5 MG Tab TAKE ONE TO TWO TABLETS (2.5mg - 5mg) BY MOUTH EACH DAY OR AS DIRECTED BY [facility] 180 Tablet 1    hydroCHLOROthiazide (HYDRODIURIL) 12.5 MG tablet Take 1 Tablet by mouth every day. 90 Tablet 2    Multiple Vitamins-Minerals (MULTIVITAL PO) Take  by mouth.      doxylamine (UNISOM) 25 MG Tab tablet Take 25 mg by mouth at bedtime.      Glucosamine HCl (GLUCOSAMINE PO) Take  by mouth.       No current Epic-ordered facility-administered medications on file.       Allergies:  Anesthetic [benzocaine], Iodine, Other drug, and Naproxen    Health Maintenance: Reviewed.  She is interested in her influenza vaccine today.      Objective:     Vital signs reviewed  Exam:  /54 (BP Location: Left arm, Patient Position: Sitting, BP Cuff Size: Small adult)   Pulse 83   Temp 36.8 °C (98.3 °F) (Temporal)   Resp 16   Ht 1.651 m (5' 5\")   Wt 70.3 kg (155 lb)   SpO2 93%   BMI 25.79 kg/m²  Body mass index is 25.79 kg/m².    Gen: Alert and oriented, No apparent distress.  Daughter present in exam room.  Eyes:   Lids normal. Glasses in place.   Lungs: Normal effort, CTA bilaterally, no wheezes, rhonchi, or rales  CV: Regular rate and rhythm. No murmurs, rubs, or gallops.  Ext: No clubbing, cyanosis, edema.      Assessment & Plan:     87 y.o. female with the following -     1. Primary hypertension  Chronic stable problem.  Her blood pressure is at goal today.  She will " continue with her amlodipine 5 mg daily and hydrochlorothiazide 12.5 mg daily.  I discontinued the lisinopril dose as she has not been taking.  I did clarify with the patient and she states that she never stopped taking the amlodipine from 1 year ago at her hospital visit.  - amLODIPine (NORVASC) 5 MG Tab; Take 1 Tablet by mouth every day.  Dispense: 100 Tablet; Refill: 3    2. Hyperlipidemia, unspecified hyperlipidemia type  Chronic stable problem.  She will continue with her rosuvastatin 5 mg daily.  Labs are up-to-date.  - rosuvastatin (CRESTOR) 5 MG Tab; TAKE ONE TABLET BY MOUTH DAILY  Dispense: 90 Tablet; Refill: 3    3. Embolism and thrombosis of arteries of the lower extremities (HCC)  Chronic stable problem.  Continue follow-up with anticoagulation clinic and continue with warfarin.    4. Hyperparathyroidism (HCC)  Chronic stable problem.  PTH is decreasing, calcium is normal, vitamin D slightly low she will continue with her vitamin D3 supplement.    5. Need for vaccination  Acute uncomplicated problem.  She would like her influenza vaccine today. I have placed the below orders and discussed them with an approved delegating provider. The MA is performing the below orders under the direction of Dr. Pascual.  - INFLUENZA VACCINE, HIGH DOSE (65+ ONLY)        Return if symptoms worsen or fail to improve.    Please note that this dictation was created using voice recognition software. I have made every reasonable attempt to correct obvious errors, but I expect that there are errors of grammar and possibly content that I did not discover before finalizing the note.

## 2022-10-10 NOTE — ASSESSMENT & PLAN NOTE
She continues with rosuvastatin 5 mg daily.  She is tolerating the medication.  She does need medication refills today.  Family is interested in possible mail order.  Number provided for [facility] to make sure they are her current mail order pharmacy.

## 2022-10-10 NOTE — ASSESSMENT & PLAN NOTE
Recent labs show slightly elevated PTH and recent calcium of 9.2 mg/dL.  Recent vitamin D level is 13.  She continues with vitamin D3 2000 units daily.

## 2022-10-10 NOTE — ASSESSMENT & PLAN NOTE
She brings in her home medications today to clarify the medications that she is currently taking. She is taking amlodipine 5 mg daily and hydrochlorothiazide 12.5 mg daily. She does not check her blood pressure at home.  She has not been taking her lisinopril although her past appointments she has stated that she was taking.  Her blood pressure is at goal today.  She denies any chest pain, shortness of breath, dizziness, blurry vision or headaches.

## 2022-10-10 NOTE — ASSESSMENT & PLAN NOTE
She has hist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  Currently on warfarin and managed by anticoagulation clinic.  She has an upcoming appointment tomorrow.

## 2022-10-11 ENCOUNTER — ANTICOAGULATION VISIT (OUTPATIENT)
Dept: MEDICAL GROUP | Facility: PHYSICIAN GROUP | Age: 87
End: 2022-10-11
Payer: MEDICARE

## 2022-10-11 DIAGNOSIS — Z79.01 CHRONIC ANTICOAGULATION: Primary | ICD-10-CM

## 2022-10-11 DIAGNOSIS — I73.9 PAD (PERIPHERAL ARTERY DISEASE) (HCC): ICD-10-CM

## 2022-10-11 LAB — INR PPP: 1.8 (ref 2–3.5)

## 2022-10-11 PROCEDURE — 99211 OFF/OP EST MAY X REQ PHY/QHP: CPT | Performed by: NURSE PRACTITIONER

## 2022-10-11 PROCEDURE — 85610 PROTHROMBIN TIME: CPT | Performed by: NURSE PRACTITIONER

## 2022-10-11 RX ORDER — WARFARIN SODIUM 2.5 MG/1
TABLET ORAL
Qty: 135 TABLET | Refills: 1 | Status: SHIPPED | OUTPATIENT
Start: 2022-10-11 | End: 2023-02-28

## 2022-10-11 NOTE — PROGRESS NOTES
Anticoagulation Summary  As of 10/11/2022      INR goal:  2.0-3.0   TTR:  66.9 % (3.7 y)   INR used for dosin.80 (10/11/2022)   Warfarin maintenance plan:  3.75 mg (2.5 mg x 1.5) every day   Weekly warfarin total:  26.25 mg   Plan last modified:  Fred Nayak, PharmD (10/11/2022)   Next INR check:  10/25/2022   Target end date:  Indefinite    Indications    Chronic anticoagulation [Z79.01]  PAD (peripheral artery disease) (HCC) [I73.9]                 Anticoagulation Episode Summary       INR check location:      Preferred lab:      Send INR reminders to:      Comments:  Indefinite therapy per Dr Jara          Anticoagulation Care Providers       Provider Role Specialty Phone number    CHACORTA Issa Referring Family Medicine [phone]    [facility] Anticoagulation Services Responsible  [phone]          Anticoagulation Patient Findings  Patient Findings       Negatives:  Signs/symptoms of thrombosis, Signs/symptoms of bleeding, Laboratory test error suspected, Change in health, Change in alcohol use, Change in activity, Upcoming invasive procedure, Emergency department visit, Upcoming dental procedure, Missed doses, Extra doses, Change in medications, Change in diet/appetite, Hospital admission, Bruising, Other complaints          HPI:   Radha Verduzco seen in clinic today, on anticoagulation therapy with warfarin for VTE prophylaxis due to 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  She returns to clinic today to recheck INR to ensure it is therapeutic and thus preventing possible clotting and/or bleeding/bruising complications.    CHADS-VASc = n/a  (unadjusted ischemic stroke risk/year:  n/a)    Does patient have any changes to current medical/health status since last appt (Y/N):  NO  Does patient have any signs/symptoms of  bleeding and/or thrombosis since the last appt (Y/N):  NO  Does patient have any interval changes to diet or medications since last appt (Y/N):  NO  Are there any complications or cost restrictions with current therapy (Y/N):  NO     Does patient have [facility] PCP? Yes, Celia  (If not, please document discussion that patient must be seen at [facility])       Vitals:  declined today.    There were no vitals filed for this visit.     Asssessment:      INR subtherapeutic at 1.8, therefore increasing patient's VTE risk.   Reason(s) for out of range INR today:  dose too low      Pt is not on antiplatelet therapy    Medication reconciliation completed today.    Plan:  Instructed patient to increase weekly warfarin regimen as detailed above.     Follow up:  Because warfarin is a high risk medication and current CHEST guidelines recommend regular monitoring intervals (few days up to 12 weeks), will have patient return to clinic in 2 weeks to recheck INR.    Fred Nayak, PharmD, BCACP

## 2022-10-14 DIAGNOSIS — I10 PRIMARY HYPERTENSION: ICD-10-CM

## 2022-10-14 RX ORDER — HYDROCHLOROTHIAZIDE 12.5 MG/1
12.5 TABLET ORAL DAILY
Qty: 90 TABLET | Refills: 3 | Status: SHIPPED | OUTPATIENT
Start: 2022-10-14 | End: 2022-12-08

## 2022-10-14 NOTE — TELEPHONE ENCOUNTER
Requested Prescriptions     Signed Prescriptions Disp Refills    hydroCHLOROthiazide (HYDRODIURIL) 12.5 MG tablet 90 Tablet 3     Sig: Take 1 Tablet by mouth every day.     Authorizing Provider: JANE CHEN A.P.R.N.

## 2022-10-25 ENCOUNTER — ANTICOAGULATION VISIT (OUTPATIENT)
Dept: MEDICAL GROUP | Facility: PHYSICIAN GROUP | Age: 87
End: 2022-10-25
Payer: MEDICARE

## 2022-10-25 DIAGNOSIS — I73.9 PAD (PERIPHERAL ARTERY DISEASE) (HCC): ICD-10-CM

## 2022-10-25 DIAGNOSIS — Z79.01 CHRONIC ANTICOAGULATION: Primary | ICD-10-CM

## 2022-10-25 LAB — INR PPP: 2.5 (ref 2–3.5)

## 2022-10-25 PROCEDURE — 85610 PROTHROMBIN TIME: CPT | Performed by: NURSE PRACTITIONER

## 2022-10-25 PROCEDURE — 93793 ANTICOAG MGMT PT WARFARIN: CPT | Performed by: NURSE PRACTITIONER

## 2022-10-25 PROCEDURE — 99999 PR NO CHARGE: CPT | Performed by: NURSE PRACTITIONER

## 2022-10-25 NOTE — PROGRESS NOTES
Anticoagulation Summary  As of 10/25/2022      INR goal:  2.0-3.0   TTR:  67.0 % (3.7 y)   INR used for dosin.50 (10/25/2022)   Warfarin maintenance plan:  3.75 mg (2.5 mg x 1.5) every day   Weekly warfarin total:  26.25 mg   Plan last modified:  Fred Nayak, PharmD (10/11/2022)   Next INR check:  2022   Target end date:  Indefinite    Indications    Chronic anticoagulation [Z79.01]  PAD (peripheral artery disease) (HCC) [I73.9]                 Anticoagulation Episode Summary       INR check location:      Preferred lab:      Send INR reminders to:      Comments:  Indefinite therapy per Dr Jara          Anticoagulation Care Providers       Provider Role Specialty Phone number    CHACORTA Issa Referring Family Medicine [phone]    [facility] Anticoagulation Services Responsible  [phone]          Anticoagulation Patient Findings  Patient Findings       Negatives:  Signs/symptoms of thrombosis, Signs/symptoms of bleeding, Laboratory test error suspected, Change in health, Change in alcohol use, Change in activity, Upcoming invasive procedure, Emergency department visit, Upcoming dental procedure, Missed doses, Extra doses, Change in medications, Change in diet/appetite, Hospital admission, Bruising, Other complaints          HPI:   Radha Verduzco seen in clinic today, on anticoagulation therapy with warfarin for VTE prophylaxis 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    Patient's previous INR was subtherapeutic at 1.8 on 10-11-22, at which time patient was instructed to increase warfarin dosing.  She returns to clinic today to recheck INR to ensure it is therapeutic and thus preventing possible clotting and/or bleeding/bruising complications.    CHADS-VASc = n/a  (unadjusted ischemic stroke risk/year:  n/a)    Does patient have any changes to current medical/health status since last appt (Y/N):  NO  Does patient have any signs/symptoms of bleeding  and/or thrombosis since the last appt (Y/N):  NO  Does patient have any interval changes to diet or medications since last appt (Y/N):  NO  Are there any complications or cost restrictions with current therapy (Y/N):  NO     Does patient have [facility] PCP? Yes, Celia Kaur (If not, please document discussion that patient must be seen at [facility])       Vitals:  declined today    There were no vitals filed for this visit.     Asssessment:      INR therapeutic at 2.5, therefore decreasing patient's VTE and/or bleeding risk.   Reason(s) for out of range INR today:  n/a      Pt is not on antiplatelet therapy    Medication reconciliation completed today.    Plan:  Pt is to continue with current warfarin dosing regimen.     Follow up:  Because warfarin is a high risk medication and current CHEST guidelines recommend regular monitoring intervals (few days up to 12 weeks), will have patient return to clinic in 4 weeks to recheck INR (per patient preference).    Fred Nayak, PharmD, BCACP

## 2022-11-21 DIAGNOSIS — Z79.01 CHRONIC ANTICOAGULATION: ICD-10-CM

## 2022-11-22 ENCOUNTER — ANTICOAGULATION VISIT (OUTPATIENT)
Dept: MEDICAL GROUP | Facility: PHYSICIAN GROUP | Age: 87
End: 2022-11-22
Payer: MEDICARE

## 2022-11-22 DIAGNOSIS — Z79.01 CHRONIC ANTICOAGULATION: Primary | ICD-10-CM

## 2022-11-22 DIAGNOSIS — I73.9 PAD (PERIPHERAL ARTERY DISEASE) (HCC): ICD-10-CM

## 2022-11-22 LAB — INR PPP: 2.5 (ref 2–3.5)

## 2022-11-22 PROCEDURE — 99999 PR NO CHARGE: CPT | Performed by: NURSE PRACTITIONER

## 2022-11-22 PROCEDURE — 85610 PROTHROMBIN TIME: CPT | Performed by: NURSE PRACTITIONER

## 2022-11-22 PROCEDURE — 93793 ANTICOAG MGMT PT WARFARIN: CPT | Performed by: NURSE PRACTITIONER

## 2022-11-22 NOTE — PROGRESS NOTES
Anticoagulation Summary  As of 2022      INR goal:  2.0-3.0   TTR:  67.6 % (3.8 y)   INR used for dosin.50 (2022)   Warfarin maintenance plan:  3.75 mg (2.5 mg x 1.5) every day; Starting 2022   Weekly warfarin total:  26.25 mg   Plan last modified:  Fred Nayak, PharmD (10/11/2022)   Next INR check:  1/3/2023   Target end date:  Indefinite    Indications    Chronic anticoagulation [Z79.01]  PAD (peripheral artery disease) ([facility]) [I73.9]                 Anticoagulation Episode Summary       INR check location:      Preferred lab:      Send INR reminders to:      Comments:  Indefinite therapy per Dr Jara          Anticoagulation Care Providers       Provider Role Specialty Phone number    CHACORTA Issa Referring Family Medicine [phone]    [facility] Anticoagulation Services Responsible  [phone]          Anticoagulation Patient Findings  Patient Findings       Negatives:  Signs/symptoms of thrombosis, Signs/symptoms of bleeding, Laboratory test error suspected, Change in health, Change in alcohol use, Change in activity, Upcoming invasive procedure, Emergency department visit, Upcoming dental procedure, Missed doses, Extra doses, Change in medications, Change in diet/appetite, Hospital admission, Bruising, Other complaints          HPI:   Radha Verduzco seen in clinic today, on anticoagulation therapy with warfarin for VTE prophylaxis 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    Patient's previous INR was therapeutic at 2.5 on 10-25-22, at which time patient was instructed to continue with current warfarin regimen.  She returns to clinic today to recheck INR to ensure it is therapeutic and thus preventing possible clotting and/or bleeding/bruising complications.    CHADS-VASc = n/a  (unadjusted ischemic stroke risk/year:  n/a)    Does patient have any changes to current medical/health status since last appt (Y/N):  NO  Does patient have any  signs/symptoms of bleeding and/or thrombosis since the last appt (Y/N):  NO  Does patient have any interval changes to diet or medications since last appt (Y/N):  NO  Are there any complications or cost restrictions with current therapy (Y/N):  NO     Does patient have [facility] PCP? Yes, Celia RIVAS (If not, please document discussion that patient must be seen at [facility])       Vitals:  declined today    There were no vitals filed for this visit.     Asssessment:      INR remains therapeutic at 2.5, therefore decreasing patient's VTE and/or bleeding risk.   Reason(s) for out of range INR today:  n/a      Pt is not on antiplatelet therapy    Medication reconciliation completed today.    Plan:  Pt is to continue with current warfarin dosing regimen.     Follow up:  Because warfarin is a high risk medication and current CHEST guidelines recommend regular monitoring intervals (few days up to 12 weeks), will have patient return to clinic in 6 weeks to recheck INR.    Fred Nayak, PharmD, BCACP

## 2022-12-08 DIAGNOSIS — I10 PRIMARY HYPERTENSION: ICD-10-CM

## 2022-12-08 RX ORDER — HYDROCHLOROTHIAZIDE 12.5 MG/1
TABLET ORAL
Qty: 90 TABLET | Refills: 3 | Status: SHIPPED
Start: 2022-12-08 | End: 2023-03-22

## 2022-12-08 NOTE — TELEPHONE ENCOUNTER
Received request via: Pharmacy    Was the patient seen in the last year in this department? Yes    Does the patient have an active prescription (recently filled or refills available) for medication(s) requested? Yes. Last rf was [facility] is [facility].    Does the patient have intermediate Plus and need 100 day supply (blood pressure, diabetes and cholesterol meds only)? Patient does not have SCP

## 2022-12-08 NOTE — TELEPHONE ENCOUNTER
Requested Prescriptions     Signed Prescriptions Disp Refills    hydroCHLOROthiazide (HYDRODIURIL) 12.5 MG tablet 90 Tablet 3     Sig: TAKE ONE TABLET BY MOUTH DAILY     Authorizing Provider: JANE CHEN A.P.R.N.

## 2023-01-17 ENCOUNTER — APPOINTMENT (OUTPATIENT)
Dept: MEDICAL GROUP | Facility: PHYSICIAN GROUP | Age: 88
End: 2023-01-17
Payer: MEDICARE

## 2023-01-24 ENCOUNTER — ANTICOAGULATION VISIT (OUTPATIENT)
Dept: MEDICAL GROUP | Facility: PHYSICIAN GROUP | Age: 88
End: 2023-01-24
Payer: MEDICARE

## 2023-01-24 DIAGNOSIS — I73.9 PAD (PERIPHERAL ARTERY DISEASE) (HCC): ICD-10-CM

## 2023-01-24 DIAGNOSIS — Z79.01 CHRONIC ANTICOAGULATION: Primary | ICD-10-CM

## 2023-01-24 LAB — INR PPP: 2.7 (ref 2–3.5)

## 2023-01-24 PROCEDURE — 85610 PROTHROMBIN TIME: CPT | Performed by: STUDENT IN AN ORGANIZED HEALTH CARE EDUCATION/TRAINING PROGRAM

## 2023-01-24 PROCEDURE — 93793 ANTICOAG MGMT PT WARFARIN: CPT | Performed by: STUDENT IN AN ORGANIZED HEALTH CARE EDUCATION/TRAINING PROGRAM

## 2023-01-24 PROCEDURE — 99999 PR NO CHARGE: CPT | Performed by: STUDENT IN AN ORGANIZED HEALTH CARE EDUCATION/TRAINING PROGRAM

## 2023-01-24 NOTE — PROGRESS NOTES
Anticoagulation Summary  As of 2023      INR goal:  2.0-3.0   TTR:  69.0 % (4 y)   INR used for dosin.70 (2023)   Warfarin maintenance plan:  3.75 mg (2.5 mg x 1.5) every day   Weekly warfarin total:  26.25 mg   Plan last modified:  Fred Nayak, PharmD (10/11/2022)   Next INR check:  2023   Target end date:  Indefinite    Indications    Chronic anticoagulation [Z79.01]  PAD (peripheral artery disease) ([facility]) [I73.9]                 Anticoagulation Episode Summary       INR check location:      Preferred lab:      Send INR reminders to:      Comments:  Indefinite therapy per Dr Jara          Anticoagulation Care Providers       Provider Role Specialty Phone number    CHACORTA Issa Referring Family Medicine [phone]    [facility] Anticoagulation Services Responsible  [phone]          Anticoagulation Patient Findings  Patient Findings       Negatives:  Signs/symptoms of thrombosis, Signs/symptoms of bleeding, Laboratory test error suspected, Change in health, Change in alcohol use, Change in activity, Upcoming invasive procedure, Emergency department visit, Upcoming dental procedure, Missed doses, Extra doses, Change in medications, Change in diet/appetite, Hospital admission, Bruising, Other complaints                  HPI:   Radha Verduzco seen in clinic today, on anticoagulation therapy with warfarin for VTE prophylaxis due to 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  She returns to clinic today to recheck INR to ensure it is therapeutic and thus preventing possible clotting and/or bleeding/bruising complications.    CHADS-VASc = n/a  (unadjusted ischemic stroke risk/year:  n/a)    Does patient have any changes to current medical/health status since last appt (Y/N):  NO  Does patient have any signs/symptoms of  bleeding and/or thrombosis since the last appt (Y/N):  NO  Does patient have any interval changes to diet or medications since last appt (Y/N):  NO  Are there any complications or cost restrictions with current therapy (Y/N):  NO     Does patient have [facility] PCP? Yes, CHACORTA Issa (If not, please document discussion that patient must be seen at [facility])       Vitals:  declined today    There were no vitals filed for this visit.     Asssessment:      INR therapeutic at 2.7, therefore decreasing VTE and/or bleeding risk.   Reason(s) for out of range INR today:  n/a      Pt is not on antiplatelet therapy    Medication reconciliation completed today.    Plan:  Pt is to continue with current warfarin dosing regimen.     Follow up:  Because warfarin is a high risk medication and current CHEST guidelines recommend regular monitoring intervals (few days up to 12 weeks), will have patient return to clinic in 10 weeks to recheck INR.    Fred Nayak, PharmD, BCACP

## 2023-02-06 ENCOUNTER — OFFICE VISIT (OUTPATIENT)
Dept: MEDICAL GROUP | Facility: PHYSICIAN GROUP | Age: 88
End: 2023-02-06
Payer: MEDICARE

## 2023-02-06 VITALS
HEART RATE: 82 BPM | DIASTOLIC BLOOD PRESSURE: 52 MMHG | TEMPERATURE: 97.4 F | SYSTOLIC BLOOD PRESSURE: 132 MMHG | OXYGEN SATURATION: 94 % | BODY MASS INDEX: 27.82 KG/M2 | HEIGHT: 63 IN | WEIGHT: 157 LBS

## 2023-02-06 DIAGNOSIS — E11.29 TYPE 2 DIABETES MELLITUS WITH MICROALBUMINURIA, WITHOUT LONG-TERM CURRENT USE OF INSULIN (HCC): ICD-10-CM
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
## 2023-02-06 DIAGNOSIS — E55.9 VITAMIN D DEFICIENCY: ICD-10-CM
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
## 2023-02-06 DIAGNOSIS — E21.3 HYPERPARATHYROIDISM (HCC): ICD-10-CM

## 2023-02-06 DIAGNOSIS — I10 PRIMARY HYPERTENSION: ICD-10-CM

## 2023-02-06 DIAGNOSIS — E11.22 TYPE 2 DIABETES MELLITUS WITH STAGE 3A CHRONIC KIDNEY DISEASE, WITHOUT LONG-TERM CURRENT USE OF INSULIN (HCC): ICD-10-CM

## 2023-02-06 DIAGNOSIS — L30.4 INTERTRIGO: ICD-10-CM

## 2023-02-06 DIAGNOSIS — N18.31 TYPE 2 DIABETES MELLITUS WITH STAGE 3A CHRONIC KIDNEY DISEASE, WITHOUT LONG-TERM CURRENT USE OF INSULIN (HCC): ICD-10-CM

## 2023-02-06 DIAGNOSIS — I74.3 EMBOLISM AND THROMBOSIS OF ARTERIES OF THE LOWER EXTREMITIES (HCC): ICD-10-CM

## 2023-02-06 DIAGNOSIS — R80.9 TYPE 2 DIABETES MELLITUS WITH MICROALBUMINURIA, WITHOUT LONG-TERM CURRENT USE OF INSULIN (HCC): ICD-10-CM

## 2023-02-06 DIAGNOSIS — I73.9 PAD (PERIPHERAL ARTERY DISEASE) (HCC): ICD-10-CM

## 2023-02-06 LAB
HBA1C MFR BLD: 7 % (ref ?–5.8)
POCT INT CON NEG: NEGATIVE
POCT INT CON POS: POSITIVE

## 2023-02-06 PROCEDURE — 92250 FUNDUS PHOTOGRAPHY W/I&R: CPT | Mod: TC | Performed by: NURSE PRACTITIONER

## 2023-02-06 PROCEDURE — 99214 OFFICE O/P EST MOD 30 MIN: CPT | Performed by: NURSE PRACTITIONER

## 2023-02-06 PROCEDURE — 83036 HEMOGLOBIN GLYCOSYLATED A1C: CPT | Performed by: NURSE PRACTITIONER

## 2023-02-06 RX ORDER — NYSTATIN 100000 [USP'U]/G
1 POWDER TOPICAL 3 TIMES DAILY
Qty: 15 G | Refills: 0 | Status: SHIPPED | OUTPATIENT
Start: 2023-02-06

## 2023-02-06 ASSESSMENT — PATIENT HEALTH QUESTIONNAIRE - PHQ9: CLINICAL INTERPRETATION OF PHQ2 SCORE: 0

## 2023-02-06 ASSESSMENT — FIBROSIS 4 INDEX: FIB4 SCORE: 1.62

## 2023-02-06 NOTE — ASSESSMENT & PLAN NOTE
Her blood pressure today is 132/52. She is taking amlodipine 5 mg daily and hydrochlorothiazide 12.5 mg daily.

## 2023-02-06 NOTE — ASSESSMENT & PLAN NOTE
Diagnosed in 2018 during hospitalization visit.  She was started on calcium supplementation.  Due for updated PTH levels.  Continues with vitamin D 1000 units daily.

## 2023-02-06 NOTE — PROGRESS NOTES
Subjective:     CC: Diabetes follow-up    HPI:   Radha presents today with her daughter for the following:    Fall at home  Recent fall 2 weeks ago in her home.  She tripped over her long pajama pants.  Her daughter has since hemmed the pants.  She was checked out by EMS who told her she looked good.  She does have a bruise to her right breast that she saw last night that she would like me to look at today. She fell onto her right side but did not hit any chairs, or in tables, states that she fell right to the floor.    Type 2 diabetes mellitus with stage 3a chronic kidney disease, without long-term current use of insulin ([facility])  She is due for A1c and monofilament today.  Currently diet controlled. She has not made diet changes. She does eat pasta 2 times a month.     Primary hypertension  Her blood pressure today is 132/52. She is taking amlodipine 5 mg daily and hydrochlorothiazide 12.5 mg daily.    Type 2 diabetes mellitus with microalbuminuria, without long-term current use of insulin ([facility])  Completing health maintenance today.  Not currently on ACE or ARB.    Embolism and thrombosis of arteries of the lower extremities ([facility])  She has hist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  Currently on warfarin and followed by anticoagulation clinic.  Her last appointment was on 1/24/2023 and her INR was therapeutic.    PAD (peripheral artery disease) ([facility])  Has been followed by Dr. Jara in the past.  Currently on warfarin and rosuvastatin.  Blood pressures controlled with amlodipine and hydrochlorothiazide.    Hyperparathyroidism ([facility])  Diagnosed in 2018 during hospitalization visit.  She was started on calcium supplementation.  Due for updated PTH levels.  Continues with vitamin D 1000 units daily.    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    Diabetes ([facility]) 4/19/2018    GOUT 1/31/2011    History of skin cancer 2/24/2016     "Hyperlipidemia 2010    Hypertension 2010    PAD (peripheral artery disease) 2010    Pre-diabetes 2010    S/P hip replacement 2011    Superficial thrombophlebitis of right upper extremity 2021       Social History     Tobacco Use    Smoking status: Former     Packs/day: 2.00     Years: 12.00     Pack years: 24.00     Types: Cigarettes     Quit date:      Years since quittin.1    Smokeless tobacco: Never   Vaping Use    Vaping Use: Never used   Substance Use Topics    Alcohol use: Not Currently     Comment: rarely    Drug use: No       Current Outpatient Medications Ordered in Epic   Medication Sig Dispense Refill    nystatin (MYCOSTATIN) powder Apply 1 g topically 3 times a day. 15 g 0    hydroCHLOROthiazide (HYDRODIURIL) 12.5 MG tablet TAKE ONE TABLET BY MOUTH DAILY 90 Tablet 3    warfarin (COUMADIN) 2.5 MG Tab Take one and one-half tablets by mouth daily or as directed by anticoagulation clinic 135 Tablet 1    vitamin D3 (CHOLECALCIFEROL) 1000 Unit (25 mcg) Tab Take 1,000 Units by mouth every day.      Misc Natural Products (NEURIVA PO) Take  by mouth.      amLODIPine (NORVASC) 5 MG Tab Take 1 Tablet by mouth every day. 100 Tablet 3    rosuvastatin (CRESTOR) 5 MG Tab TAKE ONE TABLET BY MOUTH DAILY 90 Tablet 3    Multiple Vitamins-Minerals (MULTIVITAL PO) Take  by mouth.      Glucosamine HCl (GLUCOSAMINE PO) Take  by mouth.      doxylamine (UNISOM) 25 MG Tab tablet Take 25 mg by mouth at bedtime.       No current Epic-ordered facility-administered medications on file.       Allergies:  Anesthetic [benzocaine], Iodine, Other drug, and Naproxen    Health Maintenance: Reviewed.       Objective:     Vital signs reviewed  Exam:  /52 (BP Location: Right arm, Patient Position: Sitting, BP Cuff Size: Adult)   Pulse 82   Temp 36.3 °C (97.4 °F) (Temporal)   Ht 1.6 m (5' 3\")   Wt 71.2 kg (157 lb)   SpO2 94%   BMI 27.81 kg/m²  Body mass index is 27.81 kg/m².    Gen: Alert and " oriented, No apparent distress.  Daughter present in exam room.  Eyes:   Lids normal. Glasses in place.   Lungs: Normal effort, no audible wheezes  Breast: Left breast without any skin discoloration or bruising.  Right breast skin is red, with well-defined border and moist.  CV: Skin pink, warm and dry.  Ext: No clubbing, cyanosis, edema.    Monofilament testing with a 10 gram force: sensation intact: intact bilaterally  Visual Inspection: Feet without maceration, ulcers, fissures. Dry feet and several toenails curving over edge of toe.   Pedal pulses: decreased bilaterally, 1+    Labs:      Latest Reference Range & Units 02/06/23 09:15   Glycohemoglobin 5.8 % 7.0 !   !: Data is abnormal    Assessment & Plan:     87 y.o. female with the following -     1. Fall in home, initial encounter  Acute uncomplicated problem.  She did have a recent fall at home related to the him of her pants being too long and she tripped over it.  I did evaluate the area and there is no bruising however there is some intertrigo seen.  See #2 below.  No recent falls.  - Patient identified as fall risk.  Appropriate orders and counseling given.    2. Intertrigo  Acute uncomplicated problem.  Start nystatin 3 times daily to the right under breast.  Discussed cleaning the area each time before application and keeping the area clean and dry.  - nystatin (MYCOSTATIN) powder; Apply 1 g topically 3 times a day.  Dispense: 15 g; Refill: 0    3. Type 2 diabetes mellitus with stage 3a chronic kidney disease, without long-term current use of insulin ([facility])  Chronic exacerbated problem.  A1c has increased to 7.0%.  We discussed holding off on medication at this time and trying to work on decreasing any excessive sugar or carbohydrate intake.  Plan to recheck in 3 months around May 2023.  If A1c continues to rise consider low-dose metformin.  Monofilament and retinal eye exam completed today.  I did refer her to podiatry for diabetes foot care.  Checking  updated labs as well today.  - POCT A1C  - POCT Retinal Eye Exam  - Diabetic Monofilament Lower Extremity Exam  - Referral to Podiatry  - Lipid Profile; Future  - Comp Metabolic Panel; Future    4. Type 2 diabetes mellitus with microalbuminuria, without long-term current use of insulin ([facility])  See #3 above    5. Primary hypertension  Chronic stable problem.  Continue with amlodipine 5 mg daily and hydrochlorothiazide 12.5 mg daily.    6. Hyperparathyroidism ([facility])  Chronic stable problem.  Continue daily vitamin D 1000 units daily.  Checking updated labs.  - PTH INTACT (PTH ONLY); Future    7. Vitamin D deficiency  Chronic stable problem.  Check an updated vitamin D level.  Continue with vitamin D 1000 units daily.  - VITAMIN D,25 HYDROXY (DEFICIENCY); Future    8. Embolism and thrombosis of arteries of the lower extremities ([facility])  Chronic stable problem.  Continue with warfarin and follow-up with anticoagulation clinic.    9. PAD (peripheral artery disease) ([facility])  Chronic stable problem.  Continue with rosuvastatin and warfarin.      Return in about 3 months (around 5/6/2023) for A1C, Diabetes.    Please note that this dictation was created using voice recognition software. I have made every reasonable attempt to correct obvious errors, but I expect that there are errors of grammar and possibly content that I did not discover before finalizing the note.

## 2023-02-06 NOTE — ASSESSMENT & PLAN NOTE
She is due for A1c and monofilament today.  Currently diet controlled. She has not made diet changes. She does eat pasta 2 times a month.

## 2023-02-06 NOTE — ASSESSMENT & PLAN NOTE
She has hist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  Currently on warfarin and followed by anticoagulation clinic.  Her last appointment was on 1/24/2023 and her INR was therapeutic.

## 2023-02-06 NOTE — ASSESSMENT & PLAN NOTE
Has been followed by Dr. Jara in the past.  Currently on warfarin and rosuvastatin.  Blood pressures controlled with amlodipine and hydrochlorothiazide.

## 2023-02-07 LAB — RETINAL SCREEN: NEGATIVE

## 2023-02-13 ENCOUNTER — ANTICOAGULATION MONITORING (OUTPATIENT)
Dept: MEDICAL GROUP | Facility: PHYSICIAN GROUP | Age: 88
End: 2023-02-13
Payer: MEDICARE

## 2023-02-13 DIAGNOSIS — I73.9 PAD (PERIPHERAL ARTERY DISEASE) (HCC): ICD-10-CM

## 2023-02-13 DIAGNOSIS — Z79.01 CHRONIC ANTICOAGULATION: ICD-10-CM

## 2023-02-13 LAB — INR PPP: 3.4 (ref 2–3.5)

## 2023-02-13 NOTE — PROGRESS NOTES
OP Anticoagulation Service Note    Date: 2/13/2023    Anticoagulation Summary  As of 2/13/2023      INR goal:  2.0-3.0   TTR:  68.7 % (4 y)   INR used for dosing:  3.40 (2/13/2023)   Warfarin maintenance plan:  3.75 mg (2.5 mg x 1.5) every day   Weekly warfarin total:  26.25 mg   Plan last modified:  Fred Nayak, PharmD (10/11/2022)   Next INR check:  2/27/2023   Target end date:  Indefinite    Indications    Chronic anticoagulation [Z79.01]  PAD (peripheral artery disease) ([facility]) [I73.9]                 Anticoagulation Episode Summary       INR check location:      Preferred lab:      Send INR reminders to:      Comments:  Jamir BARBOSA          Anticoagulation Care Providers       Provider Role Specialty Phone number    CHACORTA Issa Referring Family Medicine [phone]    [facility] Anticoagulation Services Responsible  [phone]          Anticoagulation Patient Findings        Patient's preferred phone number:  [phone]        HPI:   The reason for today's call is to prevent morbidity and mortality from a blood clot and/or stroke and to reduce the risk of bleeding while on a anticoagulant.     PCP:  CHACORTA Issa  [address] [address]    Assessment:     INR supra-therapeutic.     Lab Results   Component Value Date/Time    BUN 18 06/06/2022 10:00 AM    CREATININE 0.95 06/06/2022 10:00 AM     Lab Results   Component Value Date/Time    HEMOGLOBIN 14.1 06/06/2022 10:00 AM    HEMATOCRIT 45.1 06/06/2022 10:00 AM    PLATELETCT 337 06/06/2022 10:00 AM    ALKPHOSPHAT 79 06/06/2022 10:00 AM    ASTSGOT 21 06/06/2022 10:00 AM    ALTSGPT 11 06/06/2022 10:00 AM          Current Outpatient Medications:     nystatin, 1 Application, Topical, TID    hydroCHLOROthiazide, TAKE ONE TABLET BY MOUTH DAILY    warfarin, Take one and one-half tablets by mouth daily or as directed by anticoagulation clinic    vitamin D3, 1,000 Units, Oral, DAILY    Misc Natural Products (NEURIVA PO),  Take  by mouth.    amLODIPine, 5 mg, Oral, DAILY    rosuvastatin, TAKE ONE TABLET BY MOUTH DAILY    Multiple Vitamins-Minerals (MULTIVITAL PO), Take  by mouth.    Glucosamine HCl (GLUCOSAMINE PO), Take  by mouth.    doxylamine, 25 mg, Oral, QHS      Plan:     1.25 mg today then resume    Follow-up:     On date seen above    Additional information discussed with patient:     Asked patient to please call the anticoagulation clinic if they have any signs/symptoms of bleeding and/or thrombosis or any changes to diet or medications.      National recommendations regarding anticoagulation therapy:     The CHEST guidelines recommends frequent INR monitoring at regular intervals (a few days up to a max of 12 weeks) to ensure patients are on the proper dose of warfarin, and patients are not having any complications from therapy.  INRs can dramatically change over a short time period due to diet, medications, and medical conditions.       Luis Lopez, PharmD, MS, BCACP, C  [facility]  Phone: [phone]  Fax: [phone]  On call: [phone]  General scheduling/information [phone]  For emergencies please dial [phone]  Please do not use [employer] for urgent matters, call the phone numbers listed above.    This note was created using voice recognition software (Dragon). The accuracy of the dictation is limited by the abilities of the software. I have reviewed the note prior to signing, however some errors in grammar and context are still possible. If you have any questions related to this note please do not hesitate to contact our office.

## 2023-02-26 DIAGNOSIS — Z79.01 CHRONIC ANTICOAGULATION: ICD-10-CM

## 2023-02-26 DIAGNOSIS — I73.9 PAD (PERIPHERAL ARTERY DISEASE) (HCC): ICD-10-CM

## 2023-02-28 RX ORDER — WARFARIN SODIUM 2.5 MG/1
TABLET ORAL
Qty: 135 TABLET | Refills: 1 | Status: SHIPPED | OUTPATIENT
Start: 2023-02-28 | End: 2023-06-06

## 2023-02-28 NOTE — TELEPHONE ENCOUNTER
Received request via: Pharmacy    Was the patient seen in the last year in this department? Yes    Does the patient have an active prescription (recently filled or refills available) for medication(s) requested? No    Does the patient have [facility] and need 100 day supply (blood pressure, diabetes and cholesterol meds only)? Patient does not have SCP    Note from pharmacy: pt is requesting 1 year supply.

## 2023-02-28 NOTE — TELEPHONE ENCOUNTER
Requested Prescriptions     Signed Prescriptions Disp Refills    warfarin (COUMADIN) 2.5 MG Tab 135 Tablet 1     Sig: TAKE 1 AND 1/2 TABLETS BY  MOUTH DAILY OR AS DIRECTED  BY ANTICOAGULATION CLINIC     Authorizing Provider: JANE CHEN A.P.R.N.

## 2023-03-22 ENCOUNTER — ANTICOAGULATION VISIT (OUTPATIENT)
Dept: VASCULAR LAB | Facility: MEDICAL CENTER | Age: 88
End: 2023-03-22
Attending: INTERNAL MEDICINE
Payer: MEDICARE

## 2023-03-22 ENCOUNTER — OFFICE VISIT (OUTPATIENT)
Dept: URGENT CARE | Facility: PHYSICIAN GROUP | Age: 88
End: 2023-03-22
Payer: MEDICARE

## 2023-03-22 VITALS
SYSTOLIC BLOOD PRESSURE: 132 MMHG | HEIGHT: 63 IN | HEART RATE: 85 BPM | RESPIRATION RATE: 18 BRPM | TEMPERATURE: 97.5 F | BODY MASS INDEX: 26.93 KG/M2 | WEIGHT: 152 LBS | DIASTOLIC BLOOD PRESSURE: 70 MMHG | OXYGEN SATURATION: 93 %

## 2023-03-22 VITALS — DIASTOLIC BLOOD PRESSURE: 67 MMHG | SYSTOLIC BLOOD PRESSURE: 113 MMHG | HEART RATE: 78 BPM

## 2023-03-22 DIAGNOSIS — R60.0 EDEMA OF HAND: ICD-10-CM

## 2023-03-22 DIAGNOSIS — I73.9 PAD (PERIPHERAL ARTERY DISEASE) (HCC): ICD-10-CM

## 2023-03-22 DIAGNOSIS — Z79.01 CHRONIC ANTICOAGULATION: ICD-10-CM

## 2023-03-22 DIAGNOSIS — R60.0 EDEMA OF RIGHT LOWER EXTREMITY: ICD-10-CM

## 2023-03-22 LAB — INR PPP: 2.7 (ref 2–3.5)

## 2023-03-22 PROCEDURE — 99213 OFFICE O/P EST LOW 20 MIN: CPT

## 2023-03-22 PROCEDURE — 85610 PROTHROMBIN TIME: CPT

## 2023-03-22 PROCEDURE — 99212 OFFICE O/P EST SF 10 MIN: CPT

## 2023-03-22 RX ORDER — LISINOPRIL 20 MG/1
40 TABLET ORAL DAILY
COMMUNITY
End: 2023-04-05

## 2023-03-22 ASSESSMENT — ENCOUNTER SYMPTOMS
SPUTUM PRODUCTION: 0
PND: 0
ORTHOPNEA: 0
FEVER: 0
CLAUDICATION: 0
COUGH: 1
PALPITATIONS: 0
WHEEZING: 0
SORE THROAT: 0
HEMOPTYSIS: 0
SHORTNESS OF BREATH: 0

## 2023-03-22 ASSESSMENT — FIBROSIS 4 INDEX: FIB4 SCORE: 1.62

## 2023-03-22 NOTE — PROGRESS NOTES
Anticoagulation Summary  As of 3/22/2023      INR goal:  2.0-3.0   TTR:  68.1 % (4.1 y)   INR used for dosin.70 (3/22/2023)   Warfarin maintenance plan:  2.5 mg (2.5 mg x 1) every Mon, Wed, Fri; 3.75 mg (2.5 mg x 1.5) all other days   Weekly warfarin total:  22.5 mg   Plan last modified:  Reginaldo Oquendo PharmD (3/22/2023)   Next INR check:  3/28/2023   Target end date:  Indefinite    Indications    Chronic anticoagulation [Z79.01]  PAD (peripheral artery disease) ([facility]) [I73.9]                 Anticoagulation Episode Summary       INR check location:      Preferred lab:      Send INR reminders to:      Comments:  Jamir           Anticoagulation Care Providers       Provider Role Specialty Phone number    CHACORTA Issa Referring Family Medicine [phone]    [facility] Anticoagulation Services Responsible  [phone]                  Refer to Patient Findings for HPI:    Pt recently d/c from SNF, they were dosing her warfarin. Records were a little unclear.        Vitals:    23 1027   BP: 113/67   Pulse: 78       Verified current warfarin dosing schedule.    Medications reconciled   Pt is not on antiplatelet therapy      A/P   INR  therapeutic.     Warfarin dosing recommendation:   Appears pt was taking 20 mg TWD, therefore will reduce warfarin regimen to more closely reflect how it was prescribed the past 7 days.     Pt educated to contact our clinic with any changes in medications or s/s of bleeding or thrombosis. Pt is aware to seek immediate medical attention for falls, head injury or deep cuts.    Follow up appointment in 6 days.     Reginaldo Oquendo, MilenaD

## 2023-03-22 NOTE — PROGRESS NOTES
Subjective:     Radha Verduzco is a 87 y.o. female who presents for Foot Swelling (X 2 days, R foot, also swelling in R hand)    Daughter with patient and reports that right foot was swollen in rehab. She was released yesterday and the foot swelling has worsened. She reports she has been sleeping in a recliner with the foot elevated. She has been up and about today as she had an appointment at the anticoagulation clinic today to check her warfarin levels, which were therapeutic at 2.7.  The daughter endorses that the swelling in the patient's right ankle and right hand have worsened since the morning as the patient has been walking and standing intermittently throughout the day. Daughter is concerned that the patient has developed a mild cough since her departure from rehab facility. The cough is nonproductive and the daughter feels that it is likely due to environmental allergens.     Review of Systems   Constitutional:  Negative for fever.   HENT:  Negative for congestion and sore throat.    Respiratory:  Positive for cough. Negative for hemoptysis, sputum production, shortness of breath and wheezing.    Cardiovascular:  Negative for chest pain, palpitations, orthopnea, claudication, leg swelling and PND.   Musculoskeletal:         Right leg and ankle swelling   All other systems reviewed and are negative.    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    Diabetes (HCC) 4/19/2018    GOUT 1/31/2011    History of skin cancer 2/24/2016    Hyperlipidemia 8/2/2010    Hypertension 8/2/2010    PAD (peripheral artery disease) 8/2/2010    Pre-diabetes 8/2/2010    S/P hip replacement 12/9/2011    Superficial thrombophlebitis of right upper extremity 11/8/2021     ALLERGIES:   Allergies   Allergen Reactions    Anesthetic [Benzocaine] Vomiting and Nausea     Got nausea from anesthesia      Iodine Rash     RASH      Other Drug Vomiting     Opiate make  "her vomit    Naproxen      Taking Coumadin      SURGHX:   Past Surgical History:   Procedure Laterality Date    INCISION AND DRAINAGE GENERAL Left 2019    Procedure: INCISION AND DRAINAGE GENERAL- GROIN WOUND WITH WOUND VAC PLACEMENT;  Surgeon: Garett Kohli M.D.;  Location: SURGERY [facility];  Service: Vascular    THROMBECTOMY  2018    Procedure: THROMBECTOMY;  Surgeon: Garett Kohli M.D.;  Location: SURGERY [facility];  Service: Vascular    AORTOFEMORAL BYPASS      CAROTID ENDARTERECTOMY      right    CATARACT PHACO WITH IOL      NEUROMA EXCISION      in  from foot    TOENAIL REMOVAL      in ,      SOCHX:   Social History     Socioeconomic History    Marital status:    Tobacco Use    Smoking status: Former     Packs/day: 2.00     Years: 12.00     Pack years: 24.00     Types: Cigarettes     Quit date:      Years since quittin.2    Smokeless tobacco: Never   Vaping Use    Vaping Use: Never used   Substance and Sexual Activity    Alcohol use: Not Currently     Comment: rarely    Drug use: No   Social History Narrative    Lives with her son      FH:   Family History   Problem Relation Age of Onset    Lung Disease Mother 20        TB    Stroke Father 42         Objective:   /70   Pulse 85   Temp 36.4 °C (97.5 °F) (Temporal)   Resp 18   Ht 1.6 m (5' 3\")   Wt 68.9 kg (152 lb)   SpO2 93%   BMI 26.93 kg/m²     Physical Exam  Constitutional:       General: She is not in acute distress.     Appearance: Normal appearance. She is not ill-appearing.   HENT:      Head: Normocephalic and atraumatic.      Nose: Nose normal.      Mouth/Throat:      Mouth: Mucous membranes are moist.   Eyes:      Extraocular Movements: Extraocular movements intact.      Conjunctiva/sclera: Conjunctivae normal.      Pupils: Pupils are equal, round, and reactive to light.   Cardiovascular:      Rate and Rhythm: Normal rate and regular rhythm.      Pulses: Normal pulses.      " Heart sounds: Normal heart sounds.   Pulmonary:      Effort: Pulmonary effort is normal. No respiratory distress.      Breath sounds: No stridor. No wheezing, rhonchi or rales.   Chest:      Chest wall: No tenderness.   Musculoskeletal:         General: No swelling or deformity. Normal range of motion.      Right hand: Swelling present.      Cervical back: Normal range of motion.      Right lower leg: 3+ Pitting Edema present.      Left lower leg: No edema.      Comments: Right shoulder sling in place.  2+ radial pulse. Digits warm, pink. Minimal non-pitting edema present.    Right lower extremity edema improved from 3+ pitting to 1+ pitting edema once the extremity was elevated for 10 minutes. 2+ pedal pulse. Capillary refill < 3 sec.   Skin:     General: Skin is warm and dry.   Neurological:      Mental Status: She is alert.   Psychiatric:         Mood and Affect: Mood normal.         Behavior: Behavior normal.         Thought Content: Thought content normal.       Assessment/Plan:   Assessment      1. Edema of right lower extremity    2. Edema of hand     Based on patient's presenting symptoms, past medical history, and physical exam findings, increased edema of right lower extremity and edema of hand likely dependent.  Once the right lower extremity was elevated, the edema significantly improved.  Lung sounds clear throughout.  Edema not likely due to fluid overload.  Educated patient and daughter to elevate the extremities in order to decrease symptoms.  Patient encouraged to continue incentive spirometry at home.  Increase activity as tolerated.  She will follow-up with her primary care provider.     Differential diagnosis, natural history, and supportive care discussed. AVS handout given and reviewed with patient. Patient educated on red flags and when to seek treatment back in ED or UC.  Strict ER precautions discussed.  All questions answered.  Patient and daughter in agreement with plan of care.    I  personally reviewed prior external notes and test results pertinent to today's visit.  I have independently reviewed and interpreted all diagnostics ordered during this urgent care visit.     This dictation has been created using voice recognition software. The accuracy of the dictation is limited by the abilities of the software. I expect there may be some errors of grammar and possibly content. I made every attempt to manually correct the errors within my dictation. However, errors related to voice recognition software may still exist and should be interpreted within the appropriate context.    This note was electronically signed by CHACORTA Castaneda

## 2023-03-28 ENCOUNTER — APPOINTMENT (OUTPATIENT)
Dept: VASCULAR LAB | Facility: MEDICAL CENTER | Age: 88
End: 2023-03-28
Attending: INTERNAL MEDICINE
Payer: MEDICARE

## 2023-03-30 ENCOUNTER — TELEPHONE (OUTPATIENT)
Dept: MEDICAL GROUP | Facility: PHYSICIAN GROUP | Age: 88
End: 2023-03-30
Payer: MEDICARE

## 2023-03-30 NOTE — TELEPHONE ENCOUNTER
1. Caller Name: Mary Ann                         Call Back Number: [phone]      How would the patient prefer to be contacted with a response: Phone call OK to leave a detailed message    Pt's daughter Mary Ann is wanting to know if her mom should continue taking Pantoprazole? Mary Ann stated that mom has about 6 pills left, does her mom need to continue taking the medication? Her mom is also taking Bucralfate 10mg both medications were given to pt during her stay at rehab. Pt s daughter concerned for a rash that her mom has and is wondering if these medications are causing the rash? Since [facility] did not prescribe these medications I asked if Mary Ann called the rehab facility to ask about these medications, she did not. Mary Ann stated she would call and see if they could answer her questions. I did reiterate that if mom had worsening symptoms she could be seen at . Patient is scheduled with Celia on 04/12/2023.

## 2023-04-04 ENCOUNTER — ANTICOAGULATION VISIT (OUTPATIENT)
Dept: MEDICAL GROUP | Facility: PHYSICIAN GROUP | Age: 88
End: 2023-04-04
Payer: MEDICARE

## 2023-04-04 DIAGNOSIS — I73.9 PAD (PERIPHERAL ARTERY DISEASE) (HCC): ICD-10-CM

## 2023-04-04 DIAGNOSIS — Z79.01 CHRONIC ANTICOAGULATION: Primary | ICD-10-CM

## 2023-04-04 LAB — INR PPP: 3.2 (ref 2–3.5)

## 2023-04-04 PROCEDURE — 99211 OFF/OP EST MAY X REQ PHY/QHP: CPT | Performed by: NURSE PRACTITIONER

## 2023-04-04 PROCEDURE — 85610 PROTHROMBIN TIME: CPT | Performed by: NURSE PRACTITIONER

## 2023-04-04 NOTE — PROGRESS NOTES
Anticoagulation Summary  As of 4/4/2023      INR goal:  2.0-3.0   TTR:  68.0 % (4.2 y)   INR used for dosing:  3.20 (4/4/2023)   Warfarin maintenance plan:  2.5 mg (2.5 mg x 1) every Mon, Wed, Fri; 3.75 mg (2.5 mg x 1.5) all other days   Weekly warfarin total:  22.5 mg   Plan last modified:  Reginaldo Oquendo, PharmD (3/22/2023)   Next INR check:  4/18/2023   Target end date:  Indefinite    Indications    Chronic anticoagulation [Z79.01]  PAD (peripheral artery disease) (HCC) [I73.9]                 Anticoagulation Episode Summary       INR check location:      Preferred lab:      Send INR reminders to:      Comments:  Jamir BARBOSA          Anticoagulation Care Providers       Provider Role Specialty Phone number    CHACORTA Issa Referring Family Medicine [phone]    [facility]ticoagulation [facility] Responsible  [phone]          Anticoagulation Patient Findings  Patient Findings       Negatives:  Signs/symptoms of thrombosis, Signs/symptoms of bleeding, Laboratory test error suspected, Change in health, Change in alcohol use, Change in activity, Upcoming invasive procedure, Emergency department visit, Upcoming dental procedure, Missed doses, Extra doses, Change in medications, Change in diet/appetite, Hospital admission, Bruising, Other complaints                  HPI:   Radha Verduzco seen in clinic today, on anticoagulation therapy with warfarin 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    Patient's previous INR was therapeutic at 2.7 on 3-22-23, at which time patient was instructed to continue with current warfarin regimen.  She returns to clinic today to recheck INR to ensure it is therapeutic and thus preventing possible clotting and/or bleeding/bruising complications.    CHADS-VASc = n/a  (unadjusted ischemic stroke risk/year:  n/a)    Does patient have any changes to current medical/health status since last appt (Y/N):  NO  Does patient have any signs/symptoms of  bleeding and/or thrombosis since the last appt (Y/N):  NO  Does patient have any interval changes to diet or medications since last appt (Y/N):  NO  Are there any complications or cost restrictions with current therapy (Y/N):  NO     Does patient have [facility] PCP? Yes, CHACORTA Issa (If not, please document discussion that patient must be seen at [facility])       Vitals:  declined today    There were no vitals filed for this visit.     Asssessment:      INR supratherapeutic at 3.2, therefore increasing bleeding risk   Reason(s) for out of range INR today:  etiology unknown.      Pt is not on antiplatelet therapy    Medication reconciliation completed today.    Reviewed proper use of Coagu[employer]k monitor with patient and daughter today.    Plan:  Pt is to decrease tomorrow's dose (already took today's) to 1.25mg, then continue with current warfarin dosing regimen.     Follow up:  2 weeks with her home monitor.    Fred Nayak, PharmD, BCACP

## 2023-04-05 ENCOUNTER — OFFICE VISIT (OUTPATIENT)
Dept: MEDICAL GROUP | Facility: PHYSICIAN GROUP | Age: 88
End: 2023-04-05
Payer: MEDICARE

## 2023-04-05 ENCOUNTER — HOSPITAL ENCOUNTER (OUTPATIENT)
Dept: LAB | Facility: MEDICAL CENTER | Age: 88
End: 2023-04-05
Attending: NURSE PRACTITIONER
Payer: MEDICARE

## 2023-04-05 ENCOUNTER — HOSPITAL ENCOUNTER (OUTPATIENT)
Dept: RADIOLOGY | Facility: MEDICAL CENTER | Age: 88
End: 2023-04-05
Attending: NURSE PRACTITIONER
Payer: MEDICARE

## 2023-04-05 VITALS
BODY MASS INDEX: 26.58 KG/M2 | HEIGHT: 63 IN | DIASTOLIC BLOOD PRESSURE: 58 MMHG | TEMPERATURE: 97.5 F | HEART RATE: 75 BPM | SYSTOLIC BLOOD PRESSURE: 138 MMHG | OXYGEN SATURATION: 92 % | WEIGHT: 150 LBS

## 2023-04-05 DIAGNOSIS — R05.9 COUGH IN ADULT: ICD-10-CM

## 2023-04-05 DIAGNOSIS — R60.0 EDEMA OF RIGHT LOWER EXTREMITY: ICD-10-CM

## 2023-04-05 DIAGNOSIS — I10 PRIMARY HYPERTENSION: ICD-10-CM

## 2023-04-05 DIAGNOSIS — J22 LOWER RESP. TRACT INFECTION: ICD-10-CM

## 2023-04-05 PROCEDURE — 36415 COLL VENOUS BLD VENIPUNCTURE: CPT | Mod: GA

## 2023-04-05 PROCEDURE — 80048 BASIC METABOLIC PNL TOTAL CA: CPT

## 2023-04-05 PROCEDURE — 83880 ASSAY OF NATRIURETIC PEPTIDE: CPT | Mod: GA

## 2023-04-05 PROCEDURE — 99214 OFFICE O/P EST MOD 30 MIN: CPT | Performed by: NURSE PRACTITIONER

## 2023-04-05 PROCEDURE — 71046 X-RAY EXAM CHEST 2 VIEWS: CPT

## 2023-04-05 RX ORDER — HYDROCHLOROTHIAZIDE 12.5 MG/1
12.5 TABLET ORAL
COMMUNITY
Start: 2023-02-18 | End: 2023-10-16

## 2023-04-05 RX ORDER — AZITHROMYCIN 250 MG/1
TABLET, FILM COATED ORAL
Qty: 6 TABLET | Refills: 0 | Status: SHIPPED | OUTPATIENT
Start: 2023-04-05 | End: 2023-06-06

## 2023-04-05 ASSESSMENT — FIBROSIS 4 INDEX: FIB4 SCORE: 1.63

## 2023-04-05 NOTE — ASSESSMENT & PLAN NOTE
Her blood pressure today is 138/58.  Reported medications today are lisinopril 40 mg daily, hydrochlorothiazide 12.5 mg daily and amlodipine 5 mg daily.  Chart review shows I discontinued her lisinopril in October 2022 after she brought in her medications showing that she was only taking the amlodipine 5 mg daily and hydrochlorothiazide 12.5 mg daily.  Further chart review shows that at [facility] on 2/18/2023 Home medications were listed as lisinopril 40 mg daily.  MA is calling patient to clarify her blood pressure medications.

## 2023-04-05 NOTE — PROGRESS NOTES
Subjective:     CC: cough and leg swelling    HPI:   Radha presents today with her daughter for the following:    Cough  The cough started 2 weeks ago. Reports son had cough/bronchitis. Cough is productive and can be white in color. The cough is intermittent. Aggravating factors include none. Alleviating factors include none. Interventions tried include OTC cough medicine. Denies fever, chills, sore throat, ear pain, sinus pain, loss of taste, loss of smell, myalgias, itchy eyes, watery eyes, chest pain, orthopnea or shortness of breath.    Right leg swelling  She had fall on 2/18/23 and presented to [facility] ER and diagnosed with fractured right humeral head.  She was nonoperative and placed in a shoulder immobilizer.  She has an appointment with ortho tomorrow.  She continues with physical therapy and wearing right immobilizer.  [facility], we will request records. She has been elevating and icing. No right arm or hand pain. She was seen in Urgent Care on 3/22/23 for same complaint of right leg swelling and right hand swelling that resolved with elevation. Daughter has been elevating right leg at home that helps and elevating right hand as tolerated. No recent long car rides. No pain in right leg, pain in right knee. Per daughter the hand and leg swelling started before discharge on 2/21/2023 from SNF. Currently followed by [facility] once a week for PT and nursing. Currently on warfarin with therapeutic INR on 4/4/2023.    Primary hypertension  Her blood pressure today is 138/58.  Reported medications today are lisinopril 40 mg daily, hydrochlorothiazide 12.5 mg daily and amlodipine 5 mg daily.  Chart review shows I discontinued her lisinopril in October 2022 after she brought in her medications showing that she was only taking the amlodipine 5 mg daily and hydrochlorothiazide 12.5 mg daily.  Further chart review shows that at  [facility] ER on 2023 Home medications were listed as lisinopril 40 mg daily.  MA is calling patient to clarify her blood pressure medications.      Past Medical History:   Diagnosis Date    Carotid artery disease (HCC) 2010    CPPD (pseudo-gout) 2011    Diabetes (HCC) 2018    GOUT 2011    History of skin cancer 2016    Hyperlipidemia 2010    Hypertension 2010    PAD (peripheral artery disease) 2010    Pre-diabetes 2010    S/P hip replacement 2011    Superficial thrombophlebitis of right upper extremity 2021       Social History     Tobacco Use    Smoking status: Former     Packs/day: 2.00     Years: 12.00     Pack years: 24.00     Types: Cigarettes     Quit date:      Years since quittin.2    Smokeless tobacco: Never   Vaping Use    Vaping Use: Never used   Substance Use Topics    Alcohol use: Not Currently     Comment: rarely    Drug use: No       Current Outpatient Medications Ordered in Epic   Medication Sig Dispense Refill    hydroCHLOROthiazide (HYDRODIURIL) 12.5 MG tablet 12.5 mg.      azithromycin (ZITHROMAX) 250 MG Tab On Day 1 take two tablets (500 mg) by mouth. On Day 2-5 take one tablet by mouth (250 mg) daily. 6 Tablet 0    warfarin (COUMADIN) 2.5 MG Tab TAKE 1 AND 1/2 TABLETS BY  MOUTH DAILY OR AS DIRECTED  BY ANTICOAGULATION CLINIC 135 Tablet 1    nystatin (MYCOSTATIN) powder Apply 1 g topically 3 times a day. 15 g 0    vitamin D3 (CHOLECALCIFEROL) 1000 Unit (25 mcg) Tab Take 1,000 Units by mouth every day.      Misc Natural Products (NEURIVA PO) Take  by mouth.      amLODIPine (NORVASC) 5 MG Tab Take 1 Tablet by mouth every day. 100 Tablet 3    rosuvastatin (CRESTOR) 5 MG Tab TAKE ONE TABLET BY MOUTH DAILY 90 Tablet 3    Multiple Vitamins-Minerals (MULTIVITAL PO) Take  by mouth.      doxylamine (UNISOM) 25 MG Tab tablet Take 25 mg by mouth at bedtime.       No current Epic-ordered facility-administered medications on file.  "      Allergies:  Anesthetic [benzocaine], Iodine, Other drug, and Naproxen    Health Maintenance: Reviewed      Objective:     Vital signs reviewed  Exam:  /58 (BP Location: Left arm, Patient Position: Sitting, BP Cuff Size: Adult)   Pulse 75   Temp 36.4 °C (97.5 °F) (Temporal)   Ht 1.6 m (5' 3\")   Wt 68 kg (150 lb)   SpO2 92%   BMI 26.57 kg/m²  Body mass index is 26.57 kg/m².    Gen: Alert and oriented, No apparent distress.  Daughter present in exam room.  Neck: Neck is supple without lymphadenopathy.  Lungs: Normal effort, rhonchi to right lower lobe, no wheezes or rales.  CV: Regular rate and rhythm. No murmurs, rubs, or gallops.  Ext: No clubbing, cyanosis.  Left lower extremity with trace edema.  Right lower extremity 2+ pitting edema.  Right radial pulse 2+, left ulnar pulse 2+.  Capillary refill less than 3 seconds.  Right hand with dependent edema 1+, no swelling to right upper forearm or left upper forearm.      Assessment & Plan:     88 y.o. female with the following -     1. Lower resp. tract infection  Acute uncomplicated problem.  Given her rhonchi and duration of cough will treat as lower respiratory tract infection and check imaging.  Start azithromycin for 5-day course.  Discussed watching for signs symptoms of increased bleeding due to interaction with warfarin.  Patient and daughter verbalized understanding.  Patient takes warfarin in the evening and will take her azithromycin in the morning.  - azithromycin (ZITHROMAX) 250 MG Tab; On Day 1 take two tablets (500 mg) by mouth. On Day 2-5 take one tablet by mouth (250 mg) daily.  Dispense: 6 Tablet; Refill: 0    2. Cough in adult  Acute uncomplicated problem.  See #1 above.  - DX-CHEST-2 VIEWS; Future    3. Edema of right lower extremity  Chronic exacerbated problem.  Recommend she elevate her right leg and right hand as tolerated.  Check updated kidney function and check BNP.  Check ultrasound of her right lower extremity.  - Basic " Metabolic Panel; Future  - proBrain Natriuretic Peptide, NT; Future  - US-EXTREMITY VENOUS LOWER UNILAT RIGHT; Future    4. Primary hypertension  Chronic stable problem. MA called patient to clarify her blood pressure medications and was able to clarify that patient is not taking lisinopril.  Patient will continue with her hydrochlorothiazide 12.5 mg daily and amlodipine 5 mg daily.      Return if symptoms worsen or fail to improve.    Please note that this dictation was created using voice recognition software. I have made every reasonable attempt to correct obvious errors, but I expect that there are errors of grammar and possibly content that I did not discover before finalizing the note.

## 2023-04-05 NOTE — LETTER
[facility]  ETHAN IssaP.RJamarcusN.  [address] [place] [address]  Fax: [phone]   Authorization for Release/Disclosure of   Protected Health Information   Name: RADHA VERDUZCO : 1935 SSN: [id number]   Address: [address] [place] [address] [place] [address] Phone:    [phone] (home)    I authorize the entity listed below to release/disclose the PHI below to:   [facility]/ETHAN IssaP.R.ELMER and CHACORTA Issa   Provider or Entity Name:  [facility]   City, State, [facility]   Phone:      Fax:     Reason for request: continuity of care   Information to be released:    [  ] LAST COLONOSCOPY,  including any PATH REPORT and follow-up  [  ] LAST FIT/COLOGUARD RESULT [  ] LAST DEXA  [  ] LAST MAMMOGRAM  [  ] LAST PAP  [  ] LAST LABS [  ] RETINA EXAM REPORT  [  ] IMMUNIZATION RECORDS  [x] Release all info      [  ] Check here and initial the line next to each item to release ALL health information INCLUDING  _____ Care and treatment for drug and / or alcohol abuse  _____ HIV testing, infection status, or AIDS  _____ Genetic Testing    DATES OF SERVICE OR TIME PERIOD TO BE DISCLOSED: _____________  I understand and acknowledge that:  * This Authorization may be revoked at any time by you in writing, except if your health information has already been used or disclosed.  * Your health information that will be used or disclosed as a result of you signing this authorization could be re-disclosed by the recipient. If this occurs, your re-disclosed health information may no longer be protected by State or Federal laws.  * You may refuse to sign this Authorization. Your refusal will not affect your ability to obtain treatment.  * This Authorization becomes effective upon signing and will  on (date) __________.      If no date is indicated, this Authorization will  one (1) year from the signature date.    Name: Radha Verduzco  Signature:  Date:   4/5/2023     PLEASE FAX REQUESTED RECORDS BACK TO: [phone]

## 2023-04-06 DIAGNOSIS — R79.89 ELEVATED BRAIN NATRIURETIC PEPTIDE (BNP) LEVEL: ICD-10-CM

## 2023-04-06 LAB
ANION GAP SERPL CALC-SCNC: 14 MMOL/L (ref 7–16)
BUN SERPL-MCNC: 17 MG/DL (ref 8–22)
CALCIUM SERPL-MCNC: 9.3 MG/DL (ref 8.5–10.5)
CHLORIDE SERPL-SCNC: 105 MMOL/L (ref 96–112)
CO2 SERPL-SCNC: 23 MMOL/L (ref 20–33)
CREAT SERPL-MCNC: 0.89 MG/DL (ref 0.5–1.4)
GFR SERPLBLD CREATININE-BSD FMLA CKD-EPI: 63 ML/MIN/1.73 M 2
GLUCOSE SERPL-MCNC: 88 MG/DL (ref 65–99)
NT-PROBNP SERPL IA-MCNC: 392 PG/ML (ref 0–125)
POTASSIUM SERPL-SCNC: 4.1 MMOL/L (ref 3.6–5.5)
SODIUM SERPL-SCNC: 142 MMOL/L (ref 135–145)

## 2023-04-25 ENCOUNTER — ANTICOAGULATION MONITORING (OUTPATIENT)
Dept: VASCULAR LAB | Facility: MEDICAL CENTER | Age: 88
End: 2023-04-25

## 2023-04-25 ENCOUNTER — OFFICE VISIT (OUTPATIENT)
Dept: MEDICAL GROUP | Facility: PHYSICIAN GROUP | Age: 88
End: 2023-04-25
Payer: MEDICARE

## 2023-04-25 ENCOUNTER — TELEPHONE (OUTPATIENT)
Dept: VASCULAR LAB | Facility: MEDICAL CENTER | Age: 88
End: 2023-04-25

## 2023-04-25 VITALS
BODY MASS INDEX: 25.16 KG/M2 | OXYGEN SATURATION: 93 % | HEART RATE: 70 BPM | SYSTOLIC BLOOD PRESSURE: 124 MMHG | WEIGHT: 142 LBS | HEIGHT: 63 IN | DIASTOLIC BLOOD PRESSURE: 50 MMHG | TEMPERATURE: 97 F

## 2023-04-25 DIAGNOSIS — I73.9 PAD (PERIPHERAL ARTERY DISEASE) (HCC): ICD-10-CM

## 2023-04-25 DIAGNOSIS — Z79.01 CHRONIC ANTICOAGULATION: ICD-10-CM

## 2023-04-25 DIAGNOSIS — Z02.89 ENCOUNTER FOR COMPLETION OF FORM WITH PATIENT: ICD-10-CM

## 2023-04-25 LAB — INR PPP: 1.9 (ref 2–3.5)

## 2023-04-25 PROCEDURE — 99212 OFFICE O/P EST SF 10 MIN: CPT | Performed by: NURSE PRACTITIONER

## 2023-04-25 ASSESSMENT — FIBROSIS 4 INDEX: FIB4 SCORE: 1.63

## 2023-04-25 NOTE — PROGRESS NOTES
Anticoagulation Summary  As of 2023      INR goal:  2.0-3.0   TTR:  68.0 % (4.2 y)   INR used for dosin.90 (2023)   Warfarin maintenance plan:  2.5 mg (2.5 mg x 1) every Mon, Wed, Fri; 3.75 mg (2.5 mg x 1.5) all other days   Weekly warfarin total:  22.5 mg   Plan last modified:  Reginaldo Oquendo, PharmD (3/22/2023)   Next INR check:  2023   Target end date:  Indefinite    Indications    Chronic anticoagulation [Z79.01]  PAD (peripheral artery disease) (HCC) [I73.9]                 Anticoagulation Episode Summary       INR check location:      Preferred lab:      Send INR reminders to:      Comments:  Jamir           Anticoagulation Care Providers       Provider Role Specialty Phone number    CHACORTA Issa Referring Family Medicine [phone]    [facility] Responsible  [phone]            Refer to Anticoagulation Patient Findings for HPI  Patient Findings       Negatives:  Signs/symptoms of thrombosis, Signs/symptoms of bleeding, Laboratory test error suspected, Change in health, Change in alcohol use, Change in activity, Upcoming invasive procedure, Emergency department visit, Upcoming dental procedure, Missed doses, Extra doses, Change in medications, Change in diet/appetite, Hospital admission, Bruising, Other complaints            Spoke with pt and her daughter.  INR is subtherapeutic.     Pt verifies warfarin weekly dosing.     Pt is NOT on antiplatelet therapy     Will have pt continue regimen as INR is 0.1 out of range and last INR was previously supratherapeutic    Repeat INR in 1 week(s).     Janiya Dloan, PharmD

## 2023-04-25 NOTE — PROGRESS NOTES
Subjective:     CC: paperwork    HPI:   Radha presents today with the following:      Patients daughter Monica Perera needs [facility] paperwork completed as she has been caring for the patient, her mother from 3/31/2023 with plans through 2023.  Patient was in the ER 2023 through 2023 and was placed in a skilled nursing facility after a right humeral head fracture.  She was in SNF after discharge from the hospital and was released from SNF on 3/21/2023.  Her daughters been helping take her to appointments, cooking, cleaning, housekeeping and scheduling appointments.  Patient is right-handed and has been needing assistance with her ADLs.  Patient is now back at home.  She does live with her adult son.  No recent falls.      Past Medical History:   Diagnosis Date    Carotid artery disease (HCC) 2010    CPPD (pseudo-gout) 2011    Diabetes (HCC) 2018    GOUT 2011    History of skin cancer 2016    Hyperlipidemia 2010    Hypertension 2010    PAD (peripheral artery disease) 2010    Pre-diabetes 2010    S/P hip replacement 2011    Superficial thrombophlebitis of right upper extremity 2021       Social History     Tobacco Use    Smoking status: Former     Packs/day: 2.00     Years: 12.00     Pack years: 24.00     Types: Cigarettes     Quit date:      Years since quittin.3    Smokeless tobacco: Never   Vaping Use    Vaping Use: Never used   Substance Use Topics    Alcohol use: Not Currently     Comment: rarely    Drug use: No       Current Outpatient Medications Ordered in Epic   Medication Sig Dispense Refill    hydroCHLOROthiazide (HYDRODIURIL) 12.5 MG tablet 12.5 mg.      azithromycin (ZITHROMAX) 250 MG Tab On Day 1 take two tablets (500 mg) by mouth. On Day 2-5 take one tablet by mouth (250 mg) daily. 6 Tablet 0    warfarin (COUMADIN) 2.5 MG Tab TAKE 1 AND 1/2 TABLETS BY  MOUTH DAILY OR AS DIRECTED  BY ANTICOAGULATION CLINIC 135 Tablet 1    nystatin  "(MYCOSTATIN) powder Apply 1 g topically 3 times a day. 15 g 0    vitamin D3 (CHOLECALCIFEROL) 1000 Unit (25 mcg) Tab Take 1,000 Units by mouth every day.      Misc Natural Products (NEURIVA PO) Take  by mouth.      amLODIPine (NORVASC) 5 MG Tab Take 1 Tablet by mouth every day. 100 Tablet 3    rosuvastatin (CRESTOR) 5 MG Tab TAKE ONE TABLET BY MOUTH DAILY 90 Tablet 3    Multiple Vitamins-Minerals (MULTIVITAL PO) Take  by mouth.      doxylamine (UNISOM) 25 MG Tab tablet Take 25 mg by mouth at bedtime.       No current Epic-ordered facility-administered medications on file.       Allergies:  Anesthetic [benzocaine], Iodine, Other drug, Codeine, Meperidine, and Naproxen    Health Maintenance: deferred      Objective:     Vital signs reviewed  Exam:  /50 (BP Location: Left arm, Patient Position: Sitting, BP Cuff Size: Adult)   Pulse 70   Temp 36.1 °C (97 °F) (Temporal)   Ht 1.6 m (5' 3\")   Wt 64.4 kg (142 lb)   SpO2 93%   BMI 25.15 kg/m²  Body mass index is 25.15 kg/m².    Gen: Alert and oriented, No apparent distress.  Neck: Neck is supple, full ROM.  Lungs: Normal effort, no audible wheezes  CV: Skin pink, warm and dry.  Ext: No clubbing, cyanosis, edema.      Assessment & Plan:     88 y.o. female with the following -     1. Encounter for completion of form with patient  Acute uncomplicated problem.  I completed the patient's daughter's FMLA form today as the daughter has been helping care for her mother after her recent humeral head fracture.  She has follow-up appointments with orthopedics and continues with home care physical therapy.  FMLA paperwork completed for daughter for the dates of 3/31/2023 through 4/28/2023.      Return if symptoms worsen or fail to improve.    Please note that this dictation was created using voice recognition software. I have made every reasonable attempt to correct obvious errors, but I expect that there are errors of grammar and possibly content that I did not discover " before finalizing the note.

## 2023-04-25 NOTE — TELEPHONE ENCOUNTER
Received VM from pt's daughter Mary Ann.  She has issues helping pt w/ POCT INR  I walked her through the POCT steps  See anticoag encounter for details    Janiya Dolan, PharmD

## 2023-05-02 ENCOUNTER — APPOINTMENT (OUTPATIENT)
Dept: MEDICAL GROUP | Facility: PHYSICIAN GROUP | Age: 88
End: 2023-05-02
Payer: MEDICARE

## 2023-05-10 ENCOUNTER — HOSPITAL ENCOUNTER (OUTPATIENT)
Facility: MEDICAL CENTER | Age: 88
End: 2023-05-10
Attending: NURSE PRACTITIONER
Payer: MEDICARE

## 2023-05-10 ENCOUNTER — OFFICE VISIT (OUTPATIENT)
Dept: MEDICAL GROUP | Facility: PHYSICIAN GROUP | Age: 88
End: 2023-05-10
Payer: MEDICARE

## 2023-05-10 VITALS
OXYGEN SATURATION: 97 % | HEIGHT: 63 IN | BODY MASS INDEX: 25.52 KG/M2 | TEMPERATURE: 96.8 F | WEIGHT: 144 LBS | SYSTOLIC BLOOD PRESSURE: 110 MMHG | HEART RATE: 63 BPM | DIASTOLIC BLOOD PRESSURE: 54 MMHG

## 2023-05-10 DIAGNOSIS — E11.29 TYPE 2 DIABETES MELLITUS WITH MICROALBUMINURIA, WITHOUT LONG-TERM CURRENT USE OF INSULIN (HCC): ICD-10-CM

## 2023-05-10 DIAGNOSIS — R80.9 TYPE 2 DIABETES MELLITUS WITH MICROALBUMINURIA, WITHOUT LONG-TERM CURRENT USE OF INSULIN (HCC): ICD-10-CM

## 2023-05-10 DIAGNOSIS — I10 PRIMARY HYPERTENSION: ICD-10-CM

## 2023-05-10 LAB
AMBIGUOUS DTTM AMBI4: NORMAL
HBA1C MFR BLD: 6.3 % (ref ?–5.8)
POCT INT CON NEG: NEGATIVE
POCT INT CON POS: POSITIVE

## 2023-05-10 PROCEDURE — 99214 OFFICE O/P EST MOD 30 MIN: CPT | Performed by: NURSE PRACTITIONER

## 2023-05-10 PROCEDURE — 82043 UR ALBUMIN QUANTITATIVE: CPT

## 2023-05-10 PROCEDURE — 83036 HEMOGLOBIN GLYCOSYLATED A1C: CPT | Performed by: NURSE PRACTITIONER

## 2023-05-10 PROCEDURE — 82570 ASSAY OF URINE CREATININE: CPT

## 2023-05-10 ASSESSMENT — FIBROSIS 4 INDEX: FIB4 SCORE: 1.63

## 2023-05-10 NOTE — ASSESSMENT & PLAN NOTE
Last A1c 7.0%.  Due for updated A1c and urine microalbumin.  She remains diet controlled. She is drinking diet soda now.

## 2023-05-10 NOTE — PROGRESS NOTES
Subjective:     CC: Diabetes follow-up    HPI:   Radha presents today with the following:    Type 2 diabetes mellitus with microalbuminuria, without long-term current use of insulin ([facility])  Last A1c 7.0%.  Due for updated A1c and urine microalbumin.  She remains diet controlled. She is drinking diet soda now.     Primary hypertension  Her blood pressure 110/54.  She continues with hydrochlorothiazide 12.5 mg daily and amlodipine 5 mg daily. Medications clarified in April.      Past Medical History:   Diagnosis Date    Carotid artery disease (HCC) 2010    CPPD (pseudo-gout) 2011    Diabetes (HCC) 2018    GOUT 2011    History of skin cancer 2016    Hyperlipidemia 2010    Hypertension 2010    PAD (peripheral artery disease) 2010    Pre-diabetes 2010    S/P hip replacement 2011    Superficial thrombophlebitis of right upper extremity 2021       Social History     Tobacco Use    Smoking status: Former     Packs/day: 2.00     Years: 12.00     Pack years: 24.00     Types: Cigarettes     Quit date:      Years since quittin.3    Smokeless tobacco: Never   Vaping Use    Vaping Use: Never used   Substance Use Topics    Alcohol use: Not Currently     Comment: rarely    Drug use: No       Current Outpatient Medications Ordered in Epic   Medication Sig Dispense Refill    hydroCHLOROthiazide (HYDRODIURIL) 12.5 MG tablet 12.5 mg.      azithromycin (ZITHROMAX) 250 MG Tab On Day 1 take two tablets (500 mg) by mouth. On Day 2-5 take one tablet by mouth (250 mg) daily. 6 Tablet 0    warfarin (COUMADIN) 2.5 MG Tab TAKE 1 AND 1/2 TABLETS BY  MOUTH DAILY OR AS DIRECTED  BY ANTICOAGULATION CLINIC 135 Tablet 1    nystatin (MYCOSTATIN) powder Apply 1 g topically 3 times a day. 15 g 0    vitamin D3 (CHOLECALCIFEROL) 1000 Unit (25 mcg) Tab Take 1,000 Units by mouth every day.      Misc Natural Products (NEURIVA PO) Take  by mouth.      amLODIPine (NORVASC) 5 MG Tab Take 1 Tablet by  "mouth every day. 100 Tablet 3    rosuvastatin (CRESTOR) 5 MG Tab TAKE ONE TABLET BY MOUTH DAILY 90 Tablet 3    Multiple Vitamins-Minerals (MULTIVITAL PO) Take  by mouth.      doxylamine (UNISOM) 25 MG Tab tablet Take 25 mg by mouth at bedtime.       No current Epic-ordered facility-administered medications on file.       Allergies:  Anesthetic [benzocaine], Iodine, Other drug, Codeine, Meperidine, and Naproxen    Health Maintenance: Reviewed      Objective:     Vital signs reviewed  Exam:  /54 (BP Location: Left arm, Patient Position: Sitting, BP Cuff Size: Adult)   Pulse 63   Temp 36 °C (96.8 °F) (Temporal)   Ht 1.6 m (5' 3\")   Wt 65.3 kg (144 lb)   SpO2 97%   BMI 25.51 kg/m²  Body mass index is 25.51 kg/m².    Gen: Alert and oriented, No apparent distress.  Daughter present in exam room.  Eyes:   Lids normal. Glasses in place.   Lungs: Normal effort, CTA bilaterally, no wheezes, rhonchi, or rales  CV: Regular rate and rhythm. No murmurs, rubs, or gallops.  Ext: No clubbing, cyanosis, edema.  Right hand swelling is improved.      Labs:      Latest Reference Range & Units 05/10/23 11:57   Glycohemoglobin 5.8 % 6.3 !   !: Data is abnormal    Assessment & Plan:     88 y.o. female with the following -     1. Type 2 diabetes mellitus with microalbuminuria, without long-term current use of insulin (HCC)  Chronic stable problem.  Continue with diet control.  Continue with every 6-month A1c.  - POCT  A1C  - Microalbumin Creat Ratio Urine - Clinic Collect; Future    2. Primary hypertension  Chronic stable problem.  Blood pressures controlled.  Continue with amlodipine 5 mg daily and hydrochlorothiazide 12.5 mg daily.      Return in about 6 months (around 11/10/2023) for Diabetes, A1C.    Please note that this dictation was created using voice recognition software. I have made every reasonable attempt to correct obvious errors, but I expect that there are errors of grammar and possibly content that I did not " discover before finalizing the note.

## 2023-05-10 NOTE — ASSESSMENT & PLAN NOTE
Her blood pressure 110/54.  She continues with hydrochlorothiazide 12.5 mg daily and amlodipine 5 mg daily. Medications clarified in April.

## 2023-05-11 LAB
CREAT UR-MCNC: 135.64 MG/DL
MICROALBUMIN UR-MCNC: 22.7 MG/DL
MICROALBUMIN/CREAT UR: 167 MG/G (ref 0–30)

## 2023-05-12 ENCOUNTER — APPOINTMENT (OUTPATIENT)
Dept: RADIOLOGY | Facility: MEDICAL CENTER | Age: 88
End: 2023-05-12
Attending: NURSE PRACTITIONER
Payer: MEDICARE

## 2023-05-16 ENCOUNTER — HOSPITAL ENCOUNTER (OUTPATIENT)
Dept: RADIOLOGY | Facility: MEDICAL CENTER | Age: 88
End: 2023-05-16
Attending: NURSE PRACTITIONER
Payer: MEDICARE

## 2023-05-16 ENCOUNTER — ANTICOAGULATION VISIT (OUTPATIENT)
Dept: MEDICAL GROUP | Facility: PHYSICIAN GROUP | Age: 88
End: 2023-05-16
Payer: MEDICARE

## 2023-05-16 DIAGNOSIS — R60.0 EDEMA OF RIGHT LOWER EXTREMITY: ICD-10-CM

## 2023-05-16 DIAGNOSIS — Z79.01 CHRONIC ANTICOAGULATION: Primary | ICD-10-CM

## 2023-05-16 DIAGNOSIS — I73.9 PAD (PERIPHERAL ARTERY DISEASE) (HCC): ICD-10-CM

## 2023-05-16 LAB — INR PPP: 1.8 (ref 2–3.5)

## 2023-05-16 PROCEDURE — 85610 PROTHROMBIN TIME: CPT | Performed by: NURSE PRACTITIONER

## 2023-05-16 PROCEDURE — 93971 EXTREMITY STUDY: CPT | Mod: 26,RT | Performed by: INTERNAL MEDICINE

## 2023-05-16 PROCEDURE — 99211 OFF/OP EST MAY X REQ PHY/QHP: CPT | Performed by: NURSE PRACTITIONER

## 2023-05-16 PROCEDURE — 93971 EXTREMITY STUDY: CPT | Mod: RT

## 2023-05-16 NOTE — PROGRESS NOTES
Anticoagulation Summary  As of 2023      INR goal:  2.0-3.0   TTR:  67.1 % (4.3 y)   INR used for dosin.80 (2023)   Warfarin maintenance plan:  2.5 mg (2.5 mg x 1) every Mon, Fri; 3.75 mg (2.5 mg x 1.5) all other days   Weekly warfarin total:  23.75 mg   Plan last modified:  Fred Nayak, PharmD (2023)   Next INR check:  2023   Target end date:  Indefinite    Indications    Chronic anticoagulation [Z79.01]  PAD (peripheral artery disease) (HCC) [I73.9]                 Anticoagulation Episode Summary       INR check location:      Preferred lab:      Send INR reminders to:      Comments:  Jamir BARBOSA          Anticoagulation Care Providers       Provider Role Specialty Phone number    CHACORTA Issa Referring Family Medicine [phone]    [facility] Responsible  [phone]          Anticoagulation Patient Findings  Patient Findings       Negatives:  Signs/symptoms of thrombosis, Signs/symptoms of bleeding, Laboratory test error suspected, Change in health, Change in alcohol use, Change in activity, Upcoming invasive procedure, Emergency department visit, Upcoming dental procedure, Missed doses, Extra doses, Change in medications, Change in diet/appetite, Hospital admission, Bruising, Other complaints                  HPI:   Radha Verduzco seen in clinic today, on anticoagulation therapy with warfarin due 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  She returns to clinic today to recheck INR to ensure it is therapeutic and thus preventing possible clotting and/or bleeding/bruising complications.    CHADS-VASc = n/a  (unadjusted ischemic stroke risk/year:  n/a)    Does patient have any changes to current medical/health status since last appt (Y/N):  NO  Does patient have any signs/symptoms of bleeding  and/or thrombosis since the last appt (Y/N):  NO  Does patient have any interval changes to diet or medications since last appt (Y/N):  NO  Are there any complications or cost restrictions with current therapy (Y/N):  NO     Does patient have [facility] PCP? Yes, CHACORTA Issa (If not, please document discussion that patient must be seen at [facility])       Vitals:  declined today    There were no vitals filed for this visit.     Asssessment:      INR subtherapeutic at 1.8, therefore increasing VTE risk.   Reason(s) for out of range INR today:  dose too low.      Pt is not on antiplatelet therapy    Medication reconciliation completed today.    Plan:  Instructed patient to increase weekly warfarin regimen as detailed above.     Follow up:  Because warfarin is a high risk medication and current CHEST guidelines recommend regular monitoring intervals (few days up to 12 weeks), will have patient return to clinic in 3 weeks to recheck INR.    Fred Nayak, PharmD, BCACP

## 2023-05-29 ENCOUNTER — HOSPITAL ENCOUNTER (OUTPATIENT)
Dept: CARDIOLOGY | Facility: MEDICAL CENTER | Age: 88
End: 2023-05-29
Attending: NURSE PRACTITIONER
Payer: MEDICARE

## 2023-05-29 DIAGNOSIS — R79.89 ELEVATED BRAIN NATRIURETIC PEPTIDE (BNP) LEVEL: ICD-10-CM

## 2023-05-29 LAB
LV EJECT FRACT  99904: 65
LV EJECT FRACT MOD 2C 99903: 63.61
LV EJECT FRACT MOD 4C 99902: 70.48
LV EJECT FRACT MOD BP 99901: 67.11

## 2023-05-29 PROCEDURE — 93306 TTE W/DOPPLER COMPLETE: CPT

## 2023-05-29 PROCEDURE — 93306 TTE W/DOPPLER COMPLETE: CPT | Mod: 26 | Performed by: INTERNAL MEDICINE

## 2023-06-06 ENCOUNTER — ANTICOAGULATION VISIT (OUTPATIENT)
Dept: MEDICAL GROUP | Facility: PHYSICIAN GROUP | Age: 88
End: 2023-06-06
Payer: MEDICARE

## 2023-06-06 DIAGNOSIS — I73.9 PAD (PERIPHERAL ARTERY DISEASE) (HCC): ICD-10-CM

## 2023-06-06 DIAGNOSIS — Z79.01 CHRONIC ANTICOAGULATION: Primary | ICD-10-CM

## 2023-06-06 LAB — INR PPP: 1.9 (ref 2–3.5)

## 2023-06-06 PROCEDURE — 85610 PROTHROMBIN TIME: CPT | Performed by: NURSE PRACTITIONER

## 2023-06-06 PROCEDURE — 99211 OFF/OP EST MAY X REQ PHY/QHP: CPT | Performed by: NURSE PRACTITIONER

## 2023-06-06 RX ORDER — WARFARIN SODIUM 2.5 MG/1
TABLET ORAL
Qty: 135 TABLET | Refills: 2 | Status: SHIPPED | OUTPATIENT
Start: 2023-06-06 | End: 2024-02-07

## 2023-06-06 NOTE — PROGRESS NOTES
Anticoagulation Summary  As of 2023      INR goal:  2.0-3.0   TTR:  66.2 % (4.3 y)   INR used for dosin.90 (2023)   Warfarin maintenance plan:  3.75 mg (2.5 mg x 1.5) every day   Weekly warfarin total:  26.25 mg   Plan last modified:  Fred Nayak, PharmD (2023)   Next INR check:  2023   Target end date:  Indefinite    Indications    Chronic anticoagulation [Z79.01]  PAD (peripheral artery disease) (HCC) [I73.9]                 Anticoagulation Episode Summary       INR check location:      Preferred lab:      Send INR reminders to:      Comments:  Jamir BARBOSA          Anticoagulation Care Providers       Provider Role Specialty Phone number    CHACORTA Issa Referring Family Medicine [phone]    [facility] Responsible  [phone]          Anticoagulation Patient Findings  Patient Findings       Negatives:  Signs/symptoms of thrombosis, Signs/symptoms of bleeding, Laboratory test error suspected, Change in health, Change in alcohol use, Change in activity, Upcoming invasive procedure, Emergency department visit, Upcoming dental procedure, Missed doses, Extra doses, Change in medications, Change in diet/appetite, Hospital admission, Bruising, Other complaints                  HPI:   Radha Verduzco seen in clinic today, on anticoagulation therapy with warfarin 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    Patient's previous INR was subtherapeutic at 1.8 on 23, at which time patient was instructed to increase weekly warfarin regimen.  She returns to clinic today to recheck INR to ensure it is therapeutic and thus preventing possible clotting and/or bleeding/bruising complications.    CHADS-VASc = n/a  (unadjusted ischemic stroke risk/year:  n/a)    Does patient have any changes to current medical/health status since last appt (Y/N):  NO  Does patient have any signs/symptoms of bleeding and/or thrombosis since the last appt  (Y/N):  NO  Does patient have any interval changes to diet or medications since last appt (Y/N):  NO  Are there any complications or cost restrictions with current therapy (Y/N):  NO     Does patient have [facility] PCP? Yes, CHACORTA Issa (If not, please document discussion that patient must be seen at [facility])       Vitals:  declined today    There were no vitals filed for this visit.     Asssessment:      INR subtherapeutic at 1.9, therefore increasing VTE risk.   Reason(s) for out of range INR today:  dose too low      Pt is not on antiplatelet therapy    Medication reconciliation completed today.    Plan:  Instructed patient to increase weekly warfarin regimen as detailed above.     Follow up:  Because warfarin is a high risk medication and current CHEST guidelines recommend regular monitoring intervals (few days up to 12 weeks), will have patient return to clinic in 3 weeks to recheck INR.    Fred Nayak, PharmD, BCACP

## 2023-06-19 DIAGNOSIS — E78.5 HYPERLIPIDEMIA, UNSPECIFIED HYPERLIPIDEMIA TYPE: ICD-10-CM

## 2023-06-19 RX ORDER — ROSUVASTATIN CALCIUM 5 MG/1
TABLET, COATED ORAL
Qty: 90 TABLET | Refills: 3 | Status: SHIPPED | OUTPATIENT
Start: 2023-06-19 | End: 2023-10-16

## 2023-06-19 NOTE — TELEPHONE ENCOUNTER
Requested Prescriptions     Signed Prescriptions Disp Refills    rosuvastatin (CRESTOR) 5 MG Tab 90 Tablet 3     Sig: TAKE ONE TABLET BY MOUTH DAILY     Authorizing Provider: JANE CHEN A.P.R.N.

## 2023-06-23 NOTE — PROGRESS NOTES
OP Anticoagulation Service Note    Date: 3/4/2019  Anticoagulation Summary  As of 3/4/2019    INR goal:   2.0-3.0   TTR:   32.3 % (1 mo)   INR used for dosin.2 (3/4/2019)   Warfarin maintenance plan:   2.5 mg (2.5 mg x 1) every Mon, Wed, Fri; 5 mg (2.5 mg x 2) all other days   Weekly warfarin total:   27.5 mg   No change documented:   Flaco Espino Ass't   Plan last modified:   Reginaldo Oquendo, PharmD (2019)   Next INR check:   3/11/2019   Target end date:   2019    Indications    PAD (peripheral artery disease) (HCC) [I73.9]             Anticoagulation Episode Summary     INR check location:       Preferred lab:       Send INR reminders to:       Comments:   Lakesha MATHIS fax [phone]      Anticoagulation Care Providers     Provider Role Specialty Phone number    Bong Quick M.D. Referring Internal Medicine [phone]    [facility] Anticoagulation Services Responsible  [phone]        Anticoagulation Patient Findings  Patient Findings     Negatives:   Signs/symptoms of thrombosis, Signs/symptoms of bleeding, Laboratory test error suspected, Change in health, Change in alcohol use, Change in activity, Upcoming invasive procedure, Emergency department visit, Upcoming dental procedure, Missed doses, Extra doses, Change in medications, Change in diet/appetite, Hospital admission, Bruising, Other complaints        Plan: Spoke to patient. Patient is therapeutic and will remain on the same dose. Patient reports no unusual bleeding or bruising and no changes to medication or diet. Patient is to be checked again in 1 week.    Flaco Espino. Ass't  [facility]    I have reviewed and am in agreement with the above stated plan on 3-4-19.  Fred Nayak, MilenaD      Ongoing SW/CM Assessment/Plan of Care Note     See SW/CM flowsheets for goals and other objective data.    Progress note:  Clinical review of chart completed; plan of care discussed during care rounds with care team. Patient remains hospitalized due to intra-abdominal abscess. ID managing IV ABX; cultures pending. Plan IV ABX for 10-14 days and follow-up CT scan prior to ending therapy.     CM met with patient to discuss IV ABX options since pt will likely discharge on vancomycin, IV ABX, that isnt daily. Patient agreeable to going to [facility] in [place] to learn how to self manage IV ABX.    Per [facility] patient accepted and can be seen in the Educational center on Sunday.     Discharge plan:  Home on IV antibiotics pending culture results    CM/SW team to continue to follow for discharge needs.

## 2023-06-27 ENCOUNTER — ANTICOAGULATION VISIT (OUTPATIENT)
Dept: MEDICAL GROUP | Facility: PHYSICIAN GROUP | Age: 88
End: 2023-06-27
Payer: MEDICARE

## 2023-06-27 DIAGNOSIS — I73.9 PAD (PERIPHERAL ARTERY DISEASE) (HCC): ICD-10-CM

## 2023-06-27 DIAGNOSIS — Z79.01 CHRONIC ANTICOAGULATION: Primary | ICD-10-CM

## 2023-06-27 LAB — INR PPP: 1.3 (ref 2–3.5)

## 2023-06-27 PROCEDURE — 85610 PROTHROMBIN TIME: CPT | Performed by: NURSE PRACTITIONER

## 2023-06-27 PROCEDURE — 99211 OFF/OP EST MAY X REQ PHY/QHP: CPT | Performed by: NURSE PRACTITIONER

## 2023-06-27 NOTE — PROGRESS NOTES
Anticoagulation Summary  As of 2023      INR goal:  2.0-3.0   TTR:  65.3 % (4.4 y)   INR used for dosin.30 (2023)   Warfarin maintenance plan:  3.75 mg (2.5 mg x 1.5) every day   Weekly warfarin total:  26.25 mg   Plan last modified:  Fred Nayak, PharmD (2023)   Next INR check:  2023   Target end date:  Indefinite    Indications    Chronic anticoagulation [Z79.01]  PAD (peripheral artery disease) (HCC) [I73.9]                 Anticoagulation Episode Summary       INR check location:      Preferred lab:      Send INR reminders to:      Comments:  Jamir BARBOSA          Anticoagulation Care Providers       Provider Role Specialty Phone number    CHACORTA Issa Referring Family Medicine [phone]    [facility] Anticoagulation Services Responsible  [phone]          Anticoagulation Patient Findings  Patient Findings       Positives:  Missed doses (taking lower dose than instructed)    Negatives:  Signs/symptoms of thrombosis, Signs/symptoms of bleeding, Laboratory test error suspected, Change in health, Change in alcohol use, Change in activity, Upcoming invasive procedure, Emergency department visit, Upcoming dental procedure, Extra doses, Change in medications, Change in diet/appetite, Hospital admission, Bruising, Other complaints                  HPI:   Radha Verduzco seen in clinic today, on anticoagulation therapy with warfarin 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  She returns to clinic today to recheck INR to ensure it is therapeutic and thus preventing possible clotting and/or bleeding/bruising complications.    CHADS-VASc = n/a  (unadjusted ischemic stroke risk/year:  n/a)    Does patient have any changes to current medical/health status since last appt (Y/N):  NO  Does patient have any signs/symptoms of  bleeding and/or thrombosis since the last appt (Y/N):  NO  Does patient have any interval changes to diet or medications since last appt (Y/N):  Inadvertently taking 33% lower dose than instructed.  Are there any complications or cost restrictions with current therapy (Y/N):  NO     Does patient have [facility] PCP? Yes, CHACORTA Issa (If not, please document discussion that patient must be seen at [facility])       Vitals:  declined today    There were no vitals filed for this visit.     Asssessment:      INR subtherapeutic at 1.3, therefore increasing VTE risk   Reason(s) for out of range INR today:  taking lower dose than instructed.      Pt is not on antiplatelet therapy    Medication reconciliation completed today.    Plan:  Pt is to increase weekly warfarin dosing back to that described at last visit as shown above.     Follow up:  Because warfarin is a high risk medication and current CHEST guidelines recommend regular monitoring intervals (few days up to 12 weeks), will have patient return to clinic in 2 weeks to recheck INR (per patient request to be seen at this location only).    Fred Nayak, PharmD, BCACP

## 2023-07-11 ENCOUNTER — ANTICOAGULATION VISIT (OUTPATIENT)
Dept: MEDICAL GROUP | Facility: PHYSICIAN GROUP | Age: 88
End: 2023-07-11
Payer: MEDICARE

## 2023-07-11 DIAGNOSIS — I73.9 PAD (PERIPHERAL ARTERY DISEASE) (HCC): ICD-10-CM

## 2023-07-11 DIAGNOSIS — Z79.01 CHRONIC ANTICOAGULATION: Primary | ICD-10-CM

## 2023-07-11 LAB — INR PPP: 1.3 (ref 2–3.5)

## 2023-07-11 PROCEDURE — 85610 PROTHROMBIN TIME: CPT | Performed by: NURSE PRACTITIONER

## 2023-07-11 PROCEDURE — 99211 OFF/OP EST MAY X REQ PHY/QHP: CPT | Performed by: NURSE PRACTITIONER

## 2023-07-11 NOTE — PROGRESS NOTES
Anticoagulation Summary  As of 2023      INR goal:  2.0-3.0   TTR:  64.8 % (4.4 y)   INR used for dosin.30 (2023)   Warfarin maintenance plan:  3.75 mg (2.5 mg x 1.5) every day   Weekly warfarin total:  26.25 mg   Plan last modified:  Fred Nayak, PharmD (2023)   Next INR check:  2023   Target end date:  Indefinite    Indications    Chronic anticoagulation [Z79.01]  PAD (peripheral artery disease) (HCC) [I73.9]                 Anticoagulation Episode Summary       INR check location:      Preferred lab:      Send INR reminders to:      Comments:  Jamir BARBOSA          Anticoagulation Care Providers       Provider Role Specialty Phone number    CHACORTA Issa Referring Family Medicine [phone]    [facility] Anticoagulation Services Responsible  [phone]          Anticoagulation Patient Findings  Patient Findings       Positives:  Missed doses    Negatives:  Signs/symptoms of thrombosis, Signs/symptoms of bleeding, Laboratory test error suspected, Change in health, Change in alcohol use, Change in activity, Upcoming invasive procedure, Emergency department visit, Upcoming dental procedure, Extra doses, Change in medications, Change in diet/appetite, Hospital admission, Bruising, Other complaints                  HPI:   Radha Verduzco seen in clinic today, on anticoagulation therapy with warfarin 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  She returns to clinic today to recheck INR to ensure it is therapeutic and thus preventing possible clotting and/or bleeding/bruising complications.    CHADS-VASc = n/a  (unadjusted ischemic stroke risk/year:  n/a)    Does patient have any changes to current medical/health status since last appt (Y/N):  NO  Does patient have any signs/symptoms of bleeding and/or thrombosis  since the last appt (Y/N):  NO  Does patient have any interval changes to diet or medications since last appt (Y/N):  Continues to miss doses and/or take lower dose than instructed.  Are there any complications or cost restrictions with current therapy (Y/N):  NO     Does patient have [facility] PCP? Yes, CHACORTA Issa (If not, please document discussion that patient must be seen at [facility])       Vitals:  declined today.    There were no vitals filed for this visit.     Asssessment:      INR subtherapeutic at 1.3, therefore increasing VTE risk   Reason(s) for out of range INR today:  missed doses      Pt is not on antiplatelet therapy    Medication reconciliation completed today.    Plan:  Pt is to bolus with 5mg X 2, then continue with current warfarin dosing regimen.  Stressed importance of compliance with warfarin dosing, patient's daughter will work closer with her to make sure it is taken daily.  Given indication of arterial thrombosis, would not explore DOAC use at this time.     Follow up:  Because warfarin is a high risk medication and current CHEST guidelines recommend regular monitoring intervals (few days up to 12 weeks), will have patient return to clinic in 2 weeks to recheck INR (patient preference).    Fred Nayak, PharmD, BCACP

## 2023-07-19 ENCOUNTER — TELEPHONE (OUTPATIENT)
Dept: MEDICAL GROUP | Facility: PHYSICIAN GROUP | Age: 88
End: 2023-07-19
Payer: MEDICARE

## 2023-07-19 NOTE — TELEPHONE ENCOUNTER
Future Appointments         Provider Department Center    7/25/2023 11:45 AM (Arrive by 11:30 AM) [facility]    7/26/2023 11:20 AM (Arrive by 11:05 AM) CHACORTA Issa [facility]    11/13/2023 11:20 AM (Arrive by 11:05 AM) CHACORTA Issa [facility]          ANNUAL WELLNESS VISIT PRE-VISIT PLANNING    1.  Reviewed notes from the last office visit: Yes, LOV 05/10/2023    2.  If any orders were ordered or intended to be done prior to visit (i.e. 6 mos follow-up), do we have results/consult notes or has patient scheduled?          Labs - Labs were not ordered at last office visit.  Note: If patient appointment is for lab review and patient did not complete labs, check with provider if OK to reschedule patient until labs completed.         Imaging - Imaging was not ordered at last office visit.         Referrals - No referrals were ordered at last office visit.    3.  Immunizations were updated in Epic using Reconcile Outside Information activity? Yes         Is patient due for Tdap? NO         Is patient due for Shingrix? YES. Patient was not notified of copay/out of pocket cost. Pt's daughter stated her mom was not interested in doing more.     4.  Patient is due for the following Health Maintenance Topics:   Health Maintenance Due   Topic Date Due    IMM ZOSTER VACCINES (1 of 2) Never done    Annual Wellness Visit  12/19/2014    COVID-19 Vaccine (3 - Moderna series) 09/03/2021    FASTING LIPID PROFILE  06/06/2023       - Patient already has appointment scheduled for Annual Wellness Visit (AWV).    5.  Reviewed/Updated the following with patient:          Preferred Pharmacy? Yes          Preferred Lab? Yes          Preferred Communication? Yes          Allergies? Yes          Medications? YES. Was Abstract Encounter opened and chart updated? YES          Social History? Yes          Family History (document  living status of immediate family members and if + hx of  cancer, diabetes, hypertension, hyperlipidemia, heart attack, stroke) Yes    6.  Care Team Updated:          DME Company (gait device, O2, CPAP, etc.): NO          Other Specialists (eye doctor, derm, GYN, cardiology, endo, etc): N\A    7.  Patient was advised: “This is a free wellness visit. The provider will screen for medical conditions to help you stay healthy. If you have other concerns to address you may be asked to discuss these at a separate visit or there may be an additional fee.”     8.  AHA (Puls[employer]) form printed for Provider? N/A  Spoke to daughter Monica and did remind her of her check in time.

## 2023-07-25 ENCOUNTER — ANTICOAGULATION VISIT (OUTPATIENT)
Dept: MEDICAL GROUP | Facility: PHYSICIAN GROUP | Age: 88
End: 2023-07-25
Payer: MEDICARE

## 2023-07-25 DIAGNOSIS — I73.9 PAD (PERIPHERAL ARTERY DISEASE) (HCC): ICD-10-CM

## 2023-07-25 DIAGNOSIS — Z79.01 CHRONIC ANTICOAGULATION: Primary | ICD-10-CM

## 2023-07-25 LAB — INR PPP: 2.2 (ref 2–3.5)

## 2023-07-25 PROCEDURE — 85610 PROTHROMBIN TIME: CPT | Performed by: NURSE PRACTITIONER

## 2023-07-25 PROCEDURE — 93793 ANTICOAG MGMT PT WARFARIN: CPT | Performed by: NURSE PRACTITIONER

## 2023-07-25 NOTE — PROGRESS NOTES
Anticoagulation Summary  As of 2023      INR goal:  2.0-3.0   TTR:  64.5 % (4.5 y)   INR used for dosin.20 (2023)   Warfarin maintenance plan:  3.75 mg (2.5 mg x 1.5) every day   Weekly warfarin total:  26.25 mg   Plan last modified:  Fred Nayak, PharmD (2023)   Next INR check:  8/15/2023   Target end date:  Indefinite    Indications    Chronic anticoagulation [Z79.01]  PAD (peripheral artery disease) (HCC) [I73.9]                 Anticoagulation Episode Summary       INR check location:      Preferred lab:      Send INR reminders to:      Comments:  Jamir BARBOSA          Anticoagulation Care Providers       Provider Role Specialty Phone number    CHACORTA Issa Referring Family Medicine [phone]    [facility] Anticoagulation Services Responsible  [phone]          Anticoagulation Patient Findings  Patient Findings       Negatives:  Signs/symptoms of thrombosis, Signs/symptoms of bleeding, Laboratory test error suspected, Change in health, Change in alcohol use, Change in activity, Upcoming invasive procedure, Emergency department visit, Upcoming dental procedure, Missed doses, Extra doses, Change in medications, Change in diet/appetite, Hospital admission, Bruising, Other complaints                  HPI:   Radha Verduzco seen in clinic today, on anticoagulation therapy with warfarin 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  She returns to clinic today to recheck INR to ensure it is therapeutic and thus preventing possible clotting and/or bleeding/bruising complications.    CHADS-VASc = n/a  (unadjusted ischemic stroke risk/year:  n/a)    Does patient have any changes to current medical/health status since last appt (Y/N):  NO  Does patient have any signs/symptoms of bleeding and/or  thrombosis since the last appt (Y/N):  No  Does patient have any interval changes to diet or medications since last appt (Y/N):  NO  Are there any complications or cost restrictions with current therapy (Y/N):  NO     Does patient have [facility] PCP? Yes, CHACORTA Issa (If not, please document discussion that patient must be seen at [facility])       Vitals:  declined today    There were no vitals filed for this visit.     Asssessment:      INR therapeutic at 2.2, therefore decreasing VTE and/or bleeding risk   Reason(s) for out of range INR today:  n/a      Pt is not on antiplatelet therapy    Medication reconciliation completed today.    Plan:  Pt is to continue with current warfarin dosing regimen.     Follow up:  Because warfarin is a high risk medication and current CHEST guidelines recommend regular monitoring intervals (few days up to 12 weeks), will have patient return to clinic in 3 weeks to recheck INR.    Fred Nayak, PharmD, BCACP

## 2023-07-26 ENCOUNTER — OFFICE VISIT (OUTPATIENT)
Dept: MEDICAL GROUP | Facility: PHYSICIAN GROUP | Age: 88
End: 2023-07-26
Payer: MEDICARE

## 2023-07-26 VITALS
SYSTOLIC BLOOD PRESSURE: 132 MMHG | TEMPERATURE: 97 F | BODY MASS INDEX: 26.93 KG/M2 | DIASTOLIC BLOOD PRESSURE: 58 MMHG | OXYGEN SATURATION: 95 % | HEART RATE: 74 BPM | WEIGHT: 152 LBS | HEIGHT: 63 IN

## 2023-07-26 DIAGNOSIS — I10 PRIMARY HYPERTENSION: ICD-10-CM

## 2023-07-26 DIAGNOSIS — Z91.81 RISK FOR FALLS: ICD-10-CM

## 2023-07-26 DIAGNOSIS — E78.5 HYPERLIPIDEMIA, UNSPECIFIED HYPERLIPIDEMIA TYPE: ICD-10-CM

## 2023-07-26 DIAGNOSIS — Z00.00 MEDICARE ANNUAL WELLNESS VISIT, SUBSEQUENT: Primary | ICD-10-CM

## 2023-07-26 DIAGNOSIS — E55.9 VITAMIN D DEFICIENCY: ICD-10-CM

## 2023-07-26 DIAGNOSIS — R80.9 TYPE 2 DIABETES MELLITUS WITH MICROALBUMINURIA, WITHOUT LONG-TERM CURRENT USE OF INSULIN (HCC): ICD-10-CM

## 2023-07-26 DIAGNOSIS — I74.3 EMBOLISM AND THROMBOSIS OF ARTERIES OF THE LOWER EXTREMITIES (HCC): ICD-10-CM

## 2023-07-26 DIAGNOSIS — E11.29 TYPE 2 DIABETES MELLITUS WITH MICROALBUMINURIA, WITHOUT LONG-TERM CURRENT USE OF INSULIN (HCC): ICD-10-CM

## 2023-07-26 PROCEDURE — 3078F DIAST BP <80 MM HG: CPT | Performed by: NURSE PRACTITIONER

## 2023-07-26 PROCEDURE — 3075F SYST BP GE 130 - 139MM HG: CPT | Performed by: NURSE PRACTITIONER

## 2023-07-26 PROCEDURE — G0439 PPPS, SUBSEQ VISIT: HCPCS | Performed by: NURSE PRACTITIONER

## 2023-07-26 ASSESSMENT — FIBROSIS 4 INDEX: FIB4 SCORE: 1.63

## 2023-07-26 ASSESSMENT — ENCOUNTER SYMPTOMS: GENERAL WELL-BEING: EXCELLENT

## 2023-07-26 ASSESSMENT — ACTIVITIES OF DAILY LIVING (ADL): BATHING_REQUIRES_ASSISTANCE: 0

## 2023-07-26 ASSESSMENT — PATIENT HEALTH QUESTIONNAIRE - PHQ9: CLINICAL INTERPRETATION OF PHQ2 SCORE: 0

## 2023-07-26 NOTE — PROGRESS NOTES
Chief Complaint   Patient presents with    Annual Exam       HPI:  Radha Verduzco is a 88 y.o. here with her daughter for Medicare Annual Wellness Visit       Vitamin D deficiency  Continues vitamin D 1000 units daily.    Type 2 diabetes mellitus with microalbuminuria, without long-term current use of insulin ([facility])  Last A1C 6.3%. diet controlled. Declines ace inhibitor.     Primary hypertension  Blood pressure today 132/58. Continues hydrochlorothiazide 12.5 mg daily and amlodipine 5 mg daily.    Hyperlipidemia  Continues rosuvastatin 5 mg daily. Due for updated labs.    Embolism and thrombosis of arteries of the lower extremities ([facility])  Continues follow-up with anticoagulation clinic and continues warfarin. INR 1 day ago therapeutic.           Patient Active Problem List    Diagnosis Date Noted    Fall at home 02/06/2023    History of thrombosis 10/10/2022    Embolism and thrombosis of arteries of the lower extremities ([facility]) 10/10/2022    Type 2 diabetes mellitus with microalbuminuria, without long-term current use of insulin ([facility]) 08/01/2022    Chronic anticoagulation 06/02/2020    Vitamin D deficiency 12/21/2018    Hyperparathyroidism ([facility]) 12/21/2018    Type 2 diabetes mellitus with stage 3a chronic kidney disease, without long-term current use of insulin ([facility]) 04/19/2018    Obesity (BMI 30-39.9) 04/19/2018    History of skin cancer 02/24/2016    Personal history of calcium pyrophosphate deposition disease (CPPD) 04/07/2011    Primary hypertension 08/02/2010    Hyperlipidemia 08/02/2010    PAD (peripheral artery disease) ([facility]) 08/02/2010       Current Outpatient Medications   Medication Sig Dispense Refill    rosuvastatin (CRESTOR) 5 MG Tab TAKE ONE TABLET BY MOUTH DAILY 90 Tablet 3    warfarin (COUMADIN) 2.5 MG Tab Take one and one-half tablets by mouth daily or as directed by anticoagulation clinic 135 Tablet 2    hydroCHLOROthiazide (HYDRODIURIL) 12.5 MG tablet 12.5 mg.      nystatin (MYCOSTATIN) powder  Apply 1 g topically 3 times a day. 15 g 0    vitamin D3 (CHOLECALCIFEROL) 1000 Unit (25 mcg) Tab Take 1,000 Units by mouth every day.      Misc Natural Products (NEURIVA PO) Take  by mouth.      amLODIPine (NORVASC) 5 MG Tab Take 1 Tablet by mouth every day. 100 Tablet 3    Multiple Vitamins-Minerals (MULTIVITAL PO) Take  by mouth.      doxylamine (UNISOM) 25 MG Tab tablet Take 25 mg by mouth at bedtime.       No current facility-administered medications for this visit.          Current supplements as per medication list.     Allergies: Anesthetic [benzocaine], Iodine, Other drug, Codeine, Meperidine, and Naproxen    Current social contact/activities: hangs out with family and goes to the store, likes shopping, goes to [employer], she does read    She  reports that she quit smoking about 38 years ago. Her smoking use included cigarettes. She has a 24.00 pack-year smoking history. She has never used smokeless tobacco. She reports that she does not currently use alcohol. She reports that she does not use drugs.  Counseling given: Yes      ROS:    Gait: Uses a walker and cane but doesn't use them  Ostomy: No  Other tubes: No  Amputations: No  Chronic oxygen use: No  Last eye exam: couple years ago, history of cataract surgery  Wears hearing aids: No   : Denies any urinary leakage during the last 6 months    Screening:  Discussed and reviewed  Depression Screening  Little interest or pleasure in doing things?  0 - not at all  Feeling down, depressed , or hopeless? 0 - not at all  Patient Health Questionnaire Score: 0     If depressive symptoms identified deferred to follow up visit unless specifically addressed in assessment and plan.    Interpretation of PHQ-9 Total Score   Score Severity   1-4 No Depression   5-9 Mild Depression   10-14 Moderate Depression   15-19 Moderately Severe Depression   20-27 Severe Depression    Screening for Cognitive Impairment  Three Minute Recall (daughter, heaven, mountain) 1/3    Justo  clock face with all 12 numbers and set the hands to show 10 past 11.  No Pt set time to 8:10  Cognitive concerns identified deferred for follow up unless specifically addressed in assessment and plan.    She has not noticed any memory issues. She is able to take her own medications. Encouraged reading and brain stimulating activities.     Fall Risk Assessment  Has the patient had two or more falls in the last year or any fall with injury in the last year?  Yes. Declines referral to home health and physical therapy. Per daughter not shuffling her feet. She does have life alert.     Safety Assessment  Throw rugs on floor.  Yes. One by her chair. The rug covers cords.   Handrails on all stairs.  No  Good lighting in all hallways.  Yes  Difficulty hearing.  No  Patient counseled about all safety risks that were identified. Son installed land line phone, ramp and shower. Suraje helps with cleaning.     Functional Assessment ADLs  Are there any barriers preventing you from cooking for yourself or meeting nutritional needs?  No.    Are there any barriers preventing you from driving safely or obtaining transportation?  No. Daughters always gives her rides  Are there any barriers preventing you from using a telephone or calling for help?  No.    Are there any barriers preventing you from shopping?  No.    Are there any barriers preventing you from taking care of your own finances?  No.    Are there any barriers preventing you from managing your medications?  No.    Are there any barriers preventing you from showering, bathing or dressing yourself?  No.    Are you currently engaging in any exercise or physical activity?  Yes.  Goes on walks outside   What is your perception of your health?  Excellent    Advance Care Planning  Do you have an Advance Directive, Living Will, Durable Power of , or POLST? No. Packet provided to patient.                  Health Maintenance Summary            Overdue - IMM ZOSTER VACCINES (1  of 2) Overdue - never done      No completion history exists for this topic.              Overdue - Annual Wellness Visit (Every 366 Days) Overdue since 12/19/2014 12/18/2013  Done              Overdue - COVID-19 Vaccine (3 - Moderna series) Overdue since 9/3/2021      07/09/2021  Imm Admin: MODERNA SARS-COV-2 VACCINE (12+)    06/11/2021  Imm Admin: MODERNA SARS-COV-2 VACCINE (12+)              Ordered - FASTING LIPID PROFILE (Yearly) Ordered on 2/6/2023 06/06/2022  Lipid Profile    12/09/2019  Lipid Profile    12/20/2018  Lipid Profile (Lipid Panel) Fasting    03/21/2018  LIPID PROFILE    06/28/2017  LIPID PROFILE    Only the first 5 history entries have been loaded, but more history exists.              IMM INFLUENZA (1) Next due on 9/1/2023      10/10/2022  Imm Admin: Influenza Vaccine Adult HD    09/25/2020  Imm Admin: Influenza Vaccine Quad Recombinant    10/15/2019  Imm Admin: Influenza Vaccine Adult HD    09/21/2018  Imm Admin: Influenza Vaccine Adult HD    09/13/2017  Imm Admin: Influenza Vaccine Adult HD    Only the first 5 history entries have been loaded, but more history exists.              A1C SCREENING (Every 6 Months) Next due on 11/10/2023      05/10/2023  POCT  A1C    02/06/2023  POCT A1C    08/01/2022  POCT Hemoglobin A1C    02/28/2022  POCT Hemoglobin A1C    08/27/2021  POCT Hemoglobin A1C    Only the first 5 history entries have been loaded, but more history exists.              DIABETES MONOFILAMENT / LE EXAM (Yearly) Next due on 2/6/2024 02/06/2023  Diabetic Monofilament Lower Extremity Exam    02/06/2023  SmartData: WORKFLOW - DIABETES - DIABETIC FOOT EXAM PERFORMED    02/28/2022  Diabetic Monofilament Lower Extremity Exam    02/28/2022  SmartData: WORKFLOW - DIABETES - DIABETIC FOOT EXAM PERFORMED    01/04/2021  SmartData: WORKFLOW - DIABETES - DIABETIC FOOT EXAM PERFORMED    Only the first 5 history entries have been loaded, but more history exists.              RETINAL  SCREENING (Yearly) Next due on 2/7/2024 02/07/2023  POCT Retinal Eye Exam    01/23/2020  POCT Retinal Eye Exam              SERUM CREATININE (Yearly) Next due on 4/5/2024 04/05/2023  Basic Metabolic Panel    06/06/2022  Comp Metabolic Panel    12/09/2019  Comp Metabolic Panel    07/22/2019  Basic Metabolic Panel    01/26/2019  BASIC METABOLIC PANEL    Only the first 5 history entries have been loaded, but more history exists.              URINE ACR / MICROALBUMIN (Yearly) Next due on 5/10/2024      05/10/2023  Microalbumin Creat Ratio Urine - Clinic Collect    06/06/2022  MICROALBUMIN CREAT RATIO URINE    12/09/2019  MICROALBUMIN CREAT RATIO URINE              BONE DENSITY (Every 5 Years) Next due on 1/20/2026 01/20/2021  DS-BONE DENSITY STUDY (DEXA)              IMM DTaP/Tdap/Td Vaccine (2 - Td or Tdap) Next due on 1/23/2030 01/23/2020  Imm Admin: Tdap Vaccine              IMM PNEUMOCOCCAL VACCINE: 65+ Years (Series Information) Completed      09/15/2016  Imm Admin: Pneumococcal polysaccharide vaccine (PPSV-23)    09/07/2015  Imm Admin: Pneumococcal Conjugate Vaccine (Prevnar/PCV-13)              HPV Vaccines (Series Information) Aged Out      No completion history exists for this topic.              IMM MENINGOCOCCAL ACWY VACCINE (Series Information) Aged Out      No completion history exists for this topic.              Discontinued - COLORECTAL CANCER SCREENING  Discontinued        Frequency changed to Never automatically (Topic No Longer Applies)    12/18/2013  COLONOSCOPY (Patient Declined)              Discontinued - IMM HEP B VACCINE  Discontinued      No completion history exists for this topic.                    Patient Care Team:  CHACORTA Issa as PCP - General (Nurse Practitioner)  Garett Kohli M.D. (Surgery)  [facility]        Social History     Tobacco Use    Smoking status: Former     Packs/day: 2.00     Years: 12.00     Pack years:  "24.00     Types: Cigarettes     Quit date:      Years since quittin.5    Smokeless tobacco: Never   Vaping Use    Vaping Use: Never used   Substance Use Topics    Alcohol use: Not Currently     Comment: rarely    Drug use: No     Family History   Problem Relation Age of Onset    Lung Disease Mother 20        TB    Stroke Father 42     She  has a past medical history of Carotid artery disease (HCC) (2010), CPPD (pseudo-gout) (2011), Diabetes (HCC) (2018), GOUT (2011), History of skin cancer (2016), Hyperlipidemia (2010), Hypertension (2010), PAD (peripheral artery disease) (2010), Pre-diabetes (2010), S/P hip replacement (2011), and Superficial thrombophlebitis of right upper extremity (2021).   Past Surgical History:   Procedure Laterality Date    INCISION AND DRAINAGE GENERAL Left 2019    Procedure: INCISION AND DRAINAGE GENERAL- GROIN WOUND WITH WOUND VAC PLACEMENT;  Surgeon: Garett Kohli M.D.;  Location: SURGERY [facility];  Service: Vascular    THROMBECTOMY  2018    Procedure: THROMBECTOMY;  Surgeon: Graett Kohli M.D.;  Location: SURGERY [facility];  Service: Vascular    AORTOFEMORAL BYPASS      CAROTID ENDARTERECTOMY      right    CATARACT PHACO WITH IOL      NEUROMA EXCISION      in  from foot    TOENAIL REMOVAL      in         Exam:   Vital signs reviewed   /58 (BP Location: Right arm, Patient Position: Sitting, BP Cuff Size: Adult)   Pulse 74   Temp 36.1 °C (97 °F) (Temporal)   Ht 1.6 m (5' 3\")   Wt 68.9 kg (152 lb)   SpO2 95%  Body mass index is 26.93 kg/m².    Hearing fair.    Dentition upper and lower dentures.   Alert, oriented in no acute distress. Daughter present.   Eye contact is good, speech goal directed, affect calm.  Lungs: Normal effort, CTA bilaterally, no wheezes, rhonchi, or rales.    CV: Regular rate and rhythm. No murmurs, rubs, or gallops.  Ext: Trace RLE " edema.      Assessment and Plan. The following treatment and monitoring plan is recommended:      1. Medicare annual wellness visit, subsequent  Acute uncomplicated problem.  Annual wellness exam completed today.  She declines further memory screening today.  Declines home health referral.  Discussed with daughter that if they changes her mind to let me know and we can place home health referral.  Patient does not drive.    2. Type 2 diabetes mellitus with microalbuminuria, without long-term current use of insulin (HCC)  Chronic stable problem.  Continue with diet control.  Continue 6 months A1c.  Declines ACE inhibitor today.    3. Primary hypertension  Chronic stable problem.  Blood pressure controlled.  Continue amlodipine 5 mg daily and hydrochlorothiazide 12.5 mg daily.    4. Hyperlipidemia, unspecified hyperlipidemia type  Chronic stable problem.  Lipid panel lab order reprinted.  Continue rosuvastatin 5 mg daily.    5. Embolism and thrombosis of arteries of the lower extremities (HCC)  Chronic stable problem.  Continue warfarin.  Continue follow-up with anticoagulation therapy.    6. Vitamin D deficiency  Chronic stable problem.  Continue vitamin D at 1000 units daily.    7. Risk for falls  Chronic stable problem.  Encouraged to make sure the rug is secure at home.  She does have life alert.  Declines home health referral for physical therapy.  - Patient identified as fall risk.  Appropriate orders and counseling given.        Services suggested: No services needed at this time.  Referral to anticoagulation clinic in place.  Declines referral to home health today.  Health Care Screening: Age-appropriate preventive services recommended by USPTF and ACIP covered by Medicare were discussed today. Services ordered if indicated and agreed upon by the patient.  Referrals offered: Community-based lifestyle interventions to reduce health risks and promote self-management and wellness, fall prevention, nutrition,  physical activity, tobacco-use cessation, weight loss, and mental health services as per orders if indicated.    Discussion today about general wellness and lifestyle habits:    Prevent falls and reduce trip hazards; Cautioned about securing or removing rugs.  Have a working fire alarm and carbon monoxide detector;   Engage in regular physical activity and social activities     Follow-up: Return in about 3 months (around 10/26/2023) for Hypertension, Diabetes, A1C.

## 2023-07-26 NOTE — ASSESSMENT & PLAN NOTE
Blood pressure today 132/58. Continues hydrochlorothiazide 12.5 mg daily and amlodipine 5 mg daily.

## 2023-07-26 NOTE — ASSESSMENT & PLAN NOTE
Continues follow-up with anticoagulation clinic and continues warfarin. INR 1 day ago therapeutic.

## 2023-08-02 ENCOUNTER — DOCUMENTATION (OUTPATIENT)
Dept: HEALTH INFORMATION MANAGEMENT | Facility: OTHER | Age: 88
End: 2023-08-02

## 2023-08-15 ENCOUNTER — ANTICOAGULATION VISIT (OUTPATIENT)
Dept: MEDICAL GROUP | Facility: PHYSICIAN GROUP | Age: 88
End: 2023-08-15
Payer: MEDICARE

## 2023-08-15 DIAGNOSIS — Z79.01 CHRONIC ANTICOAGULATION: Primary | ICD-10-CM

## 2023-08-15 DIAGNOSIS — I73.9 PAD (PERIPHERAL ARTERY DISEASE) (HCC): ICD-10-CM

## 2023-08-15 LAB — INR PPP: 2.9 (ref 2–3.5)

## 2023-08-15 PROCEDURE — 85610 PROTHROMBIN TIME: CPT | Performed by: NURSE PRACTITIONER

## 2023-08-15 PROCEDURE — 93793 ANTICOAG MGMT PT WARFARIN: CPT | Performed by: NURSE PRACTITIONER

## 2023-08-15 NOTE — PROGRESS NOTES
Anticoagulation Summary  As of 8/15/2023      INR goal:  2.0-3.0   TTR:  64.9 % (4.5 y)   INR used for dosin.90 (8/15/2023)   Warfarin maintenance plan:  3.75 mg (2.5 mg x 1.5) every day   Weekly warfarin total:  26.25 mg   Plan last modified:  Fred Nayak, PharmD (2023)   Next INR check:  2023   Target end date:  Indefinite    Indications    Chronic anticoagulation [Z79.01]  PAD (peripheral artery disease) (HCC) [I73.9]                 Anticoagulation Episode Summary       INR check location:      Preferred lab:      Send INR reminders to:      Comments:  Jamir BARBOSA          Anticoagulation Care Providers       Provider Role Specialty Phone number    CHACORTA Issa Referring Family Medicine [phone]    [facility] Anticoagulation [facility] Responsible  [phone]          Anticoagulation Patient Findings  Patient Findings       Negatives:  Signs/symptoms of thrombosis, Signs/symptoms of bleeding, Laboratory test error suspected, Change in health, Change in alcohol use, Change in activity, Upcoming invasive procedure, Emergency department visit, Upcoming dental procedure, Missed doses, Extra doses, Change in medications, Change in diet/appetite, Hospital admission, Bruising, Other complaints                  HPI:   Radha Verduzco seen in clinic today, on anticoagulation therapy with warfarin due to 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  She returns to clinic today to recheck INR to ensure it is therapeutic and thus preventing possible clotting and/or bleeding/bruising complications.    CHADS-VASc = n/a  (unadjusted ischemic stroke risk/year:  n/a)    Does patient have any changes to current medical/health status since last appt (Y/N):  no  Does patient have any signs/symptoms of bleeding and/or thrombosis since the  last appt (Y/N):  no  Does patient have any interval changes to diet or medications since last appt (Y/N):  no  Are there any complications or cost restrictions with current therapy (Y/N):  no     Does patient have [facility] PCP? Yes, CHACORTA Issa (If not, please document discussion that patient must be seen at [facility])       Vitals:  declined today    There were no vitals filed for this visit.     Asssessment:      INR therapeutic at 2.9, therefore decreasing patients VTE and or bleeding risk   Reason(s) for out of range INR today:  n/a      Pt is not on antiplatelet therapy    Medication reconciliation completed today.    Plan:  Pt is to continue with current warfarin dosing regimen.     Follow up:  Because warfarin is a high risk medication and current CHEST guidelines recommend regular monitoring intervals (few days up to 12 weeks), will have patient return to clinic in 4 weeks to recheck INR.    Fred Nayak, PharmD, BCACP

## 2023-09-12 ENCOUNTER — ANTICOAGULATION VISIT (OUTPATIENT)
Dept: MEDICAL GROUP | Facility: PHYSICIAN GROUP | Age: 88
End: 2023-09-12
Payer: MEDICARE

## 2023-09-12 DIAGNOSIS — I73.9 PAD (PERIPHERAL ARTERY DISEASE) (HCC): ICD-10-CM

## 2023-09-12 DIAGNOSIS — Z79.01 CHRONIC ANTICOAGULATION: Primary | ICD-10-CM

## 2023-09-12 LAB — INR PPP: 2.1 (ref 2–3.5)

## 2023-09-12 PROCEDURE — 85610 PROTHROMBIN TIME: CPT | Performed by: NURSE PRACTITIONER

## 2023-09-12 PROCEDURE — 93793 ANTICOAG MGMT PT WARFARIN: CPT | Performed by: NURSE PRACTITIONER

## 2023-09-12 NOTE — PROGRESS NOTES
Anticoagulation Summary  As of 2023      INR goal:  2.0-3.0   TTR:  65.5 % (4.6 y)   INR used for dosin.10 (2023)   Warfarin maintenance plan:  3.75 mg (2.5 mg x 1.5) every day   Weekly warfarin total:  26.25 mg   Plan last modified:  Fred Nayak, PharmD (2023)   Next INR check:  10/24/2023   Target end date:  Indefinite    Indications    Chronic anticoagulation [Z79.01]  PAD (peripheral artery disease) (HCC) [I73.9]                 Anticoagulation Episode Summary       INR check location:      Preferred lab:      Send INR reminders to:      Comments:  Jamir BARBOSA          Anticoagulation Care Providers       Provider Role Specialty Phone number    CHACORTA Issa Referring Family Medicine [phone]    [facility] Anticoagulation Services Responsible  [phone]          Anticoagulation Patient Findings  Patient Findings       Negatives:  Signs/symptoms of thrombosis, Signs/symptoms of bleeding, Laboratory test error suspected, Change in health, Change in alcohol use, Change in activity, Upcoming invasive procedure, Emergency department visit, Upcoming dental procedure, Missed doses, Extra doses, Change in medications, Change in diet/appetite, Hospital admission, Bruising, Other complaints                  HPI:   Radha Verduzco seen in clinic today, on anticoagulation therapy with warfarin due to 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  She returns to clinic today to recheck INR to ensure it is therapeutic and thus preventing possible clotting and/or bleeding/bruising complications.     CHADS-VASc = n/a  (unadjusted ischemic stroke risk/year:  n/a)     Does patient have any changes to current medical/health status since last appt (Y/N):  no  Does patient have any signs/symptoms of bleeding and/or thrombosis since  the last appt (Y/N):  no  Does patient have any interval changes to diet or medications since last appt (Y/N):  no  Are there any complications or cost restrictions with current therapy (Y/N):  no     Does patient have [facility] PCP? Yes, CHACORTA Issa (If not, please document discussion that patient must be seen at [facility])       Vitals:  declined today    There were no vitals filed for this visit.     Asssessment:      INR therapeutic at 2.1, therefore decreasing VTE and /or bleeding risk   Reason(s) for out of range INR today:  n/a      Pt is not on antiplatelet therapy    Medication reconciliation completed today.    Plan:  Pt is to continue with current warfarin dosing regimen.     Follow up:  Because warfarin is a high risk medication and current CHEST guidelines recommend regular monitoring intervals (few days up to 12 weeks), will have patient return to clinic in 6 weeks to recheck INR.    Fred Nayak, PharmD, BCACP

## 2023-10-13 DIAGNOSIS — I10 PRIMARY HYPERTENSION: ICD-10-CM

## 2023-10-15 DIAGNOSIS — E78.5 HYPERLIPIDEMIA, UNSPECIFIED HYPERLIPIDEMIA TYPE: ICD-10-CM

## 2023-10-16 RX ORDER — ROSUVASTATIN CALCIUM 5 MG/1
TABLET, COATED ORAL
Qty: 90 TABLET | Refills: 1 | Status: SHIPPED | OUTPATIENT
Start: 2023-10-16 | End: 2024-03-12

## 2023-10-16 RX ORDER — HYDROCHLOROTHIAZIDE 12.5 MG/1
12.5 TABLET ORAL DAILY
Qty: 90 TABLET | Refills: 3 | Status: SHIPPED | OUTPATIENT
Start: 2023-10-16

## 2023-10-16 NOTE — TELEPHONE ENCOUNTER
Requested Prescriptions     Signed Prescriptions Disp Refills    hydroCHLOROthiazide (HYDRODIURIL) 12.5 MG tablet 90 Tablet 3     Sig: TAKE 1 TABLET BY MOUTH  DAILY     Authorizing Provider: JANE CHEN A.P.R.N.

## 2023-10-16 NOTE — TELEPHONE ENCOUNTER
Received request via: Pharmacy    Was the patient seen in the last year in this department? Yes    Does the patient have an active prescription (recently filled or refills available) for medication(s) requested? Yes different pharmacy     Does the patient have [facility] and need 100 day supply (blood pressure, diabetes and cholesterol meds only)? Patient does not have [facility]

## 2023-10-17 NOTE — TELEPHONE ENCOUNTER
Requested Prescriptions     Signed Prescriptions Disp Refills    rosuvastatin (CRESTOR) 5 MG Tab 90 Tablet 1     Sig: TAKE 1 TABLET BY MOUTH  DAILY     Authorizing Provider: JANE CHEN A.P.R.N.

## 2023-10-24 ENCOUNTER — ANTICOAGULATION VISIT (OUTPATIENT)
Dept: MEDICAL GROUP | Facility: PHYSICIAN GROUP | Age: 88
End: 2023-10-24
Payer: MEDICARE

## 2023-10-24 DIAGNOSIS — I73.9 PAD (PERIPHERAL ARTERY DISEASE) (HCC): ICD-10-CM

## 2023-10-24 DIAGNOSIS — Z79.01 CHRONIC ANTICOAGULATION: Primary | ICD-10-CM

## 2023-10-24 LAB — INR PPP: 2.3 (ref 2–3.5)

## 2023-10-24 PROCEDURE — 85610 PROTHROMBIN TIME: CPT | Performed by: NURSE PRACTITIONER

## 2023-10-24 PROCEDURE — 93793 ANTICOAG MGMT PT WARFARIN: CPT | Performed by: NURSE PRACTITIONER

## 2023-10-24 NOTE — PROGRESS NOTES
Anticoagulation Summary  As of 10/24/2023      INR goal:  2.0-3.0   TTR:  66.4 % (4.7 y)   INR used for dosin.30 (10/24/2023)   Warfarin maintenance plan:  3.75 mg (2.5 mg x 1.5) every day   Weekly warfarin total:  26.25 mg   Plan last modified:  Fred Nayak, PharmD (2023)   Next INR check:  2023   Target end date:  Indefinite    Indications    Chronic anticoagulation [Z79.01]  PAD (peripheral artery disease) (HCC) [I73.9]                 Anticoagulation Episode Summary       INR check location:      Preferred lab:      Send INR reminders to:      Comments:  Jamir BARBOSA          Anticoagulation Care Providers       Provider Role Specialty Phone number    CHACORTA Issa Referring Family Medicine [phone]    [facility] Responsible  [phone]          Anticoagulation Patient Findings  Patient Findings       Negatives:  Signs/symptoms of thrombosis, Signs/symptoms of bleeding, Laboratory test error suspected, Change in health, Change in alcohol use, Change in activity, Upcoming invasive procedure, Emergency department visit, Upcoming dental procedure, Missed doses, Extra doses, Change in medications, Change in diet/appetite, Hospital admission, Bruising, Other complaints                  HPI:   Radha Verduzco seen in clinic today, on anticoagulation therapy with warfarin due to 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  She returns to clinic today to recheck INR to ensure it is therapeutic and thus preventing possible clotting and/or bleeding/bruising complications.     CHADS-VASc = n/a  (unadjusted ischemic stroke risk/year:  n/a)    Does patient have any changes to current medical/health status since last appt (Y/N):  NO  Does patient have any signs/symptoms of bleeding and/or thrombosis since  the last appt (Y/N):  NO  Does patient have any interval changes to diet or medications since last appt (Y/N):  NO  Are there any complications or cost restrictions with current therapy (Y/N):  NO     Does patient have [facility] PCP? Yes, CHACORTA Issa (If not, please document discussion that patient must be seen at [facility])       Vitals:  declined today    There were no vitals filed for this visit.     Asssessment:      INR therapeutic at 2.3, therefore decreasing VTE and/or bleeding risk.   Reason(s) for out of range INR today:  n/a      Pt is not on antiplatelet therapy    Medication reconciliation completed today.    Plan:  Pt is to continue with current warfarin dosing regimen.     Follow up:  Because warfarin is a high risk medication and current CHEST guidelines recommend regular monitoring intervals (few days up to 12 weeks), will have patient return to clinic in 8 weeks to recheck INR.    Fred Nayak, PharmD, BCACP

## 2023-11-15 DIAGNOSIS — I10 PRIMARY HYPERTENSION: ICD-10-CM

## 2023-11-16 RX ORDER — AMLODIPINE BESYLATE 5 MG/1
5 TABLET ORAL DAILY
Qty: 90 TABLET | Refills: 0 | Status: SHIPPED | OUTPATIENT
Start: 2023-11-16 | End: 2024-01-17

## 2023-11-17 NOTE — TELEPHONE ENCOUNTER
Requested Prescriptions     Pending Prescriptions Disp Refills    amLODIPine (NORVASC) 5 MG Tab [Pharmacy Med Name: amLODIPine Besylate 5 MG Oral Tablet] 90 Tablet 0     Sig: TAKE 1 TABLET BY MOUTH  DAILY       CHACORTA Belle

## 2023-12-19 ENCOUNTER — ANTICOAGULATION VISIT (OUTPATIENT)
Dept: MEDICAL GROUP | Facility: PHYSICIAN GROUP | Age: 88
End: 2023-12-19
Payer: MEDICARE

## 2023-12-19 DIAGNOSIS — I73.9 PAD (PERIPHERAL ARTERY DISEASE) (HCC): ICD-10-CM

## 2023-12-19 DIAGNOSIS — Z79.01 CHRONIC ANTICOAGULATION: Primary | ICD-10-CM

## 2023-12-19 LAB — INR PPP: 2.3 (ref 2–3.5)

## 2023-12-19 PROCEDURE — 93793 ANTICOAG MGMT PT WARFARIN: CPT | Performed by: NURSE PRACTITIONER

## 2023-12-19 PROCEDURE — 85610 PROTHROMBIN TIME: CPT | Performed by: NURSE PRACTITIONER

## 2023-12-19 NOTE — PROGRESS NOTES
Anticoagulation Summary  As of 2023      INR goal:  2.0-3.0   TTR:  67.4 % (4.9 y)   INR used for dosin.30 (2023)   Warfarin maintenance plan:  3.75 mg (2.5 mg x 1.5) every day   Weekly warfarin total:  26.25 mg   Plan last modified:  Fred Nayak, PharmD (2023)   Next INR check:  2024   Target end date:  Indefinite    Indications    Chronic anticoagulation [Z79.01]  PAD (peripheral artery disease) (HCC) [I73.9]                 Anticoagulation Episode Summary       INR check location:  Anticoagulation Clinic    Preferred lab:      Send INR reminders to:      Comments:            Anticoagulation Care Providers       Provider Role Specialty Phone number    CHACORTA Issa Referring Nurse Practitioner Family [phone]    [facility] Responsible  [phone]          Anticoagulation Patient Findings  Patient Findings       Negatives:  Signs/symptoms of thrombosis, Signs/symptoms of bleeding, Laboratory test error suspected, Change in health, Change in alcohol use, Change in activity, Upcoming invasive procedure, Emergency department visit, Upcoming dental procedure, Missed doses, Extra doses, Change in medications, Change in diet/appetite, Hospital admission, Bruising, Other complaints                  HPI:   Radha Verduzco seen in clinic today, on anticoagulation therapy with warfarin 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  She returns to clinic today to recheck INR to ensure it is therapeutic and thus preventing possible clotting and/or bleeding/bruising complications.    CHADS-VASc = n/a  (unadjusted ischemic stroke risk/year:  n/a)    Does patient have any changes to current medical/health status since last appt (Y/N):  NO  Does patient have any signs/symptoms of bleeding  and/or thrombosis since the last appt (Y/N):  NO  Does patient have any interval changes to diet or medications since last appt (Y/N):  NO  Are there any complications or cost restrictions with current therapy (Y/N):  NO     Does patient have [facility] PCP? Yes, CHACORTA Issa (If not, please document discussion that patient must be seen at [facility])       Vitals:  declined today    There were no vitals filed for this visit.     Asssessment:      INR therapeutic at 2.3, therefore decreasing VTE and/or bleeding risk.   Reason(s) for out of range INR today:  n/a      Pt is not on antiplatelet therapy    Medication reconciliation completed today.    Plan:  Pt is to continue with current warfarin dosing regimen.     Follow up:  Because warfarin is a high risk medication and current CHEST guidelines recommend regular monitoring intervals (few days up to 12 weeks), will have patient return to clinic in 9 weeks to recheck INR.    Fred Nayak, PharmD, BCACP

## 2024-01-16 DIAGNOSIS — I10 PRIMARY HYPERTENSION: ICD-10-CM

## 2024-01-17 RX ORDER — AMLODIPINE BESYLATE 5 MG/1
5 TABLET ORAL DAILY
Qty: 90 TABLET | Refills: 3 | Status: SHIPPED | OUTPATIENT
Start: 2024-01-17

## 2024-01-18 NOTE — TELEPHONE ENCOUNTER
Requested Prescriptions     Signed Prescriptions Disp Refills    amLODIPine (NORVASC) 5 MG Tab 90 Tablet 3     Sig: TAKE 1 TABLET BY MOUTH DAILY     Authorizing Provider: JANE CHEN A.P.R.N.

## 2024-01-23 ENCOUNTER — TELEPHONE (OUTPATIENT)
Dept: HEALTH INFORMATION MANAGEMENT | Facility: OTHER | Age: 89
End: 2024-01-23
Payer: MEDICARE

## 2024-02-06 NOTE — TELEPHONE ENCOUNTER
Received request via: Patient    Was the patient seen in the last year in this department? Yes    Does the patient have an active prescription (recently filled or refills available) for medication(s) requested? No    Pharmacy Name: [facility]    Does the patient have shelter [facility] and need 100 day supply (blood pressure, diabetes and cholesterol meds only)? Patient does not have SCP

## 2024-02-07 RX ORDER — WARFARIN SODIUM 2.5 MG/1
TABLET ORAL
Qty: 135 TABLET | Refills: 0 | Status: SHIPPED | OUTPATIENT
Start: 2024-02-07

## 2024-02-20 ENCOUNTER — ANTICOAGULATION VISIT (OUTPATIENT)
Dept: MEDICAL GROUP | Facility: PHYSICIAN GROUP | Age: 89
End: 2024-02-20
Payer: MEDICARE

## 2024-02-20 DIAGNOSIS — Z79.01 CHRONIC ANTICOAGULATION: Primary | ICD-10-CM

## 2024-02-20 DIAGNOSIS — I73.9 PAD (PERIPHERAL ARTERY DISEASE) (HCC): ICD-10-CM

## 2024-02-20 LAB — INR PPP: 1.8 (ref 2–3.5)

## 2024-02-20 PROCEDURE — 85610 PROTHROMBIN TIME: CPT | Performed by: NURSE PRACTITIONER

## 2024-02-20 PROCEDURE — 99211 OFF/OP EST MAY X REQ PHY/QHP: CPT | Performed by: NURSE PRACTITIONER

## 2024-02-20 NOTE — PROGRESS NOTES
Anticoagulation Summary  As of 2024      INR goal:  2.0-3.0   TTR:  67.2% (5.1 y)   INR used for dosin.80 (2024)   Warfarin maintenance plan:  3.75 mg (2.5 mg x 1.5) every day   Weekly warfarin total:  26.25 mg   Plan last modified:  Fred Nayak, PharmD (2023)   Next INR check:  3/19/2024   Target end date:  Indefinite    Indications    Chronic anticoagulation [Z79.01]  PAD (peripheral artery disease) (HCC) [I73.9]                 Anticoagulation Episode Summary       INR check location:  Anticoagulation Clinic    Preferred lab:      Send INR reminders to:      Comments:            Anticoagulation Care Providers       Provider Role Specialty Phone number    CHACORTA Issa Referring Family Medicine [phone]    [facility] Responsible  [phone]          Anticoagulation Patient Findings  Patient Findings       Negatives:  Signs/symptoms of thrombosis, Signs/symptoms of bleeding, Laboratory test error suspected, Change in health, Change in alcohol use, Change in activity, Upcoming invasive procedure, Emergency department visit, Upcoming dental procedure, Missed doses, Extra doses, Change in medications, Change in diet/appetite, Hospital admission, Bruising, Other complaints                  HPI:   Radha Ruiz Dax seen in clinic today, on anticoagulation therapy with warfarin 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    Patient's previous INR was therapeutic at 2.3 on 23, at which time patient was instructed to continue with current warfarin regimen.  She returns to clinic today to recheck INR to ensure it is therapeutic and thus preventing possible clotting and/or bleeding/bruising complications.    CHADS-VASc = n/a  (unadjusted ischemic stroke risk/year:  n/a)    Does patient have any changes to current medical/health status since last appt (Y/N):  NO  Does patient have any signs/symptoms of bleeding and/or thrombosis  since the last appt (Y/N):  NO  Does patient have any interval changes to diet or medications since last appt (Y/N):  NO  Are there any complications or cost restrictions with current therapy (Y/N):  NO     Does patient have [facility] PCP? Yes, CHACORTA Issa (If not, please document discussion that patient must be seen at [facility])       Vitals:  neglected in error today    There were no vitals filed for this visit.     Asssessment:      INR subtherapeutic at 1.8, therefore increasing VTE risk.   Reason(s) for out of range INR today:  etiology unknown.      Pt is not on antiplatelet therapy    Medication reconciliation completed today.    Plan:  Pt is to bolus with 5mg X 1, then continue with current warfarin dosing regimen.     Follow up:  Because warfarin is a high risk medication and current CHEST guidelines recommend regular monitoring intervals (few days up to 12 weeks), will have patient return to clinic in 4 weeks to recheck INR.    Fred Nayak, PharmD, BCACP

## 2024-02-28 DIAGNOSIS — I73.9 PAD (PERIPHERAL ARTERY DISEASE) (HCC): ICD-10-CM

## 2024-03-11 DIAGNOSIS — E78.5 HYPERLIPIDEMIA, UNSPECIFIED HYPERLIPIDEMIA TYPE: ICD-10-CM

## 2024-03-12 RX ORDER — ROSUVASTATIN CALCIUM 5 MG/1
TABLET, COATED ORAL
Qty: 90 TABLET | Refills: 0 | Status: SHIPPED | OUTPATIENT
Start: 2024-03-12

## 2024-03-12 NOTE — TELEPHONE ENCOUNTER
Requested Prescriptions     Signed Prescriptions Disp Refills    rosuvastatin (CRESTOR) 5 MG Tab 90 Tablet 0     Sig: TAKE 1 TABLET BY MOUTH DAILY     Authorizing Provider: JANE CHEN A.P.R.N.

## 2024-03-12 NOTE — TELEPHONE ENCOUNTER
Received request via: Pharmacy    Was the patient seen in the last year in this department? Yes    Does the patient have an active prescription (recently filled or refills available) for medication(s) requested? No    [facility]    Does the patient have [facility] living [facility] and need 100 day supply (blood pressure, diabetes and cholesterol meds only)? Patient does not have SCP

## 2024-04-05 DIAGNOSIS — I73.9 PAD (PERIPHERAL ARTERY DISEASE) (HCC): ICD-10-CM

## 2024-04-05 DIAGNOSIS — Z79.01 CHRONIC ANTICOAGULATION: ICD-10-CM

## 2024-04-09 ENCOUNTER — ANTICOAGULATION VISIT (OUTPATIENT)
Dept: MEDICAL GROUP | Facility: PHYSICIAN GROUP | Age: 89
End: 2024-04-09
Payer: MEDICARE

## 2024-04-09 VITALS — OXYGEN SATURATION: 94 % | HEART RATE: 68 BPM | HEIGHT: 63 IN | BODY MASS INDEX: 26.93 KG/M2 | RESPIRATION RATE: 15 BRPM

## 2024-04-09 DIAGNOSIS — I73.9 PAD (PERIPHERAL ARTERY DISEASE) (HCC): ICD-10-CM

## 2024-04-09 DIAGNOSIS — Z79.01 CHRONIC ANTICOAGULATION: Primary | ICD-10-CM

## 2024-04-09 LAB — INR PPP: 2 (ref 2–3.5)

## 2024-04-09 PROCEDURE — 85610 PROTHROMBIN TIME: CPT | Performed by: NURSE PRACTITIONER

## 2024-04-09 PROCEDURE — 93793 ANTICOAG MGMT PT WARFARIN: CPT | Performed by: NURSE PRACTITIONER

## 2024-04-09 NOTE — PROGRESS NOTES
Anticoagulation Summary  As of 2024      INR goal:  2.0-3.0   TTR:  65.4% (5.2 y)   INR used for dosin.00 (2024)   Warfarin maintenance plan:  3.75 mg (2.5 mg x 1.5) every day   Weekly warfarin total:  26.25 mg   Plan last modified:  Fred Nayak, PharmD (2023)   Next INR check:  2024   Target end date:  Indefinite    Indications    Chronic anticoagulation [Z79.01]  PAD (peripheral artery disease) (HCC) [I73.9]                 Anticoagulation Episode Summary       INR check location:  Anticoagulation Clinic    Preferred lab:      Send INR reminders to:      Comments:            Anticoagulation Care Providers       Provider Role Specialty Phone number    CHACORTA Issa Referring Family Medicine [phone]    [facility] Responsible  [phone]          Anticoagulation Patient Findings  Patient Findings       Negatives:  Signs/symptoms of thrombosis, Signs/symptoms of bleeding, Laboratory test error suspected, Change in health, Change in alcohol use, Change in activity, Upcoming invasive procedure, Emergency department visit, Upcoming dental procedure, Missed doses, Extra doses, Change in medications, Change in diet/appetite, Hospital admission, Bruising, Other complaints                  HPI:   Radha Ann Dax seen in clinic today, on anticoagulation therapy with warfarin 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    Patient's previous INR was subtherapeutic at 1.8 on 24, at which time patient was instructed to bolus with one dose, then resume current warfarin regimen.  She returns to clinic today to recheck INR to ensure it is therapeutic and thus preventing possible clotting and/or bleeding/bruising complications.    CHADS-VASc = n/a  (unadjusted ischemic stroke risk/year:  n/a)    Does patient have any changes to current medical/health status since last appt (Y/N):  NO  Does patient have any signs/symptoms of bleeding  "and/or thrombosis since the last appt (Y/N):  NO  Does patient have any interval changes to diet or medications since last appt (Y/N):  NO  Are there any complications or cost restrictions with current therapy (Y/N):  NO     Does patient have [facility] PCP? Yes, CHACORTA Issa (If not, please document discussion that patient must be seen at [facility])       Vitals:       Vitals:    04/09/24 1510   Pulse: 68   Resp: 15   SpO2: 94%   Height: 1.6 m (5' 2.99\")        Asssessment:      INR therapeutic at 2.0, therefore decreasing VTE and/or bleeding risk.   Reason(s) for out of range INR today:  n/a      Pt is not on antiplatelet therapy    Medication reconciliation completed today.    Plan:  Pt is to continue with current warfarin dosing regimen.     Follow up:  Because warfarin is a high risk medication and current CHEST guidelines recommend regular monitoring intervals (few days up to 12 weeks), will have patient return to clinic in 7 weeks to recheck INR.    Fred Nayak, PharmD, BCACP    "

## 2024-04-09 NOTE — TELEPHONE ENCOUNTER
Received request via: Pharmacy    Was the patient seen in the last year in this department? Yes    Does the patient have an active prescription (recently filled or refills available) for medication(s) requested? No    Pharmacy Name: [facility]    Does the patient have [facility] and need 100 day supply (blood pressure, diabetes and cholesterol meds only)? Patient does not have SCP

## 2024-04-10 RX ORDER — WARFARIN SODIUM 2.5 MG/1
TABLET ORAL
Qty: 135 TABLET | Refills: 3 | Status: SHIPPED | OUTPATIENT
Start: 2024-04-10

## 2024-04-10 NOTE — TELEPHONE ENCOUNTER
Requested Prescriptions     Signed Prescriptions Disp Refills    warfarin (COUMADIN) 2.5 MG Tab 135 Tablet 3     Sig: TAKE 1 AND 1/2 TABLETS BY MOUTH  DAILY OR AS DIRECTED BY  ANTICOAGULATION CLINIC     Authorizing Provider: JANE CHEN A.P.R.N.

## 2024-05-13 DIAGNOSIS — E78.5 HYPERLIPIDEMIA, UNSPECIFIED HYPERLIPIDEMIA TYPE: ICD-10-CM

## 2024-05-14 RX ORDER — ROSUVASTATIN CALCIUM 5 MG/1
TABLET, COATED ORAL
Qty: 90 TABLET | Refills: 0 | Status: SHIPPED | OUTPATIENT
Start: 2024-05-14

## 2024-05-14 NOTE — TELEPHONE ENCOUNTER
Received request via: Pharmacy    Was the patient seen in the last year in this department? Yes    Does the patient have an active prescription (recently filled or refills available) for medication(s) requested? No    [facility]    Does the patient have long term Plus and need 100 day supply (blood pressure, diabetes and cholesterol meds only)? Patient does not have SCP

## 2024-05-28 ENCOUNTER — ANTICOAGULATION VISIT (OUTPATIENT)
Dept: MEDICAL GROUP | Facility: PHYSICIAN GROUP | Age: 89
End: 2024-05-28
Payer: MEDICARE

## 2024-05-28 DIAGNOSIS — I73.9 PAD (PERIPHERAL ARTERY DISEASE) (HCC): ICD-10-CM

## 2024-05-28 DIAGNOSIS — Z79.01 CHRONIC ANTICOAGULATION: Primary | ICD-10-CM

## 2024-05-28 LAB — INR PPP: 3.8 (ref 2–3.5)

## 2024-05-28 PROCEDURE — 85610 PROTHROMBIN TIME: CPT | Performed by: NURSE PRACTITIONER

## 2024-05-28 PROCEDURE — 99211 OFF/OP EST MAY X REQ PHY/QHP: CPT | Performed by: NURSE PRACTITIONER

## 2024-05-28 NOTE — PROGRESS NOTES
Anticoagulation Summary  As of 5/28/2024      INR goal:  2.0-3.0   TTR:  65.2% (5.3 y)   INR used for dosing:  3.80 (5/28/2024)   Warfarin maintenance plan:  3.75 mg (2.5 mg x 1.5) every day   Weekly warfarin total:  26.25 mg   Plan last modified:  Fred Nayak, PharmD (6/6/2023)   Next INR check:  6/18/2024   Target end date:  Indefinite    Indications    Chronic anticoagulation [Z79.01]  PAD (peripheral artery disease) (HCC) [I73.9]                 Anticoagulation Episode Summary       INR check location:  Anticoagulation Clinic    Preferred lab:      Send INR reminders to:      Comments:            Anticoagulation Care Providers       Provider Role Specialty Phone number    CHACORTA Issa Referring Family Medicine [phone]    [facility] Responsible  [phone]          Anticoagulation Patient Findings  Patient Findings       Negatives:  Signs/symptoms of thrombosis, Signs/symptoms of bleeding, Laboratory test error suspected, Change in health, Change in alcohol use, Change in activity, Upcoming invasive procedure, Emergency department visit, Upcoming dental procedure, Missed doses, Extra doses, Change in medications, Change in diet/appetite, Hospital admission, Bruising, Other complaints                  HPI:   Radha Ann Dax seen in clinic today, on anticoagulation therapy with warfarin 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    Patient's previous INR was therapeutic at 2.0 on 4-9-24, at which time patient was instructed to continue with current warfarin regimen.  She returns to clinic today to recheck INR to ensure it is therapeutic and thus preventing possible clotting and/or bleeding/bruising complications.    CHADS-VASc = n/a  (unadjusted ischemic stroke risk/year:  n/a)    Does patient have any changes to current medical/health status since last appt (Y/N):  NO  Does patient have any signs/symptoms of bleeding and/or thrombosis since  the last appt (Y/N):  NO  Does patient have any interval changes to diet or medications since last appt (Y/N):  NO  Are there any complications or cost restrictions with current therapy (Y/N):  NO     Does patient have [facility] PCP? Yes, CHACORTA Issa (If not, please document discussion that patient must be seen at [facility])       Vitals:      There were no vitals filed for this visit.     Asssessment:      INR supratherapeutic at 3.8, therefore increasing bleeding risk.   Reason(s) for out of range INR today:  etiology unknown      Pt is not on antiplatelet therapy    Medication reconciliation completed today.    Plan:  Pt is to HOLD X 1, then continue with current warfarin dosing regimen.     Follow up:  Because warfarin is a high risk medication and current CHEST guidelines recommend regular monitoring intervals (few days up to 12 weeks), will have patient return to clinic in 3 weeks to recheck INR.     Fred Nayak, PharmD, BCACP

## 2024-06-18 ENCOUNTER — ANTICOAGULATION VISIT (OUTPATIENT)
Dept: MEDICAL GROUP | Facility: PHYSICIAN GROUP | Age: 89
End: 2024-06-18
Payer: MEDICARE

## 2024-06-18 VITALS
HEIGHT: 63 IN | RESPIRATION RATE: 14 BRPM | BODY MASS INDEX: 26.91 KG/M2 | WEIGHT: 151.9 LBS | HEART RATE: 66 BPM | OXYGEN SATURATION: 98 %

## 2024-06-18 DIAGNOSIS — I73.9 PAD (PERIPHERAL ARTERY DISEASE) (HCC): ICD-10-CM

## 2024-06-18 DIAGNOSIS — Z79.01 CHRONIC ANTICOAGULATION: Primary | ICD-10-CM

## 2024-06-18 LAB — INR PPP: 2.4 (ref 2–3.5)

## 2024-06-18 PROCEDURE — 93793 ANTICOAG MGMT PT WARFARIN: CPT | Performed by: NURSE PRACTITIONER

## 2024-06-18 PROCEDURE — 85610 PROTHROMBIN TIME: CPT | Performed by: NURSE PRACTITIONER

## 2024-06-18 NOTE — PROGRESS NOTES
Anticoagulation Summary  As of 2024      INR goal:  2.0-3.0   TTR:  64.9% (5.4 y)   INR used for dosin.40 (2024)   Warfarin maintenance plan:  3.75 mg (2.5 mg x 1.5) every day   Weekly warfarin total:  26.25 mg   Plan last modified:  Fred Nayak, PharmD (2023)   Next INR check:  2024   Target end date:  Indefinite    Indications    Chronic anticoagulation [Z79.01]  PAD (peripheral artery disease) (HCC) [I73.9]                 Anticoagulation Episode Summary       INR check location:  Anticoagulation Clinic    Preferred lab:      Send INR reminders to:      Comments:            Anticoagulation Care Providers       Provider Role Specialty Phone number    CHACORTA Issa Referring Family Medicine [phone]    [facility]  [phone]          Anticoagulation Patient Findings  Patient Findings       Negatives:  Signs/symptoms of thrombosis, Signs/symptoms of bleeding, Laboratory test error suspected, Change in health, Change in alcohol use, Change in activity, Upcoming invasive procedure, Emergency department visit, Upcoming dental procedure, Missed doses, Extra doses, Change in medications, Change in diet/appetite, Hospital admission, Bruising, Other complaints                  HPI:   Radha Ruiz Dax seen in clinic today, on anticoagulation therapy with warfarin 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    Patient's previous INR was supratherapeutic at 3.8 on 24, at which time patient was instructed to hold one dose, then resume current regimen.  She returns to clinic today to recheck INR to ensure it is therapeutic and thus preventing possible clotting and/or bleeding/bruising complications.    CHADS-VASc = n/a  (unadjusted ischemic stroke risk/year:  n/a)    Does patient have any changes to current medical/health status since last appt (Y/N):  NO  Does patient have any signs/symptoms of bleeding and/or  "thrombosis since the last appt (Y/N):  NO  Does patient have any interval changes to diet or medications since last appt (Y/N):  NO  Are there any complications or cost restrictions with current therapy (Y/N):  NO     Does patient have [facility] PCP? Yes, CHACORTA Issa (If not, please document discussion that patient must be seen at [facility])       Vitals:      Vitals:    06/18/24 1154   Pulse: 66   Resp: 14   SpO2: 98%   Weight: 68.9 kg (151 lb 14.4 oz)   Height: 1.599 m (5' 2.95\")        Asssessment:      INR therapeutic at 2.4, therefore decreasing VTE and/or bleeding risk.   Reason(s) for out of range INR today:  n/a      Pt is not on antiplatelet therapy    Medication reconciliation completed today.    Plan:  Pt is to continue with current warfarin dosing regimen.     Follow up:  Because warfarin is a high risk medication and current CHEST guidelines recommend regular monitoring intervals (few days up to 12 weeks), will have patient return to clinic in 5 weeks to recheck INR.    Fred Nayak, PharmD, BCACP    "

## 2024-07-15 DIAGNOSIS — E78.5 HYPERLIPIDEMIA, UNSPECIFIED HYPERLIPIDEMIA TYPE: ICD-10-CM

## 2024-07-16 RX ORDER — ROSUVASTATIN CALCIUM 5 MG/1
TABLET, COATED ORAL
Qty: 90 TABLET | Refills: 0 | Status: SHIPPED | OUTPATIENT
Start: 2024-07-16

## 2024-07-23 ENCOUNTER — ANTICOAGULATION VISIT (OUTPATIENT)
Dept: MEDICAL GROUP | Facility: PHYSICIAN GROUP | Age: 89
End: 2024-07-23
Payer: MEDICARE

## 2024-07-23 VITALS — OXYGEN SATURATION: 94 % | BODY MASS INDEX: 26.95 KG/M2 | HEIGHT: 63 IN | HEART RATE: 69 BPM | RESPIRATION RATE: 14 BRPM

## 2024-07-23 DIAGNOSIS — Z79.01 CHRONIC ANTICOAGULATION: Primary | ICD-10-CM

## 2024-07-23 DIAGNOSIS — I73.9 PAD (PERIPHERAL ARTERY DISEASE) (HCC): ICD-10-CM

## 2024-07-23 LAB — INR PPP: 3 (ref 2–3.5)

## 2024-07-23 PROCEDURE — 85610 PROTHROMBIN TIME: CPT | Performed by: NURSE PRACTITIONER

## 2024-07-23 PROCEDURE — 93793 ANTICOAG MGMT PT WARFARIN: CPT | Performed by: NURSE PRACTITIONER

## 2024-09-01 DIAGNOSIS — I10 PRIMARY HYPERTENSION: ICD-10-CM

## 2024-09-03 RX ORDER — HYDROCHLOROTHIAZIDE 12.5 MG/1
12.5 TABLET ORAL DAILY
Qty: 90 TABLET | Refills: 0 | Status: SHIPPED | OUTPATIENT
Start: 2024-09-03

## 2024-09-03 NOTE — TELEPHONE ENCOUNTER
Requested Prescriptions     Signed Prescriptions Disp Refills    hydroCHLOROthiazide 12.5 MG tablet 90 Tablet 0     Sig: TAKE 1 TABLET BY MOUTH DAILY     Authorizing Provider: JANE CHEN A.P.R.N.

## 2024-09-03 NOTE — TELEPHONE ENCOUNTER
Received request via: Pharmacy    Was the patient seen in the last year in this department? No l/m to make appt for refills with pt daughter    Does the patient have an active prescription (recently filled or refills available) for medication(s) requested? No    [facility]    Does the patient have [facility] and need 100-day supply? (This applies to ALL medications) Patient does not have SCP

## 2024-09-10 ENCOUNTER — ANTICOAGULATION VISIT (OUTPATIENT)
Dept: MEDICAL GROUP | Facility: PHYSICIAN GROUP | Age: 89
End: 2024-09-10
Payer: MEDICARE

## 2024-09-10 DIAGNOSIS — Z79.01 CHRONIC ANTICOAGULATION: Primary | ICD-10-CM

## 2024-09-10 DIAGNOSIS — I73.9 PAD (PERIPHERAL ARTERY DISEASE) (HCC): ICD-10-CM

## 2024-09-10 LAB — INR PPP: 5.4 (ref 2–3.5)

## 2024-09-10 PROCEDURE — 99211 OFF/OP EST MAY X REQ PHY/QHP: CPT | Performed by: STUDENT IN AN ORGANIZED HEALTH CARE EDUCATION/TRAINING PROGRAM

## 2024-09-10 PROCEDURE — 85610 PROTHROMBIN TIME: CPT | Performed by: STUDENT IN AN ORGANIZED HEALTH CARE EDUCATION/TRAINING PROGRAM

## 2024-09-10 NOTE — PROGRESS NOTES
Anticoagulation Summary  As of 9/10/2024      INR goal:  2.0-3.0   TTR:  64.0% (5.6 y)   INR used for dosin.40 (9/10/2024)   Warfarin maintenance plan:  3.75 mg (2.5 mg x 1.5) every day   Weekly warfarin total:  26.25 mg   Plan last modified:  Fred Nayak, PharmD (2023)   Next INR check:  2024   Target end date:  Indefinite    Indications    Chronic anticoagulation [Z79.01]  PAD (peripheral artery disease) (HCC) [I73.9]                 Anticoagulation Episode Summary       INR check location:  Anticoagulation Clinic    Preferred lab:  --    Send INR reminders to:  --    Comments:  --          Anticoagulation Care Providers       Provider Role Specialty Phone number    CHACORTA Issa Referring Family Medicine [phone]    [facility] Anticoagulation Services Responsible  [phone]          Anticoagulation Patient Findings  Patient Findings       Positives:  Change in alcohol use    Negatives:  Signs/symptoms of thrombosis, Signs/symptoms of bleeding, Laboratory test error suspected, Change in health, Change in activity, Upcoming invasive procedure, Emergency department visit, Upcoming dental procedure, Missed doses, Extra doses, Change in medications, Change in diet/appetite, Hospital admission, Bruising, Other complaints                  HPI:   Radha Ruiz Verduzco seen in clinic today, on anticoagulation therapy with warfarin 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     Patient's previous INR was therapeutic at 3.0 on 24, at which time patient was instructed to continue with current warfarin regimen.  She returns to clinic today to recheck INR to ensure it is therapeutic and thus preventing possible clotting and/or bleeding/bruising complications.    CHADS-VASc = n/a  (unadjusted ischemic stroke risk/year:  n/a)    Does patient have any changes to current medical/health status since last appt (Y/N):  NO  Does patient have any signs/symptoms of bleeding  and/or thrombosis since the last appt (Y/N):  NO  Does patient have any interval changes to diet or medications since last appt (Y/N):  Had a jacoby yesterday, which is out of norm for her  Are there any complications or cost restrictions with current therapy (Y/N):  NO     Does patient have [facility] PCP? Yes, CHACORTA Issa (If not, please document discussion that patient must be seen at [facility])       Vitals:      There were no vitals filed for this visit.     Asssessment:      INR supratherapeutic at 5.4, therefore increasing    Reason(s) for out of range INR today:  likely d/t acute alcohol ingestion yesterday.      Pt is not on antiplatelet therapy    Medication reconciliation completed today.    Plan:  Pt is to HOLD X 2 (starting tomorrow as she already took today's dose), then continue with current warfarin dosing regimen.     Follow up:  Because warfarin is a high risk medication and current CHEST guidelines recommend regular monitoring intervals (few days up to 12 weeks), will have patient return to clinic in 1 weeks to recheck INR.    Fred Nayak, PharmD, BCACP

## 2024-09-16 DIAGNOSIS — E78.5 HYPERLIPIDEMIA, UNSPECIFIED HYPERLIPIDEMIA TYPE: ICD-10-CM

## 2024-09-17 ENCOUNTER — ANTICOAGULATION VISIT (OUTPATIENT)
Dept: MEDICAL GROUP | Facility: PHYSICIAN GROUP | Age: 89
End: 2024-09-17
Payer: MEDICARE

## 2024-09-17 DIAGNOSIS — Z79.01 CHRONIC ANTICOAGULATION: Primary | ICD-10-CM

## 2024-09-17 DIAGNOSIS — I73.9 PAD (PERIPHERAL ARTERY DISEASE) (HCC): ICD-10-CM

## 2024-09-17 LAB — INR PPP: 1.6 (ref 2–3.5)

## 2024-09-17 PROCEDURE — 99211 OFF/OP EST MAY X REQ PHY/QHP: CPT | Performed by: NURSE PRACTITIONER

## 2024-09-17 PROCEDURE — 85610 PROTHROMBIN TIME: CPT | Performed by: NURSE PRACTITIONER

## 2024-09-17 RX ORDER — ROSUVASTATIN CALCIUM 5 MG/1
TABLET, COATED ORAL
Qty: 90 TABLET | Refills: 0 | Status: SHIPPED | OUTPATIENT
Start: 2024-09-17

## 2024-09-17 NOTE — TELEPHONE ENCOUNTER
Received request via: Pharmacy    Was the patient seen in the last year in this department? No called and set up next appt in 11/6/24, daughter is having knee surgery and needed appt later on     Does the patient have an active prescription (recently filled or refills available) for medication(s) requested? No    [facility]    Does the patient have custodial Plus and need 100-day supply? (This applies to ALL medications) Patient does not have SCP

## 2024-09-17 NOTE — PROGRESS NOTES
Anticoagulation Summary  As of 2024      INR goal:  2.0-3.0   TTR:  63.8% (5.6 y)   INR used for dosin.60 (2024)   Warfarin maintenance plan:  3.75 mg (2.5 mg x 1.5) every day   Weekly warfarin total:  26.25 mg   Plan last modified:  Fred Nayak, PharmD (2023)   Next INR check:  10/1/2024   Target end date:  Indefinite    Indications    Chronic anticoagulation [Z79.01]  PAD (peripheral artery disease) (HCC) [I73.9]                 Anticoagulation Episode Summary       INR check location:  Anticoagulation Clinic    Preferred lab:  --    Send INR reminders to:  --    Comments:  --          Anticoagulation Care Providers       Provider Role Specialty Phone number    CHACORTA Issa Referring Family Medicine [phone]    [facility] Anticoagulation Services Responsible  [phone]          Anticoagulation Patient Findings  Patient Findings       Negatives:  Signs/symptoms of thrombosis, Signs/symptoms of bleeding, Laboratory test error suspected, Change in health, Change in alcohol use, Change in activity, Upcoming invasive procedure, Emergency department visit, Upcoming dental procedure, Missed doses, Extra doses, Change in medications, Change in diet/appetite, Hospital admission, Bruising, Other complaints                  HPI:   Radha Ruiz Dax seen in clinic today, on anticoagulation therapy with warfarin 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     Patient's previous INR was supratherapeutic at 5.6 on 9-10-24, at which time patient was instructed to hold two doses, then resume current warfarin regimen.  She returns to clinic today to recheck INR to ensure it is therapeutic and thus preventing possible clotting and/or bleeding/bruising complications.    CHADS-VASc = n/a  (unadjusted ischemic stroke risk/year:  n/a)    Does patient have any changes to current medical/health status since last appt (Y/N):  NO  Does patient have any signs/symptoms of  bleeding and/or thrombosis since the last appt (Y/N):  NO  Does patient have any interval changes to diet or medications since last appt (Y/N):  NO  Are there any complications or cost restrictions with current therapy (Y/N):  NO     Does patient have [facility] PCP? Yes, CHACORTA Issa (If not, please document discussion that patient must be seen at [facility])       Vitals:      There were no vitals filed for this visit.     Asssessment:      INR subtherapeutic at 1.6, therefore increasing VTE risk   Reason(s) for out of range INR today:  held doses last week      Pt is not on antiplatelet therapy    Medication reconciliation completed today.    Plan:  Pt is to continue with current warfarin dosing regimen.     Follow up:  Because warfarin is a high risk medication and current CHEST guidelines recommend regular monitoring intervals (few days up to 12 weeks), will have patient return to clinic in 2 weeks to recheck INR.    Fred Nayak, PharmD, BCACP

## 2024-10-01 ENCOUNTER — ANTICOAGULATION VISIT (OUTPATIENT)
Dept: MEDICAL GROUP | Facility: PHYSICIAN GROUP | Age: 89
End: 2024-10-01
Payer: MEDICARE

## 2024-10-01 DIAGNOSIS — I73.9 PAD (PERIPHERAL ARTERY DISEASE) (HCC): ICD-10-CM

## 2024-10-01 DIAGNOSIS — Z79.01 CHRONIC ANTICOAGULATION: Primary | ICD-10-CM

## 2024-10-01 LAB — INR PPP: 2.7 (ref 2–3.5)

## 2024-10-01 PROCEDURE — 85610 PROTHROMBIN TIME: CPT | Performed by: NURSE PRACTITIONER

## 2024-10-01 PROCEDURE — 93793 ANTICOAG MGMT PT WARFARIN: CPT | Performed by: NURSE PRACTITIONER

## 2024-11-03 DIAGNOSIS — I10 PRIMARY HYPERTENSION: ICD-10-CM

## 2024-11-04 RX ORDER — HYDROCHLOROTHIAZIDE 12.5 MG/1
12.5 TABLET ORAL DAILY
Qty: 90 TABLET | Refills: 0 | Status: SHIPPED | OUTPATIENT
Start: 2024-11-04

## 2024-11-04 NOTE — TELEPHONE ENCOUNTER
Received request via: Pharmacy    Was the patient seen in the last year in this department? No has upcoming appt     Does the patient have an active prescription (recently filled or refills available) for medication(s) requested? No    [facility] Name[facility]    Does the patient have senior living Plus and need 100-day supply? (This applies to ALL medications) Patient does not have SCP

## 2024-11-12 ENCOUNTER — APPOINTMENT (OUTPATIENT)
Dept: MEDICAL GROUP | Facility: PHYSICIAN GROUP | Age: 89
End: 2024-11-12
Payer: MEDICARE

## 2024-11-12 VITALS — HEIGHT: 63 IN | HEART RATE: 79 BPM | RESPIRATION RATE: 14 BRPM | OXYGEN SATURATION: 94 % | BODY MASS INDEX: 26.95 KG/M2

## 2024-11-12 DIAGNOSIS — I73.9 PAD (PERIPHERAL ARTERY DISEASE) (HCC): ICD-10-CM

## 2024-11-12 DIAGNOSIS — Z79.01 CHRONIC ANTICOAGULATION: Primary | ICD-10-CM

## 2024-11-12 LAB — INR PPP: 2.7 (ref 2–3.5)

## 2024-11-12 PROCEDURE — 93793 ANTICOAG MGMT PT WARFARIN: CPT | Performed by: NURSE PRACTITIONER

## 2024-11-12 PROCEDURE — 85610 PROTHROMBIN TIME: CPT | Performed by: NURSE PRACTITIONER

## 2024-11-12 NOTE — PROGRESS NOTES
Anticoagulation Summary  As of 2024      INR goal:  2.0-3.0   TTR:  64.6% (5.8 y)   INR used for dosin.70 (2024)   Warfarin maintenance plan:  3.75 mg (2.5 mg x 1.5) every day   Weekly warfarin total:  26.25 mg   Plan last modified:  Fred Nayak, PharmD (2023)   Next INR check:  2024   Target end date:  Indefinite    Indications    Chronic anticoagulation [Z79.01]  PAD (peripheral artery disease) (HCC) [I73.9]                 Anticoagulation Episode Summary       INR check location:  Anticoagulation Clinic    Preferred lab:  --    Send INR reminders to:  --    Comments:  --          Anticoagulation Care Providers       Provider Role Specialty Phone number    CHACORTA Issa Referring Family Medicine [phone]    [facility] Anticoagulation Services Responsible  [phone]          Anticoagulation Patient Findings  Patient Findings       Negatives:  Signs/symptoms of thrombosis, Signs/symptoms of bleeding, Laboratory test error suspected, Change in health, Change in alcohol use, Change in activity, Upcoming invasive procedure, Emergency department visit, Upcoming dental procedure, Missed doses, Extra doses, Change in medications, Change in diet/appetite, Hospital admission, Bruising, Other complaints                  HPI:   Radha Ruiz Verduzco seen in clinic today, on anticoagulation therapy with warfarin due to history of 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    Patient's previous INR was therapeutic at 2.7 on 10-1-24, at which time patient was instructed to continue with current warfarin regimen.  She returns to clinic today to recheck INR to ensure it is therapeutic and thus preventing possible clotting and/or bleeding/bruising complications.    CHADS-VASc = n/a  (unadjusted ischemic stroke risk/year:  n/a)    Does patient have any changes to current medical/health status since last appt (Y/N):  NO  Does patient have any signs/symptoms of bleeding and/or  "thrombosis since the last appt (Y/N):  NO  Does patient have any interval changes to diet or medications since last appt (Y/N):  NO  Are there any complications or cost restrictions with current therapy (Y/N):  NO     Does patient have [facility] PCP? Yes, CHACORTA Issa (If not, please document discussion that patient must be seen at [facility])       Vitals:      Vitals:    11/12/24 1050   Pulse: 79   Resp: 14   SpO2: 94%   Height: 1.599 m (5' 2.95\")        Asssessment:      INR therapeutic at 2.7, therefore decreasing VTE and/or bleeding risk.   Reason(s) for out of range INR today:  n/a      Pt is not on antiplatelet therapy    Medication reconciliation completed today.    Plan:  Pt is to continue with current warfarin dosing regimen.     Follow up:  Because warfarin is a high risk medication and current CHEST guidelines recommend regular monitoring intervals (few days up to 12 weeks), will have patient return to clinic in 5 weeks to recheck INR.    Fred Nayak, PharmD, BCACP    "

## 2024-11-25 DIAGNOSIS — I10 PRIMARY HYPERTENSION: ICD-10-CM

## 2024-11-26 RX ORDER — AMLODIPINE BESYLATE 5 MG/1
5 TABLET ORAL DAILY
Qty: 90 TABLET | Refills: 3 | Status: SHIPPED | OUTPATIENT
Start: 2024-11-26

## 2024-11-26 NOTE — TELEPHONE ENCOUNTER
Received request via: Pharmacy    Was the patient seen in the last year in this department? Yes    Does the patient have an active prescription (recently filled or refills available) for medication(s) requested? No    Ph[facility]acy Name[facility]    Does the patient have care home Plus and need 100-day supply? (This applies to ALL medications) Patient does not have SCP

## 2024-12-09 ENCOUNTER — APPOINTMENT (OUTPATIENT)
Dept: MEDICAL GROUP | Facility: PHYSICIAN GROUP | Age: 89
End: 2024-12-09
Payer: MEDICARE

## 2024-12-09 VITALS
SYSTOLIC BLOOD PRESSURE: 108 MMHG | TEMPERATURE: 96.6 F | OXYGEN SATURATION: 96 % | DIASTOLIC BLOOD PRESSURE: 58 MMHG | HEART RATE: 73 BPM | WEIGHT: 163.36 LBS | BODY MASS INDEX: 28.95 KG/M2 | HEIGHT: 63 IN

## 2024-12-09 DIAGNOSIS — I73.9 PAD (PERIPHERAL ARTERY DISEASE) (HCC): ICD-10-CM

## 2024-12-09 DIAGNOSIS — E55.9 VITAMIN D DEFICIENCY: ICD-10-CM

## 2024-12-09 DIAGNOSIS — E21.3 HYPERPARATHYROIDISM (HCC): ICD-10-CM

## 2024-12-09 DIAGNOSIS — I10 PRIMARY HYPERTENSION: ICD-10-CM

## 2024-12-09 DIAGNOSIS — Z23 NEED FOR VACCINATION: ICD-10-CM

## 2024-12-09 DIAGNOSIS — Z79.01 CHRONIC ANTICOAGULATION: ICD-10-CM

## 2024-12-09 DIAGNOSIS — R80.9 TYPE 2 DIABETES MELLITUS WITH MICROALBUMINURIA, WITHOUT LONG-TERM CURRENT USE OF INSULIN (HCC): ICD-10-CM

## 2024-12-09 DIAGNOSIS — Z00.00 MEDICARE ANNUAL WELLNESS VISIT, SUBSEQUENT: Primary | ICD-10-CM

## 2024-12-09 DIAGNOSIS — E78.5 DYSLIPIDEMIA: ICD-10-CM

## 2024-12-09 DIAGNOSIS — E11.29 TYPE 2 DIABETES MELLITUS WITH MICROALBUMINURIA, WITHOUT LONG-TERM CURRENT USE OF INSULIN (HCC): ICD-10-CM
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
## 2024-12-09 PROBLEM — E66.3 OVERWEIGHT (BMI 25.0-29.9): Status: ACTIVE | Noted: 2018-04-19
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
## 2024-12-09 PROCEDURE — 90662 IIV NO PRSV INCREASED AG IM: CPT | Performed by: NURSE PRACTITIONER

## 2024-12-09 PROCEDURE — 3078F DIAST BP <80 MM HG: CPT | Performed by: NURSE PRACTITIONER

## 2024-12-09 PROCEDURE — 92250 FUNDUS PHOTOGRAPHY W/I&R: CPT | Mod: 26 | Performed by: NURSE PRACTITIONER

## 2024-12-09 PROCEDURE — G0439 PPPS, SUBSEQ VISIT: HCPCS | Mod: 25 | Performed by: NURSE PRACTITIONER

## 2024-12-09 PROCEDURE — 3074F SYST BP LT 130 MM HG: CPT | Performed by: NURSE PRACTITIONER

## 2024-12-09 PROCEDURE — G0008 ADMIN INFLUENZA VIRUS VAC: HCPCS | Performed by: NURSE PRACTITIONER

## 2024-12-09 RX ORDER — ROSUVASTATIN CALCIUM 5 MG/1
TABLET, COATED ORAL
Qty: 90 TABLET | Refills: 3 | Status: SHIPPED | OUTPATIENT
Start: 2024-12-09

## 2024-12-09 RX ORDER — HYDROCHLOROTHIAZIDE 12.5 MG/1
12.5 TABLET ORAL DAILY
Qty: 90 TABLET | Refills: 3 | Status: SHIPPED | OUTPATIENT
Start: 2024-12-09

## 2024-12-09 ASSESSMENT — PATIENT HEALTH QUESTIONNAIRE - PHQ9: CLINICAL INTERPRETATION OF PHQ2 SCORE: 0

## 2024-12-09 ASSESSMENT — ENCOUNTER SYMPTOMS: GENERAL WELL-BEING: GOOD

## 2024-12-09 ASSESSMENT — ACTIVITIES OF DAILY LIVING (ADL): BATHING_REQUIRES_ASSISTANCE: 0

## 2024-12-09 NOTE — ASSESSMENT & PLAN NOTE
BP today 108/58.  Continues hydrochlorothiazide 12.5 mg daily and amlodipine 5 mg daily.  No home BP monitoring. Denies chest pain, shortness of breath or dizziness.

## 2024-12-09 NOTE — PROGRESS NOTES
Chief Complaint   Patient presents with    Annual Wellness Visit       HPI:  Radha Verduzco is a 89 y.o. here for Medicare Annual Wellness Visit. She is here with her daughter.       Type 2 diabetes mellitus with microalbuminuria, without long-term current use of insulin ([facility])  Due for updated health maintenance.  Last A1c 5/2023.  Continues with diet control.    Primary hypertension  BP today 108/58.  Continues hydrochlorothiazide 12.5 mg daily and amlodipine 5 mg daily.  No home BP monitoring. Denies chest pain, shortness of breath or dizziness.    Dyslipidemia  Continues rosuvastatin 5 mg daily.  Due for updated labs. No myalgias.     Chronic anticoagulation  Followed by anticoagulation clinic.  Continues warfarin  2.5 mg daily.    Vitamin D deficiency  No longer taking Vitamin D supplement. She does go for walks and check the mail.  Check updated labs.    Hyperparathyroidism ([facility])  No current vitamin D supplementation.  Check updated vitamin D and PTH levels.    PAD (peripheral artery disease) ([facility])  Currently on rosuvastatin and warfarin.      Patient Active Problem List    Diagnosis Date Noted    Risk for falls 07/26/2023    History of thrombosis 10/10/2022    Embolism and thrombosis of arteries of the lower extremities ([facility]) 10/10/2022    Type 2 diabetes mellitus with microalbuminuria, without long-term current use of insulin ([facility]) 08/01/2022    Chronic anticoagulation 06/02/2020    Vitamin D deficiency 12/21/2018    Hyperparathyroidism ([facility]) 12/21/2018    Type 2 diabetes mellitus with stage 3a chronic kidney disease, without long-term current use of insulin ([facility]) 04/19/2018    Overweight (BMI 25.0-29.9) 04/19/2018    History of skin cancer 02/24/2016    Personal history of calcium pyrophosphate deposition disease (CPPD) 04/07/2011    Primary hypertension 08/02/2010    Dyslipidemia 08/02/2010    PAD (peripheral artery disease) ([facility]) 08/02/2010       Current Outpatient Medications   Medication Sig Dispense  Refill    rosuvastatin (CRESTOR) 5 MG Tab TAKE 1 TABLET BY MOUTH  DAILY 90 Tablet 3    hydroCHLOROthiazide 12.5 MG tablet Take 1 Tablet by mouth every day. 90 Tablet 3    amLODIPine (NORVASC) 5 MG Tab TAKE 1 TABLET BY MOUTH DAILY 90 Tablet 3    warfarin (COUMADIN) 2.5 MG Tab TAKE 1 AND 1/2 TABLETS BY MOUTH  DAILY OR AS DIRECTED BY  ANTICOAGULATION CLINIC 135 Tablet 3    nystatin (MYCOSTATIN) powder Apply 1 g topically 3 times a day. 15 g 0    doxylamine (UNISOM) 25 MG Tab tablet Take 25 mg by mouth at bedtime.      vitamin D3 (CHOLECALCIFEROL) 1000 Unit (25 mcg) Tab Take 1,000 Units by mouth every day. (Patient not taking: Reported on 12/9/2024)       No current facility-administered medications for this visit.          Current supplements as per medication list.     Allergies: Anesthetic [benzocaine], Iodine, Other drug, Codeine, Meperidine, and Naproxen    Current social contact/activities: family      She  reports that she quit smoking about 39 years ago. Her smoking use included cigarettes. She started smoking about 51 years ago. She has a 24 pack-year smoking history. She has never used smokeless tobacco. She reports current alcohol use. She reports that she does not use drugs.  Counseling given: Yes      ROS:    Gait: Uses a walker at home mostly at night   Ostomy: No  Other tubes: No  Amputations: No  Chronic oxygen use: No  Last eye exam: over 6 years. She does not like to go to eye doctors. She has reading glasses. History of cataract surgery.   Wears hearing aids: No   : Denies any urinary leakage during the last 6 months    Screening:  Discussed and reviewed.   Depression Screening  Little interest or pleasure in doing things?  0 - not at all  Feeling down, depressed , or hopeless? 0 - not at all  Patient Health Questionnaire Score: 0     If depressive symptoms identified deferred to follow up visit unless specifically addressed in assessment and plan.    Interpretation of PHQ-9 Total Score   Score  Severity   1-4 No Depression   5-9 Mild Depression   10-14 Moderate Depression   15-19 Moderately Severe Depression   20-27 Severe Depression    Screening for Cognitive Impairment  Do you or any of your friends or family members have any concern about your memory? No.   Three Minute Recall (Leader, Season, Table) 0/3    Justo clock face with all 12 numbers and set the hands to show 10 minutes after 11.  No Pt put hands at 10 and 11   Cognitive concerns identified deferred for follow up unless specifically addressed in assessment and plan.  She likes to read. Discussed abnormal screening today, patient declines neurology referral.     Fall Risk Assessment  Has the patient had two or more falls in the last year or any fall with injury in the last year?  No    She lives with her son. She has a ramp to go inside her house. She has shower safety rails. She does have Life[employer].     Safety Assessment  Do you always wear your seatbelt?  Yes  Any changes to home needed to function safely? No  Difficulty hearing.  Yes  Patient counseled about all safety risks that were identified.    Functional Assessment ADLs  Are there any barriers preventing you from cooking for yourself or meeting nutritional needs?  No.    Are there any barriers preventing you from driving safely or obtaining transportation?  No.    Are there any barriers preventing you from using a telephone or calling for help?  No    Are there any barriers preventing you from shopping?  No.    Are there any barriers preventing you from taking care of your own finances?  No    Are there any barriers preventing you from managing your medications?  No    Are there any barriers preventing you from showering, bathing or dressing yourself? No    Are there any barriers preventing you from doing housework or laundry? No  Are there any barriers preventing you from using the toilet?No  Are you currently engaging in any exercise or physical activity?  Yes. Decorating around house  for holidays     Self-Assessment of Health  What is your perception of your health? Good    Do you sleep more than six hours a night? Yes    In the past 7 days, how much did pain keep you from doing your normal work? None    Do you spend quality time with family or friends (virtually or in person)? Yes    Do you usually eat a heart healthy diet that constists of a variety of fruits, vegetables, whole grains and fiber? Yes    Do you eat foods high in fat and/or Fast Food more than three times per week? No    How concerned are you that your medical conditions are not being well managed? Not at all    Are you worried that in the next 2 months, you may not have stable housing that you own, rent, or stay in as part of a household? No      Advance Care Planning  Do you have an Advance Directive, Living Will, Durable Power of , or POLST? No Encouraged to complete.                 Health Maintenance Summary            Overdue - Zoster (Shingles) Vaccines (1 of 2) Never done      No completion history exists for this topic.              Ordered - Fasting Lipid Profile (Yearly) Ordered on 12/9/2024 06/06/2022  Lipid Profile    12/09/2019  Lipid Profile    12/20/2018  Lipid Profile (Lipid Panel) Fasting    03/21/2018  LIPID PROFILE    06/28/2017  LIPID PROFILE    Only the first 5 history entries have been loaded, but more history exists.              Ordered - A1c Screening (Every 6 Months) Ordered on 12/9/2024      05/10/2023  POCT  A1C    02/06/2023  POCT A1C    08/01/2022  POCT Hemoglobin A1C    02/28/2022  POCT Hemoglobin A1C    08/27/2021  POCT Hemoglobin A1C    Only the first 5 history entries have been loaded, but more history exists.              Ordered - Diabetes: Retinopathy Screening (Yearly) Ordered on 12/9/2024 02/07/2023  POCT Retinal Eye Exam    01/23/2020  POCT Retinal Eye Exam              Ordered - SERUM CREATININE (Yearly) Ordered on 12/9/2024 04/05/2023  Basic Metabolic Panel     06/06/2022  Comp Metabolic Panel    12/09/2019  Comp Metabolic Panel    07/22/2019  Basic Metabolic Panel    01/26/2019  BASIC METABOLIC PANEL    Only the first 5 history entries have been loaded, but more history exists.              Ordered - Diabetes: Urine Protein Screening (Yearly) Ordered on 12/9/2024      05/10/2023  Microalbumin Creat Ratio Urine - Clinic Collect    06/06/2022  MICROALBUMIN CREAT RATIO URINE    12/09/2019  MICROALBUMIN CREAT RATIO URINE              Overdue - Annual Wellness Visit (Yearly) Overdue since 7/26/2024 07/26/2023  Level of Service: AL ANNUAL WELLNESS VISIT-INCLUDES PPPS SUBSEQUE*    07/26/2023  Visit Dx: Medicare annual wellness visit, subsequent    01/04/2021  Level of Service: AL PREVENTIVE VISIT,EST,65 & OVER    12/18/2013  Done    03/23/2012  Level of Service: AL PREVENTIVE VISIT,EST,65 & OVER    Only the first 5 history entries have been loaded, but more history exists.              Postponed - COVID-19 Vaccine (3 - 2024-25 season) Postponed until 12/9/2025 07/09/2021  Imm Admin: MODERNA SARS-COV-2 VACCINE (12+)    06/11/2021  Imm Admin: MODERNA SARS-COV-2 VACCINE (12+)              Diabetes: Monofilament / LE Exam (Yearly) Tentatively due on 12/9/2025 12/09/2024  Diabetic Monofilament LE Exam    02/06/2023  Diabetic Monofilament Lower Extremity Exam    02/06/2023  SmartData: WORKFLOW - DIABETES - DIABETIC FOOT EXAM PERFORMED    02/28/2022  Diabetic Monofilament Lower Extremity Exam    02/28/2022  SmartData: WORKFLOW - DIABETES - DIABETIC FOOT EXAM PERFORMED    Only the first 5 history entries have been loaded, but more history exists.              Bone Density Scan (Every 5 Years) Tentatively due on 1/20/2026 01/20/2021  DS-BONE DENSITY STUDY (DEXA)              IMM DTaP/Tdap/Td Vaccine (2 - Td or Tdap) Next due on 1/23/2030 01/23/2020  Imm Admin: Tdap Vaccine              Pneumococcal Vaccine: 65+ Years (Series Information) Completed      09/15/2016   Imm Admin: Pneumococcal polysaccharide vaccine (PPSV-23)    2015  Imm Admin: Pneumococcal Conjugate Vaccine (Prevnar/PCV-13)              Influenza Vaccine (Series Information) Completed      2024  Imm Admin: Influenza high-dose trivalent (PF)    10/10/2022  Imm Admin: Influenza Vaccine Adult HD    2020  Imm Admin: Influenza Vaccine Quad Recombinant    10/15/2019  Imm Admin: Influenza Vaccine Adult HD    2018  Imm Admin: Influenza Vaccine Adult HD    Only the first 5 history entries have been loaded, but more history exists.              Hepatitis A Vaccine (Hep A) (Series Information) Aged Out      No completion history exists for this topic.              HPV Vaccines (Series Information) Aged Out      No completion history exists for this topic.              Polio Vaccine (Inactivated Polio) (Series Information) Aged Out      No completion history exists for this topic.              Meningococcal Immunization (Series Information) Aged Out      No completion history exists for this topic.              Discontinued - Colorectal Cancer Screening  Discontinued        Frequency changed to Never automatically (Topic No Longer Applies)    2013  Colonoscopy (Patient Declined)              Discontinued - Hepatitis B Vaccine (Hep B)  Discontinued      No completion history exists for this topic.                    Patient Care Team:  CHACORTA Issa as PCP - General (Nurse Practitioner)  Garett Kohli M.D. (Surgery)  [facility]        Social History     Tobacco Use    Smoking status: Former     Current packs/day: 0.00     Average packs/day: 2.0 packs/day for 12.0 years (24.0 ttl pk-yrs)     Types: Cigarettes     Start date:      Quit date: 1985     Years since quittin.9    Smokeless tobacco: Never   Vaping Use    Vaping status: Never Used   Substance Use Topics    Alcohol use: Yes     Comment: rarely    Drug use: No     Family History   Problem  "Relation Age of Onset    Lung Disease Mother 20        TB    Stroke Father 42     She  has a past medical history of Carotid artery disease (HCC) (8/2/2010), 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   Past Surgical History:   Procedure Laterality Date    INCISION AND DRAINAGE GENERAL Left 1/24/2019    Procedure: INCISION AND DRAINAGE GENERAL- GROIN WOUND WITH WOUND VAC PLACEMENT;  Surgeon: Garett Kohli M.D.;  Location: SURGERY [facility];  Service: Vascular    THROMBECTOMY  12/19/2018    Procedure: THROMBECTOMY;  Surgeon: Garett Kohli M.D.;  Location: SURGERY [facility];  Service: Vascular    AORTOFEMORAL BYPASS      CAROTID ENDARTERECTOMY      right    CATARACT PHACO WITH IOL      NEUROMA EXCISION      in 2000 from foot    TOENAIL REMOVAL      in 2009,2008        Exam:   Vital signs reviewed   /58 (BP Location: Right arm, Patient Position: Sitting, BP Cuff Size: Adult)   Pulse 73   Temp 35.9 °C (96.6 °F) (Temporal)   Ht 1.6 m (5' 3\")   Wt 74.1 kg (163 lb 5.8 oz)   SpO2 96%  Body mass index is 28.94 kg/m².    Hearing good.    Dentition upper and lower dentures.   Alert, oriented in no acute distress.  Eye contact is good, speech goal directed, affect calm.  Lungs: Normal effort, CTA bilaterally, no wheezes, rhonchi, or rales.    CV: Regular rate and rhythm. No murmurs, rubs, or gallops.  Ext: No clubbing, cyanosis, edema    Monofilament testing with a 10 gram force: sensation intact: intact bilaterally  Visual Inspection: Feet without maceration, ulcers, fissures. Toe nails need trimming, per daughter she will have her sister take patient next week for trimming. Patient denies podiatry " referral.   Pedal pulses: decreased bilaterally, 1+        Assessment and Plan. The following treatment and monitoring plan is recommended:      1. Medicare annual wellness visit, subsequent (Primary)  Acute uncomplicated problem.  Annual wellness exam completed today.  Discussed referral to podiatry for footcare and patient declines.  Abnormal cognitive screening today, declines referral to neurology.    2. Type 2 diabetes mellitus with microalbuminuria, without long-term current use of insulin (HCC)  Chronic stable problem.  Due for health maintenance labs.  Monofilament and retinal completed today.  Discussed with patient to return every 6 months for A1c.  She verbalized understanding.  - CBC WITH DIFFERENTIAL; Future  - Comp Metabolic Panel; Future  - HEMOGLOBIN A1C; Future  - Lipid Profile; Future  - MICROALBUMIN CREAT RATIO URINE; Future  - POCT Retinal Eye Exam  - Diabetic Monofilament LE Exam    3. Primary hypertension  Chronic stable problem.  Continue amlodipine 5 mg daily and hydrochlorothiazide 12.5 mg daily.  Check updated labs.   - Comp Metabolic Panel; Future  - hydroCHLOROthiazide 12.5 MG tablet; Take 1 Tablet by mouth every day.  Dispense: 90 Tablet; Refill: 3    4. Dyslipidemia  Chronic stable problem.  Continue rosuvastatin 5 mg daily.  Due for updated labs.  - Lipid Profile; Future  - rosuvastatin (CRESTOR) 5 MG Tab; TAKE 1 TABLET BY MOUTH  DAILY  Dispense: 90 Tablet; Refill: 3    5. Chronic anticoagulation  Chronic stable problem.  Continue follow-up with anticoagulation clinic.  Continue warfarin 2.5 mg as directed by anticoagulation clinic.    6. Hyperparathyroidism (HCC)  Chronic stable problem.  Check updated labs.    - VITAMIN D,25 HYDROXY (DEFICIENCY); Future  - PTH INTACT (PTH ONLY); Future    7. PAD (peripheral artery disease) (HCC)  Chronic stable problem.  Continue rosuvastatin and warfarin.    8. Vitamin D deficiency  Chronic stable problem.  Due for updated labs.  - VITAMIN D,25  HYDROXY (DEFICIENCY); Future    9. Need for vaccination  Acute uncomplicated problem.  She would like her influenza vaccine today. I have placed the below orders and discussed them with an approved delegating provider. The MA is performing the below orders under the direction of Dr. Morataya.  - INFLUENZA VACCINE, HIGH DOSE (65+ ONLY)      Services suggested: Referral to AntiCoagulation Clinic-current patient, declines neurology and podiatry referral today  Health Care Screening: Age-appropriate preventive services recommended by USPTF and ACIP covered by Medicare were discussed today. Services ordered if indicated and agreed upon by the patient.  Referrals offered: Community-based lifestyle interventions to reduce health risks and promote self-management and wellness, fall prevention, nutrition, physical activity, tobacco-use cessation, weight loss, and mental health services as per orders if indicated.    Discussion today about general wellness and lifestyle habits:    Prevent falls and reduce trip hazards; Cautioned about securing or removing rugs.  Have a working fire alarm and carbon monoxide detector;   Engage in regular physical activity and social activities     Follow-up: Return in about 6 months (around 6/9/2025) for Diabetes, A1C.      Please note that this dictation was created using voice recognition software. I have made every reasonable attempt to correct obvious errors, but I expect that there are errors of grammar and possibly content that I did not discover before finalizing the note.

## 2024-12-09 NOTE — ASSESSMENT & PLAN NOTE
No longer taking Vitamin D supplement. She does go for walks and check the mail.  Check updated labs.

## 2024-12-10 LAB — RETINAL SCREEN: NEGATIVE

## 2024-12-17 ENCOUNTER — ANTICOAGULATION VISIT (OUTPATIENT)
Dept: MEDICAL GROUP | Facility: PHYSICIAN GROUP | Age: 89
End: 2024-12-17
Payer: MEDICARE

## 2024-12-17 DIAGNOSIS — I73.9 PAD (PERIPHERAL ARTERY DISEASE) (HCC): ICD-10-CM

## 2024-12-17 DIAGNOSIS — Z79.01 CHRONIC ANTICOAGULATION: Primary | ICD-10-CM

## 2024-12-17 LAB — INR PPP: 2.4 (ref 2–3.5)

## 2024-12-17 PROCEDURE — 93793 ANTICOAG MGMT PT WARFARIN: CPT | Performed by: NURSE PRACTITIONER

## 2024-12-17 PROCEDURE — 85610 PROTHROMBIN TIME: CPT | Performed by: NURSE PRACTITIONER

## 2024-12-17 NOTE — PROGRESS NOTES
Anticoagulation Summary  As of 2024      INR goal:  2.0-3.0   TTR:  65.1% (5.9 y)   INR used for dosin.40 (2024)   Warfarin maintenance plan:  3.75 mg (2.5 mg x 1.5) every day   Weekly warfarin total:  26.25 mg   Plan last modified:  Fred Nayak, PharmD (2023)   Next INR check:  2025   Target end date:  Indefinite    Indications    Chronic anticoagulation [Z79.01]  PAD (peripheral artery disease) (HCC) [I73.9]                 Anticoagulation Episode Summary       INR check location:  Anticoagulation Clinic    Preferred lab:  --    Send INR reminders to:  --    Comments:  --          Anticoagulation Care Providers       Provider Role Specialty Phone number    CHACORTA Issa Referring Family Medicine [phone]    [facility] Responsible  [phone]          Anticoagulation Patient Findings  Patient Findings       Negatives:  Signs/symptoms of thrombosis, Signs/symptoms of bleeding, Laboratory test error suspected, Change in health, Change in alcohol use, Change in activity, Upcoming invasive procedure, Emergency department visit, Upcoming dental procedure, Missed doses, Extra doses, Change in medications, Change in diet/appetite, Hospital admission, Bruising, Other complaints                  HPI:   Radha Verduzco seen in clinic today, on anticoagulation therapy with warfarin due to 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  She returns to clinic today to recheck INR to ensure it is therapeutic and thus preventing possible clotting and/or bleeding/bruising complications.    CHADS-VASc = n/a  (unadjusted ischemic stroke risk/year:  n/a)    Does patient have any changes to current medical/health status since last appt (Y/N):  NO  Does patient have any signs/symptoms of bleeding and/or  thrombosis since the last appt (Y/N):  NO  Does patient have any interval changes to diet or medications since last appt (Y/N):  NO  Are there any complications or cost restrictions with current therapy (Y/N):  NO     Does patient have [facility] PCP? Yes, CHACORTA Issa (If not, please document discussion that patient must be seen at [facility] [place])       Vitals:      There were no vitals filed for this visit.     Asssessment:      INR is therapeutic  Reason(s) for out of range INR today: N/A      Pt is not on antiplatelet therapy    Medication reconciliation completed today.    Plan:  Pt is to continue with current warfarin dosing regimen.     Follow up:  Because warfarin is a high risk medication and current CHEST guidelines recommend regular monitoring intervals (few days up to 12 weeks), will have patient return to clinic in 6 weeks to recheck INR.    Fred Nayak, PharmD, BCACP

## 2025-01-06 ENCOUNTER — HOSPITAL ENCOUNTER (OUTPATIENT)
Dept: LAB | Facility: MEDICAL CENTER | Age: 89
End: 2025-01-06
Attending: NURSE PRACTITIONER
Payer: MEDICARE

## 2025-01-06 DIAGNOSIS — E21.3 HYPERPARATHYROIDISM (HCC): ICD-10-CM

## 2025-01-06 DIAGNOSIS — E11.29 TYPE 2 DIABETES MELLITUS WITH MICROALBUMINURIA, WITHOUT LONG-TERM CURRENT USE OF INSULIN (HCC): ICD-10-CM

## 2025-01-06 DIAGNOSIS — R80.9 TYPE 2 DIABETES MELLITUS WITH MICROALBUMINURIA, WITHOUT LONG-TERM CURRENT USE OF INSULIN (HCC): ICD-10-CM

## 2025-01-06 DIAGNOSIS — E78.5 DYSLIPIDEMIA: ICD-10-CM

## 2025-01-06 DIAGNOSIS — I10 PRIMARY HYPERTENSION: ICD-10-CM

## 2025-01-06 DIAGNOSIS — E55.9 VITAMIN D DEFICIENCY: ICD-10-CM

## 2025-01-06 LAB
25(OH)D3 SERPL-MCNC: 9 NG/ML (ref 30–100)
ALBUMIN SERPL BCP-MCNC: 4.2 G/DL (ref 3.2–4.9)
ALBUMIN/GLOB SERPL: 1.3 G/DL
ALP SERPL-CCNC: 78 U/L (ref 30–99)
ALT SERPL-CCNC: 12 U/L (ref 2–50)
ANION GAP SERPL CALC-SCNC: 12 MMOL/L (ref 7–16)
AST SERPL-CCNC: 22 U/L (ref 12–45)
BASOPHILS # BLD AUTO: 0.7 % (ref 0–1.8)
BASOPHILS # BLD: 0.06 K/UL (ref 0–0.12)
BILIRUB SERPL-MCNC: 0.4 MG/DL (ref 0.1–1.5)
BUN SERPL-MCNC: 23 MG/DL (ref 8–22)
CALCIUM ALBUM COR SERPL-MCNC: 9.6 MG/DL (ref 8.5–10.5)
CALCIUM SERPL-MCNC: 9.8 MG/DL (ref 8.5–10.5)
CHLORIDE SERPL-SCNC: 106 MMOL/L (ref 96–112)
CHOLEST SERPL-MCNC: 140 MG/DL (ref 100–199)
CO2 SERPL-SCNC: 20 MMOL/L (ref 20–33)
CREAT SERPL-MCNC: 1.08 MG/DL (ref 0.5–1.4)
EOSINOPHIL # BLD AUTO: 0.18 K/UL (ref 0–0.51)
EOSINOPHIL NFR BLD: 2.1 % (ref 0–6.9)
ERYTHROCYTE [DISTWIDTH] IN BLOOD BY AUTOMATED COUNT: 48.7 FL (ref 35.9–50)
EST. AVERAGE GLUCOSE BLD GHB EST-MCNC: 169 MG/DL
GFR SERPLBLD CREATININE-BSD FMLA CKD-EPI: 49 ML/MIN/1.73 M 2
GLOBULIN SER CALC-MCNC: 3.3 G/DL (ref 1.9–3.5)
GLUCOSE SERPL-MCNC: 174 MG/DL (ref 65–99)
HBA1C MFR BLD: 7.5 % (ref 4–5.6)
HCT VFR BLD AUTO: 47.2 % (ref 37–47)
HDLC SERPL-MCNC: 37 MG/DL
HGB BLD-MCNC: 15.2 G/DL (ref 12–16)
IMM GRANULOCYTES # BLD AUTO: 0.03 K/UL (ref 0–0.11)
IMM GRANULOCYTES NFR BLD AUTO: 0.3 % (ref 0–0.9)
LDLC SERPL CALC-MCNC: 83 MG/DL
LYMPHOCYTES # BLD AUTO: 2.15 K/UL (ref 1–4.8)
LYMPHOCYTES NFR BLD: 25 % (ref 22–41)
MCH RBC QN AUTO: 27.2 PG (ref 27–33)
MCHC RBC AUTO-ENTMCNC: 32.2 G/DL (ref 32.2–35.5)
MCV RBC AUTO: 84.4 FL (ref 81.4–97.8)
MONOCYTES # BLD AUTO: 0.65 K/UL (ref 0–0.85)
MONOCYTES NFR BLD AUTO: 7.6 % (ref 0–13.4)
NEUTROPHILS # BLD AUTO: 5.53 K/UL (ref 1.82–7.42)
NEUTROPHILS NFR BLD: 64.3 % (ref 44–72)
NRBC # BLD AUTO: 0 K/UL
NRBC BLD-RTO: 0 /100 WBC (ref 0–0.2)
PLATELET # BLD AUTO: 237 K/UL (ref 164–446)
PMV BLD AUTO: 9.5 FL (ref 9–12.9)
POTASSIUM SERPL-SCNC: 3.8 MMOL/L (ref 3.6–5.5)
PROT SERPL-MCNC: 7.5 G/DL (ref 6–8.2)
RBC # BLD AUTO: 5.59 M/UL (ref 4.2–5.4)
SODIUM SERPL-SCNC: 138 MMOL/L (ref 135–145)
TRIGL SERPL-MCNC: 100 MG/DL (ref 0–149)
WBC # BLD AUTO: 8.6 K/UL (ref 4.8–10.8)

## 2025-01-06 PROCEDURE — 80061 LIPID PANEL: CPT

## 2025-01-06 PROCEDURE — 36415 COLL VENOUS BLD VENIPUNCTURE: CPT

## 2025-01-06 PROCEDURE — 82043 UR ALBUMIN QUANTITATIVE: CPT

## 2025-01-06 PROCEDURE — 80053 COMPREHEN METABOLIC PANEL: CPT

## 2025-01-06 PROCEDURE — 82570 ASSAY OF URINE CREATININE: CPT

## 2025-01-06 PROCEDURE — 85025 COMPLETE CBC W/AUTO DIFF WBC: CPT

## 2025-01-06 PROCEDURE — 83970 ASSAY OF PARATHORMONE: CPT

## 2025-01-06 PROCEDURE — 83036 HEMOGLOBIN GLYCOSYLATED A1C: CPT | Mod: GA

## 2025-01-06 PROCEDURE — 82306 VITAMIN D 25 HYDROXY: CPT

## 2025-01-07 LAB
CREAT UR-MCNC: 80.09 MG/DL
MICROALBUMIN UR-MCNC: 29.4 MG/DL
MICROALBUMIN/CREAT UR: 367 MG/G (ref 0–30)
PTH-INTACT SERPL-MCNC: 108 PG/ML (ref 14–72)

## 2025-01-15 NOTE — RESULT ENCOUNTER NOTE
Called and spoke with daughter and POA, Mary Ann Tian, and relayed message to schedule aracely't.  She agreed and an aracely't was made for 1/27/25.  She had no further questions or issues at this time.  Thank you,  Amelia Hall RN

## 2025-01-27 ENCOUNTER — OFFICE VISIT (OUTPATIENT)
Dept: MEDICAL GROUP | Facility: PHYSICIAN GROUP | Age: 89
End: 2025-01-27
Payer: MEDICARE

## 2025-01-27 VITALS
HEART RATE: 75 BPM | DIASTOLIC BLOOD PRESSURE: 48 MMHG | HEIGHT: 63 IN | WEIGHT: 164.68 LBS | TEMPERATURE: 96.6 F | SYSTOLIC BLOOD PRESSURE: 106 MMHG | OXYGEN SATURATION: 93 % | BODY MASS INDEX: 29.18 KG/M2

## 2025-01-27 DIAGNOSIS — E11.29 TYPE 2 DIABETES MELLITUS WITH MICROALBUMINURIA, WITHOUT LONG-TERM CURRENT USE OF INSULIN (HCC): ICD-10-CM

## 2025-01-27 DIAGNOSIS — E11.22 TYPE 2 DIABETES MELLITUS WITH STAGE 3A CHRONIC KIDNEY DISEASE, WITHOUT LONG-TERM CURRENT USE OF INSULIN (HCC): ICD-10-CM

## 2025-01-27 DIAGNOSIS — R80.9 TYPE 2 DIABETES MELLITUS WITH MICROALBUMINURIA, WITHOUT LONG-TERM CURRENT USE OF INSULIN (HCC): ICD-10-CM

## 2025-01-27 DIAGNOSIS — I10 PRIMARY HYPERTENSION: ICD-10-CM

## 2025-01-27 DIAGNOSIS — Z71.2 ENCOUNTER TO DISCUSS TEST RESULTS: ICD-10-CM

## 2025-01-27 DIAGNOSIS — E78.5 DYSLIPIDEMIA: ICD-10-CM

## 2025-01-27 DIAGNOSIS — E55.9 VITAMIN D DEFICIENCY: ICD-10-CM

## 2025-01-27 DIAGNOSIS — N18.31 TYPE 2 DIABETES MELLITUS WITH STAGE 3A CHRONIC KIDNEY DISEASE, WITHOUT LONG-TERM CURRENT USE OF INSULIN (HCC): ICD-10-CM

## 2025-01-27 PROCEDURE — 3074F SYST BP LT 130 MM HG: CPT | Performed by: NURSE PRACTITIONER

## 2025-01-27 PROCEDURE — 3078F DIAST BP <80 MM HG: CPT | Performed by: NURSE PRACTITIONER

## 2025-01-27 PROCEDURE — 99214 OFFICE O/P EST MOD 30 MIN: CPT | Performed by: NURSE PRACTITIONER

## 2025-01-27 RX ORDER — ERGOCALCIFEROL 1.25 MG/1
50000 CAPSULE ORAL
Qty: 12 CAPSULE | Refills: 0 | Status: SHIPPED | OUTPATIENT
Start: 2025-01-27

## 2025-01-27 RX ORDER — LISINOPRIL 5 MG/1
5 TABLET ORAL DAILY
Qty: 100 TABLET | Refills: 3 | Status: SHIPPED | OUTPATIENT
Start: 2025-01-27 | End: 2026-03-03

## 2025-01-27 ASSESSMENT — FIBROSIS 4 INDEX: FIB4 SCORE: 2.36

## 2025-01-27 ASSESSMENT — PATIENT HEALTH QUESTIONNAIRE - PHQ9: CLINICAL INTERPRETATION OF PHQ2 SCORE: 0

## 2025-01-28 ENCOUNTER — ANTICOAGULATION VISIT (OUTPATIENT)
Dept: MEDICAL GROUP | Facility: PHYSICIAN GROUP | Age: 89
End: 2025-01-28
Payer: MEDICARE

## 2025-01-28 DIAGNOSIS — Z79.01 CHRONIC ANTICOAGULATION: Primary | ICD-10-CM

## 2025-01-28 DIAGNOSIS — I73.9 PAD (PERIPHERAL ARTERY DISEASE) (HCC): ICD-10-CM

## 2025-01-28 LAB — INR PPP: 3 (ref 2–3.5)

## 2025-01-28 PROCEDURE — 93793 ANTICOAG MGMT PT WARFARIN: CPT | Performed by: PHARMACIST

## 2025-01-28 PROCEDURE — 85610 PROTHROMBIN TIME: CPT | Performed by: NURSE PRACTITIONER

## 2025-01-28 NOTE — PROGRESS NOTES
Subjective:     CC: Lab results     HPI:   Radha presents today with her daughter for the following:    Primary hypertension  Initial BP today 150/64.  Continues amlodipine 5 mg daily and hydrochlorothiazide 12.5 mg daily.  Repeat /48.  Positive urine microalbumin, stopping amlodipine, start lisinopril 5 mg daily.    Dyslipidemia  Cholesterol controlled with rosuvastatin 5 mg daily.    Vitamin D deficiency  No current vitamin D supplementation.  Recent labs show vitamin D level of 9 with elevated PTH.  Plan to replace vitamin D and repeat labs.    Type 2 diabetes mellitus with microalbuminuria, without long-term current use of insulin ([facility])  Not on ACE inhibitor.  Recent urine microalbumin elevated.  Stop amlodipine.  Start lisinopril 5 mg daily.    Type 2 diabetes mellitus with stage 3a chronic kidney disease, without long-term current use of insulin ([facility])  Recent GFR 49 with an A1c of 7.5%. She drinks 1 regular pepsi today. She eats more sweets than carbs.  She will work on decreasing sweets and soda intake.  Continue A1c every 6 months.      Past Medical History:   Diagnosis Date    Carotid artery disease (HCC) 2010    CPPD (pseudo-gout) 2011    Diabetes ([facility]) 2018    GOUT 2011    History of skin cancer 2016    Hyperlipidemia 2010    Hypertension 2010    PAD (peripheral artery disease) 2010    Pre-diabetes 2010    S/P hip replacement 2011    Superficial thrombophlebitis of right upper extremity 2021       Social History     Tobacco Use    Smoking status: Former     Current packs/day: 0.00     Average packs/day: 2.0 packs/day for 12.0 years (24.0 ttl pk-yrs)     Types: Cigarettes     Start date:      Quit date:      Years since quittin.0    Smokeless tobacco: Never   Vaping Use    Vaping status: Never Used   Substance Use Topics    Alcohol use: Yes     Comment: rarely    Drug use: No       Current Outpatient Medications Ordered in Epic  "  Medication Sig Dispense Refill    ergocalciferol (DRISDOL) 84201 UNIT capsule Take 1 Capsule by mouth every 7 days. 12 Capsule 0    lisinopril (PRINIVIL) 5 MG Tab Take 1 Tablet by mouth every day. 100 Tablet 3    rosuvastatin (CRESTOR) 5 MG Tab TAKE 1 TABLET BY MOUTH  DAILY 90 Tablet 3    hydroCHLOROthiazide 12.5 MG tablet Take 1 Tablet by mouth every day. 90 Tablet 3    warfarin (COUMADIN) 2.5 MG Tab TAKE 1 AND 1/2 TABLETS BY MOUTH  DAILY OR AS DIRECTED BY  ANTICOAGULATION CLINIC 135 Tablet 3    nystatin (MYCOSTATIN) powder Apply 1 g topically 3 times a day. 15 g 0    doxylamine (UNISOM) 25 MG Tab tablet Take 25 mg by mouth at bedtime.       No current Epic-ordered facility-administered medications on file.       Allergies:  Anesthetic [benzocaine], Iodine, Other drug, Codeine, Meperidine, and Naproxen    Health Maintenance: Reviewed       Objective:     Vital signs reviewed  Exam:  /48 (BP Location: Left arm, Patient Position: Sitting)   Pulse 75   Temp 35.9 °C (96.6 °F) (Temporal)   Ht 1.6 m (5' 3\")   Wt 74.7 kg (164 lb 10.9 oz)   SpO2 93%   BMI 29.17 kg/m²  Body mass index is 29.17 kg/m².    Gen: Alert and oriented, No apparent distress.  Eyes:   Lids normal. Glasses in place.   Lungs: Normal effort, CTA bilaterally, no wheezes, rhonchi, or rales  CV: Regular rate and rhythm. No murmurs, rubs, or gallops.      Assessment & Plan:     89 y.o. female with the following -     1. Type 2 diabetes mellitus with stage 3a chronic kidney disease, without long-term current use of insulin (HCC)  2. Type 2 diabetes mellitus with microalbuminuria, without long-term current use of insulin (HCC)  Chronic exacerbated problem.  Continue A1c every 6 months.  She will work on decreasing her sugar and sweet intake.  With her age 7.5% is agreeable.  Consider medication if A1c continues to increase despite diet changes.  Urine microalbumin positive.  Stop amlodipine start lisinopril 5 mg daily.  - lisinopril (PRINIVIL) 5 " MG Tab; Take 1 Tablet by mouth every day.  Dispense: 100 Tablet; Refill: 3    3. Primary hypertension  Chronic exacerbated problem.  Recent labs positive for urine microalbumin and patient is not on ACE inhibitor.  Plan to stop amlodipine and start lisinopril 5 mg daily.  Daughter states that either her or patient's son can check patient's blood pressure once a week, BP log provided today.  Continue hydrochlorothiazide 12.5 mg daily.  Follow-up in 1 month.  - lisinopril (PRINIVIL) 5 MG Tab; Take 1 Tablet by mouth every day.  Dispense: 100 Tablet; Refill: 3    4. Dyslipidemia  Chronic stable problem.  Continue rosuvastatin 5 mg daily.    5. Vitamin D deficiency  Chronic exacerbated problem.  Discontinue daily vitamin D3.  Start ergocalciferol 50,000 units weekly.  - ergocalciferol (DRISDOL) 41872 UNIT capsule; Take 1 Capsule by mouth every 7 days.  Dispense: 12 Capsule; Refill: 0    6. Encounter to discuss test results  Acute uncomplicated problem.  Discussed and reviewed lab results from 1/6/2025.      Return in about 4 weeks (around 2/24/2025) for Hypertension.    Please note that this dictation was created using voice recognition software. I have made every reasonable attempt to correct obvious errors, but I expect that there are errors of grammar and possibly content that I did not discover before finalizing the note.

## 2025-01-28 NOTE — ASSESSMENT & PLAN NOTE
Not on ACE inhibitor.  Recent urine microalbumin elevated.  Stop amlodipine.  Start lisinopril 5 mg daily.

## 2025-01-28 NOTE — PROGRESS NOTES
Anticoagulation Summary  As of 1/28/2025      INR goal:  2.0-3.0   TTR:  65.8% (6 y)   INR used for dosing:  3.00 (1/28/2025)   Warfarin maintenance plan:  3.75 mg (2.5 mg x 1.5) every day   Weekly warfarin total:  26.25 mg   Plan last modified:  Fred Nayak, PharmD (6/6/2023)   Next INR check:  2/25/2025   Target end date:  Indefinite    Indications    Chronic anticoagulation [Z79.01]  PAD (peripheral artery disease) ([facility]) [I73.9]                 Anticoagulation Episode Summary       INR check location:  Anticoagulation Clinic    Preferred lab:  --    Send INR reminders to:  --    Comments:  --          Anticoagulation Care Providers       Provider Role Specialty Phone number    CHACORTA Issa Referring Family Medicine [phone]    [facility] Anticoagulation Services Responsible  [phone]          Anticoagulation Patient Findings  Patient Findings       Negatives:  Signs/symptoms of thrombosis, Signs/symptoms of bleeding, Laboratory test error suspected, Change in health, Change in alcohol use, Change in activity, Upcoming invasive procedure, Emergency department visit, Upcoming dental procedure, Missed doses, Extra doses, Change in medications, Change in diet/appetite, Hospital admission, Bruising, Other complaints                  HPI:   Radha Ruiz Dax seen in clinic today, on anticoagulation therapy with warfarin 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    Patient's previous INR was therapeutic at 2.4 on 12-17-24, at which time patient was instructed to continue with current warfarin regimen.  She returns to clinic today to recheck INR to ensure it is therapeutic and thus preventing possible clotting and/or bleeding/bruising complications.    CHADS-VASc = n/a  (unadjusted ischemic stroke risk/year:  n/a)    Does patient have any changes to current medical/health status since last appt (Y/N):  NO  Does patient have any signs/symptoms of bleeding and/or thrombosis  since the last appt (Y/N):  NO  Does patient have any interval changes to diet or medications since last appt (Y/N):  NO  Are there any complications or cost restrictions with current therapy (Y/N):  NO     Does patient have [facility] PCP? Yes, CHACORTA Issa (If not, please document discussion that patient must be seen at [facility])       Vitals:      There were no vitals filed for this visit.     Asssessment:      INR is therapeutic  Reason(s) for out of range INR today: N/A      Pt is not on antiplatelet therapy    Medication reconciliation completed today.    Plan:  Pt is to continue with current warfarin dosing regimen.     Follow up:  Because warfarin is a high risk medication and current CHEST guidelines recommend regular monitoring intervals (few days up to 12 weeks), will have patient return to clinic in 4 weeks to recheck INR.    Fred Nayak, PharmD, BCACP

## 2025-01-28 NOTE — ASSESSMENT & PLAN NOTE
No current vitamin D supplementation.  Recent labs show vitamin D level of 9 with elevated PTH.  Plan to replace vitamin D and repeat labs.

## 2025-01-28 NOTE — ASSESSMENT & PLAN NOTE
Recent GFR 49 with an A1c of 7.5%. She drinks 1 regular pepsi today. She eats more sweets than carbs.  She will work on decreasing sweets and soda intake.  Continue A1c every 6 months.

## 2025-01-28 NOTE — ASSESSMENT & PLAN NOTE
Initial BP today 150/64.  Continues amlodipine 5 mg daily and hydrochlorothiazide 12.5 mg daily.  Repeat /48.  Positive urine microalbumin, stopping amlodipine, start lisinopril 5 mg daily.

## 2025-02-19 DIAGNOSIS — I73.9 PAD (PERIPHERAL ARTERY DISEASE) (HCC): ICD-10-CM

## 2025-02-19 DIAGNOSIS — Z79.01 CHRONIC ANTICOAGULATION: ICD-10-CM

## 2025-02-19 NOTE — TELEPHONE ENCOUNTER
Received request via: Pharmacy    Was the patient seen in the last year in this department? Yes    Does the patient have an active prescription (recently filled or refills available) for medication(s) requested? No    Pharm[facility] Name: [facility]    Does the patient have [facility] Plus and need 100-day supply? (This applies to ALL medications) Patient does not have SCP

## 2025-02-20 RX ORDER — WARFARIN SODIUM 2.5 MG/1
TABLET ORAL
Qty: 135 TABLET | Refills: 3 | Status: SHIPPED | OUTPATIENT
Start: 2025-02-20

## 2025-02-24 ENCOUNTER — OFFICE VISIT (OUTPATIENT)
Dept: MEDICAL GROUP | Facility: PHYSICIAN GROUP | Age: 89
End: 2025-02-24
Payer: MEDICARE

## 2025-02-24 VITALS
TEMPERATURE: 97.2 F | HEART RATE: 61 BPM | BODY MASS INDEX: 28.75 KG/M2 | SYSTOLIC BLOOD PRESSURE: 114 MMHG | WEIGHT: 162.26 LBS | DIASTOLIC BLOOD PRESSURE: 58 MMHG | HEIGHT: 63 IN | OXYGEN SATURATION: 94 %

## 2025-02-24 DIAGNOSIS — I10 PRIMARY HYPERTENSION: ICD-10-CM

## 2025-02-24 DIAGNOSIS — E11.29 TYPE 2 DIABETES MELLITUS WITH MICROALBUMINURIA, WITHOUT LONG-TERM CURRENT USE OF INSULIN (HCC): ICD-10-CM

## 2025-02-24 DIAGNOSIS — R80.9 TYPE 2 DIABETES MELLITUS WITH MICROALBUMINURIA, WITHOUT LONG-TERM CURRENT USE OF INSULIN (HCC): ICD-10-CM

## 2025-02-24 DIAGNOSIS — E55.9 VITAMIN D DEFICIENCY: ICD-10-CM

## 2025-02-24 DIAGNOSIS — E21.3 HYPERPARATHYROIDISM (HCC): ICD-10-CM

## 2025-02-24 PROCEDURE — 3074F SYST BP LT 130 MM HG: CPT | Performed by: NURSE PRACTITIONER

## 2025-02-24 PROCEDURE — 99214 OFFICE O/P EST MOD 30 MIN: CPT | Performed by: NURSE PRACTITIONER

## 2025-02-24 PROCEDURE — 3078F DIAST BP <80 MM HG: CPT | Performed by: NURSE PRACTITIONER

## 2025-02-24 ASSESSMENT — FIBROSIS 4 INDEX: FIB4 SCORE: 2.36

## 2025-02-25 ENCOUNTER — NON-PROVIDER VISIT (OUTPATIENT)
Dept: MEDICAL GROUP | Facility: PHYSICIAN GROUP | Age: 89
End: 2025-02-25
Payer: MEDICARE

## 2025-02-25 ENCOUNTER — ANTICOAGULATION VISIT (OUTPATIENT)
Dept: MEDICAL GROUP | Facility: PHYSICIAN GROUP | Age: 89
End: 2025-02-25
Payer: MEDICARE

## 2025-02-25 VITALS — DIASTOLIC BLOOD PRESSURE: 56 MMHG | OXYGEN SATURATION: 94 % | SYSTOLIC BLOOD PRESSURE: 124 MMHG | HEART RATE: 76 BPM

## 2025-02-25 DIAGNOSIS — I73.9 PAD (PERIPHERAL ARTERY DISEASE) (HCC): ICD-10-CM

## 2025-02-25 DIAGNOSIS — Z79.01 CHRONIC ANTICOAGULATION: Primary | ICD-10-CM

## 2025-02-25 LAB — INR PPP: 1.5 (ref 2–3.5)

## 2025-02-25 PROCEDURE — 99211 OFF/OP EST MAY X REQ PHY/QHP: CPT | Performed by: NURSE PRACTITIONER

## 2025-02-25 PROCEDURE — 85610 PROTHROMBIN TIME: CPT | Performed by: NURSE PRACTITIONER

## 2025-02-25 NOTE — PROGRESS NOTES
Subjective:     CC: Hypertension follow-up    HPI:   Radha presents today with her daughter for the following:    Primary hypertension  Initial BP today 116/70.  Repeat /58.  Continues hydrochlorothiazide 12.5 mg daily and lisinopril 5 mg daily.  She takes her medication in the morning with her coffee. She was positive for urine microalbumin at her last appointment.  Her amlodipine was stopped and she was started on lisinopril.  Here for follow-up.  Home -212/77-89. She is using arm cuff. Denies chest pain, shortness of breath, dizziness, blurry vision or headaches.  Discussion today to bring in home BP cuff to correlate and they will bring in her home BP cuff tomorrow.  Discussion to check home BP once a week on Sundays as her daughter calls her on Sundays to remind her to take her ergocalciferol.    Vitamin D deficiency  Continues ergocalciferol 50,000 units weekly.  Plan for repeat labs after 12-week course.      Past Medical History:   Diagnosis Date    Carotid artery disease (HCC) 2010    CPPD (pseudo-gout) 2011    Diabetes (HCC) 2018    GOUT 2011    History of skin cancer 2016    Hyperlipidemia 2010    Hypertension 2010    PAD (peripheral artery disease) 2010    Pre-diabetes 2010    S/P hip replacement 2011    Superficial thrombophlebitis of right upper extremity 2021       Social History     Tobacco Use    Smoking status: Former     Current packs/day: 0.00     Average packs/day: 2.0 packs/day for 12.0 years (24.0 ttl pk-yrs)     Types: Cigarettes     Start date:      Quit date:      Years since quittin.1    Smokeless tobacco: Never   Vaping Use    Vaping status: Never Used   Substance Use Topics    Alcohol use: Yes     Comment: rarely    Drug use: No       Current Outpatient Medications Ordered in Epic   Medication Sig Dispense Refill    warfarin (COUMADIN) 2.5 MG Tab TAKE 1 AND 1/2 TABLETS BY MOUTH  DAILY OR AS DIRECTED BY   "ANTICOAGULATION CLINIC 135 Tablet 3    ergocalciferol (DRISDOL) 66137 UNIT capsule Take 1 Capsule by mouth every 7 days. 12 Capsule 0    lisinopril (PRINIVIL) 5 MG Tab Take 1 Tablet by mouth every day. 100 Tablet 3    rosuvastatin (CRESTOR) 5 MG Tab TAKE 1 TABLET BY MOUTH  DAILY 90 Tablet 3    hydroCHLOROthiazide 12.5 MG tablet Take 1 Tablet by mouth every day. 90 Tablet 3    nystatin (MYCOSTATIN) powder Apply 1 g topically 3 times a day. 15 g 0    doxylamine (UNISOM) 25 MG Tab tablet Take 25 mg by mouth at bedtime. (Patient not taking: Reported on 2/24/2025)       No current [facility]-ordered facility-administered medications on file.       Allergies:  Anesthetic [benzocaine], Iodine, Other drug, Codeine, Meperidine, and Naproxen    Health Maintenance: Due for shingles vaccine.      Objective:     Vital signs reviewed  Exam:  /58 (BP Location: Left arm, Patient Position: Sitting)   Pulse 61   Temp 36.2 °C (97.2 °F) (Temporal)   Ht 1.6 m (5' 3\")   Wt 73.6 kg (162 lb 4.1 oz)   SpO2 94%   BMI 28.74 kg/m²  Body mass index is 28.74 kg/m².    Gen: Alert and oriented, No apparent distress.  Lungs: Normal effort, CTA bilaterally, no wheezes, rhonchi, or rales  CV: Regular rate and rhythm. No murmurs, rubs, or gallops.      Assessment & Plan:     89 y.o. female with the following -     1. Primary hypertension  Chronic stable problem.  Repeat BP in office at goal of less than 140/90.  She will bring in her home BP cuff tomorrow to correlate.  For now continue hydrochlorothiazide 12.5 mg daily and lisinopril 5 mg daily.  Plan for BMP with upcoming labs around May 2025.  - Basic Metabolic Panel; Future    2. Vitamin D deficiency  Chronic exacerbated problem.  Continue with 12-week course of ergocalciferol 50,000 units weekly.  After completion recheck vitamin D level.  - VITAMIN D,25 HYDROXY (DEFICIENCY); Future    3. Hyperparathyroidism (HCC)  Chronic exacerbated problem.  Replacing vitamin D and after 12-week course " will check PTH again.  - PTH WITH CALCIUM; Future    4. Type 2 diabetes mellitus with microalbuminuria, without long-term current use of insulin (HCC)  Chronic exacerbated problem.  She has cut out Pepsi's and is drinking carbonated water.  Plan for repeat A1c with upcoming May 2025 labs.  - HEMOGLOBIN A1C; Future      Return in about 3 months (around 5/24/2025) for Labs, Diabetes, Hypertension.    Please note that this dictation was created using voice recognition software. I have made every reasonable attempt to correct obvious errors, but I expect that there are errors of grammar and possibly content that I did not discover before finalizing the note.

## 2025-02-25 NOTE — PROGRESS NOTES
Radha Verduzco is a 89 y.o. female here for a non-provider visit for MA BP CHECK    Encounter Vitals  Blood Pressure : 124/56  Pulse: 76  Pulse Oximetry: 94 %    If abnormal, was the Registered Nurse (office provider if RN is unavailable) notified today? Yes    Routed to PCP/Requested Provider? Yes

## 2025-02-25 NOTE — ASSESSMENT & PLAN NOTE
Initial BP today 116/70.  Repeat /58.  Continues hydrochlorothiazide 12.5 mg daily and lisinopril 5 mg daily.  She takes her medication in the morning with her coffee. She was positive for urine microalbumin at her last appointment.  Her amlodipine was stopped and she was started on lisinopril.  Here for follow-up.  Home -212/77-89. She is using arm cuff. Denies chest pain, shortness of breath, dizziness, blurry vision or headaches.  Discussion today to bring in home BP cuff to correlate and they will bring in her home BP cuff tomorrow.  Discussion to check home BP once a week on Sundays as her daughter calls her on Sundays to remind her to take her ergocalciferol.

## 2025-02-25 NOTE — PROGRESS NOTES
Anticoagulation Summary  As of 2025      INR goal:  2.0-3.0   TTR:  65.8% (6.1 y)   INR used for dosin.50 (2025)   Warfarin maintenance plan:  3.75 mg (2.5 mg x 1.5) every day   Weekly warfarin total:  26.25 mg   Plan last modified:  Fred Nayak, PharmD (2023)   Next INR check:  3/18/2025   Target end date:  Indefinite    Indications    Chronic anticoagulation [Z79.01]  PAD (peripheral artery disease) (HCC) [I73.9]                 Anticoagulation Episode Summary       INR check location:  Anticoagulation Clinic    Preferred lab:  --    Send INR reminders to:  --    Comments:  --          Anticoagulation Care Providers       Provider Role Specialty Phone number    CHACORTA Issa Referring Family Medicine [phone]    [facility] Anticoagulation Services Responsible  [phone]          Anticoagulation Patient Findings  Patient Findings       Negatives:  Signs/symptoms of thrombosis, Signs/symptoms of bleeding, Laboratory test error suspected, Change in health, Change in alcohol use, Change in activity, Upcoming invasive procedure, Emergency department visit, Upcoming dental procedure, Missed doses, Extra doses, Change in medications, Change in diet/appetite, Hospital admission, Bruising, Other complaints                  HPI:   Radha Ruiz Dax seen in clinic today, on anticoagulation therapy with warfarin 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    Patient's previous INR was therapeutic at 3.0 on 25, at which time patient was instructed to continue with current warfarin regimen.  She returns to clinic today to recheck INR to ensure it is therapeutic and thus preventing possible clotting and/or bleeding/bruising complications.    CHADS-VASc = n/a  (unadjusted ischemic stroke risk/year:  n/a)    Does patient have any changes to current medical/health status since last appt (Y/N):  NO  Does patient have any signs/symptoms of bleeding and/or thrombosis  since the last appt (Y/N):  NO  Does patient have any interval changes to diet or medications since last appt (Y/N):  NO  Are there any complications or cost restrictions with current therapy (Y/N):  NO     Does patient have [facility] PCP? Yes, CHACORTA Issa (If not, please document discussion that patient must be seen at [facility])       Vitals:  taken during MA visit today    There were no vitals filed for this visit.     Asssessment:      INR is subtherapeutic  Reason(s) for out of range INR today: No obvious causes      Pt is not on antiplatelet therapy    Medication reconciliation completed today.    Plan:  Pt is to bolus with 5mg X 1, then continue with current warfarin dosing regimen.     Follow up:  Because warfarin is a high risk medication and current CHEST guidelines recommend regular monitoring intervals (few days up to 12 weeks), will have patient return to clinic in 3 weeks to recheck INR (per patient and clinic availability).    Fred Nayak, PharmD, BCACP

## 2025-02-26 DIAGNOSIS — E11.29 TYPE 2 DIABETES MELLITUS WITH MICROALBUMINURIA, WITHOUT LONG-TERM CURRENT USE OF INSULIN (HCC): ICD-10-CM

## 2025-02-26 DIAGNOSIS — R80.9 TYPE 2 DIABETES MELLITUS WITH MICROALBUMINURIA, WITHOUT LONG-TERM CURRENT USE OF INSULIN (HCC): ICD-10-CM

## 2025-02-26 DIAGNOSIS — I10 PRIMARY HYPERTENSION: ICD-10-CM

## 2025-02-26 RX ORDER — LISINOPRIL 5 MG/1
5 TABLET ORAL DAILY
Qty: 100 TABLET | Refills: 3 | Status: SHIPPED | OUTPATIENT
Start: 2025-02-26 | End: 2026-04-02

## 2025-02-27 NOTE — TELEPHONE ENCOUNTER
Received request via: Pharmacy    Was the patient seen in the last year in this department? Yes    Does the patient have an active prescription (recently filled or refills available) for medication(s) requested? Yes change in pharmacy     [facility]    Does the patient have [facility] and need 100-day supply? (This applies to ALL medications) Patient does not have SCP

## 2025-02-27 NOTE — TELEPHONE ENCOUNTER
Requested Prescriptions     Signed Prescriptions Disp Refills    lisinopril (PRINIVIL) 5 MG Tab 100 Tablet 3     Sig: Take 1 Tablet by mouth every day.     Authorizing Provider: JANE CHEN     New pharmacy    CHACORTA Issa

## 2025-03-10 DIAGNOSIS — I73.9 PAD (PERIPHERAL ARTERY DISEASE) (HCC): ICD-10-CM

## 2025-03-18 ENCOUNTER — ANTICOAGULATION VISIT (OUTPATIENT)
Dept: MEDICAL GROUP | Facility: PHYSICIAN GROUP | Age: 89
End: 2025-03-18
Payer: MEDICARE

## 2025-03-18 VITALS
BODY MASS INDEX: 28.7 KG/M2 | WEIGHT: 162 LBS | OXYGEN SATURATION: 93 % | HEART RATE: 68 BPM | HEIGHT: 63 IN | RESPIRATION RATE: 15 BRPM

## 2025-03-18 DIAGNOSIS — Z79.01 CHRONIC ANTICOAGULATION: Primary | ICD-10-CM

## 2025-03-18 DIAGNOSIS — I73.9 PAD (PERIPHERAL ARTERY DISEASE) (HCC): ICD-10-CM

## 2025-03-18 LAB — INR PPP: 2 (ref 2–3.5)

## 2025-03-18 PROCEDURE — 85610 PROTHROMBIN TIME: CPT | Performed by: NURSE PRACTITIONER

## 2025-03-18 PROCEDURE — 93793 ANTICOAG MGMT PT WARFARIN: CPT | Performed by: PHARMACIST

## 2025-03-18 ASSESSMENT — FIBROSIS 4 INDEX: FIB4 SCORE: 2.36

## 2025-03-18 NOTE — PROGRESS NOTES
Anticoagulation Summary  As of 3/18/2025      INR goal:  2.0-3.0   TTR:  65.2% (6.1 y)   INR used for dosin.00 (3/18/2025)   Warfarin maintenance plan:  3.75 mg (2.5 mg x 1.5) every day   Weekly warfarin total:  26.25 mg   Plan last modified:  Fred Nayak, PharmD (2023)   Next INR check:  2025   Target end date:  Indefinite    Indications    Chronic anticoagulation [Z79.01]  PAD (peripheral artery disease) (HCC) [I73.9]                 Anticoagulation Episode Summary       INR check location:  Anticoagulation Clinic    Preferred lab:  --    Send INR reminders to:  --    Comments:  --          Anticoagulation Care Providers       Provider Role Specialty Phone number    CHACORTA Issa Referring Family Medicine [phone]    [facility] Anticoagulation Services Responsible  [phone]          Anticoagulation Patient Findings  Patient Findings       Negatives:  Signs/symptoms of thrombosis, Signs/symptoms of bleeding, Laboratory test error suspected, Change in health, Change in alcohol use, Change in activity, Upcoming invasive procedure, Emergency department visit, Upcoming dental procedure, Missed doses, Extra doses, Change in medications, Change in diet/appetite, Hospital admission, Bruising, Other complaints                  HPI:   Radha Ruiz Dax seen in clinic today, on anticoagulation therapy with warfarin 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    Patient's previous INR was subtherapeutic at 1.5 on 25, at which time patient was instructed to bolus with one dose, then resume current warfarin regimen.  She returns to clinic today to recheck INR to ensure it is therapeutic and thus preventing possible clotting and/or bleeding/bruising complications.    CHADS-VASc = n/a  (unadjusted ischemic stroke risk/year:  n/a)    Does patient have any changes to current medical/health status since last appt (Y/N):  NO  Does patient have any signs/symptoms of  "bleeding and/or thrombosis since the last appt (Y/N):  NO  Does patient have any interval changes to diet or medications since last appt (Y/N):  NO  Are there any complications or cost restrictions with current therapy (Y/N):  NO     Does patient have [facility] PCP? Yes, CHACORTA Issa (If not, please document discussion that patient must be seen at [facility])       Vitals:      Vitals:    03/18/25 1106   Pulse: 68   Resp: 15   SpO2: 93%   Weight: 73.5 kg (162 lb)   Height: 1.6 m (5' 2.99\")        Asssessment:      INR is therapeutic  Reason(s) for out of range INR today: N/A      Pt is not on antiplatelet therapy    Medication reconciliation completed today.    Plan:  Pt is to continue with current warfarin dosing regimen.     Follow up:  Because warfarin is a high risk medication and current CHEST guidelines recommend regular monitoring intervals (few days up to 12 weeks), will have patient return to clinic in 6 weeks to recheck INR.    Fred Nayak, PharmD, BCACP      "

## 2025-04-14 NOTE — ASSESSMENT & PLAN NOTE
Recent fall 2 weeks ago in her home.  She tripped over her long pajama pants.  Her daughter has since hemmed the pants.  She was checked out by EMS who told her she looked good.  She does have a bruise to her right breast that she saw last night that she would like me to look at today. She fell onto her right side but did not hit any chairs, or in tables, states that she fell right to the floor.   No

## 2025-04-18 DIAGNOSIS — E55.9 VITAMIN D DEFICIENCY: ICD-10-CM

## 2025-04-21 RX ORDER — ERGOCALCIFEROL 1.25 MG/1
50000 CAPSULE, LIQUID FILLED ORAL
Qty: 12 CAPSULE | Refills: 0 | Status: SHIPPED | OUTPATIENT
Start: 2025-04-21

## 2025-04-21 NOTE — TELEPHONE ENCOUNTER
Requested Prescriptions     Signed Prescriptions Disp Refills    vitamin D2, Ergocalciferol, (DRISDOL) 1.25 MG (14771 UT) Cap capsule 12 Capsule 0     Sig: TAKE ONE CAPSULE BY MOUTH EVERY 7 DAYS     Authorizing Provider: JANE CHEN A.P.R.N.

## 2025-04-29 ENCOUNTER — ANTICOAGULATION VISIT (OUTPATIENT)
Dept: MEDICAL GROUP | Facility: PHYSICIAN GROUP | Age: 89
End: 2025-04-29
Payer: MEDICARE

## 2025-04-29 DIAGNOSIS — I73.9 PAD (PERIPHERAL ARTERY DISEASE) (HCC): ICD-10-CM

## 2025-04-29 DIAGNOSIS — Z79.01 CHRONIC ANTICOAGULATION: Primary | ICD-10-CM

## 2025-04-29 LAB — INR PPP: 1.6 (ref 2–3.5)

## 2025-04-29 NOTE — PROGRESS NOTES
Anticoagulation Summary  As of 2025      INR goal:  2.0-3.0   TTR:  64.0% (6.2 y)   INR used for dosin.60 (2025)   Warfarin maintenance plan:  3.75 mg (2.5 mg x 1.5) every day   Weekly warfarin total:  26.25 mg   Plan last modified:  Fred Nayak, PharmD (2023)   Next INR check:  2025   Target end date:  Indefinite    Indications    Chronic anticoagulation [Z79.01]  PAD (peripheral artery disease) (HCC) [I73.9]                 Anticoagulation Episode Summary       INR check location:  Anticoagulation Clinic    Preferred lab:  --    Send INR reminders to:  --    Comments:  --          Anticoagulation Care Providers       Provider Role Specialty Phone number    CHACORTA Issa Referring Family Medicine [phone]    [facility] Anticoagulation Services Responsible  [phone]          Anticoagulation Patient Findings  Patient Findings       Negatives:  Signs/symptoms of thrombosis, Signs/symptoms of bleeding, Laboratory test error suspected, Change in health, Change in alcohol use, Change in activity, Upcoming invasive procedure, Emergency department visit, Upcoming dental procedure, Missed doses, Extra doses, Change in medications, Change in diet/appetite, Hospital admission, Bruising, Other complaints                  HPI:   Radha Ruiz Dax seen in clinic today, on anticoagulation therapy with warfarin 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    Patient's previous INR was therapeutic at 2.0 on 3-18-25, at which time patient was instructed to continue with current warfarin regimen.  She returns to clinic today to recheck INR to ensure it is therapeutic and thus preventing possible clotting and/or bleeding/bruising complications.    CHADS-VASc = n/a  (unadjusted ischemic stroke risk/year:  n/a)    Does patient have any changes to current medical/health status since last appt (Y/N):  NO  Does patient have any signs/symptoms of bleeding and/or thrombosis  since the last appt (Y/N):  NO  Does patient have any interval changes to diet or medications since last appt (Y/N):  NO  Are there any complications or cost restrictions with current therapy (Y/N):  NO     Does patient have [facility] PCP? Yes, CHACORTA Issa (If not, please document discussion that patient must be seen at [facility])       Vitals:      There were no vitals filed for this visit.     Asssessment:      INR is subtherapeutic  Reason(s) for out of range INR today: No obvious causes      Pt is not on antiplatelet therapy    Medication reconciliation completed today.    Plan:  Pt is to bolus with 5mg X 1, then continue with current warfarin dosing regimen.     Follow up:  Because warfarin is a high risk medication and current CHEST guidelines recommend regular monitoring intervals (few days up to 12 weeks), will have patient return to clinic in 3 weeks to recheck INR.    Fred Nayak, PharmD, BCACP

## 2025-05-13 NOTE — TELEPHONE ENCOUNTER
Pt notified on approval.    70 yo male with newly diagnosed ASXL1, DDX41 mutated AML, admitted for induction chemo with 7+3:  dauno 60 mg/m2+ cytarabine. 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 Chemo started on 5/2, Complicated by rigors, chills, fever, SOB  and tachycardia 2 hours into day 1 infusion - likely infusion reaction. Pt has pancytopenia d/t chemo and disease

## 2025-05-20 ENCOUNTER — ANTICOAGULATION VISIT (OUTPATIENT)
Dept: MEDICAL GROUP | Facility: PHYSICIAN GROUP | Age: 89
End: 2025-05-20
Payer: MEDICARE

## 2025-05-20 ENCOUNTER — HOSPITAL ENCOUNTER (OUTPATIENT)
Dept: LAB | Facility: MEDICAL CENTER | Age: 89
End: 2025-05-20
Attending: NURSE PRACTITIONER
Payer: MEDICARE

## 2025-05-20 VITALS — OXYGEN SATURATION: 94 % | HEIGHT: 63 IN | BODY MASS INDEX: 28.7 KG/M2 | RESPIRATION RATE: 14 BRPM | HEART RATE: 73 BPM

## 2025-05-20 DIAGNOSIS — E21.3 HYPERPARATHYROIDISM (HCC): ICD-10-CM

## 2025-05-20 DIAGNOSIS — E55.9 VITAMIN D DEFICIENCY: ICD-10-CM

## 2025-05-20 DIAGNOSIS — Z79.01 CHRONIC ANTICOAGULATION: Primary | ICD-10-CM

## 2025-05-20 DIAGNOSIS — I73.9 PAD (PERIPHERAL ARTERY DISEASE) (HCC): ICD-10-CM

## 2025-05-20 DIAGNOSIS — E11.29 TYPE 2 DIABETES MELLITUS WITH MICROALBUMINURIA, WITHOUT LONG-TERM CURRENT USE OF INSULIN (HCC): ICD-10-CM

## 2025-05-20 DIAGNOSIS — R80.9 TYPE 2 DIABETES MELLITUS WITH MICROALBUMINURIA, WITHOUT LONG-TERM CURRENT USE OF INSULIN (HCC): ICD-10-CM

## 2025-05-20 DIAGNOSIS — I10 PRIMARY HYPERTENSION: ICD-10-CM

## 2025-05-20 LAB
25(OH)D3 SERPL-MCNC: 34 NG/ML (ref 30–100)
ANION GAP SERPL CALC-SCNC: 11 MMOL/L (ref 7–16)
BUN SERPL-MCNC: 26 MG/DL (ref 8–22)
CHLORIDE SERPL-SCNC: 104 MMOL/L (ref 96–112)
CO2 SERPL-SCNC: 24 MMOL/L (ref 20–33)
CREAT SERPL-MCNC: 1.17 MG/DL (ref 0.5–1.4)
EST. AVERAGE GLUCOSE BLD GHB EST-MCNC: 160 MG/DL
FASTING STATUS PATIENT QL REPORTED: NORMAL
GFR SERPLBLD CREATININE-BSD FMLA CKD-EPI: 45 ML/MIN/1.73 M 2
GLUCOSE SERPL-MCNC: 138 MG/DL (ref 65–99)
HBA1C MFR BLD: 7.2 % (ref 4–5.6)
INR PPP: 2.6 (ref 2–3.5)
POTASSIUM SERPL-SCNC: 4.9 MMOL/L (ref 3.6–5.5)
SODIUM SERPL-SCNC: 139 MMOL/L (ref 135–145)

## 2025-05-20 PROCEDURE — 85610 PROTHROMBIN TIME: CPT | Performed by: NURSE PRACTITIONER

## 2025-05-20 PROCEDURE — 36415 COLL VENOUS BLD VENIPUNCTURE: CPT

## 2025-05-20 PROCEDURE — 83970 ASSAY OF PARATHORMONE: CPT

## 2025-05-20 PROCEDURE — 82306 VITAMIN D 25 HYDROXY: CPT

## 2025-05-20 PROCEDURE — 93793 ANTICOAG MGMT PT WARFARIN: CPT | Performed by: NURSE PRACTITIONER

## 2025-05-20 PROCEDURE — 83036 HEMOGLOBIN GLYCOSYLATED A1C: CPT | Mod: GA

## 2025-05-20 PROCEDURE — 80048 BASIC METABOLIC PNL TOTAL CA: CPT

## 2025-05-20 NOTE — PROGRESS NOTES
Anticoagulation Summary  As of 2025      INR goal:  2.0-3.0   TTR:  64.0% (6.3 y)   INR used for dosin.60 (2025)   Warfarin maintenance plan:  3.75 mg (2.5 mg x 1.5) every day   Weekly warfarin total:  26.25 mg   Plan last modified:  Fred Nayak, PharmD (2023)   Next INR check:  6/3/2025   Target end date:  Indefinite    Indications    Chronic anticoagulation [Z79.01]  PAD (peripheral artery disease) (HCC) [I73.9]                 Anticoagulation Episode Summary       INR check location:  Anticoagulation Clinic    Preferred lab:  --    Send INR reminders to:  --    Comments:  --          Anticoagulation Care Providers       Provider Role Specialty Phone number    CHACORTA Issa Referring Family Medicine [phone]    [facility] Anticoagulation Services Responsible  [phone]          Anticoagulation Patient Findings  Patient Findings       Negatives:  Signs/symptoms of thrombosis, Signs/symptoms of bleeding, Laboratory test error suspected, Change in health, Change in alcohol use, Change in activity, Upcoming invasive procedure, Emergency department visit, Upcoming dental procedure, Missed doses, Extra doses, Change in medications, Change in diet/appetite, Hospital admission, Bruising, Other complaints                  HPI:   Radha Ruiz Dax seen in clinic today, on anticoagulation therapy with warfarin 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    Patient's previous INR was subtherapeutic at 1.6 on 25, at which time patient was instructed to bolus with one dose, then resume current warfarin regimen.  She returns to clinic today to recheck INR to ensure it is therapeutic and thus preventing possible clotting and/or bleeding/bruising complications.    CHADS-VASc = n/a  (unadjusted ischemic stroke risk/year:  n/a)    Does patient have any changes to current medical/health status since last appt (Y/N):  NO  Does patient have any signs/symptoms of  "bleeding and/or thrombosis since the last appt (Y/N):  NO  Does patient have any interval changes to diet or medications since last appt (Y/N):  NO  Are there any complications or cost restrictions with current therapy (Y/N):  NO     Does patient have [facility] PCP? Yes, CHACORTA Issa (If not, please document discussion that patient must be seen at [facility])       Vitals:      Vitals:    05/20/25 1100   Pulse: 73   Resp: 14   SpO2: 94%   Height: 1.6 m (5' 2.99\")        Asssessment:      INR is therapeutic  Reason(s) for out of range INR today: N/A      Pt is not on antiplatelet therapy    Medication reconciliation completed today.    Plan:  Pt is to continue with current warfarin dosing regimen.     Follow up:  Because warfarin is a high risk medication and current CHEST guidelines recommend regular monitoring intervals (few days up to 12 weeks), will have patient return to clinic in 2 weeks to recheck INR.    Fred Nayak, PharmD, BCACP      "

## 2025-05-21 LAB
CALCIUM SERPL-MCNC: 9.6 MG/DL (ref 8.5–10.5)
PTH-INTACT SERPL-MCNC: 92.5 PG/ML (ref 14–72)

## 2025-06-02 ENCOUNTER — OFFICE VISIT (OUTPATIENT)
Dept: MEDICAL GROUP | Facility: PHYSICIAN GROUP | Age: 89
End: 2025-06-02
Payer: MEDICARE

## 2025-06-02 VITALS
HEIGHT: 62 IN | OXYGEN SATURATION: 92 % | DIASTOLIC BLOOD PRESSURE: 54 MMHG | SYSTOLIC BLOOD PRESSURE: 132 MMHG | WEIGHT: 159.39 LBS | TEMPERATURE: 98.2 F | BODY MASS INDEX: 29.33 KG/M2 | HEART RATE: 77 BPM

## 2025-06-02 DIAGNOSIS — E55.9 VITAMIN D DEFICIENCY: ICD-10-CM

## 2025-06-02 DIAGNOSIS — I10 PRIMARY HYPERTENSION: ICD-10-CM

## 2025-06-02 DIAGNOSIS — E21.3 HYPERPARATHYROIDISM (HCC): ICD-10-CM

## 2025-06-02 DIAGNOSIS — E11.22 TYPE 2 DIABETES MELLITUS WITH STAGE 3A CHRONIC KIDNEY DISEASE, WITHOUT LONG-TERM CURRENT USE OF INSULIN (HCC): Primary | ICD-10-CM

## 2025-06-02 DIAGNOSIS — N18.31 TYPE 2 DIABETES MELLITUS WITH STAGE 3A CHRONIC KIDNEY DISEASE, WITHOUT LONG-TERM CURRENT USE OF INSULIN (HCC): Primary | ICD-10-CM

## 2025-06-02 DIAGNOSIS — Z71.2 ENCOUNTER TO DISCUSS TEST RESULTS: ICD-10-CM

## 2025-06-02 PROCEDURE — 3078F DIAST BP <80 MM HG: CPT | Performed by: NURSE PRACTITIONER

## 2025-06-02 PROCEDURE — 3075F SYST BP GE 130 - 139MM HG: CPT | Performed by: NURSE PRACTITIONER

## 2025-06-02 PROCEDURE — 99214 OFFICE O/P EST MOD 30 MIN: CPT | Performed by: NURSE PRACTITIONER

## 2025-06-02 ASSESSMENT — FIBROSIS 4 INDEX: FIB4 SCORE: 2.38

## 2025-06-03 NOTE — ASSESSMENT & PLAN NOTE
Recent GFR 45.  BP controlled today at 132/54.  She is on lisinopril 5 mg daily.  Recent A1c 7.2%.  She has cut out soda.  She will continue with diet control.

## 2025-06-03 NOTE — ASSESSMENT & PLAN NOTE
BP today 132/54. Home BP monitoring at home initially but not recently. Continues hydrochlorothiazide 12.5 mg daily and lisinopril 5 mg daily.  She is drinking 1 glass of water with medications, drinks sparkling water 12 ounces, 1-2 cup of coffee. She has quit soda.  Denies chest pain, shortness of breath or dizziness.

## 2025-06-03 NOTE — PROGRESS NOTES
"Subjective:     CC: Lab results    HPI:   Radha presents today with her daughter for the following:    Primary hypertension  BP today 132/54. Home BP monitoring at home initially but not recently. Continues hydrochlorothiazide 12.5 mg daily and lisinopril 5 mg daily.  She is drinking 1 glass of water with medications, drinks sparkling water 12 ounces, 1-2 cup of coffee. She has quit soda.  Denies chest pain, shortness of breath or dizziness.    Hyperparathyroidism (HCC)  Vitamin D has been replaced with recent level of 34.  PTH has decreased although elevated.  GFR 45.  We discussed referral to nephrology and she declines at this time.  We discussed after completion of the high-dose vitamin D she can take over-the-counter vitamin D3 2000 units daily.  Repeat labs in 6 months around December 2025.    Type 2 diabetes mellitus with stage 3a chronic kidney disease, without long-term current use of insulin (HCC)  Recent GFR 45.  BP controlled today at 132/54.  She is on lisinopril 5 mg daily.  Recent A1c 7.2%.  She has cut out soda.  She will continue with diet control.    Vitamin D deficiency  Complete 12-week course of high-dose vitamin D then over-the-counter vitamin D3 2000 units daily.      Past Medical History[1]    Social History[2]    Current Medications and Prescriptions Ordered in Epic[3]    Allergies:  Anesthetic [benzocaine], Iodine, Other drug, Codeine, Meperidine, and Naproxen    Health Maintenance: Declines shingles vaccine      Objective:     Vital signs reviewed  Exam:  /54 (BP Location: Right arm, Patient Position: Sitting, BP Cuff Size: Adult)   Pulse 77   Temp 36.8 °C (98.2 °F) (Temporal)   Ht 1.575 m (5' 2\")   Wt 72.3 kg (159 lb 6.3 oz)   SpO2 92%   BMI 29.15 kg/m²  Body mass index is 29.15 kg/m².    Gen: Alert and oriented, No apparent distress.  Eyes:   Lids normal. Glasses in place.   Lungs: Normal effort, CTA bilaterally, no wheezes, rhonchi, or rales  CV: Regular rate and rhythm. " No murmurs, rubs, or gallops.  Ext: No clubbing, cyanosis with trace LE edema.      Assessment & Plan:     89 y.o. female with the following -     1. Type 2 diabetes mellitus with stage 3a chronic kidney disease, without long-term current use of insulin (HCC) (Primary)  Chronic stable problem.  Continue with diet control.  Continue with lisinopril 5 mg daily and rosuvastatin 5 mg daily.  Repeat labs around December 2025.  - Basic Metabolic Panel; Future  - HEMOGLOBIN A1C; Future    2. Primary hypertension  Chronic stable problem.  Continue hydrochlorothiazide 12.5 mg daily and lisinopril 5 mg daily.    3. Hyperparathyroidism (HCC)  Chronic exacerbated problem.  Continue with vitamin D3 supplementation. Repeat labs around December 2025. Calcium WNL.   - PTH INTACT (PTH ONLY); Future    4. Vitamin D deficiency  Chronic stable problem.  Continue vitamin D3 2000 units daily. Repeat labs around December 2025.  - VITAMIN D,25 HYDROXY (DEFICIENCY); Future    5. Encounter to discuss test results  Acute uncomplicated problem.  Discussed and reviewed lab results from 5/20/2025.      Return in about 6 months (around 12/10/2025) for annual wellness visit.    Please note that this dictation was created using voice recognition software. I have made every reasonable attempt to correct obvious errors, but I expect that there are errors of grammar and possibly content that I did not discover before finalizing the note.             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    Diabetes (HCC) 4/19/2018    GOUT 1/31/2011    History of skin cancer 2/24/2016    Hyperlipidemia 8/2/2010    Hypertension 8/2/2010    PAD (peripheral artery disease) 8/2/2010    Pre-diabetes 8/2/2010    S/P hip replacement 12/9/2011    Superficial thrombophlebitis of right upper extremity 11/8/2021   [2]   Social History  Tobacco Use    Smoking status: Former     Current packs/day: 0.00      Average packs/day: 2.0 packs/day for 12.0 years (24.0 ttl pk-yrs)     Types: Cigarettes     Start date:      Quit date:      Years since quittin.4    Smokeless tobacco: Never   Vaping Use    Vaping status: Never Used   Substance Use Topics    Alcohol use: Yes     Comment: rarely    Drug use: No   [3]   Current Outpatient Medications Ordered in Epic   Medication Sig Dispense Refill    vitamin D2, Ergocalciferol, (DRISDOL) 1.25 MG (72696 UT) Cap capsule TAKE ONE CAPSULE BY MOUTH EVERY 7 DAYS 12 Capsule 0    lisinopril (PRINIVIL) 5 MG Tab Take 1 Tablet by mouth every day. 100 Tablet 3    warfarin (COUMADIN) 2.5 MG Tab TAKE 1 AND 1/2 TABLETS BY MOUTH  DAILY OR AS DIRECTED BY  ANTICOAGULATION CLINIC 135 Tablet 3    rosuvastatin (CRESTOR) 5 MG Tab TAKE 1 TABLET BY MOUTH  DAILY 90 Tablet 3    hydroCHLOROthiazide 12.5 MG tablet Take 1 Tablet by mouth every day. 90 Tablet 3     No current Epic-ordered facility-administered medications on file.

## 2025-06-03 NOTE — ASSESSMENT & PLAN NOTE
Vitamin D has been replaced with recent level of 34.  PTH has decreased although elevated.  GFR 45.  We discussed referral to nephrology and she declines at this time.  We discussed after completion of the high-dose vitamin D she can take over-the-counter vitamin D3 2000 units daily.  Repeat labs in 6 months around December 2025.

## 2025-06-17 ENCOUNTER — APPOINTMENT (OUTPATIENT)
Dept: MEDICAL GROUP | Facility: PHYSICIAN GROUP | Age: 89
End: 2025-06-17
Payer: MEDICARE

## 2025-07-22 ENCOUNTER — ANTICOAGULATION VISIT (OUTPATIENT)
Dept: MEDICAL GROUP | Facility: PHYSICIAN GROUP | Age: 89
End: 2025-07-22
Payer: MEDICARE

## 2025-07-22 VITALS — HEART RATE: 77 BPM | RESPIRATION RATE: 15 BRPM | OXYGEN SATURATION: 99 % | HEIGHT: 62 IN | BODY MASS INDEX: 29.15 KG/M2

## 2025-07-22 DIAGNOSIS — I73.9 PAD (PERIPHERAL ARTERY DISEASE) (HCC): ICD-10-CM

## 2025-07-22 DIAGNOSIS — Z79.01 CHRONIC ANTICOAGULATION: Primary | ICD-10-CM

## 2025-07-22 LAB — INR PPP: 1.8 (ref 2–3.5)

## 2025-07-22 PROCEDURE — 99211 OFF/OP EST MAY X REQ PHY/QHP: CPT | Performed by: NURSE PRACTITIONER

## 2025-07-22 PROCEDURE — 85610 PROTHROMBIN TIME: CPT | Performed by: NURSE PRACTITIONER

## 2025-07-22 NOTE — PROGRESS NOTES
Anticoagulation Summary  As of 2025      INR goal:  2.0-3.0   TTR:  64.3% (6.5 y)   INR used for dosin.80 (2025)   Warfarin maintenance plan:  3.75 mg (2.5 mg x 1.5) every day   Weekly warfarin total:  26.25 mg   Plan last modified:  Fred Nayak, PharmD (2023)   Next INR check:  2025   Target end date:  Indefinite    Indications    Chronic anticoagulation [Z79.01]  PAD (peripheral artery disease) (HCC) [I73.9]                 Anticoagulation Episode Summary       INR check location:  Anticoagulation Clinic    Preferred lab:  --    Send INR reminders to:  --    Comments:  --          Anticoagulation Care Providers       Provider Role Specialty Phone number    CHACORTA Issa Referring Family Medicine [phone]    [facility] Anticoagulation Services Responsible  [phone]          Anticoagulation Patient Findings  Patient Findings       Negatives:  Signs/symptoms of thrombosis, Signs/symptoms of bleeding, Laboratory test error suspected, Change in health, Change in alcohol use, Change in activity, Upcoming invasive procedure, Emergency department visit, Upcoming dental procedure, Missed doses, Extra doses, Change in medications, Change in diet/appetite, Hospital admission, Bruising, Other complaints                  HPI:   Radha Ruiz Dax seen in clinic today, on anticoagulation therapy with warfarin 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    Patient's previous INR was therapeutic at 2.6 on 25, at which time patient was instructed to continue with current warfarin regimen.  She returns to clinic today to recheck INR to ensure it is therapeutic and thus preventing possible clotting and/or bleeding/bruising complications.    CHADS-VASc = n/a  (unadjusted ischemic stroke risk/year:  n/a)    Does patient have any changes to current medical/health status since last appt (Y/N):  NO  Does patient have any signs/symptoms of bleeding and/or thrombosis  "since the last appt (Y/N):  NO  Does patient have any interval changes to diet or medications since last appt (Y/N):  NO  Are there any complications or cost restrictions with current therapy (Y/N):  NO     Does patient have [facility] PCP? Yes, CHACORTA Issa (If not, please document discussion that patient must be seen at [facility])       Vitals:      Vitals:    07/22/25 1526   Pulse: 77   Resp: 15   SpO2: 99%   Height: 1.575 m (5' 2.01\")        Asssessment:      INR is subtherapeutic  Reason(s) for out of range INR today: No obvious causes      Pt is not on antiplatelet therapy    Medication reconciliation completed today.    Plan:  Pt is to bolus with 5mg X 1, then continue with current warfarin dosing regimen.     Follow up:  Because warfarin is a high risk medication and current CHEST guidelines recommend regular monitoring intervals (few days up to 12 weeks), will have patient return to clinic in 3 weeks to recheck INR.    Fred Nayak, PharmD, BCACP      "

## 2025-08-12 ENCOUNTER — ANTICOAGULATION VISIT (OUTPATIENT)
Dept: MEDICAL GROUP | Facility: PHYSICIAN GROUP | Age: 89
End: 2025-08-12
Payer: MEDICARE

## 2025-08-12 VITALS — BODY MASS INDEX: 29.26 KG/M2 | WEIGHT: 159 LBS | RESPIRATION RATE: 16 BRPM | HEIGHT: 62 IN

## 2025-08-12 DIAGNOSIS — Z79.01 CHRONIC ANTICOAGULATION: Primary | ICD-10-CM

## 2025-08-12 DIAGNOSIS — I73.9 PAD (PERIPHERAL ARTERY DISEASE) (HCC): ICD-10-CM

## 2025-08-12 LAB — INR PPP: 2.2 (ref 2–3.5)

## 2025-08-12 PROCEDURE — 85610 PROTHROMBIN TIME: CPT | Performed by: NURSE PRACTITIONER

## 2025-08-12 PROCEDURE — 93793 ANTICOAG MGMT PT WARFARIN: CPT | Performed by: NURSE PRACTITIONER

## 2025-08-12 ASSESSMENT — FIBROSIS 4 INDEX: FIB4 SCORE: 2.38

## (undated) DEVICE — GOWN SURGICAL X-LARGE ULTRA - FILM-REINFORCED (20/CA)

## (undated) DEVICE — NEPTUNE 4 PORT MANIFOLD - (20/PK)

## (undated) DEVICE — VESSELOOP MINI BLUE STERILE - SURG-I-LOOP (10EA/BX)

## (undated) DEVICE — DRAPE IOBAN II 23 IN X 33 IN - (10/BX)

## (undated) DEVICE — SYRINGE 10 ML CONTROL LL (25EA/BX 4BX/CA)

## (undated) DEVICE — SYRINGE DISP. 12 CC LL - (100/BX)

## (undated) DEVICE — VESSELOOP SUPER MAXI BLUE - SURG-I-LOOP (2EA/PK 10PK/BX)

## (undated) DEVICE — SUTURE 4-0 PROLENE PS-2 18 (36PK/BX)"

## (undated) DEVICE — KIT ANESTHESIA W/CIRCUIT & 3/LT BAG W/FILTER (20EA/CA)

## (undated) DEVICE — CHLORAPREP 26 ML APPLICATOR - ORANGE TINT(25/CA)

## (undated) DEVICE — ELECTRODE DUAL RETURN W/ CORD - (50/PK)

## (undated) DEVICE — SYRINGE 20 ML LL (50EA/BX 4BX/CA)

## (undated) DEVICE — SET EXTENSION WITH 2 PORTS (48EA/CA) ***PART #2C8610 IS A SUBSTITUTE*****

## (undated) DEVICE — SURGIFOAM (SIZE 100) - (6EA/CA)

## (undated) DEVICE — SET LEADWIRE 5 LEAD BEDSIDE DISPOSABLE ECG (1SET OF 5/EA)

## (undated) DEVICE — FILTER BLOOD TRANSFUSION - (40/CA) (PALL)

## (undated) DEVICE — PROTECTOR ULNA NERVE - (36PR/CA)

## (undated) DEVICE — DETERGENT RENUZYME PLUS 10 OZ PACKET (50/BX)

## (undated) DEVICE — SODIUM CHL IRRIGATION 0.9% 1000ML (12EA/CA)

## (undated) DEVICE — SLEEVE, VASO, THIGH, MED

## (undated) DEVICE — GLOVE BIOGEL SZ 7.5 SURGICAL PF LTX - (50PR/BX 4BX/CA)

## (undated) DEVICE — MASK ANESTHESIA ADULT  - (100/CA)

## (undated) DEVICE — DRESSING KIT V.A.C. SENSA T.R.A.C. SMALL (10EA/CA)

## (undated) DEVICE — TRANSDUCER ADULT DISP. SINGLE BONDED STERILE - (20EA/CA)

## (undated) DEVICE — CATHETER EMBOLECTOMY 3FR (1EA)

## (undated) DEVICE — CLIP MED INTNL HRZN TI ESCP - (25/BX)

## (undated) DEVICE — GLOVE BIOGEL SZ 8 SURGICAL PF LTX - (50PR/BX 4BX/CA)

## (undated) DEVICE — TUBE E-T HI-LO CUFF 7.0MM (10EA/PK)

## (undated) DEVICE — SENSOR SPO2 NEO LNCS ADHESIVE (20/BX) SEE USER NOTES

## (undated) DEVICE — GOWN SURGEONS LARGE - (32/CA)

## (undated) DEVICE — CELLSAVER STAT

## (undated) DEVICE — BAG DECANTER (50EA/CS)

## (undated) DEVICE — CLIP MED LG INTNL HRZN TI ESCP - (20/BX)

## (undated) DEVICE — KIT ROOM DECONTAMINATION

## (undated) DEVICE — TOWELS CLOTH SURGICAL - (4/PK 20PK/CA)

## (undated) DEVICE — CELLSAVER PACK

## (undated) DEVICE — VAC CANISTER W/GEL 500ML - FITS NEW MACHINES (10EA/CA)

## (undated) DEVICE — CANISTER SUCTION 3000ML MECHANICAL FILTER AUTO SHUTOFF MEDI-VAC NONSTERILE LF DISP  (40EA/CA)

## (undated) DEVICE — GLOVE BIOGEL SZ 7 SURGICAL PF LTX - (50PR/BX 4BX/CA)

## (undated) DEVICE — DRAPE LAPAROTOMY T SHEET - (12EA/CA)

## (undated) DEVICE — BLADE SURGICAL CLIPPER - (50EA/CA)

## (undated) DEVICE — SOD. CHL. INJ. 0.9% 1000 ML - (14EA/CA 60CA/PF)

## (undated) DEVICE — MASK AIRWAY SIZE 3 UNIQUE SILICON (10/BX)

## (undated) DEVICE — KIT RADIAL ARTERY 20GA W/MAX BARRIER AND BIOPATCH  (5EA/CA) #10740 IS FOR THE SET RADIAL ARTERIAL

## (undated) DEVICE — SUTURE CV

## (undated) DEVICE — SUTURE GENERAL

## (undated) DEVICE — BLADE SURGICAL #11 - (50/BX)

## (undated) DEVICE — KIT SURGIFLO W/OUT THROMBIN - (6EA/CA)

## (undated) DEVICE — STAPLER SKIN DISP - (6/BX 10BX/CA) VISISTAT

## (undated) DEVICE — LACTATED RINGERS INJ 1000 ML - (14EA/CA 60CA/PF)

## (undated) DEVICE — SYRINGE SAFETY TB 1 ML 27 GA X 1/2 IN (100/BX 4BX/CA)

## (undated) DEVICE — TUBING CLEARLINK DUO-VENT - C-FLO (48EA/CA)

## (undated) DEVICE — SYRINGE 30 ML LL (56/BX)

## (undated) DEVICE — CLIP LG INTNL HRZN TI ESCP LGT - (20/BX)

## (undated) DEVICE — PACK MINOR BASIN - (2EA/CA)

## (undated) DEVICE — VESSELOOP MAXI BLUE STERILE- SURG-I-LOOP (10EA/BX)

## (undated) DEVICE — SET FLUID WARMING STANDARD FLOW - (10/CA)

## (undated) DEVICE — GOWN WARMING STANDARD FLEX - (30/CA)

## (undated) DEVICE — SET BIFURCATED BLOOD - (48EA/CS)

## (undated) DEVICE — SPONGE PEANUT - (5/PK 50PK/CA)

## (undated) DEVICE — SPONGE GAUZESTER 4 X 4 4PLY - (128PK/CA)

## (undated) DEVICE — TRAY, CV CATH MULTI-LUM.AK-25703

## (undated) DEVICE — BLADE SURGICAL #15 - (50/BX 3BX/CA)

## (undated) DEVICE — DRAPE SURGICAL U 77X120 - (10/CA)

## (undated) DEVICE — ELECTRODE 850 FOAM ADHESIVE - HYDROGEL RADIOTRNSPRNT (50/PK)

## (undated) DEVICE — DISH PETRI STERILE (50EA/CA)

## (undated) DEVICE — HEAD HOLDER JUNIOR/ADULT

## (undated) DEVICE — SYRINGE SAFETY 5 ML 18 GA X 1-1/2 BLUNT LL (100/BX 4BX/CA)

## (undated) DEVICE — PACK MAJOR BASIN - (2EA/CA)

## (undated) DEVICE — TRAY MULTI-LUMEN 7FR PRESSURE W/MAX BARRIER AND BIOPATCH - (5/CA)

## (undated) DEVICE — CLIP SM INTNL HRZN TI ESCP LGT - (24EA/PK 25PK/BX)

## (undated) DEVICE — PAD LAP STERILE 18 X 18 - (5/PK 40PK/CA)

## (undated) DEVICE — GLOVE BIOGEL PI INDICATOR SZ 7.0 SURGICAL PF LF - (50/BX 4BX/CA)

## (undated) DEVICE — BOVIE BLADE COATED - (50/PK)

## (undated) DEVICE — GELAQUASONIC 100 ULTRASOUND - 48/BX 20GM STERILE FOIL POUCH

## (undated) DEVICE — SYRINGE SAFETY 10 ML 18 GA X 1 1/2 BLUNT LL (100/BX 4BX/CA)

## (undated) DEVICE — SUCTION INSTRUMENT YANKAUER BULBOUS TIP W/O VENT (50EA/CA)

## (undated) DEVICE — TRAY SURESTEP FOLEY TEMP SENSING 16FR (10EA/CA) ORDER  #18764 FOR TEMP FOLEY ONLY

## (undated) DEVICE — SHEET CARDIOVASCULAR - (4/CA)

## (undated) DEVICE — DRAPE LARGE 3 QUARTER - (20/CA)

## (undated) DEVICE — SYSTEM PEEL & PLACE 13CM INCISIONS